# Patient Record
Sex: FEMALE | Race: WHITE | NOT HISPANIC OR LATINO | Employment: OTHER | URBAN - METROPOLITAN AREA
[De-identification: names, ages, dates, MRNs, and addresses within clinical notes are randomized per-mention and may not be internally consistent; named-entity substitution may affect disease eponyms.]

---

## 2017-01-26 ENCOUNTER — OFFICE VISIT (OUTPATIENT)
Dept: INTERNAL MEDICINE | Facility: CLINIC | Age: 82
End: 2017-01-26
Payer: MEDICARE

## 2017-01-26 VITALS
SYSTOLIC BLOOD PRESSURE: 126 MMHG | WEIGHT: 143.94 LBS | BODY MASS INDEX: 25.5 KG/M2 | RESPIRATION RATE: 12 BRPM | HEIGHT: 63 IN | TEMPERATURE: 98 F | DIASTOLIC BLOOD PRESSURE: 84 MMHG | HEART RATE: 64 BPM

## 2017-01-26 DIAGNOSIS — I25.10 CORONARY ARTERY DISEASE INVOLVING NATIVE CORONARY ARTERY OF NATIVE HEART WITHOUT ANGINA PECTORIS: ICD-10-CM

## 2017-01-26 DIAGNOSIS — F02.80 LATE ONSET ALZHEIMER'S DISEASE WITHOUT BEHAVIORAL DISTURBANCE: ICD-10-CM

## 2017-01-26 DIAGNOSIS — G30.1 LATE ONSET ALZHEIMER'S DISEASE WITHOUT BEHAVIORAL DISTURBANCE: ICD-10-CM

## 2017-01-26 DIAGNOSIS — G30.1 LATE ONSET ALZHEIMER'S DISEASE WITH BEHAVIORAL DISTURBANCE: ICD-10-CM

## 2017-01-26 DIAGNOSIS — M25.552 LEFT HIP PAIN: ICD-10-CM

## 2017-01-26 DIAGNOSIS — S72.142D CLOSED DISPLACED INTERTROCHANTERIC FRACTURE OF LEFT FEMUR WITH ROUTINE HEALING, SUBSEQUENT ENCOUNTER: ICD-10-CM

## 2017-01-26 DIAGNOSIS — I10 ESSENTIAL HYPERTENSION: ICD-10-CM

## 2017-01-26 DIAGNOSIS — I35.0 SEVERE AORTIC STENOSIS: ICD-10-CM

## 2017-01-26 DIAGNOSIS — K21.9 GASTROESOPHAGEAL REFLUX DISEASE, ESOPHAGITIS PRESENCE NOT SPECIFIED: ICD-10-CM

## 2017-01-26 DIAGNOSIS — Z23 NEED FOR TETANUS BOOSTER: ICD-10-CM

## 2017-01-26 DIAGNOSIS — R41.3 MEMORY LOSS: ICD-10-CM

## 2017-01-26 DIAGNOSIS — Z00.00 WELLNESS EXAMINATION: Primary | ICD-10-CM

## 2017-01-26 DIAGNOSIS — F32.A DEPRESSION, UNSPECIFIED DEPRESSION TYPE: ICD-10-CM

## 2017-01-26 DIAGNOSIS — E03.9 ACQUIRED HYPOTHYROIDISM: ICD-10-CM

## 2017-01-26 DIAGNOSIS — M89.9 BONE DISORDER: ICD-10-CM

## 2017-01-26 DIAGNOSIS — F02.818 LATE ONSET ALZHEIMER'S DISEASE WITH BEHAVIORAL DISTURBANCE: ICD-10-CM

## 2017-01-26 DIAGNOSIS — E78.5 HYPERLIPIDEMIA, UNSPECIFIED HYPERLIPIDEMIA TYPE: ICD-10-CM

## 2017-01-26 DIAGNOSIS — D63.8 ANEMIA OF CHRONIC DISEASE: ICD-10-CM

## 2017-01-26 DIAGNOSIS — L98.9 HAND LESION: ICD-10-CM

## 2017-01-26 PROCEDURE — 1160F RVW MEDS BY RX/DR IN RCRD: CPT | Mod: S$GLB,,, | Performed by: INTERNAL MEDICINE

## 2017-01-26 PROCEDURE — 99213 OFFICE O/P EST LOW 20 MIN: CPT | Mod: 25,S$GLB,, | Performed by: INTERNAL MEDICINE

## 2017-01-26 PROCEDURE — 90714 TD VACC NO PRESV 7 YRS+ IM: CPT | Mod: S$GLB,,, | Performed by: INTERNAL MEDICINE

## 2017-01-26 PROCEDURE — 99999 PR PBB SHADOW E&M-EST. PATIENT-LVL V: CPT | Mod: PBBFAC,,, | Performed by: INTERNAL MEDICINE

## 2017-01-26 PROCEDURE — 90471 IMMUNIZATION ADMIN: CPT | Mod: S$GLB,,, | Performed by: INTERNAL MEDICINE

## 2017-01-26 PROCEDURE — 1157F ADVNC CARE PLAN IN RCRD: CPT | Mod: S$GLB,,, | Performed by: INTERNAL MEDICINE

## 2017-01-26 PROCEDURE — 1125F AMNT PAIN NOTED PAIN PRSNT: CPT | Mod: S$GLB,,, | Performed by: INTERNAL MEDICINE

## 2017-01-26 PROCEDURE — 1159F MED LIST DOCD IN RCRD: CPT | Mod: S$GLB,,, | Performed by: INTERNAL MEDICINE

## 2017-01-26 RX ORDER — LEVOTHYROXINE SODIUM 50 UG/1
50 TABLET ORAL
Qty: 90 TABLET | Refills: 1 | Status: SHIPPED | OUTPATIENT
Start: 2017-01-26 | End: 2017-05-31 | Stop reason: SDUPTHER

## 2017-01-26 RX ORDER — LOSARTAN POTASSIUM 25 MG/1
12.5 TABLET ORAL DAILY
Qty: 30 TABLET | Refills: 3 | Status: ON HOLD | OUTPATIENT
Start: 2017-01-26 | End: 2017-03-20 | Stop reason: HOSPADM

## 2017-01-26 RX ORDER — ATORVASTATIN CALCIUM 40 MG/1
40 TABLET, FILM COATED ORAL DAILY
Qty: 90 TABLET | Refills: 3 | Status: SHIPPED | OUTPATIENT
Start: 2017-01-26 | End: 2018-04-10 | Stop reason: SDUPTHER

## 2017-01-26 RX ORDER — ALPRAZOLAM 0.25 MG/1
0.25 TABLET ORAL NIGHTLY PRN
Qty: 30 TABLET | Refills: 0 | Status: SHIPPED | OUTPATIENT
Start: 2017-01-26 | End: 2017-01-26

## 2017-01-30 ENCOUNTER — HOSPITAL ENCOUNTER (OUTPATIENT)
Dept: RADIOLOGY | Facility: HOSPITAL | Age: 82
Discharge: HOME | DRG: 291 | End: 2017-01-30
Attending: ORTHOPAEDIC SURGERY
Payer: MEDICARE

## 2017-01-30 ENCOUNTER — OFFICE VISIT (OUTPATIENT)
Dept: ORTHOPEDICS | Facility: CLINIC | Age: 82
DRG: 291 | End: 2017-01-30
Payer: MEDICARE

## 2017-01-30 VITALS
WEIGHT: 143.94 LBS | RESPIRATION RATE: 16 BRPM | DIASTOLIC BLOOD PRESSURE: 74 MMHG | HEART RATE: 75 BPM | HEIGHT: 63 IN | SYSTOLIC BLOOD PRESSURE: 151 MMHG | BODY MASS INDEX: 25.5 KG/M2

## 2017-01-30 DIAGNOSIS — M25.552 LEFT HIP PAIN: Primary | ICD-10-CM

## 2017-01-30 DIAGNOSIS — M25.552 LEFT HIP PAIN: ICD-10-CM

## 2017-01-30 DIAGNOSIS — M70.72 BURSITIS OF HIP, LEFT: ICD-10-CM

## 2017-01-30 PROCEDURE — 1125F AMNT PAIN NOTED PAIN PRSNT: CPT | Mod: S$GLB,,, | Performed by: PHYSICIAN ASSISTANT

## 2017-01-30 PROCEDURE — 1160F RVW MEDS BY RX/DR IN RCRD: CPT | Mod: S$GLB,,, | Performed by: PHYSICIAN ASSISTANT

## 2017-01-30 PROCEDURE — 1159F MED LIST DOCD IN RCRD: CPT | Mod: S$GLB,,, | Performed by: PHYSICIAN ASSISTANT

## 2017-01-30 PROCEDURE — 1157F ADVNC CARE PLAN IN RCRD: CPT | Mod: S$GLB,,, | Performed by: PHYSICIAN ASSISTANT

## 2017-01-30 PROCEDURE — 73521 X-RAY EXAM HIPS BI 2 VIEWS: CPT | Mod: 26,,, | Performed by: RADIOLOGY

## 2017-01-30 PROCEDURE — 99213 OFFICE O/P EST LOW 20 MIN: CPT | Mod: 25,S$GLB,, | Performed by: PHYSICIAN ASSISTANT

## 2017-01-30 PROCEDURE — 20610 DRAIN/INJ JOINT/BURSA W/O US: CPT | Mod: LT,S$GLB,, | Performed by: PHYSICIAN ASSISTANT

## 2017-01-30 PROCEDURE — 99999 PR PBB SHADOW E&M-EST. PATIENT-LVL IV: CPT | Mod: PBBFAC,,, | Performed by: PHYSICIAN ASSISTANT

## 2017-01-30 RX ORDER — BETAMETHASONE SODIUM PHOSPHATE AND BETAMETHASONE ACETATE 3; 3 MG/ML; MG/ML
12 INJECTION, SUSPENSION INTRA-ARTICULAR; INTRALESIONAL; INTRAMUSCULAR; SOFT TISSUE
Status: COMPLETED | OUTPATIENT
Start: 2017-01-30 | End: 2017-01-30

## 2017-01-30 RX ADMIN — BETAMETHASONE SODIUM PHOSPHATE AND BETAMETHASONE ACETATE 12 MG: 3; 3 INJECTION, SUSPENSION INTRA-ARTICULAR; INTRALESIONAL; INTRAMUSCULAR; SOFT TISSUE at 03:01

## 2017-01-30 NOTE — LETTER
January 30, 2017      Vishal Avendaño MD  [address] [facility] [place] [address]           [facility]  [address]  [place] [address]  Phone: [phone]          Patient: Janine Awad   MR Number: [id number]   YOB: 1927   Date of Visit: 1/30/2017       Dear Dr. Vishal Avendaño:    Thank you for referring Janine Awad to me for evaluation. Attached you will find relevant portions of my assessment and plan of care.    If you have questions, please do not hesitate to call me. I look forward to following Janine Awad along with you.    Sincerely,    Jamin Mustafa PA-C    Enclosure  CC:  No Recipients    If you would like to receive this communication electronically, please contact [email or web address] or [phone] to request more information on [employer] Link access.    For providers and/or their staff who would like to refer a patient to [facility], please contact us through our one-stop-shop provider referral line, Angelic Major, at [phone].    If you feel you have received this communication in error or would no longer like to receive these types of communications, please e-mail [email or web address]

## 2017-01-30 NOTE — PROGRESS NOTES
SUBJECTIVE:     Chief Complaint & History of Present Illness:  Janine Awad is a Established patient 89 y.o. female who is seen here today with a complaint of    Chief Complaint   Patient presents with    Left Hip - Pain     Patient suffered a fall in June 2016.     .  She is patient well known to us has undergone an IM nailing of her left hip in June 2016 has recovered very well.  She gel developed some soreness and tenderness in the lateral aspect of the left hip associated with standing and ambulation  On a scale of 1-10, with 10 being worst pain imaginable, he rates this pain as 5 on good days and 6 on bad days.  she describes the pain as tender.      Past Medical History   Diagnosis Date    Acquired hypothyroidism     Closed displaced intertrochanteric fracture of left femur s/p IM nail on 6/5/2016 6/5/2016    Coronary artery disease involving native coronary artery of native heart without angina pectoris     Essential hypertension     GERD (gastroesophageal reflux disease)     Hx of colonic polyps     Hyperlipidemia     Macular degeneration     Mitral valve stenosis     Severe aortic stenosis 9/22/2014    Syncope 1/5/2015       Past Surgical History   Procedure Laterality Date    Intertrochanteric hip fracture surgery Left 06/05/2016     IM nail       Family History   Problem Relation Age of Onset    Heart disease Mother     Cancer Mother     Heart disease Father     No Known Problems Sister     No Known Problems Brother     No Known Problems Maternal Grandmother     No Known Problems Maternal Grandfather     No Known Problems Paternal Grandmother     No Known Problems Paternal Grandfather     No Known Problems Maternal Aunt     No Known Problems Maternal Uncle     No Known Problems Paternal Aunt     No Known Problems Paternal Uncle     Anemia Neg Hx     Arrhythmia Neg Hx     Asthma Neg Hx     Clotting disorder Neg Hx     Fainting Neg Hx     Heart attack Neg Hx      Heart failure Neg Hx     Hyperlipidemia Neg Hx     Hypertension Neg Hx     Stroke Neg Hx     Atrial Septal Defect Neg Hx        Review of patient's allergies indicates:   Allergen Reactions    Codeine      chills    Lisinopril Other (See Comments)     cough         Current Outpatient Prescriptions:     acetaminophen (TYLENOL) 325 MG tablet, Take 2 tablets (650 mg total) by mouth every 6 (six) hours as needed for Pain., Disp: , Rfl: 0    aspirin (ECOTRIN) 81 MG EC tablet, Take 81 mg by mouth once daily., Disp: , Rfl:     atorvastatin (LIPITOR) 40 MG tablet, Take 1 tablet (40 mg total) by mouth once daily., Disp: 90 tablet, Rfl: 3    calcium citrate-vitamin D3 315-200 mg (CITRACAL+D) 315-200 mg-unit per tablet, Take 1 tablet by mouth once daily., Disp: , Rfl:     COCONUT OIL ORAL, Take by mouth., Disp: , Rfl:     estrogens, conjugated, (PREMARIN) 0.3 MG tablet, Take 0.3 mg by mouth once daily., Disp: , Rfl:     fish oil-omega-3 fatty acids 300-1,000 mg capsule, Take 2 g by mouth once daily., Disp: , Rfl:     GINSENG ORAL, Take by mouth., Disp: , Rfl:     levothyroxine (SYNTHROID) 50 MCG tablet, Take 1 tablet (50 mcg total) by mouth before breakfast., Disp: 90 tablet, Rfl: 1    losartan (COZAAR) 25 MG tablet, Take 0.5 tablets (12.5 mg total) by mouth once daily. Hold for SBP < 110., Disp: 30 tablet, Rfl: 3    omeprazole (PRILOSEC) 20 MG capsule, Take 20 mg by mouth once daily. , Disp: , Rfl:     polyethylene glycol (GLYCOLAX) 17 gram PwPk, Take 17 g by mouth 2 (two) times daily., Disp: , Rfl: 0    senna-docusate 8.6-50 mg (PERICOLACE) 8.6-50 mg per tablet, Take 2 tablets by mouth 2 (two) times daily., Disp: , Rfl:     vitamin D 1000 units Tab, Take 185 mg by mouth once daily., Disp: , Rfl:     ZINC ACETATE ORAL, Take by mouth., Disp: , Rfl:     Review of Systems:  ROS:  Constitutional: no fever or chills  Eyes: no visual changes, Positive macular degeneration  ENT: no nasal congestion or sore  "throat  Respiratory: no cough or shortness of breath  Cardiovascular: no chest pain or palpitations, Positive for mitral valve stenosis, CAD, severe aortic stenosis  Gastrointestinal: no nausea or vomiting, tolerating diet, Positive for GERD  Genitourinary: no hematuria or dysuria  Integument/Breast: no rash or pruritis  Hematologic/Lymphatic: no easy bruising or lymphadenopathy, Positive hypothyroidism  Musculoskeletal: no arthralgias or myalgias  Neurological: no seizures or tremors, Positive for dementia Alzheimer's disease  Behavioral/Psych: no auditory or visual hallucinations, Positive for depression  Endocrine: no heat or cold intolerance      PE:  Visit Vitals    BP (!) 151/74    Pulse 75    Resp 16    Ht 5' 3" (1.6 m)    Wt 65.3 kg (143 lb 15.4 oz)    LMP  (LMP Unknown)    BMI 25.5 kg/m2     General: Pleasant, cooperative, NAD   HEENT: NCAT, sclera nonicteric   Lungs: Respirations are equal and unlabored.   Abdomen: Soft and non-tender.  CV: 2+ bilateral upper and lower extremity pulses.   Skin: Intact throughout LE with no rashes, erythema, or lesions  Extremities: No LE edema, NVI lower extremities        Hip Exam:   leftpositives: tenderness over greater trochanter and negatives: FROM  no pain with heel impact  pulses full    120 degrees flexion  -05 degrees extension   30 degrees internal rotation  45 degrees external rotation  40 degrees abduction  -05 degrees adduction   0 flexion contracture      RADIOGRAPHS:  X-rays taken today films reviewed by me demonstrate IM nail in good alignment unchanged from previous x-rays no evidence of fracture dislocation or other degenerative joint disease joint spaces well maintained    ASSESSMENT/PLAN:     Plan: We discussed with the patient at length all the different treatment options available for arthrosis of the hip including anti-inflammatories, acetaminophen, rest, ice, lower extremity strengthening exercise, occasional cortisone injections for " temporary relief, and finally total hip arthroplasty.   We will proceed with therapeutic diagnostic injection of the left greater trochanteric bursa     The injection site was identified and the skin was prepared with an ETOH solution. The   left  greater trochanteric bursa was injected with 1 ml of Celestone and 5 ml Lidocaine under sterile technique. Janine Awad tolerated the procedure well, she was advised to rest the  hip  today, ice and support. she did receive immediate relief of the pain in and about her  hip  she was told this would be short lived and is secondary to the lidocaine. she may have an increase in her discomfort tonight followed by steady improvement over the next several days. It may take 1-3 weeks following the injection to get the full benefit of the medication.  I will see her back in 3-6 months. Sooner if she has any problems or concerns.    Janine Awad was advised to monitor her blood sugars closely over the next several days. The steroid may cause a rise in them. If her glucose levels rise to a point she is uncomfortable or he is unable to control them is is to contact his PCP or go immediately to the emergency department.

## 2017-02-01 ENCOUNTER — TELEPHONE (OUTPATIENT)
Dept: INTERNAL MEDICINE | Facility: CLINIC | Age: 82
End: 2017-02-01

## 2017-02-01 ENCOUNTER — HOSPITAL ENCOUNTER (INPATIENT)
Facility: HOSPITAL | Age: 82
LOS: 2 days | Discharge: HOME HEALTH | DRG: 291 | End: 2017-02-03
Attending: EMERGENCY MEDICINE | Admitting: INTERNAL MEDICINE
Payer: MEDICARE

## 2017-02-01 DIAGNOSIS — I50.9 HEART FAILURE: ICD-10-CM

## 2017-02-01 DIAGNOSIS — I50.9 ACUTE ON CHRONIC CONGESTIVE HEART FAILURE, UNSPECIFIED CONGESTIVE HEART FAILURE TYPE: ICD-10-CM

## 2017-02-01 DIAGNOSIS — J81.0 ACUTE PULMONARY EDEMA: ICD-10-CM

## 2017-02-01 DIAGNOSIS — J18.9 PNEUMONIA OF LEFT LOWER LOBE DUE TO INFECTIOUS ORGANISM: Primary | ICD-10-CM

## 2017-02-01 DIAGNOSIS — R09.02 HYPOXIA: ICD-10-CM

## 2017-02-01 DIAGNOSIS — R06.02 SHORTNESS OF BREATH: ICD-10-CM

## 2017-02-01 LAB
ALBUMIN SERPL BCP-MCNC: 3.5 G/DL
ALP SERPL-CCNC: 93 U/L
ALT SERPL W/O P-5'-P-CCNC: 15 U/L
ANION GAP SERPL CALC-SCNC: 11 MMOL/L
AST SERPL-CCNC: 19 U/L
BACTERIA #/AREA URNS AUTO: ABNORMAL /HPF
BASOPHILS # BLD AUTO: 0.04 K/UL
BASOPHILS NFR BLD: 0.2 %
BILIRUB SERPL-MCNC: 1.5 MG/DL
BILIRUB UR QL STRIP: NEGATIVE
BNP SERPL-MCNC: 852 PG/ML
BUN SERPL-MCNC: 14 MG/DL
CALCIUM SERPL-MCNC: 9.1 MG/DL
CHLORIDE SERPL-SCNC: 103 MMOL/L
CLARITY UR REFRACT.AUTO: CLEAR
CO2 SERPL-SCNC: 24 MMOL/L
COLOR UR AUTO: YELLOW
CREAT SERPL-MCNC: 0.7 MG/DL
DIFFERENTIAL METHOD: ABNORMAL
EOSINOPHIL # BLD AUTO: 0.1 K/UL
EOSINOPHIL NFR BLD: 0.9 %
ERYTHROCYTE [DISTWIDTH] IN BLOOD BY AUTOMATED COUNT: 14.7 %
EST. GFR  (AFRICAN AMERICAN): >60 ML/MIN/1.73 M^2
EST. GFR  (NON AFRICAN AMERICAN): >60 ML/MIN/1.73 M^2
GLUCOSE SERPL-MCNC: 94 MG/DL
GLUCOSE UR QL STRIP: NEGATIVE
HCT VFR BLD AUTO: 35.3 %
HGB BLD-MCNC: 11.7 G/DL
HGB UR QL STRIP: NEGATIVE
INR PPP: 1
KETONES UR QL STRIP: NEGATIVE
LACTATE SERPL-SCNC: 1.9 MMOL/L
LEUKOCYTE ESTERASE UR QL STRIP: ABNORMAL
LIPASE SERPL-CCNC: 10 U/L
LYMPHOCYTES # BLD AUTO: 2.1 K/UL
LYMPHOCYTES NFR BLD: 12.8 %
MCH RBC QN AUTO: 31.1 PG
MCHC RBC AUTO-ENTMCNC: 33.1 %
MCV RBC AUTO: 94 FL
MICROSCOPIC COMMENT: ABNORMAL
MONOCYTES # BLD AUTO: 0.9 K/UL
MONOCYTES NFR BLD: 5.8 %
NEUTROPHILS # BLD AUTO: 12.8 K/UL
NEUTROPHILS NFR BLD: 79.7 %
NITRITE UR QL STRIP: NEGATIVE
PH UR STRIP: 8 [PH] (ref 5–8)
PLATELET # BLD AUTO: 229 K/UL
PMV BLD AUTO: 10.5 FL
POTASSIUM SERPL-SCNC: 4.2 MMOL/L
PROT SERPL-MCNC: 7.1 G/DL
PROT UR QL STRIP: NEGATIVE
PROTHROMBIN TIME: 11 SEC
RBC # BLD AUTO: 3.76 M/UL
RBC #/AREA URNS AUTO: 1 /HPF (ref 0–4)
SODIUM SERPL-SCNC: 138 MMOL/L
SP GR UR STRIP: 1.01 (ref 1–1.03)
SQUAMOUS #/AREA URNS AUTO: 4 /HPF
T4 FREE SERPL-MCNC: 0.8 NG/DL
TROPONIN I SERPL DL<=0.01 NG/ML-MCNC: 0.04 NG/ML
TROPONIN I SERPL DL<=0.01 NG/ML-MCNC: 0.05 NG/ML
URN SPEC COLLECT METH UR: ABNORMAL
UROBILINOGEN UR STRIP-ACNC: NEGATIVE EU/DL
WBC # BLD AUTO: 16.07 K/UL
WBC #/AREA URNS AUTO: 18 /HPF (ref 0–5)

## 2017-02-01 PROCEDURE — 84439 ASSAY OF FREE THYROXINE: CPT

## 2017-02-01 PROCEDURE — 83605 ASSAY OF LACTIC ACID: CPT

## 2017-02-01 PROCEDURE — 99285 EMERGENCY DEPT VISIT HI MDM: CPT | Mod: 25

## 2017-02-01 PROCEDURE — 87040 BLOOD CULTURE FOR BACTERIA: CPT

## 2017-02-01 PROCEDURE — 63600175 PHARM REV CODE 636 W HCPCS: Performed by: EMERGENCY MEDICINE

## 2017-02-01 PROCEDURE — 25000003 PHARM REV CODE 250: Performed by: INTERNAL MEDICINE

## 2017-02-01 PROCEDURE — 85025 COMPLETE CBC W/AUTO DIFF WBC: CPT

## 2017-02-01 PROCEDURE — 96372 THER/PROPH/DIAG INJ SC/IM: CPT

## 2017-02-01 PROCEDURE — 93010 ELECTROCARDIOGRAM REPORT: CPT | Mod: ,,, | Performed by: INTERNAL MEDICINE

## 2017-02-01 PROCEDURE — 93005 ELECTROCARDIOGRAM TRACING: CPT

## 2017-02-01 PROCEDURE — 85610 PROTHROMBIN TIME: CPT

## 2017-02-01 PROCEDURE — 83690 ASSAY OF LIPASE: CPT

## 2017-02-01 PROCEDURE — 25000003 PHARM REV CODE 250: Performed by: EMERGENCY MEDICINE

## 2017-02-01 PROCEDURE — 20600001 HC STEP DOWN PRIVATE ROOM

## 2017-02-01 PROCEDURE — 96366 THER/PROPH/DIAG IV INF ADDON: CPT

## 2017-02-01 PROCEDURE — 84484 ASSAY OF TROPONIN QUANT: CPT

## 2017-02-01 PROCEDURE — 63600175 PHARM REV CODE 636 W HCPCS: Performed by: INTERNAL MEDICINE

## 2017-02-01 PROCEDURE — 84484 ASSAY OF TROPONIN QUANT: CPT | Mod: 91

## 2017-02-01 PROCEDURE — 96376 TX/PRO/DX INJ SAME DRUG ADON: CPT

## 2017-02-01 PROCEDURE — 81001 URINALYSIS AUTO W/SCOPE: CPT

## 2017-02-01 PROCEDURE — 96375 TX/PRO/DX INJ NEW DRUG ADDON: CPT

## 2017-02-01 PROCEDURE — 80053 COMPREHEN METABOLIC PANEL: CPT

## 2017-02-01 PROCEDURE — 99285 EMERGENCY DEPT VISIT HI MDM: CPT | Mod: ,,, | Performed by: EMERGENCY MEDICINE

## 2017-02-01 PROCEDURE — 96367 TX/PROPH/DG ADDL SEQ IV INF: CPT

## 2017-02-01 PROCEDURE — 96365 THER/PROPH/DIAG IV INF INIT: CPT

## 2017-02-01 PROCEDURE — 83880 ASSAY OF NATRIURETIC PEPTIDE: CPT

## 2017-02-01 RX ORDER — ASPIRIN 325 MG
325 TABLET ORAL
Status: DISCONTINUED | OUTPATIENT
Start: 2017-02-01 | End: 2017-02-01

## 2017-02-01 RX ORDER — AZITHROMYCIN 250 MG/1
250 TABLET, FILM COATED ORAL DAILY
Status: DISCONTINUED | OUTPATIENT
Start: 2017-02-02 | End: 2017-02-03 | Stop reason: HOSPADM

## 2017-02-01 RX ORDER — LEVOTHYROXINE SODIUM 25 UG/1
50 TABLET ORAL
Status: DISCONTINUED | OUTPATIENT
Start: 2017-02-02 | End: 2017-02-03 | Stop reason: HOSPADM

## 2017-02-01 RX ORDER — HEPARIN SODIUM 5000 [USP'U]/ML
5000 INJECTION, SOLUTION INTRAVENOUS; SUBCUTANEOUS EVERY 8 HOURS
Status: DISCONTINUED | OUTPATIENT
Start: 2017-02-01 | End: 2017-02-02

## 2017-02-01 RX ORDER — ACETAMINOPHEN 325 MG/1
650 TABLET ORAL EVERY 6 HOURS PRN
Status: DISCONTINUED | OUTPATIENT
Start: 2017-02-01 | End: 2017-02-03 | Stop reason: HOSPADM

## 2017-02-01 RX ORDER — AMOXICILLIN 250 MG
2 CAPSULE ORAL 2 TIMES DAILY PRN
Status: DISCONTINUED | OUTPATIENT
Start: 2017-02-01 | End: 2017-02-03 | Stop reason: HOSPADM

## 2017-02-01 RX ORDER — ASPIRIN 81 MG/1
81 TABLET ORAL DAILY
Status: DISCONTINUED | OUTPATIENT
Start: 2017-02-02 | End: 2017-02-03 | Stop reason: HOSPADM

## 2017-02-01 RX ORDER — FUROSEMIDE 10 MG/ML
10 INJECTION INTRAMUSCULAR; INTRAVENOUS
Status: COMPLETED | OUTPATIENT
Start: 2017-02-01 | End: 2017-02-01

## 2017-02-01 RX ORDER — POLYETHYLENE GLYCOL 3350 17 G/17G
17 POWDER, FOR SOLUTION ORAL 2 TIMES DAILY PRN
Status: DISCONTINUED | OUTPATIENT
Start: 2017-02-01 | End: 2017-02-03 | Stop reason: HOSPADM

## 2017-02-01 RX ORDER — ATORVASTATIN CALCIUM 20 MG/1
40 TABLET, FILM COATED ORAL DAILY
Status: DISCONTINUED | OUTPATIENT
Start: 2017-02-02 | End: 2017-02-03 | Stop reason: HOSPADM

## 2017-02-01 RX ORDER — FUROSEMIDE 10 MG/ML
20 INJECTION INTRAMUSCULAR; INTRAVENOUS DAILY
Status: DISCONTINUED | OUTPATIENT
Start: 2017-02-01 | End: 2017-02-03 | Stop reason: HOSPADM

## 2017-02-01 RX ORDER — HYDROCODONE BITARTRATE AND ACETAMINOPHEN 5; 325 MG/1; MG/1
1 TABLET ORAL
Status: DISPENSED | OUTPATIENT
Start: 2017-02-01 | End: 2017-02-02

## 2017-02-01 RX ORDER — PANTOPRAZOLE SODIUM 40 MG/1
40 TABLET, DELAYED RELEASE ORAL DAILY
Status: DISCONTINUED | OUTPATIENT
Start: 2017-02-02 | End: 2017-02-03 | Stop reason: HOSPADM

## 2017-02-01 RX ADMIN — CEFTRIAXONE 1 G: 1 INJECTION, SOLUTION INTRAVENOUS at 06:02

## 2017-02-01 RX ADMIN — FUROSEMIDE 10 MG: 10 INJECTION, SOLUTION INTRAMUSCULAR; INTRAVENOUS at 06:02

## 2017-02-01 RX ADMIN — ACETAMINOPHEN 650 MG: 325 TABLET ORAL at 07:02

## 2017-02-01 RX ADMIN — FUROSEMIDE 20 MG: 10 INJECTION, SOLUTION INTRAVENOUS at 09:02

## 2017-02-01 RX ADMIN — HEPARIN SODIUM 5000 UNITS: 5000 INJECTION, SOLUTION INTRAVENOUS; SUBCUTANEOUS at 10:02

## 2017-02-01 RX ADMIN — Medication 1 CAPSULE: at 09:02

## 2017-02-01 RX ADMIN — AZITHROMYCIN MONOHYDRATE 500 MG: 500 INJECTION, POWDER, LYOPHILIZED, FOR SOLUTION INTRAVENOUS at 07:02

## 2017-02-01 NOTE — ED PROVIDER NOTES
"Encounter Date: 2/1/2017    SCRIBE #1 NOTE: I, Ashley Dykes, am scribing for, and in the presence of,  Dr. Marin. I have scribed the entire note.       History     Chief Complaint   Patient presents with    Chest Pain     Episode of SOB while getting dresse to go to a doctor's appointment.  left sided chest pain. Alzheimers     Review of patient's allergies indicates:   Allergen Reactions    Codeine      chills    Lisinopril Other (See Comments)     cough     HPI     Time seen by provider: 4:39 PM    This is a 89 y.o. female with PM Hx of GERD, essential HTN, severe aortic stenosis (9/22/14), and with Alzheimer's who presents to ER. Pt's daughter called in to PCP stating pt had a fever at approximately 101 Fahrenheit. Associated symptoms included increased weakness, wheezing, neck pain, and upper back pain. Daughter attempted to get an appointment but stated her mother was too weak and decided to bring her to ER instead. Pt also c/o mid-chest pain when she stood up associated with SOB. She states, "my breathing doesn't seem to be normal." Pt denies cough, nausea, vomiting, or any other symptoms at this time. Took two baby aspirin PTA.       Past Medical History   Diagnosis Date    Acquired hypothyroidism     Closed displaced intertrochanteric fracture of left femur s/p IM nail on 6/5/2016 6/5/2016    Coronary artery disease involving native coronary artery of native heart without angina pectoris     Essential hypertension     GERD (gastroesophageal reflux disease)     Hx of colonic polyps     Hyperlipidemia     Macular degeneration     Mitral valve stenosis     Severe aortic stenosis 9/22/2014    Syncope 1/5/2015     Past Medical History Pertinent Negatives   Diagnosis Date Noted    AAA (abdominal aortic aneurysm) 1/5/2015    Acute coronary syndrome 1/5/2015    Asthma 1/5/2015    Atrial fibrillation 1/5/2015    Atrial flutter 1/5/2015    Cancer 1/5/2015    Cardiomyopathy 1/5/2015    Carotid " artery occlusion 1/5/2015    CHF (congestive heart failure) 1/5/2015    Clotting disorder 1/5/2015    Diabetes mellitus 1/5/2015    Heart block 1/5/2015    Heart murmur 1/5/2015    Sleep apnea 1/5/2015    Stroke 1/5/2015    Supraventricular tachycardia 1/5/2015    Valvular regurgitation 1/5/2015    Ventricular tachycardia 1/5/2015     Past Surgical History   Procedure Laterality Date    Intertrochanteric hip fracture surgery Left 06/05/2016     IM nail     Family History   Problem Relation Age of Onset    Heart disease Mother     Cancer Mother     Heart disease Father     No Known Problems Sister     No Known Problems Brother     No Known Problems Maternal Grandmother     No Known Problems Maternal Grandfather     No Known Problems Paternal Grandmother     No Known Problems Paternal Grandfather     No Known Problems Maternal Aunt     No Known Problems Maternal Uncle     No Known Problems Paternal Aunt     No Known Problems Paternal Uncle     Anemia Neg Hx     Arrhythmia Neg Hx     Asthma Neg Hx     Clotting disorder Neg Hx     Fainting Neg Hx     Heart attack Neg Hx     Heart failure Neg Hx     Hyperlipidemia Neg Hx     Hypertension Neg Hx     Stroke Neg Hx     Atrial Septal Defect Neg Hx      Social History   Substance Use Topics    Smoking status: Never Smoker    Smokeless tobacco: Never Used    Alcohol use 0.6 oz/week     1 Standard drinks or equivalent per week      Comment: rare      Review of Systems   Constitutional: Positive for fever (101 at home).   Respiratory: Positive for shortness of breath and wheezing. Negative for cough.    Cardiovascular: Positive for chest pain (mid chest).   Gastrointestinal: Negative for nausea and vomiting.   Musculoskeletal: Positive for back pain (upper back) and neck pain.   Neurological: Positive for weakness.   All other systems reviewed and are negative.      Physical Exam   Initial Vitals   BP Pulse Resp Temp SpO2   02/01/17 1524  02/01/17 1524 02/01/17 1524 02/01/17 1524 02/01/17 1524   169/69 80 18 98.9 °F (37.2 °C) 91 %     Physical Exam     Gen/Constitutional: Interactive. No acute distress  Head: Normocephalic, Atraumatic  Neck: supple, no masses or LAD  Eyes: PERRLA, conjunctiva clear  Ears, Nose and Throat: No rhinorrhea or stridor.  Cardiac: Reg Rhythm, No murmur  Pulmonary: CTA Bilat, no wheezes, rhonchi. Scattered rales in left lower lobe. No accessory muscle use.   GI: Abdomen soft, non-tender, non-distended; no rebound or guarding  : No CVA tenderness.  Musculoskeletal: Extremities warm, well perfused, no erythema, no edema  Skin: No rashes  Neuro: Alert and Oriented x 3; No focal motor or sensory deficits.    Psych: Normal affect      ED Course   Procedures  Labs Reviewed   CBC W/ AUTO DIFFERENTIAL - Abnormal; Notable for the following:        Result Value    WBC 16.07 (*)     RBC 3.76 (*)     Hemoglobin 11.7 (*)     Hematocrit 35.3 (*)     MCH 31.1 (*)     RDW 14.7 (*)     Gran # 12.8 (*)     Gran% 79.7 (*)     Lymph% 12.8 (*)     All other components within normal limits   COMPREHENSIVE METABOLIC PANEL - Abnormal; Notable for the following:     Total Bilirubin 1.5 (*)     All other components within normal limits   TROPONIN I - Abnormal; Notable for the following:     Troponin I 0.035 (*)     All other components within normal limits   B-TYPE NATRIURETIC PEPTIDE - Abnormal; Notable for the following:      (*)     All other components within normal limits   URINALYSIS - Abnormal; Notable for the following:     Leukocytes, UA 3+ (*)     All other components within normal limits   URINALYSIS MICROSCOPIC - Abnormal; Notable for the following:     WBC, UA 18 (*)     All other components within normal limits   CULTURE, BLOOD   CULTURE, BLOOD   LACTIC ACID, PLASMA   LIPASE   PROTIME-INR   TSH   T4, FREE   TROPONIN I     Imaging Results         X-Ray Chest 1 View (Final result) Result time:  02/01/17 18:29:23    Final result  by Dean Garza MD (02/01/17 18:29:23)    Impression:      Patchy areas of heterogeneous, predominantly airspace, consolidation in the middle and lower lung zones bilaterally.  Findings could relate to pneumonia, atelectasis, or pulmonary edema.  ______________________________________     Electronically signed by resident: DEAN GARZA MD  Date:     02/01/17  Time:    18:05            As the supervising and teaching physician, I personally reviewed the images and resident's interpretation and I agree with the findings.            Electronically signed by: RAKAN LAZAR MD  Date:     02/01/17  Time:    18:29     Narrative:    Technique:  Chest AP radiograph    Comparison: Radiograph 6/4/2016.    Findings:   Lung volumes are normal and symmetric.  Mediastinal arches are midline.  The cardiac silhouette is enlarged, stable.  Patchy, nonsegmental heterogeneous opacities are seen in the middle and lower lung zones bilaterally.  Atherosclerotic calcification is noted at the level of the aortic arch.  No pleural effusion or pneumothorax is seen.  The osseous structures demonstrate degenerative change without evidence of acute fracture or osseous destructive process.                EKG Readings: (Independently Interpreted)   EKG: NSR, no CRISTIAN's or STD's, non-specific twave pattern, no STEMI, and no changes when compared to prior.             Medical Decision Making:   History:   Old Medical Records: I decided to obtain old medical records.  Independently Interpreted Test(s):   I have ordered and independently interpreted EKG Reading(s) - see prior notes    Clinical Tests:  Labs Test(s) were ordered and reviewed by me.  Radiological study(s) were ordered and reviewed by me.  Medical test(s) were ordered and reviewed by me.      Pt presents with SOB, CP, and reported fever at home. My primary concern was evaluation for  PNA, MI/ACS, and COPD exacerbation, among other diagnoses. Plan to get cardiac labs, EKG, and CXR. Also  considered PE but felt it to be unlikely based on hx and exam.       5:43 PM  CXR consistent with mild pulmonary edema bilaterally as well as likely infiltrate in the left lower lobe. Given these findings I felt she warranted coverage for CAP as well as lasix for diuresis, and I felt she warranted admission to medicine for treatment of PNA and heart failure. Accepted for admit by [facility] because of new dx of heart failure.           Scribe Attestation:   Scribe #1: I performed the above scribed service and the documentation accurately describes the services I performed. I attest to the accuracy of the note.    Attending Attestation:           Physician Attestation for Scribe:  Physician Attestation Statement for Scribe #1: I, Dr. Marin, reviewed documentation, as scribed by Ashley Dykes in my presence, and it is both accurate and complete.                 ED Course     Clinical Impression:   The primary encounter diagnosis was Pneumonia of left lower lobe due to infectious organism. Diagnoses of Shortness of breath, Acute on chronic congestive heart failure, unspecified congestive heart failure type, and Hypoxia were also pertinent to this visit.    Disposition:   Disposition: Admitted  Condition: Leonardo Marin MD  02/01/17 0312

## 2017-02-01 NOTE — TELEPHONE ENCOUNTER
Received call from phone staff- phone staff said that she tried to call triage line,but was place on hold. Told phone staff to go ahead and give me call, since she really didn't know why daughter was calling,except that pt was seen by Dr Avendaño on Monday(Dr Avendaño was not here).     Spoke with daughter,she said that pt is running a fever of 100-101,and pt says she is wheezing,even though daughter doesn't hear anything,and pt is weak.     The daughter wanted Dr Avendaño to do something for pt,since pt was seen last week. She was explained to that this was a new problem,and would need to be seen.  An appt was given for today at 340 with Dr Enciso. Daughter did say that she didn't know if she could get her mother in cause she was weak. She is aware that if she couldn't get help,she should call 911,and bring pt to ER and get evaluated.      Dr Avendaño was made verbally aware of the phone conversation,and agreed with plan.

## 2017-02-01 NOTE — PROVIDER PROGRESS NOTES - EMERGENCY DEPT.
Encounter Date: 2/1/2017    ED Physician Progress Notes       SCRIBE NOTE: I, Abraham Walker, am scribing for, and in the presence of,  Daniel Carrasco MD.  Physician Statement: I, Daniel Carrasco MD, personally performed the services described in this documentation as scribed by Abraham Walker in my presence, and it is both accurate and complete.      EKG - STEMI Decision  Initial Reading: No STEMI present.

## 2017-02-02 PROBLEM — J81.0 ACUTE PULMONARY EDEMA: Status: ACTIVE | Noted: 2017-02-02

## 2017-02-02 LAB
ALBUMIN SERPL BCP-MCNC: 3.4 G/DL
ALP SERPL-CCNC: 86 U/L
ALT SERPL W/O P-5'-P-CCNC: 13 U/L
ANION GAP SERPL CALC-SCNC: 11 MMOL/L
AORTIC VALVE REGURGITATION: ABNORMAL
AORTIC VALVE STENOSIS: ABNORMAL
APTT BLDCRRT: 25.3 SEC
AST SERPL-CCNC: 22 U/L
BASOPHILS # BLD AUTO: 0.05 K/UL
BASOPHILS # BLD AUTO: 0.05 K/UL
BASOPHILS NFR BLD: 0.5 %
BASOPHILS NFR BLD: 0.5 %
BILIRUB SERPL-MCNC: 1.5 MG/DL
BUN SERPL-MCNC: 20 MG/DL
CALCIUM SERPL-MCNC: 9.3 MG/DL
CHLORIDE SERPL-SCNC: 102 MMOL/L
CO2 SERPL-SCNC: 28 MMOL/L
CREAT SERPL-MCNC: 0.9 MG/DL
DIASTOLIC DYSFUNCTION: YES
DIFFERENTIAL METHOD: ABNORMAL
DIFFERENTIAL METHOD: ABNORMAL
EOSINOPHIL # BLD AUTO: 0.3 K/UL
EOSINOPHIL # BLD AUTO: 0.3 K/UL
EOSINOPHIL NFR BLD: 2.8 %
EOSINOPHIL NFR BLD: 2.8 %
ERYTHROCYTE [DISTWIDTH] IN BLOOD BY AUTOMATED COUNT: 14.8 %
ERYTHROCYTE [DISTWIDTH] IN BLOOD BY AUTOMATED COUNT: 14.8 %
EST. GFR  (AFRICAN AMERICAN): >60 ML/MIN/1.73 M^2
EST. GFR  (NON AFRICAN AMERICAN): 56.9 ML/MIN/1.73 M^2
GLUCOSE SERPL-MCNC: 96 MG/DL
HCT VFR BLD AUTO: 36.8 %
HCT VFR BLD AUTO: 36.8 %
HGB BLD-MCNC: 12.1 G/DL
HGB BLD-MCNC: 12.1 G/DL
LYMPHOCYTES # BLD AUTO: 2.4 K/UL
LYMPHOCYTES # BLD AUTO: 2.4 K/UL
LYMPHOCYTES NFR BLD: 23.2 %
LYMPHOCYTES NFR BLD: 23.2 %
MAGNESIUM SERPL-MCNC: 2.6 MG/DL
MCH RBC QN AUTO: 31 PG
MCH RBC QN AUTO: 31 PG
MCHC RBC AUTO-ENTMCNC: 32.9 %
MCHC RBC AUTO-ENTMCNC: 32.9 %
MCV RBC AUTO: 94 FL
MCV RBC AUTO: 94 FL
MITRAL VALVE MOBILITY: ABNORMAL
MITRAL VALVE REGURGITATION: ABNORMAL
MONOCYTES # BLD AUTO: 0.5 K/UL
MONOCYTES # BLD AUTO: 0.5 K/UL
MONOCYTES NFR BLD: 4.7 %
MONOCYTES NFR BLD: 4.7 %
NEUTROPHILS # BLD AUTO: 7.2 K/UL
NEUTROPHILS # BLD AUTO: 7.2 K/UL
NEUTROPHILS NFR BLD: 68.5 %
NEUTROPHILS NFR BLD: 68.5 %
PLATELET # BLD AUTO: 215 K/UL
PLATELET # BLD AUTO: 215 K/UL
PMV BLD AUTO: 10.8 FL
PMV BLD AUTO: 10.8 FL
POTASSIUM SERPL-SCNC: 4.2 MMOL/L
PROT SERPL-MCNC: 7.3 G/DL
RBC # BLD AUTO: 3.9 M/UL
RBC # BLD AUTO: 3.9 M/UL
RETIRED EF AND QEF - SEE NOTES: 55 (ref 55–65)
SODIUM SERPL-SCNC: 141 MMOL/L
TRICUSPID VALVE REGURGITATION: ABNORMAL
TROPONIN I SERPL DL<=0.01 NG/ML-MCNC: 0.04 NG/ML
TROPONIN I SERPL DL<=0.01 NG/ML-MCNC: 0.04 NG/ML
WBC # BLD AUTO: 10.53 K/UL
WBC # BLD AUTO: 10.53 K/UL

## 2017-02-02 PROCEDURE — 25000003 PHARM REV CODE 250: Performed by: INTERNAL MEDICINE

## 2017-02-02 PROCEDURE — G8988 SELF CARE GOAL STATUS: HCPCS | Mod: CI

## 2017-02-02 PROCEDURE — 63600175 PHARM REV CODE 636 W HCPCS: Performed by: INTERNAL MEDICINE

## 2017-02-02 PROCEDURE — G8989 SELF CARE D/C STATUS: HCPCS | Mod: CJ

## 2017-02-02 PROCEDURE — 93306 TTE W/DOPPLER COMPLETE: CPT

## 2017-02-02 PROCEDURE — 85025 COMPLETE CBC W/AUTO DIFF WBC: CPT

## 2017-02-02 PROCEDURE — 93306 TTE W/DOPPLER COMPLETE: CPT | Mod: 26,,, | Performed by: INTERNAL MEDICINE

## 2017-02-02 PROCEDURE — 97161 PT EVAL LOW COMPLEX 20 MIN: CPT

## 2017-02-02 PROCEDURE — 25000003 PHARM REV CODE 250: Performed by: HOSPITALIST

## 2017-02-02 PROCEDURE — 36415 COLL VENOUS BLD VENIPUNCTURE: CPT

## 2017-02-02 PROCEDURE — 99233 SBSQ HOSP IP/OBS HIGH 50: CPT | Mod: ,,, | Performed by: HOSPITALIST

## 2017-02-02 PROCEDURE — 85730 THROMBOPLASTIN TIME PARTIAL: CPT

## 2017-02-02 PROCEDURE — 84484 ASSAY OF TROPONIN QUANT: CPT

## 2017-02-02 PROCEDURE — 83735 ASSAY OF MAGNESIUM: CPT

## 2017-02-02 PROCEDURE — G8987 SELF CARE CURRENT STATUS: HCPCS | Mod: CJ

## 2017-02-02 PROCEDURE — 80053 COMPREHEN METABOLIC PANEL: CPT

## 2017-02-02 PROCEDURE — 63600175 PHARM REV CODE 636 W HCPCS: Performed by: HOSPITALIST

## 2017-02-02 PROCEDURE — 97165 OT EVAL LOW COMPLEX 30 MIN: CPT

## 2017-02-02 PROCEDURE — 20600001 HC STEP DOWN PRIVATE ROOM

## 2017-02-02 RX ORDER — ASPIRIN 325 MG
325 TABLET ORAL DAILY
Status: DISCONTINUED | OUTPATIENT
Start: 2017-02-02 | End: 2017-02-02

## 2017-02-02 RX ORDER — CLOPIDOGREL 300 MG/1
300 TABLET, FILM COATED ORAL ONCE
Status: COMPLETED | OUTPATIENT
Start: 2017-02-02 | End: 2017-02-02

## 2017-02-02 RX ORDER — TRAMADOL HYDROCHLORIDE 50 MG/1
50 TABLET ORAL EVERY 6 HOURS PRN
Status: DISCONTINUED | OUTPATIENT
Start: 2017-02-02 | End: 2017-02-03 | Stop reason: HOSPADM

## 2017-02-02 RX ORDER — HEPARIN SODIUM 5000 [USP'U]/ML
5000 INJECTION, SOLUTION INTRAVENOUS; SUBCUTANEOUS EVERY 12 HOURS
Status: DISCONTINUED | OUTPATIENT
Start: 2017-02-02 | End: 2017-02-03 | Stop reason: HOSPADM

## 2017-02-02 RX ORDER — HYDROCODONE BITARTRATE AND ACETAMINOPHEN 5; 325 MG/1; MG/1
1 TABLET ORAL EVERY 6 HOURS PRN
Status: DISCONTINUED | OUTPATIENT
Start: 2017-02-02 | End: 2017-02-03 | Stop reason: HOSPADM

## 2017-02-02 RX ORDER — HEPARIN SODIUM,PORCINE/D5W 25000/250
15 INTRAVENOUS SOLUTION INTRAVENOUS CONTINUOUS
Status: DISCONTINUED | OUTPATIENT
Start: 2017-02-02 | End: 2017-02-02

## 2017-02-02 RX ORDER — ONDANSETRON 2 MG/ML
4 INJECTION INTRAMUSCULAR; INTRAVENOUS EVERY 8 HOURS PRN
Status: DISCONTINUED | OUTPATIENT
Start: 2017-02-02 | End: 2017-02-03 | Stop reason: HOSPADM

## 2017-02-02 RX ORDER — ONDANSETRON 2 MG/ML
4 INJECTION INTRAMUSCULAR; INTRAVENOUS ONCE
Status: COMPLETED | OUTPATIENT
Start: 2017-02-02 | End: 2017-02-02

## 2017-02-02 RX ADMIN — CEFTRIAXONE 1 G: 1 INJECTION, SOLUTION INTRAVENOUS at 05:02

## 2017-02-02 RX ADMIN — HYDROCODONE BITARTRATE AND ACETAMINOPHEN 1 TABLET: 5; 325 TABLET ORAL at 03:02

## 2017-02-02 RX ADMIN — FUROSEMIDE 20 MG: 10 INJECTION, SOLUTION INTRAVENOUS at 08:02

## 2017-02-02 RX ADMIN — TRAMADOL HYDROCHLORIDE 50 MG: 50 TABLET, COATED ORAL at 11:02

## 2017-02-02 RX ADMIN — Medication 1 CAPSULE: at 08:02

## 2017-02-02 RX ADMIN — ONDANSETRON 4 MG: 2 INJECTION INTRAMUSCULAR; INTRAVENOUS at 07:02

## 2017-02-02 RX ADMIN — ASPIRIN 81 MG: 81 TABLET, COATED ORAL at 08:02

## 2017-02-02 RX ADMIN — HEPARIN SODIUM AND DEXTROSE 15 UNITS/KG/HR: 10000; 5 INJECTION INTRAVENOUS at 05:02

## 2017-02-02 RX ADMIN — LOSARTAN POTASSIUM 12.5 MG: 25 TABLET, FILM COATED ORAL at 08:02

## 2017-02-02 RX ADMIN — ATORVASTATIN CALCIUM 40 MG: 20 TABLET, FILM COATED ORAL at 08:02

## 2017-02-02 RX ADMIN — ONDANSETRON 4 MG: 2 INJECTION INTRAMUSCULAR; INTRAVENOUS at 03:02

## 2017-02-02 RX ADMIN — Medication 1 CAPSULE: at 09:02

## 2017-02-02 RX ADMIN — AZITHROMYCIN 250 MG: 250 TABLET, FILM COATED ORAL at 08:02

## 2017-02-02 RX ADMIN — PANTOPRAZOLE SODIUM 40 MG: 40 TABLET, DELAYED RELEASE ORAL at 08:02

## 2017-02-02 RX ADMIN — HEPARIN SODIUM 5000 UNITS: 5000 INJECTION, SOLUTION INTRAVENOUS; SUBCUTANEOUS at 08:02

## 2017-02-02 RX ADMIN — HEPARIN SODIUM 5000 UNITS: 5000 INJECTION, SOLUTION INTRAVENOUS; SUBCUTANEOUS at 09:02

## 2017-02-02 RX ADMIN — CLOPIDOGREL BISULFATE 300 MG: 300 TABLET, FILM COATED ORAL at 05:02

## 2017-02-02 RX ADMIN — LEVOTHYROXINE SODIUM 50 MCG: 25 TABLET ORAL at 05:02

## 2017-02-02 NOTE — PLAN OF CARE
Problem: Physical Therapy Goal  Goal: Physical Therapy Goal  Goals to be met by: 17     Patient will increase functional independence with mobility by performin. Supine to sit with Modified [place]  2. Sit to stand transfer with Supervision  3. Bed to chair transfer with Supervision using AD as needed.  4. Gait x 300 feet with Supervision using AD as needed.   5. Lower extremity exercise program x15 reps, with assistance as needed, in order to increase LE strength and (I) with functional mobility.   Outcome: Ongoing (interventions implemented as appropriate)  PT evaluation complete and appropriate goals established.     Andreina Faith, PT, DPT   2017  [phone]

## 2017-02-02 NOTE — PLAN OF CARE
02/02/17 1246   Discharge Assessment   Assessment Type Discharge Planning Assessment   Confirmed/corrected address and phone number on facesheet? Yes   Assessment information obtained from? Patient;Caregiver;Medical Record   Expected Length of Stay (days) 3   Communicated expected length of stay with patient/caregiver yes   Prior to hospitilization cognitive status: Alert/Oriented   Prior to hospitalization functional status: Independent   Current cognitive status: Alert/Oriented   Current Functional Status: Independent   Arrived From home or self-care   Lives With child(teodoro), adult   Able to Return to Prior Arrangements yes   Is patient able to care for self after discharge? Yes   How many people do you have in your home that can help with your care after discharge? 1   Who are your caregiver(s) and their phone number(s)? daughterGrace  [phone]   Patient's perception of discharge disposition home or selfcare   Readmission Within The Last 30 Days no previous admission in last 30 days   Patient currently being followed by outpatient case management? No   Patient currently receives home health services? No   Does the patient currently use HME? No   Patient currently receives private duty nursing? No   Patient currently receives any other outside agency services? No   Equipment Currently Used at Home walker, rolling;wheelchair;bath bench   Do you have any problems affording any of your prescribed medications? No   Is the patient taking medications as prescribed? yes   Do you have any financial concerns preventing you from receiving the healthcare you need? No   Does the patient have transportation to healthcare appointments? Yes   Transportation Available family or friend will provide   On Dialysis? No   Does the patient receive services at the Coumadin Clinic? No   Are there any open cases? No   Discharge Plan A Home with family   Patient/Family In Agreement With Plan yes   Admitted with [facility]. Lives with  daughter. Mostly independent in her ADLs. Uses walker PRN. Plan is to DC home. No DC needs identified.

## 2017-02-02 NOTE — ED NOTES
Pt resting in bed. Daughter at bedside. Pt aao to self and place. Pt has history of alzheimers. Skin intact. Pt connected to cardiac monitor, pulse ox, and BP cuff. Pt c/o neck pain 8/10. Will administer tylenol

## 2017-02-02 NOTE — H&P
"[facility]  History & Physical    SUBJECTIVE:     Chief Complaint/Reason for Admission: shortness of breath    History of Present Illness:  89 y.o. female with PM Hx of GERD, essential HTN, severe aortic stenosis (9/22/14), and with Alzheimer's who presents to ER. Pt's daughter called in to PCP stating pt had a fever at approximately 101 Fahrenheit. Associated symptoms included increased weakness, wheezing, neck pain, and upper back pain. Daughter attempted to get an appointment but stated her mother was too weak and decided to bring her to ER instead. Pt also c/o mid-chest pain when she stood up associated with SOB. She states, "my breathing doesn't seem to be normal." Pt denies cough, nausea, vomiting, or any other symptoms at this time. Took two baby aspirin PTA for fever. Chest pain was described as worse when taking a deep breath and on movement.No relation with exertion. Patient states that she doesnot remember much of her symptoms-daughter assists in history .    Review of patient's allergies indicates:   Allergen Reactions    Codeine      chills    Lisinopril Other (See Comments)     cough       Past Medical History   Diagnosis Date    Acquired hypothyroidism     Closed displaced intertrochanteric fracture of left femur s/p IM nail on 6/5/2016 6/5/2016    Coronary artery disease involving native coronary artery of native heart without angina pectoris     Essential hypertension     GERD (gastroesophageal reflux disease)     Hx of colonic polyps     Hyperlipidemia     Macular degeneration     Mitral valve stenosis     Severe aortic stenosis 9/22/2014    Syncope 1/5/2015     Past Surgical History   Procedure Laterality Date    Intertrochanteric hip fracture surgery Left 06/05/2016     IM nail     Family History   Problem Relation Age of Onset    Heart disease Mother     Cancer Mother     Heart disease Father     No Known Problems Sister     No Known Problems Brother     " No Known Problems Maternal Grandmother     No Known Problems Maternal Grandfather     No Known Problems Paternal Grandmother     No Known Problems Paternal Grandfather     No Known Problems Maternal Aunt     No Known Problems Maternal Uncle     No Known Problems Paternal Aunt     No Known Problems Paternal Uncle     Anemia Neg Hx     Arrhythmia Neg Hx     Asthma Neg Hx     Clotting disorder Neg Hx     Fainting Neg Hx     Heart attack Neg Hx     Heart failure Neg Hx     Hyperlipidemia Neg Hx     Hypertension Neg Hx     Stroke Neg Hx     Atrial Septal Defect Neg Hx      Social History   Substance Use Topics    Smoking status: Never Smoker    Smokeless tobacco: Never Used    Alcohol use 0.6 oz/week     1 Standard drinks or equivalent per week      Comment: rare             Scheduled Meds:   azithromycin  500 mg Intravenous ED 1 Time    cefTRIAXone (ROCEPHIN) IVPB  1 g Intravenous ED 1 Time     Continuous Infusions:   PRN Meds:    Review of Systems:  Constitutional:+ fever - chills  Eyes: no visual changes  Respiratory: no cough +shortness of breath  Cardiovascular: + chest pain or palpitations  Gastrointestinal: no nausea or vomiting, no abdominal pain or change in bowel habits  Hematologic/Lymphatic: no easy bruising or lymphadenopathy  Musculoskeletal: no arthralgias or myalgias  Neurological: no seizures or tremors      OBJECTIVE:     Vital Signs (Most Recent):  Temp: 98.6 °F (37 °C) (02/01/17 1700)  Pulse: 85 (02/01/17 1651)  Resp: 18 (02/01/17 1524)  BP: (!) 169/69 (02/01/17 1524)  SpO2: 96 % (02/01/17 1651)        Physical Exam:  General: alert, awake and oriented x 3  Eyes:  PERRL.   Neck: supple, + JVD ,Tenderness to [id number] neck   Lungs:  Bibasilar crackles  Cardiovascular: Heart: regular rate and rhythm, S1, S2 normal, + MAXIMINO  Chest Wall: no tenderness.   Extremities: no cyanosis or edema  Pulses: 2+ and symmetric.  Abdomen/Rectal: Abdomen: soft, non-tender non-distented; bowel  sounds normal  Skin: No rashes or lesions  Neurologic: Normal strength and tone. No focal numbness or weakness  Psych/Behavioral:  Normal.      Laboratory:  CBC:   Recent Labs  Lab 02/01/17  1700   WBC 16.07*   RBC 3.76*   HGB 11.7*   HCT 35.3*      MCV 94   MCH 31.1*   MCHC 33.1     CMP:   Recent Labs  Lab 02/01/17  1700   GLU 94   CALCIUM 9.1   ALBUMIN 3.5   PROT 7.1      K 4.2   CO2 24      BUN 14   CREATININE 0.7   ALKPHOS 93   ALT 15   AST 19   BILITOT 1.5*     Cardiac markers:   Recent Labs  Lab 02/01/17  1700   TROPONINI 0.035*       Diagnostic Results:  Labs: Reviewed  ECG: Reviewed  X-Ray: Reviewed    No results found for this visit on 02/01/17.  Results for orders placed or performed during the hospital encounter of 02/01/17   X-Ray Chest 1 View    Narrative    Technique:  Chest AP radiograph    Comparison: Radiograph 6/4/2016.    Findings:   Lung volumes are normal and symmetric.  Mediastinal arches are midline.  The cardiac silhouette is enlarged, stable.  Patchy, nonsegmental heterogeneous opacities are seen in the middle and lower lung zones bilaterally.  Atherosclerotic calcification is noted at the level of the aortic arch.  No pleural effusion or pneumothorax is seen.  The osseous structures demonstrate degenerative change without evidence of acute fracture or osseous destructive process.    Impression    Patchy areas of heterogeneous, predominantly airspace, consolidation in the middle and lower lung zones bilaterally.  Findings could relate to pneumonia, atelectasis, or pulmonary edema.  ______________________________________     Electronically signed by resident: RAY VU MD  Date:     02/01/17  Time:    18:05            As the supervising and teaching physician, I personally reviewed the images and resident's interpretation and I agree with the findings.         Ejection Fractions   EF   Date Value Ref Range Status   09/22/2014 65          CXR-Patchy areas of  heterogeneous, predominantly airspace, consolidation in the middle and lower lung zones bilaterally.  Findings could relate to pneumonia, atelectasis, or pulmonary edema.    EKG-sinus rhythm with ST changes similar to previous EKG    ASSESSMENT/PLAN:     Acute Diastolic heart failure in setting of severe AS  -lasix naive -10 Mg given in ED-start with lasix 20 mg I V tonight and monitor UOP(with severe AS prevent overdiuresis)  -echocardiogram with color flow doppler in am  -ARB-continue  -strict ins and outs  -daily weights  -fluid restriction  -BP equal in both arms  -consider repeat CXR to r/o Pnemonia    ?Pneumonia  -WBC up could be secondary to steroid injection  -CXR as above  -cover with ceftriaxone and azithromycin  -blood cx x 2 done in ED  -check procalcitonin  -Pyuria on UA       Elevated troponin  -Pleuritic chest pain  -in setting of CHF and severe AS  -trend  -start ACS protocol if significant elevation    Hypothyroidism  -continue Levothyroxine and check TSH and FT4    Musculoskeletal tenderness Base of neck  -Tylenol prn    DVT px-Heparin  GI px-protonix

## 2017-02-02 NOTE — PT/OT/SLP EVAL
Occupational Therapy  Evaluation    Janine Awad   MRN: [id number]   Admitting Diagnosis: Acute pulmonary edema    OT Date of Treatment: 02/02/17   OT Start Time: 1420  OT Stop Time: 1439  OT Total Time (min): 19 min    Billable Minutes:  Evaluation 19 minutes    Diagnosis: Acute pulmonary edema     Past Medical History   Diagnosis Date    Acquired hypothyroidism     Closed displaced intertrochanteric fracture of left femur s/p IM nail on 6/5/2016 6/5/2016    Coronary artery disease involving native coronary artery of native heart without angina pectoris     Essential hypertension     GERD (gastroesophageal reflux disease)     Hx of colonic polyps     Hyperlipidemia     Macular degeneration     Mitral valve stenosis     Severe aortic stenosis 9/22/2014    Syncope 1/5/2015      Past Surgical History   Procedure Laterality Date    Intertrochanteric hip fracture surgery Left 06/05/2016     IM nail       Referring physician: Dr. Avendaño  Date referred to OT: 2/1/17    General Precautions: Standard, fall  Orthopedic Precautions: N/A  Braces: N/A    Do you have any cultural, spiritual, Church conflicts, given your current situation?: None     Patient History:  Living Environment  Lives With: child(teodoro), adult  Living Arrangements: house  Number of Stairs to Enter Home: 1  Living Environment Comment: Pt poor historian; correct history provided by daughter; lives with daughter in 1-story house with 1 [address]. PTA, pt was (I) with mobility and ADLs, requiring supervision only. Daughter provides 24 hr assistance.  Equipment Currently Used at Home: bath bench, bedside commode, walker, rolling, wheelchair (owned but not used)    Prior level of function:   Bed Mobility/Transfers: independent  Grooming: independent  Bathing: independent  Upper Body Dressing: independent  Lower Body Dressing: independent  Toileting: independent  Driving License: No     Dominant hand: right    Subjective:  Communicated with RN prior  to session.  Pt pleasantly confused    Chief Complaint: Neck pain  Patient/Family stated goals: Return home    Pain Ratin/10  Location: neck  Pain Addressed: Reposition, Distraction, Nurse notified  Pain Rating Post-Intervention:  (Did not rate)    Objective:  Patient found with: pulse ox (continuous), oxygen, telemetry    Cognitive Exam:  Oriented to: Person and Place  Follows Commands/attention: Follows one-step commands  Communication: clear/fluent  Memory:  Impaired STM and Impaired LTM  Safety awareness/insight to disability: impaired    Visual/perceptual:  Intact    Physical Exam:  Postural examination/scapula alignment: No postural abnormalities identified  Skin integrity: Visible skin intact  Edema: None    Sensation:   Intact    Upper Extremity Range of Motion:  Right Upper Extremity: WFL  Left Upper Extremity: WFL    Upper Extremity Strength:  Right Upper Extremity: 3+/5 throughout  Left Upper Extremity: 3+/5 throughout   Strength: WFL    Functional Mobility:  Bed Mobility:  Supine to Sit: Stand by Assistance  Sit to Supine: Stand by Assistance    Transfers:  Sit <> Stand Assistance: Stand By Assistance from EOB  Sit <> Stand Assistive Device: No Assistive Device    Functional Ambulation: ~175 ft with initially CGA, then Min A for LOB/episode of knees buckling; pt c/o dizziness during ambulation (O2 sats 94-96% on 1L)    Activities of Daily Living:  UE Dressing Level of Assistance: Minimum assistance to don gown around back  LE Dressing Level of Assistance: Stand by assistance to don socks    Balance:   Static Sit: GOOD+: Takes MAXIMAL challenges from all directions.    Dynamic Sit: GOOD+: Maintains balance through MAXIMAL excursions of active trunk motion  Static Stand: GOOD-: Takes MODERATE challenges from all directions inconsistently  Dynamic stand: FAIR+: Needs CLOSE SUPERVISION during gait and is able to right self with minor LOB    Therapeutic Activities and Exercises:  OT/PT eval; educated  "on OT role and POC    AM-PAC 6 CLICK ADL  How much help from another person does this patient currently need?  1 = Unable, Total/Dependent Assistance  2 = A lot, Maximum/Moderate Assistance  3 = A little, Minimum/Contact Guard/Supervision  4 = None, Modified [place]/Independent    Putting on and taking off regular lower body clothing? : 3  Bathing (including washing, rinsing, drying)?: 3  Toileting, which includes using toilet, bedpan, or urinal? : 3  Putting on and taking off regular upper body clothing?: 3  Taking care of personal grooming such as brushing teeth?: 4  Eating meals?: 4  Total Score: 20    AM-PAC Raw Score CMS "G-Code Modifier Level of Impairment Assistance   6 % Total / Unable   7 - 9 CM 80 - 100% Maximal Assist   10 - 14 CL 60 - 80% Moderate Assist   15 - 19 CK 40 - 60% Moderate Assist   20 - 22 CJ 20 - 40% Minimal Assist   23 CI 1-20% SBA / CGA   24 CH 0% Independent/ Mod I       Patient left supine with all lines intact, call button in reach and daughter present    Assessment:  Janine Awad is a 89 y.o. female with a medical diagnosis of Acute pulmonary edema and presents with decreased strength, endurance, and safety awareness needed for ADLs and functional mobility. Pt would continue to benefit from OT to increase independence. Recommend  upon D/C.    Rehab identified problem list/impairments: Rehab identified problem list/impairments: weakness, impaired endurance, impaired self care skills, impaired functional mobilty, gait instability, impaired balance, impaired cognition, pain, decreased safety awareness, impaired cardiopulmonary response to activity    Rehab potential is good.    Activity tolerance: Good    Discharge recommendations: Discharge Facility/Level Of Care Needs: home with home health     Barriers to discharge: Barriers to Discharge: None    Equipment recommendations: none     GOALS:   Occupational Therapy Goals        Problem: Occupational Therapy Goal    " Goal Priority Disciplines Outcome Interventions   Occupational Therapy Goal     OT, PT/OT Ongoing (interventions implemented as appropriate)    Description:  Goals to be met by: 7 days (2/9/17)     Patient will increase functional independence with ADLs by performing:    UE Dressing with Supervision.  LE Dressing with Supervision.  Grooming while standing at sink with Supervision.  Toileting from toilet with Supervision for hygiene and clothing management.   Supine to sit with Supervision.  Toilet transfer to toilet with Supervision.  Increased functional strength to WFL for ADLs.                PLAN:  Patient to be seen 4 x/week to address the above listed problems via self-care/home management, therapeutic activities, therapeutic exercises  Plan of Care expires: 03/02/17  Plan of Care reviewed with: patient, daughter    OT G-codes  Functional Assessment Tool Used: [facility]  Score: 20  Functional Limitation: Self care  Self Care Current Status (): CJ  Self Care Goal Status (): ROSEY Thomas  02/02/2017

## 2017-02-02 NOTE — PROGRESS NOTES
Pt with Alzheimer's; daughter needing to assist with history as pt is unable to give complete history. Not appropriate for Digital medicine HF program.    Removed from HF list.

## 2017-02-02 NOTE — NURSING TRANSFER
Nursing Transfer Note      2/2/2017     Transfer From: ED to Room 377    Transfer via stretcher    Transfer with  to O2, cardiac monitoring    Transported by Transporter    Medicines sent: Yes    Chart send with patient: Yes    Notified: daughter    Patient reassessed at: 2/1/2017 2300    Upon arrival to floor: cardiac monitor applied, patient oriented to room, call bell in reach and bed in lowest position

## 2017-02-02 NOTE — SIGNIFICANT EVENT
Considering the elevation of troponin on second draw, will start treating her for ACS. Also ordered 3rd Troponin and consult for Int card evaluation.

## 2017-02-02 NOTE — PLAN OF CARE
CM went to the room. Bedside echo in progress. No family at bedside. Will call pt's daughter later for DC needs assessment.

## 2017-02-02 NOTE — PROGRESS NOTES
"Progress Note  Hospital Medicine    Primary Team: [facility]  Admit Date: 2/1/2017   Length of Stay:  LOS: 1 day   SUBJECTIVE:   Reason for Admission:  Acute pulmonary edema      HPI:  89 y.o. female with PM Hx of GERD, essential HTN, severe aortic stenosis (9/22/14), and with Alzheimer's who presents to ER. Pt's daughter called in to PCP stating pt had a fever at approximately 101 Fahrenheit. Associated symptoms included increased weakness, wheezing, neck pain, and upper back pain. Daughter attempted to get an appointment but stated her mother was too weak and decided to bring her to ER instead. Pt also c/o mid-chest pain when she stood up associated with SOB. She states, "my breathing doesn't seem to be normal." Pt denies cough, nausea, vomiting, or any other symptoms at this time. Took two baby aspirin PTA for fever. Chest pain was described as worse when taking a deep breath and on movement.No relation with exertion. Patient states that she doesnot remember much of her symptoms-daughter assists in history.  CXR notable for bilateral pulmonary edema vs PNA with WBC 16; pt was admitted to - for evaluation of CHF and PNA.    Interval history:    No acute events overnight.  Pt reports increased urine output after Lasix and reports breathing is somewhat improved.    Review of Systems:  Constitutional: no fever or chills  Respiratory: positive for dyspnea on exertion  Cardiovascular: no chest pain or palpitations  Gastrointestinal: no nausea or vomiting, no abdominal pain or change in bowel habits  Musculoskeletal: no arthralgias or myalgias     OBJECTIVE:     Temp:  [97.8 °F (36.6 °C)-98.9 °F (37.2 °C)]   Pulse:  [56-85]   Resp:  [17-18]   BP: (110-169)/(58-69)   SpO2:  [91 %-98 %]  Body mass index is 24.29 kg/(m^2).  Intake/[place]:  This Shift:       Net I/O past 24h:     Intake/Output Summary (Last 24 hours) at 02/02/17 1257  Last data filed at 02/02/17 0613   Gross per 24 hour   Intake              180 ml "   Output              950 ml   Net             -770 ml             Physical Exam:  Gen- well-developed, well-nourished, NAD  CVS- S1 and S2 present, 5/6 systolic murmur with radiation to carotids, RRR  Resp- bibasilar crackles, no work of breathing  Abd- BS+, soft, NT, ND  Ext- no clubbing, cyanosis, or edema    Laboratory:  CBC/Anemia Labs: Coags:      Recent Labs  Lab 02/01/17  1700 02/02/17  0543   WBC 16.07* 10.53  10.53   HGB 11.7* 12.1  12.1   HCT 35.3* 36.8*  36.8*    215  215   MCV 94 94  94   RDW 14.7* 14.8*  14.8*      Recent Labs  Lab 02/01/17  1700 02/02/17  0543   INR 1.0  --    APTT  --  25.3        Chemistries:     Recent Labs  Lab 02/01/17  1700 02/02/17  0543    141   K 4.2 4.2    102   CO2 24 28   BUN 14 20   CREATININE 0.7 0.9   CALCIUM 9.1 9.3   PROT 7.1 7.3   BILITOT 1.5* 1.5*   ALKPHOS 93 86   ALT 15 13   AST 19 22   MG  --  2.6          Cardiac Enzymes: Ejection Fractions:    Recent Labs      02/01/17   1700  02/01/17   2208  02/02/17   0543   TROPONINI  0.035*  0.054*  0.039*  0.039*    EF   Date Value Ref Range Status   09/22/2014 65          POCT Glucose: HbA1c:    No results for input(s): POCTGLUCOSE in the last 168 hours. Hemoglobin A1C   Date Value Ref Range Status   03/24/2016 5.7 4.5 - 6.2 % Final   08/11/2009 6.0 4.0 - 6.2 % Final         Medications:  Scheduled Meds:   aspirin  81 mg Oral Daily    atorvastatin  40 mg Oral Daily    azithromycin  250 mg Oral Daily    [START ON 2/3/2017] cefTRIAXone (ROCEPHIN) IVPB  1 g Intravenous Q24H    furosemide  20 mg Intravenous Daily    heparin (porcine)  5,000 Units Subcutaneous Q12H    levothyroxine  50 mcg Oral Before breakfast    losartan  12.5 mg Oral Daily    omega-3 fatty acids-fish oil  1 capsule Oral BID    pantoprazole  40 mg Oral Daily                             Continuous Infusions:   PRN Meds:.acetaminophen, polyethylene glycol, senna-docusate 8.6-50 mg, tramadol     ASSESSMENT/PLAN:     Acute  Diastolic heart failure in setting of severe AS  -Pt with good urine output after total Lasix 30 IV since admission (Lasix naive)- negative 3lb, I/O not accurate  -Repeat dose of 20mg IV x 1 today; may need 20mg PO daily on discharge  -Repeat ECHO today  -Continue Losartan 12.5mg daily  -strict i/o, daily weights, fluid restriction  -chart reviewed, and appears that pt has been evaluated for TAVR in the past, but refused surgery at most recent Cardiology appt 10/2016; will discuss further with daughter  -Suspect Trop elevated due to demand ischemia, in setting of CHF exacerbation and severe AS    CAP  -WBC up could be secondary to steroid injection, but resolved after starting Rocephin and Azithro  -Continue this regimen x 5 days (fever, cough)  -blood cx x 2 done in ED, NGTD      Hypothyroidism  -continue Levothyroxine   -TFTs at goal     GERD  -Continue PPI    DVT ppx- Heparin  CODE Status- FULL    Dispo- possible d/c home tomorrow if medically stable    Shena Lord MD  Hospital Medicine Staff

## 2017-02-02 NOTE — PLAN OF CARE
Problem: Patient Care Overview  Goal: Plan of Care Review  Outcome: Ongoing (interventions implemented as appropriate)  Pt denies Chest pain, SOB or nausea. No falls, trauma or injury noted. VSS. 2L NC. Echo in am. Blood culture: pending. Plan of care reviewed with patient. No further questions at this time. No significant events. Will continue to monitor.

## 2017-02-02 NOTE — ED NOTES
MD made aware of the patients neck and back pain. Patient rates her pain as a 8/10 on the pain scale.

## 2017-02-02 NOTE — PLAN OF CARE
Problem: Occupational Therapy Goal  Goal: Occupational Therapy Goal  Goals to be met by: 7 days (2/9/17)     Patient will increase functional independence with ADLs by performing:    UE Dressing with Supervision.  LE Dressing with Supervision.  Grooming while standing at sink with Supervision.  Toileting from toilet with Supervision for hygiene and clothing management.   Supine to sit with Supervision.  Toilet transfer to toilet with Supervision.  Increased functional strength to WFL for ADLs.  Outcome: Ongoing (interventions implemented as appropriate)  Eval and POC set 2/2/17

## 2017-02-02 NOTE — PT/OT/SLP EVAL
Physical Therapy  Evaluation    Janine Awad   MRN: [id number]   Admitting Diagnosis: Acute pulmonary edema    PT Received On: 02/02/17  PT Start Time: 1410     PT Stop Time: 1438    PT Total Time (min): 28 min       Billable Minutes:  Evaluation 28 (co-eval with OT)     Diagnosis: Acute pulmonary edema  Alzheimer's disease     Past Medical History   Diagnosis Date    Acquired hypothyroidism     Closed displaced intertrochanteric fracture of left femur s/p IM nail on 6/5/2016 6/5/2016    Coronary artery disease involving native coronary artery of native heart without angina pectoris     Essential hypertension     GERD (gastroesophageal reflux disease)     Hx of colonic polyps     Hyperlipidemia     Macular degeneration     Mitral valve stenosis     Severe aortic stenosis 9/22/2014    Syncope 1/5/2015      Past Surgical History   Procedure Laterality Date    Intertrochanteric hip fracture surgery Left 06/05/2016     IM nail       Referring physician: Jassi Avendaño MD  Date referred to PT: 2/1/17    General Precautions: Standard, fall  Orthopedic Precautions: N/A   Braces: N/A       Do you have any cultural, spiritual, Synagogue conflicts, given your current situation?: none noted     Patient History:  -Patient able to provide partial history with pt's daughter assisting and clarifying as needed.   Lives With: child(teodoro), adult   Living Arrangements: house  Home Accessibility: stairs to enter home  Home Layout: Able to live on 1st floor  Number of Stairs to Enter Home: 1  Stair Railings at Home: none  Transportation Available: family or friend will provide  Living Environment Comment: Pt lives with her daughter in a 1SH with 1 CRISTIAN. Pt reports that PTA she was (I) with ADLs and ambulation. Pt's daughter reports that she provides 24-hour assistance.   Equipment Currently Used at Home: bath bench, bedside commode, walker, rolling, wheelchair  DME owned (not currently used): none    Previous Level of  Function:  Ambulation Skills: independent  Transfer Skills: independent  ADL Skills: independent  Work/Leisure Activity: independent    Subjective:  Communicated with RN prior to session.  Pt agreeable to therapy.   Chief Complaint: neck pain  Patient goals: return home     Pain Ratin/10   Location - Side: Right  Location - Orientation: generalized  Location: neck  Pain Addressed: Reposition, Distraction, Nurse notified       Objective:   Patient found with: pulse ox (continuous), telemetry, oxygen     Cognitive Exam:  Oriented to: Person and Place    Follows Commands/attention: Follows two-step commands  Communication: clear/fluent  Safety awareness/insight to disability: impaired    Physical Exam:  Postural examination/scapula alignment: Rounded shoulder and Head forward    Skin integrity: Visible skin intact  Edema: None noted     Sensation:   Intact    Lower Extremity Range of Motion:  Right Lower Extremity: WFL  Left Lower Extremity: WFL    Lower Extremity Strength:  Right Lower Extremity: WFL  Left Lower Extremity: WFL    Functional Mobility:  Bed Mobility:  Supine to Sit: Stand by Assistance  Sit to Supine: Contact Guard Assistance    Transfers:  Sit <> Stand Assistance: Stand By Assistance  Sit <> Stand Assistive Device: No Assistive Device    Gait:   Gait Distance: 175 ft.   Assistance 1: Contact Guard Assistance, Minimum assistance, Moderate assistance (initially CGA but progressed to [place] with occasional modA for balance assist)  Gait Assistive Device: Hand held assist, No device  Gait Pattern: reciprocal  Gait Deviation(s): decreased jeanne, increased time in double stance, decreased velocity of limb motion, decreased step length, decreased stride length  -Portable O2 in place throughout ambulation. SpO2 94-96%. Pt became dizzy and weak towards the end of ambulation, requiring increased level of assistance ([place]). Pt then with two episodes of knee buckling, requiring modA to maintain balance.      Balance:   Static Sit: GOOD: Takes MODERATE challenges from all directions  Dynamic Sit: GOOD-: Maintains balance through MODERATE excursions of active trunk movement,     Static Stand: GOOD-: Takes MODERATE challenges from all directions inconsistently  Dynamic stand: FAIR: Needs CONTACT GUARD during gait    Therapeutic Activities and Exercises:  Pt and pt's daughter educated on role of PT and PT POC. Pt and pt's daughter educated on d/c recs for HHPT. Pt and pt's daughter verbalized understanding and agreeable to recs.   Pt provided daily orientation.     AM-PAC 6 CLICK MOBILITY  How much help from another person does this patient currently need?   1 = Unable, Total/Dependent Assistance  2 = A lot, Maximum/Moderate Assistance  3 = A little, Minimum/Contact Guard/Supervision  4 = None, Modified [place]/Independent    Turning over in bed (including adjusting bedclothes, sheets and blankets)?: 3  Sitting down on and standing up from a chair with arms (e.g., wheelchair, bedside commode, etc.): 3  Moving from lying on back to sitting on the side of the bed?: 3  Moving to and from a bed to a chair (including a wheelchair)?: 3  Need to walk in hospital room?: 3  Climbing 3-5 steps with a railing?: 2  Total Score: 17     AM-PAC Raw Score CMS G-Code Modifier Level of Impairment Assistance   6 % Total / Unable   7 - 9 CM 80 - 100% Maximal Assist   10 - 14 CL 60 - 80% Moderate Assist   15 - 19 CK 40 - 60% Moderate Assist   20 - 22 CJ 20 - 40% Minimal Assist   23 CI 1-20% SBA / CGA   24 CH 0% Independent/ Mod I     Patient left supine with all lines intact, call button in reach and daughter present.    Assessment:   Janine Awad is a 89 y.o. female with a medical diagnosis of Acute pulmonary edema and presents with Alzheimer's disease at baseline. Functional mobility limited by impaired endurance, decreased balance, and generalized weakness. Pt ambulated with CGA initially, but then experienced  dizziness and weakness, requiring increased assistance. Pt would benefit from skilled IP PT in order to address current deficits and progress functional mobility.     Rehab identified problem list/impairments: Rehab identified problem list/impairments: weakness, gait instability, impaired balance, impaired endurance, impaired cardiopulmonary response to activity, decreased safety awareness, impaired self care skills, impaired functional mobilty, pain, impaired cognition    Rehab potential is good.    Activity tolerance: Fair    Discharge recommendations: Discharge Facility/Level Of Care Needs: home health PT     Barriers to discharge: Barriers to Discharge: None    Equipment recommendations: Equipment Needed After Discharge: none     GOALS:   Physical Therapy Goals        Problem: Physical Therapy Goal    Goal Priority Disciplines Outcome Goal Variances Interventions   Physical Therapy Goal     PT/OT, PT Ongoing (interventions implemented as appropriate)     Description:  Goals to be met by: 17     Patient will increase functional independence with mobility by performin. Supine to sit with Modified [place]  2. Sit to stand transfer with Supervision  3. Bed to chair transfer with Supervision using AD as needed.  4. Gait  x 300 feet with Supervision using AD as needed.   5. Lower extremity exercise program x15 reps, with assistance as needed, in order to increase LE strength and (I) with functional mobility.                 PLAN:    Patient to be seen 4 x/week to address the above listed problems via gait training, therapeutic activities, therapeutic exercises  Plan of Care expires: 17  Plan of Care reviewed with: patient, daughter        Andreina Faith, PT, DPT   2017  [phone]

## 2017-02-03 VITALS
BODY MASS INDEX: 24.38 KG/M2 | DIASTOLIC BLOOD PRESSURE: 57 MMHG | TEMPERATURE: 98 F | HEIGHT: 63 IN | WEIGHT: 137.56 LBS | OXYGEN SATURATION: 94 % | HEART RATE: 62 BPM | RESPIRATION RATE: 14 BRPM | SYSTOLIC BLOOD PRESSURE: 110 MMHG

## 2017-02-03 LAB
ALBUMIN SERPL BCP-MCNC: 3.2 G/DL
ALP SERPL-CCNC: 78 U/L
ALT SERPL W/O P-5'-P-CCNC: 10 U/L
ANION GAP SERPL CALC-SCNC: 9 MMOL/L
AST SERPL-CCNC: 14 U/L
BASOPHILS # BLD AUTO: 0.04 K/UL
BASOPHILS NFR BLD: 0.5 %
BILIRUB SERPL-MCNC: 0.9 MG/DL
BUN SERPL-MCNC: 20 MG/DL
CALCIUM SERPL-MCNC: 9.2 MG/DL
CHLORIDE SERPL-SCNC: 101 MMOL/L
CO2 SERPL-SCNC: 28 MMOL/L
CREAT SERPL-MCNC: 0.8 MG/DL
DIFFERENTIAL METHOD: ABNORMAL
EOSINOPHIL # BLD AUTO: 0.3 K/UL
EOSINOPHIL NFR BLD: 4.2 %
ERYTHROCYTE [DISTWIDTH] IN BLOOD BY AUTOMATED COUNT: 14.5 %
EST. GFR  (AFRICAN AMERICAN): >60 ML/MIN/1.73 M^2
EST. GFR  (NON AFRICAN AMERICAN): >60 ML/MIN/1.73 M^2
GLUCOSE SERPL-MCNC: 102 MG/DL
HCT VFR BLD AUTO: 35.2 %
HGB BLD-MCNC: 11.4 G/DL
LYMPHOCYTES # BLD AUTO: 2.7 K/UL
LYMPHOCYTES NFR BLD: 34.9 %
MAGNESIUM SERPL-MCNC: 2.4 MG/DL
MCH RBC QN AUTO: 30.6 PG
MCHC RBC AUTO-ENTMCNC: 32.4 %
MCV RBC AUTO: 95 FL
MONOCYTES # BLD AUTO: 0.5 K/UL
MONOCYTES NFR BLD: 6.2 %
NEUTROPHILS # BLD AUTO: 4.2 K/UL
NEUTROPHILS NFR BLD: 53.9 %
PLATELET # BLD AUTO: 212 K/UL
PMV BLD AUTO: 10.6 FL
POTASSIUM SERPL-SCNC: 4.2 MMOL/L
PROT SERPL-MCNC: 7 G/DL
RBC # BLD AUTO: 3.72 M/UL
SODIUM SERPL-SCNC: 138 MMOL/L
WBC # BLD AUTO: 7.71 K/UL

## 2017-02-03 PROCEDURE — 99238 HOSP IP/OBS DSCHRG MGMT 30/<: CPT | Mod: ,,, | Performed by: HOSPITALIST

## 2017-02-03 PROCEDURE — 63600175 PHARM REV CODE 636 W HCPCS: Performed by: INTERNAL MEDICINE

## 2017-02-03 PROCEDURE — 97530 THERAPEUTIC ACTIVITIES: CPT

## 2017-02-03 PROCEDURE — 36415 COLL VENOUS BLD VENIPUNCTURE: CPT

## 2017-02-03 PROCEDURE — 80053 COMPREHEN METABOLIC PANEL: CPT

## 2017-02-03 PROCEDURE — 25000003 PHARM REV CODE 250: Performed by: INTERNAL MEDICINE

## 2017-02-03 PROCEDURE — 85025 COMPLETE CBC W/AUTO DIFF WBC: CPT

## 2017-02-03 PROCEDURE — 63600175 PHARM REV CODE 636 W HCPCS: Performed by: HOSPITALIST

## 2017-02-03 PROCEDURE — 83735 ASSAY OF MAGNESIUM: CPT

## 2017-02-03 RX ORDER — ONDANSETRON 2 MG/ML
4 INJECTION INTRAMUSCULAR; INTRAVENOUS ONCE AS NEEDED
Status: DISCONTINUED | OUTPATIENT
Start: 2017-02-03 | End: 2017-02-03 | Stop reason: HOSPADM

## 2017-02-03 RX ORDER — ONDANSETRON 4 MG/1
4 TABLET, FILM COATED ORAL EVERY 8 HOURS PRN
Qty: 10 TABLET | Refills: 0 | Status: SHIPPED | OUTPATIENT
Start: 2017-02-03 | End: 2018-01-16

## 2017-02-03 RX ORDER — LEVOFLOXACIN 750 MG/1
750 TABLET ORAL DAILY
Qty: 3 TABLET | Refills: 0 | Status: SHIPPED | OUTPATIENT
Start: 2017-02-03 | End: 2017-02-13

## 2017-02-03 RX ORDER — FUROSEMIDE 20 MG/1
20 TABLET ORAL DAILY
Qty: 30 TABLET | Refills: 2 | Status: SHIPPED | OUTPATIENT
Start: 2017-02-03 | End: 2017-05-31 | Stop reason: SDUPTHER

## 2017-02-03 RX ADMIN — HEPARIN SODIUM 5000 UNITS: 5000 INJECTION, SOLUTION INTRAVENOUS; SUBCUTANEOUS at 08:02

## 2017-02-03 RX ADMIN — FUROSEMIDE 20 MG: 10 INJECTION, SOLUTION INTRAVENOUS at 08:02

## 2017-02-03 RX ADMIN — LOSARTAN POTASSIUM 12.5 MG: 25 TABLET, FILM COATED ORAL at 08:02

## 2017-02-03 RX ADMIN — AZITHROMYCIN 250 MG: 250 TABLET, FILM COATED ORAL at 08:02

## 2017-02-03 RX ADMIN — Medication 1 CAPSULE: at 08:02

## 2017-02-03 RX ADMIN — LEVOTHYROXINE SODIUM 50 MCG: 25 TABLET ORAL at 05:02

## 2017-02-03 RX ADMIN — ATORVASTATIN CALCIUM 40 MG: 20 TABLET, FILM COATED ORAL at 08:02

## 2017-02-03 RX ADMIN — CEFTRIAXONE 1 G: 1 INJECTION, SOLUTION INTRAVENOUS at 05:02

## 2017-02-03 RX ADMIN — ONDANSETRON 4 MG: 2 INJECTION INTRAMUSCULAR; INTRAVENOUS at 10:02

## 2017-02-03 RX ADMIN — ASPIRIN 81 MG: 81 TABLET, COATED ORAL at 08:02

## 2017-02-03 RX ADMIN — PANTOPRAZOLE SODIUM 40 MG: 40 TABLET, DELAYED RELEASE ORAL at 08:02

## 2017-02-03 NOTE — PLAN OF CARE
NO ORDERS IN SYSTEM. MARRY PLEASE ATTACH AND SEND ORDERS ONCE ORDERS ARE RECEIVED    REFERRAL SENT TO     [facility]

## 2017-02-03 NOTE — PROGRESS NOTES
Pt transport came to move pt to XRAY, pt complained of severe dizziness and nausea. Notified MD. Have orders to give Zofran IV to pt. XRAY would be rescheduled until tomorrow morning.

## 2017-02-03 NOTE — PROGRESS NOTES
Orthostatic BP:    1945: Lying   BP: 141/66 (95)    1950: Sitting: BP: 144/65 (96)     1955: Standing   BP: 141/74 (105)    Pt denies dizziness. But she c/o nauseated afterwards. Will continue to monitor.

## 2017-02-03 NOTE — PLAN OF CARE
[facility]    HOME HEALTH ORDERS  FACE TO FACE ENCOUNTER    Patient Name: Janine Awad  YOB: 1927    PCP: Vishal Avendaño MD   PCP Address: [address] / [place] [address]  PCP Phone Number: [phone]  PCP Fax: [phone]    Encounter Date: 02/03/2017    Admit to Home Health    Diagnoses:  Active Hospital Problems    Diagnosis  POA    *Acute pulmonary edema [J81.0]  Yes    Pneumonia of left lower lobe due to infectious organism [J18.1]  Yes    Late onset Alzheimer's disease without behavioral disturbance [G30.1, F02.80]  Yes    Depression [F32.9]  Yes    Anemia of chronic disease [D63.8]  Yes    Severe aortic stenosis [I35.0]  Yes    Hyperlipidemia [E78.5]  Yes    Essential hypertension [I10]  Yes    Acquired hypothyroidism [E03.9]  Yes    GERD (gastroesophageal reflux disease) [K21.9]  Yes      Resolved Hospital Problems    Diagnosis Date Resolved POA   No resolved problems to display.       Future Appointments  Date Time Provider Department Center   3/14/2017 1:40 PM Jeri Leahy MD [facility] DERM [place]   4/5/2017 2:00 PM Micky Palma MD [facility] NEURO [facility]   4/19/2017 1:00 PM LAB, APPOINTMENT [facility] LAB [facility]   4/26/2017 1:40 PM Vishal Avendaño MD [facility] IM [place]           I have seen and examined this patient face to face today. My clinical findings that support the need for the home health skilled services and home bound status are the following:  Weakness/numbness causing balance and gait disturbance due to Heart Failure and Weakness/Debility making it taxing to leave home.    Allergies:  Review of patient's allergies indicates:   Allergen Reactions    Codeine Nausea And Vomiting     chills    Lisinopril Other (See Comments)     cough       Diet: cardiac diet    Activities: activity as tolerated    Nursing:   SN to complete comprehensive assessment including routine vital signs. Instruct on disease  process and s/s of complications to report to MD. Review/verify medication list sent home with the patient at time of discharge  and instruct patient/caregiver as needed. Frequency may be adjusted depending on start of care date.    Notify MD if SBP > 160 or < 90; DBP > 90 or < 50; HR > 120 or < 50; Temp > 101      CONSULTS:    Physical Therapy to evaluate and treat. Evaluate for home safety and equipment needs; Establish/upgrade home exercise program. Perform / instruct on therapeutic exercises, gait training, transfer training, and Range of Motion.  Occupational Therapy to evaluate and treat. Evaluate home environment for safety and equipment needs. Perform/Instruct on transfers, ADL training, ROM, and therapeutic exercises.    MISCELLANEOUS CARE:  Heart Failure:      SN to instruct on the following:    Instruct on the definition of CHF.   Instruct on the signs/sympoms of CHF to be reported.   Instruct on and monitor daily weights.   Instruct on factors that cause exacerbation.   Instruct on action, dose, schedule, and side effects of medications.   Instruct on diet as prescribed.   Instruct on activity allowed.   Instruct on life-style modifications for life long management of CHF   SN to assess compliance with daily weights, diet, medications, fluid retention,    safety precautions, activities permitted and life-style modifications.   Additional 1-2 SN visits per week as needed for signs and symptoms     of CHF exacerbation.      For Weight Gain > 2-3 lbs in 1 day or 4-6 lbs over 1 week notify PCP:  Obtain BMP lab test in 3 days, notify PCP    Medications: Review discharge medications with patient and family and provide education.      Current Discharge Medication List      START taking these medications    Details   furosemide (LASIX) 20 MG tablet Take 1 tablet (20 mg total) by mouth once daily.  Qty: 30 tablet, Refills: 2      levoFLOXacin (LEVAQUIN) 750 MG tablet Take 1 tablet (750 mg total) by mouth once  daily.  Qty: 3 tablet, Refills: 0         CONTINUE these medications which have NOT CHANGED    Details   aspirin (ECOTRIN) 81 MG EC tablet Take 81 mg by mouth once daily.      atorvastatin (LIPITOR) 40 MG tablet Take 1 tablet (40 mg total) by mouth once daily.  Qty: 90 tablet, Refills: 3      levothyroxine (SYNTHROID) 50 MCG tablet Take 1 tablet (50 mcg total) by mouth before breakfast.  Qty: 90 tablet, Refills: 1      losartan (COZAAR) 25 MG tablet Take 0.5 tablets (12.5 mg total) by mouth once daily. Hold for SBP < 110.  Qty: 30 tablet, Refills: 3      omeprazole (PRILOSEC) 20 MG capsule Take 20 mg by mouth once daily.       acetaminophen (TYLENOL) 325 MG tablet Take 2 tablets (650 mg total) by mouth every 6 (six) hours as needed for Pain.  Refills: 0      calcium citrate-vitamin D3 315-200 mg (CITRACAL+D) 315-200 mg-unit per tablet Take 1 tablet by mouth once daily.      COCONUT OIL ORAL Take by mouth.      estrogens, conjugated, (PREMARIN) 0.3 MG tablet Take 0.3 mg by mouth once daily.      fish oil-omega-3 fatty acids 300-1,000 mg capsule Take 2 g by mouth once daily.      GINSENG ORAL Take by mouth.      senna-docusate 8.6-50 mg (PERICOLACE) 8.6-50 mg per tablet Take 2 tablets by mouth 2 (two) times daily.      vitamin D 1000 units Tab Take 185 mg by mouth once daily.      ZINC ACETATE ORAL Take by mouth.         STOP taking these medications       polyethylene glycol (GLYCOLAX) 17 gram PwPk Comments:   Reason for Stopping:               I certify that this patient is confined to her home and needs intermittent skilled nursing care, physical therapy and occupational therapy.

## 2017-02-03 NOTE — PT/OT/SLP PROGRESS
"Physical Therapy  Treatment    Janine Awad   MRN: [id number]   Admitting Diagnosis: Acute pulmonary edema    PT Received On: 17  PT Start Time: 1017     PT Stop Time: 1026    PT Total Time (min): 9 min       Billable Minutes:  Therapeutic Activity 9    Treatment Type: Treatment  PT/PTA: PT     PTA Visit Number: 0       General Precautions: Standard, fall  Orthopedic Precautions: N/A   Braces: N/A    Do you have any cultural, spiritual, Christian conflicts, given your current situation?: none noted     Subjective:  Communicated with RN prior to session.  "I wasn't in here last night, was I?" "I'm very confused then."    Pain Ratin/10    Objective:   Patient found with: telemetry, pulse ox (continuous)    Functional Mobility:  Bed Mobility:   Supine to Sit: Stand by Assistance  Sit to Supine: Stand by Assistance    Transfers:  Sit <> Stand Assistance: Contact Guard Assistance  Sit <> Stand Assistive Device: No Assistive Device  Toilet Transfer Technique: Stand Pivot  Toilet Transfer Assistance: Contact Guard Assistance  Toilet Transfer Assistive Device: No Assistive Device, bedside commode, HHA      Gait:   Gait Distance: 2 ft. + 2 ft. bed<>BSC  Assistance 1: Contact Guard Assistance  Gait Assistive Device: Hand held assist  Gait Pattern: 2-point gait  Gait Deviation(s): decreased jeanne, increased time in double stance, decreased velocity of limb motion, decreased step length    Balance:   Static Sit: supervision   Dynamic Sit: SBA  Static Stand: CGA  Dynamic stand: CGA     Therapeutic Activities and Exercises:  Pt pleasantly confused throughout session. Provided daily orientation. Pt receptive to orientation and verbalized understanding.  Upon sitting up at EOB, pt reported dizziness. Increased time spent in static sitting prior to progressing mobility to allow dizziness to decrease. Pt assisted to BSC with CGA and HHA. While voiding on BSC, pt reported nausea and dizziness. Pt cued on pursed " breathing to decrease dizziness. Pt performed hygiene following toileting with SBA. Pt assisted back to bed with CGA and HHA. Further ambulation unable to be performed 2* dizziness and nausea. Pt also decline seated and supine therex. Pt provided with emesis bag. RN notified on pt's nausea and dizziness.      AM-PAC 6 CLICK MOBILITY  How much help from another person does this patient currently need?   1 = Unable, Total/Dependent Assistance  2 = A lot, Maximum/Moderate Assistance  3 = A little, Minimum/Contact Guard/Supervision  4 = None, Modified [place]/Independent    Turning over in bed (including adjusting bedclothes, sheets and blankets)?: 4  Sitting down on and standing up from a chair with arms (e.g., wheelchair, bedside commode, etc.): 3  Moving from lying on back to sitting on the side of the bed?: 3  Moving to and from a bed to a chair (including a wheelchair)?: 3  Need to walk in hospital room?: 3  Climbing 3-5 steps with a railing?: 2  Total Score: 18    AM-PAC Raw Score CMS G-Code Modifier Level of Impairment Assistance   6 % Total / Unable   7 - 9 CM 80 - 100% Maximal Assist   10 - 14 CL 60 - 80% Moderate Assist   15 - 19 CK 40 - 60% Moderate Assist   20 - 22 CJ 20 - 40% Minimal Assist   23 CI 1-20% SBA / CGA   24 CH 0% Independent/ Mod I     Patient left supine with all lines intact, call button in reach and RN notified.    Assessment:  Janine Awad is a 89 y.o. female with a medical diagnosis of Acute pulmonary edema and presents with nausea and dizziness this date, limiting participation in therapy session. Pt tolerated stand pivot transfer T/F BSC; however, unable to perform further ambulation or therex 2* nausea and dizziness. RN notified. Pt would continue to benefit from skilled IP PT in order to address current deficits and progress functional mobility. Will progress as tolerated.     Rehab identified problem list/impairments: Rehab identified problem list/impairments:  weakness, gait instability, impaired endurance, impaired balance, impaired self care skills, impaired functional mobilty, impaired cognition, impaired cardiopulmonary response to activity    Rehab potential is good.    Activity tolerance: Fair    Discharge recommendations: Discharge Facility/Level Of Care Needs: home health PT     Barriers to discharge: Barriers to Discharge: None    Equipment recommendations: Equipment Needed After Discharge: none     GOALS:   Physical Therapy Goals        Problem: Physical Therapy Goal    Goal Priority Disciplines Outcome Goal Variances Interventions   Physical Therapy Goal     PT/OT, PT Ongoing (interventions implemented as appropriate)     Description:  Goals to be met by: 17     Patient will increase functional independence with mobility by performin. Supine to sit with Modified [place]  2. Sit to stand transfer with Supervision  3. Bed to chair transfer with Supervision using AD as needed.  4. Gait  x 300 feet with Supervision using AD as needed.   5. Lower extremity exercise program x15 reps, with assistance as needed, in order to increase LE strength and (I) with functional mobility.                 PLAN:    Patient to be seen 4 x/week  to address the above listed problems via gait training, therapeutic activities, therapeutic exercises  Plan of Care expires: 17  Plan of Care reviewed with: patient        Andreina Marcell, PT, DPT   2/3/2017  [phone]

## 2017-02-03 NOTE — PROGRESS NOTES
Every time pt wakes up, she gets confused and keeps asking where she is and what happened to her. Need frequent reinforcement about place and situation.

## 2017-02-03 NOTE — PLAN OF CARE
Problem: Patient Care Overview  Goal: Plan of Care Review  Outcome: Ongoing (interventions implemented as appropriate)  Pt denies Chest pain, SOB. Nauseated and vomitted; prn meds given. No falls, trauma or injury noted. VSS. RA O2 Sat >96%. Blood culture: pending. Plan of care reviewed with patient. No further questions at this time. No significant events. Will continue to monitor.

## 2017-02-03 NOTE — PLAN OF CARE
Problem: Physical Therapy Goal  Goal: Physical Therapy Goal  Goals to be met by: 17     Patient will increase functional independence with mobility by performin. Supine to sit with Modified [place]  2. Sit to stand transfer with Supervision  3. Bed to chair transfer with Supervision using AD as needed.  4. Gait x 300 feet with Supervision using AD as needed.   5. Lower extremity exercise program x15 reps, with assistance as needed, in order to increase LE strength and (I) with functional mobility.    Outcome: Ongoing (interventions implemented as appropriate)  Goals reviewed and remain appropriate. Pt progressing towards goals.     Andreina Faith, PT, DPT   2/3/2017  [phone]

## 2017-02-03 NOTE — PLAN OF CARE
Called [facility] & spoke to Beata.  Received referral & will F/U with Yanet

## 2017-02-03 NOTE — PROGRESS NOTES
"Progress Note  Hospital Medicine    Primary Team: [facility] [place] [facility]  Admit Date: 2/1/2017   Length of Stay:  LOS: 2 days   SUBJECTIVE:   Reason for Admission:  Acute pulmonary edema      HPI:  89 y.o. female with PM Hx of GERD, essential HTN, severe aortic stenosis (9/22/14), and with Alzheimer's who presents to ER. Pt's daughter called in to PCP stating pt had a fever at approximately 101 Fahrenheit. Associated symptoms included increased weakness, wheezing, neck pain, and upper back pain. Daughter attempted to get an appointment but stated her mother was too weak and decided to bring her to ER instead. Pt also c/o mid-chest pain when she stood up associated with SOB. She states, "my breathing doesn't seem to be normal." Pt denies cough, nausea, vomiting, or any other symptoms at this time. Took two baby aspirin PTA for fever. Chest pain was described as worse when taking a deep breath and on movement.No relation with exertion. Patient states that she doesnot remember much of her symptoms-daughter assists in history.  CXR notable for bilateral pulmonary edema vs PNA with WBC 16; pt was admitted to - for evaluation of CHF and PNA.    Interval history:    No acute events overnight.  Pt is on room air and is breathing comfortably.  Reports neck pain is not bothering her today, mild right shoulder pain.      Review of Systems:  Constitutional: no fever or chills  Respiratory: positive for dyspnea on exertion  Cardiovascular: no chest pain or palpitations  Gastrointestinal: no nausea or vomiting, no abdominal pain or change in bowel habits  Musculoskeletal: no arthralgias or myalgias     OBJECTIVE:     Temp:  [97.6 °F (36.4 °C)-97.9 °F (36.6 °C)]   Pulse:  [61-83]   Resp:  [8-24]   BP: (105-144)/(55-74)   SpO2:  [91 %-97 %]  Body mass index is 24.37 kg/(m^2).  Intake/[place]:  This Shift:       Net I/O past 24h:     Intake/Output Summary (Last 24 hours) at 02/03/17 0901  Last data filed at 02/03/17 0500   Gross per 24 " hour   Intake              600 ml   Output             1600 ml   Net            -1000 ml             Physical Exam:  Gen- well-developed, well-nourished, NAD  CVS- S1 and S2 present, 5/6 systolic murmur with radiation to carotids, RRR  Resp- minimal bibasilar crackles, no work of breathing  Abd- BS+, soft, NT, ND  Ext- no clubbing, cyanosis, or edema    Laboratory:  CBC/Anemia Labs: Coags:      Recent Labs  Lab 02/01/17  1700 02/02/17  0543 02/03/17  0619   WBC 16.07* 10.53  10.53 7.71   HGB 11.7* 12.1  12.1 11.4*   HCT 35.3* 36.8*  36.8* 35.2*    215  215 212   MCV 94 94  94 95   RDW 14.7* 14.8*  14.8* 14.5      Recent Labs  Lab 02/01/17  1700 02/02/17  0543   INR 1.0  --    APTT  --  25.3        Chemistries:     Recent Labs  Lab 02/01/17  1700 02/02/17  0543    141   K 4.2 4.2    102   CO2 24 28   BUN 14 20   CREATININE 0.7 0.9   CALCIUM 9.1 9.3   PROT 7.1 7.3   BILITOT 1.5* 1.5*   ALKPHOS 93 86   ALT 15 13   AST 19 22   MG  --  2.6          Cardiac Enzymes: Ejection Fractions:    Recent Labs      02/01/17   1700  02/01/17   2208  02/02/17   0543   TROPONINI  0.035*  0.054*  0.039*  0.039*    EF   Date Value Ref Range Status   02/02/2017 55 55 - 65    09/22/2014 65          POCT Glucose: HbA1c:    No results for input(s): POCTGLUCOSE in the last 168 hours. Hemoglobin A1C   Date Value Ref Range Status   03/24/2016 5.7 4.5 - 6.2 % Final   08/11/2009 6.0 4.0 - 6.2 % Final         Medications:  Scheduled Meds:   aspirin  81 mg Oral Daily    atorvastatin  40 mg Oral Daily    azithromycin  250 mg Oral Daily    cefTRIAXone (ROCEPHIN) IVPB  1 g Intravenous Q24H    furosemide  20 mg Intravenous Daily    heparin (porcine)  5,000 Units Subcutaneous Q12H    levothyroxine  50 mcg Oral Before breakfast    losartan  12.5 mg Oral Daily    omega-3 fatty acids-fish oil  1 capsule Oral BID    pantoprazole  40 mg Oral Daily                             Continuous Infusions:   PRN  Meds:.acetaminophen, hydrocodone-acetaminophen 5-325mg, ondansetron, polyethylene glycol, senna-docusate 8.6-50 mg, tramadol     ASSESSMENT/PLAN:     Acute Diastolic heart failure in setting of severe AS  -Pt with good urine output after IV Lasix; now down 5lb  -Repeat dose of Lasix 20mg IV x 1 today; discharge with 20mg PO daily  -ECHO 2/2/17:  CONCLUSIONS     1 - Normal left ventricular systolic function (EF 55-60%).     2 - Normal right ventricular systolic function .     3 - Left ventricular diastolic dysfunction.     4 - Biatrial enlargement.     5 - Moderate mitral regurgitation.     6 - Severe aortic stenosis.   -Continue Losartan 12.5mg daily; add beta-blocker as tolerated by bp, after resolution of acute CHF exacerbation  -strict i/o, daily weights, fluid restriction  -chart reviewed, pt was been evaluated for TAVR in the past, but refused surgery at most recent Cardiology appt 10/2016; confirmed this with daughter   -Suspect Trop elevated due to demand ischemia, in setting of CHF exacerbation and severe AS    CAP  -WBC up could be secondary to steroid injection, but resolved after starting Rocephin and Azithro  -Continue this regimen x 5 days (fever, cough); change to Levaquin on discharge  -blood cx x 2 done in ED, NGTD      Hypothyroidism  -continue Levothyroxine   -TFTs at goal     GERD  -Continue PPI    DVT ppx- Heparin  CODE Status- FULL    Dispo- stable for d/c home today with KEYUR Lord MD  Hospital Medicine Staff

## 2017-02-03 NOTE — PROGRESS NOTES
Pt c/o nausea and vomitted. MD notified. 4mg IV zofran given. Will continue to monitor.     Pt and her daughter complained that her sleeping and depression meds are not working. She takes 10mg po hydroxyzine and 75mg po venlafaxine at home. They wanted to change the meds to different ones. Dr. Bradshaw notified. No new orders received. Received an order to inform the pt to notify primary care provider or morning team. Pt states ok. Will inform day shift nurse to follow up.

## 2017-02-04 NOTE — DISCHARGE SUMMARY
"DISCHARGE SUMMARY  Hospital Medicine    Team: [facility] [place] [facility] MED C    Patient Name: Janine Awad  YOB: 1927    Admit Date: 2/1/2017    Discharge Date: 02/03/2017    Discharge Attending Physician: Shena Lord MD     Principal Diagnoses:  Active Hospital Problems    Diagnosis  POA    *Acute pulmonary edema [J81.0]  Yes    Pneumonia of left lower lobe due to infectious organism [J18.1]  Yes    Late onset Alzheimer's disease without behavioral disturbance [G30.1, F02.80]  Yes    Depression [F32.9]  Yes    Anemia of chronic disease [D63.8]  Yes    Severe aortic stenosis [I35.0]  Yes    Hyperlipidemia [E78.5]  Yes    Essential hypertension [I10]  Yes    Acquired hypothyroidism [E03.9]  Yes    GERD (gastroesophageal reflux disease) [K21.9]  Yes      Resolved Hospital Problems    Diagnosis Date Resolved POA   No resolved problems to display.       Discharged Condition: stable    HOSPITAL COURSE:      Initial Presentation:    89 y.o. female with PM Hx of GERD, essential HTN, severe aortic stenosis (9/22/14), and with Alzheimer's who presents to ER. Pt's daughter called in to PCP stating pt had a fever at approximately 101 Fahrenheit. Associated symptoms included increased weakness, wheezing, neck pain, and upper back pain. Daughter attempted to get an appointment but stated her mother was too weak and decided to bring her to ER instead. Pt also c/o mid-chest pain when she stood up associated with SOB. She states, "my breathing doesn't seem to be normal." Pt denies cough, nausea, vomiting, or any other symptoms at this time. Took two baby aspirin PTA for fever. Chest pain was described as worse when taking a deep breath and on movement.No relation with exertion. Patient states that she doesnot remember much of her symptoms-daughter assists in history.  CXR notable for bilateral pulmonary edema vs PNA with WBC 16; [place] [facility] [place] and [place]NA.    Course of Principle " Problem for Admission:    Acute Diastolic heart failure in setting of severe AS  -Pt with good urine output after IV Lasix; now down 5lb  -Repeat dose of Lasix 20mg IV x 1 today; discharge with 20mg PO daily  -ECHO 2/2/17:  CONCLUSIONS     1 - Normal left ventricular systolic function (EF 55-60%).     2 - Normal right ventricular systolic function .     3 - Left ventricular diastolic dysfunction.     4 - Biatrial enlargement.     5 - Moderate mitral regurgitation.     6 - Severe aortic stenosis.   -Continue Losartan 12.5mg daily; add beta-blocker as tolerated by bp, after resolution of acute CHF exacerbation  -strict i/o, daily weights, fluid restriction  -chart reviewed, pt was been evaluated for TAVR in the past, but refused surgery at most recent Cardiology appt 10/2016; confirmed this with daughter   -Suspect Trop elevated due to demand ischemia, in setting of CHF exacerbation and severe AS    Other Medical Problems Addressed in the Hospital:    CAP  -WBC up could be secondary to steroid injection, but resolved after starting Rocephin and Azithro  -Continue this regimen x 5 days (fever, cough); change to Levaquin on discharge  -blood cx x 2 done in ED, NGTD      Hypothyroidism  -continue Levothyroxine   -TFTs at goal      GERD  -Continue PPI      Consults: None    Last CBC/BMP:    CBC/Anemia Labs: Coags:      Recent Labs  Lab 02/01/17  1700 02/02/17  0543 02/03/17  0619   WBC 16.07* 10.53  10.53 7.71   HGB 11.7* 12.1  12.1 11.4*   HCT 35.3* 36.8*  36.8* 35.2*    215  215 212   MCV 94 94  94 95   RDW 14.7* 14.8*  14.8* 14.5      Recent Labs  Lab 02/01/17  1700 02/02/17  0543   INR 1.0  --    APTT  --  25.3        Chemistries:     Recent Labs  Lab 02/01/17  1700 02/02/17  0543 02/03/17  0619    141 138   K 4.2 4.2 4.2    102 101   CO2 24 28 28   BUN 14 20 20   CREATININE 0.7 0.9 0.8   CALCIUM 9.1 9.3 9.2   PROT 7.1 7.3 7.0   BILITOT 1.5* 1.5* 0.9   ALKPHOS 93 86 78   ALT 15 13 10   AST 19  22 14   MG  --  2.6 2.4        Significant Diagnostic Studies: as above    Special Treatments/Procedures:   * No surgery found *     Disposition: [place]-Health Care [facility] [place]      Future Scheduled Appointments:  Future Appointments  Date Time Provider Department Center   3/14/2017 1:40 PM Jeri Leahy MD [facility] DERM [place]   4/5/2017 2:00 PM Micky Palma MD [facility] NEUR[facility]   4/19/2017 1:00 PM LAB, APPOINTMENT [facility] LAB VNP [facility]   4/26/2017 1:40 PM Vishal Avendaño MD [facility] IM [place]       Discharge Medication List:       Janine Awad   Home Medication Instructions RALF:[id number]    Printed on:02/03/17 2139   Medication Information                      acetaminophen (TYLENOL) 325 MG tablet  Take 2 tablets (650 mg total) by mouth every 6 (six) hours as needed for Pain.             aspirin (ECOTRIN) 81 MG EC tablet  Take 81 mg by mouth once daily.             atorvastatin (LIPITOR) 40 MG tablet  Take 1 tablet (40 mg total) by mouth once daily.             calcium citrate-vitamin D3 315-200 mg (CITRACAL+D) 315-200 mg-unit per tablet  Take 1 tablet by mouth once daily.             COCONUT OIL ORAL  Take by mouth.             estrogens, conjugated, (PREMARIN) 0.3 MG tablet  Take 0.3 mg by mouth once daily.             fish oil-omega-3 fatty acids 300-1,000 mg capsule  Take 2 g by mouth once daily.             furosemide (LASIX) 20 MG tablet  Take 1 tablet (20 mg total) by mouth once daily.             GINSENG ORAL  Take by mouth.             levoFLOXacin (LEVAQUIN) 750 MG tablet  Take 1 tablet (750 mg total) by mouth once daily.             levothyroxine (SYNTHROID) 50 MCG tablet  Take 1 tablet (50 mcg total) by mouth before breakfast.             losartan (COZAAR) 25 MG tablet  Take 0.5 tablets (12.5 mg total) by mouth once daily. Hold for SBP < 110.             omeprazole (PRILOSEC) 20 MG capsule  Take 20 mg by mouth once daily.              ondansetron (ZOFRAN) 4 MG tablet  Take 1  tablet (4 mg total) by mouth every 8 (eight) hours as needed for Nausea.             senna-docusate 8.6-50 mg (PERICOLACE) 8.6-50 mg per tablet  Take 2 tablets by mouth 2 (two) times daily.             vitamin D 1000 units Tab  Take 185 mg by mouth once daily.             ZINC ACETATE ORAL  Take by mouth.                 Patient Instructions:    Discharge Procedure Orders  Diet Cardiac     Activity as tolerated     Call MD for:  difficulty breathing or increased cough         At the time of discharge patient was told to take all medications as prescribed, to keep all followup appointments, and to call their primary care physician or return to the emergency room if they have any worsening or concerning symptoms.    Signing Physician:  Shena Lord MD

## 2017-02-06 ENCOUNTER — PATIENT OUTREACH (OUTPATIENT)
Dept: ADMINISTRATIVE | Facility: CLINIC | Age: 82
End: 2017-02-06
Payer: MEDICARE

## 2017-02-06 LAB
BACTERIA BLD CULT: NORMAL
BACTERIA BLD CULT: NORMAL

## 2017-02-06 NOTE — PT/OT/SLP DISCHARGE
Physical Therapy Discharge Summary    Janine Awad  MRN: [id number]   Acute pulmonary edema   Patient Discharged from acute Physical Therapy on 2/3/17.  Please refer to prior PT noted date on 2/3/17 for functional status.     Assessment:   Patient was discharge unexpectedly.  Information required to complete and accurate discharge summary is unknown.  Refer to therapy initial evaluation and last progress note for initial and most recent functional status and goal achievement.  Recommendations made may be found in medical record.  GOALS:   Physical Therapy Goals     Not on file      Multidisciplinary Problems (Resolved)        Problem: Physical Therapy Goal    Goal Priority Disciplines Outcome Goal Variances Interventions   Physical Therapy Goal   (Resolved)     PT/OT, PT Outcome(s) achieved     Description:  Goals to be met by: 17     Patient will increase functional independence with mobility by performin. Supine to sit with Modified [place]  2. Sit to stand transfer with Supervision  3. Bed to chair transfer with Supervision using AD as needed.  4. Gait  x 300 feet with Supervision using AD as needed.   5. Lower extremity exercise program x15 reps, with assistance as needed, in order to increase LE strength and (I) with functional mobility.               Reasons for Discontinuation of Therapy Services  Transfer to alternate level of care.      Plan:  Patient Discharged to: Home with Home Health Service.    Andreina Faith, PT, DPT   2017  [phone]

## 2017-02-06 NOTE — PT/OT/SLP DISCHARGE
Occupational Therapy Discharge Summary    Janine Awad  MRN: [id number]   Acute pulmonary edema   Patient Discharged from acute Occupational Therapy on 2/4/17.  Please refer to prior OT note dated on 2/2/17 for functional status.     Assessment:   Patient was discharge unexpectedly.  Information required to complete and accurate discharge summary is unknown.  Refer to therapy initial evaluation and last progress note for initial and most recent functional status and goal achievement.  Recommendations made may be found in medical record. Patient appropriate for care in another setting.  GOALS:   Occupational Therapy Goals     Not on file      Multidisciplinary Problems (Resolved)        Problem: Occupational Therapy Goal    Goal Priority Disciplines Outcome Interventions   Occupational Therapy Goal   (Resolved)     OT, PT/OT Outcome(s) achieved    Description:  Goals to be met by: 7 days (2/9/17)     Patient will increase functional independence with ADLs by performing:    UE Dressing with Supervision.  LE Dressing with Supervision.  Grooming while standing at sink with Supervision.  Toileting from toilet with Supervision for hygiene and clothing management.   Supine to sit with Supervision.  Toilet transfer to toilet with Supervision.  Increased functional strength to WFL for ADLs.              Reasons for Discontinuation of Therapy Services  Transfer to alternate level of care.      Plan:  Patient Discharged to: Home with Home Health Service     ROSEY Diego  2/6/2017

## 2017-02-15 ENCOUNTER — HOSPITAL ENCOUNTER (INPATIENT)
Facility: HOSPITAL | Age: 82
LOS: 2 days | Discharge: HOME HEALTH | DRG: 303 | End: 2017-02-17
Attending: EMERGENCY MEDICINE | Admitting: HOSPITALIST
Payer: MEDICARE

## 2017-02-15 DIAGNOSIS — I24.9 ACS (ACUTE CORONARY SYNDROME): Primary | ICD-10-CM

## 2017-02-15 PROBLEM — J18.9 PNEUMONIA OF LEFT LOWER LOBE DUE TO INFECTIOUS ORGANISM: Status: RESOLVED | Noted: 2017-02-01 | Resolved: 2017-02-15

## 2017-02-15 PROBLEM — J81.0 ACUTE PULMONARY EDEMA: Status: RESOLVED | Noted: 2017-02-02 | Resolved: 2017-02-15

## 2017-02-15 PROBLEM — R07.9 CHEST PAIN IN ADULT: Status: ACTIVE | Noted: 2017-02-15

## 2017-02-15 LAB
ALBUMIN SERPL BCP-MCNC: 3.7 G/DL
ALP SERPL-CCNC: 94 U/L
ALT SERPL W/O P-5'-P-CCNC: 15 U/L
ANION GAP SERPL CALC-SCNC: 11 MMOL/L
AST SERPL-CCNC: 20 U/L
BASOPHILS # BLD AUTO: 0.05 K/UL
BASOPHILS NFR BLD: 0.5 %
BILIRUB SERPL-MCNC: 0.7 MG/DL
BUN SERPL-MCNC: 17 MG/DL
CALCIUM SERPL-MCNC: 9.3 MG/DL
CHLORIDE SERPL-SCNC: 108 MMOL/L
CO2 SERPL-SCNC: 24 MMOL/L
CREAT SERPL-MCNC: 0.8 MG/DL
DIFFERENTIAL METHOD: ABNORMAL
EOSINOPHIL # BLD AUTO: 0.2 K/UL
EOSINOPHIL NFR BLD: 2.4 %
ERYTHROCYTE [DISTWIDTH] IN BLOOD BY AUTOMATED COUNT: 14.8 %
EST. GFR  (AFRICAN AMERICAN): >60 ML/MIN/1.73 M^2
EST. GFR  (NON AFRICAN AMERICAN): >60 ML/MIN/1.73 M^2
GLUCOSE SERPL-MCNC: 87 MG/DL
HCT VFR BLD AUTO: 36.1 %
HGB BLD-MCNC: 11.9 G/DL
LYMPHOCYTES # BLD AUTO: 2.2 K/UL
LYMPHOCYTES NFR BLD: 21.8 %
MCH RBC QN AUTO: 31.1 PG
MCHC RBC AUTO-ENTMCNC: 33 %
MCV RBC AUTO: 94 FL
MONOCYTES # BLD AUTO: 0.6 K/UL
MONOCYTES NFR BLD: 6.3 %
NEUTROPHILS # BLD AUTO: 6.9 K/UL
NEUTROPHILS NFR BLD: 68.5 %
PLATELET # BLD AUTO: 233 K/UL
PMV BLD AUTO: 10.3 FL
POTASSIUM SERPL-SCNC: 3.8 MMOL/L
PROT SERPL-MCNC: 7.4 G/DL
RBC # BLD AUTO: 3.83 M/UL
SODIUM SERPL-SCNC: 143 MMOL/L
TROPONIN I SERPL DL<=0.01 NG/ML-MCNC: 0.09 NG/ML
WBC # BLD AUTO: 10.06 K/UL

## 2017-02-15 PROCEDURE — 25000003 PHARM REV CODE 250: Performed by: EMERGENCY MEDICINE

## 2017-02-15 PROCEDURE — 85025 COMPLETE CBC W/AUTO DIFF WBC: CPT

## 2017-02-15 PROCEDURE — 12000002 HC ACUTE/MED SURGE SEMI-PRIVATE ROOM

## 2017-02-15 PROCEDURE — 93005 ELECTROCARDIOGRAM TRACING: CPT

## 2017-02-15 PROCEDURE — 84484 ASSAY OF TROPONIN QUANT: CPT | Mod: 91

## 2017-02-15 PROCEDURE — 99291 CRITICAL CARE FIRST HOUR: CPT | Mod: 25

## 2017-02-15 PROCEDURE — 85730 THROMBOPLASTIN TIME PARTIAL: CPT

## 2017-02-15 PROCEDURE — 93010 ELECTROCARDIOGRAM REPORT: CPT | Mod: 76,,, | Performed by: INTERNAL MEDICINE

## 2017-02-15 PROCEDURE — 99285 EMERGENCY DEPT VISIT HI MDM: CPT | Mod: ,,, | Performed by: EMERGENCY MEDICINE

## 2017-02-15 PROCEDURE — 80053 COMPREHEN METABOLIC PANEL: CPT

## 2017-02-15 PROCEDURE — 93010 ELECTROCARDIOGRAM REPORT: CPT | Mod: ,,, | Performed by: INTERNAL MEDICINE

## 2017-02-15 RX ORDER — HEPARIN SODIUM,PORCINE/D5W 25000/250
12 INTRAVENOUS SOLUTION INTRAVENOUS CONTINUOUS
Status: DISCONTINUED | OUTPATIENT
Start: 2017-02-15 | End: 2017-02-16

## 2017-02-15 RX ORDER — ASPIRIN 325 MG
325 TABLET ORAL ONCE
Status: COMPLETED | OUTPATIENT
Start: 2017-02-15 | End: 2017-02-15

## 2017-02-15 RX ADMIN — ASPIRIN 325 MG ORAL TABLET 325 MG: 325 PILL ORAL at 10:02

## 2017-02-15 NOTE — IP AVS SNAPSHOT
[facility]  [address]  [facility] [address]  Phone: [phone]           Patient Discharge Instructions     Our goal is to set you up for success. This packet includes information on your condition, medications, and your home care. It will help you to care for yourself so you don't get sicker and need to go back to the hospital.     Please ask your nurse if you have any questions.        There are many details to remember when preparing to leave the hospital. Here is what you will need to do:    1. Take your medicine. If you are prescribed medications, review your Medication List in the following pages. You may have new medications to  at the pharmacy and others that you'll need to stop taking. Review the instructions for how and when to take your medications. Talk with your doctor or nurses if you are unsure of what to do.     2. Go to your follow-up appointments. Specific follow-up information is listed in the following pages. Your may be contacted by a transition nurse or clinical provider about future appointments. Be sure we have all of the phone numbers to reach you, if needed. Please contact your provider's office if you are unable to make an appointment.     3. Watch for warning signs. Your doctor or nurse will give you detailed warning signs to watch for and when to call for assistance. These instructions may also include educational information about your condition. If you experience any of warning signs to your health, call your doctor.               [facility]  Unless otherwise directed by your provider, please contact [facility], our nurse care line that is available for 24/7 assistance.     [phone] (toll-free)    Registered nurses in the [facility] provide clinical advisement, health education, appointment booking, and other advisory services.                    ** Verify the list of medication(s) below is accurate and up  to date. Carry this with you in case of emergency. If your medications have changed, please notify your healthcare provider.             Medication List      START taking these medications        Additional Info    Begin Date AM Noon PM Bedtime    artificial tears 0.5 % ophthalmic solution   Commonly known as:  ISOPTO TEARS   Refills:  0   Dose:  1 drop    Last time this was given:  1 drop on 2/17/2017  5:49 AM   Instructions:  Place 1 drop into the left eye 4 (four) times daily.                                          CONTINUE taking these medications        Additional Info    Begin Date AM Noon PM Bedtime    acetaminophen 325 MG tablet   Commonly known as:  TYLENOL   Refills:  0   Dose:  650 mg    Instructions:  Take 2 tablets (650 mg total) by mouth every 6 (six) hours as needed for Pain.                            atorvastatin 40 MG tablet   Commonly known as:  LIPITOR   Quantity:  90 tablet   Refills:  3   Dose:  40 mg    Last time this was given:  80 mg on 2/17/2017  8:55 AM   Instructions:  Take 1 tablet (40 mg total) by mouth once daily.                               calcium citrate-vitamin D3 315-200 mg 315-200 mg-unit per tablet   Commonly known as:  CITRACAL+D   Refills:  0   Dose:  1 tablet    Instructions:  Take 1 tablet by mouth once daily.                               COCONUT OIL ORAL   Refills:  0    Instructions:  Take by mouth.                               estrogens (conjugated) 0.3 MG tablet   Commonly known as:  PREMARIN   Refills:  0   Dose:  0.3 mg    Instructions:  Take 0.3 mg by mouth once daily.                               fish oil-omega-3 fatty acids 300-1,000 mg capsule   Refills:  0   Dose:  2 g    Instructions:  Take 2 g by mouth once daily.                               furosemide 20 MG tablet   Commonly known as:  LASIX   Quantity:  30 tablet   Refills:  2   Dose:  20 mg    Last time this was given:  20 mg on 2/17/2017  8:56 AM   Instructions:  Take 1 tablet (20 mg total) by mouth  once daily.                               GINSENG ORAL   Refills:  0    Instructions:  Take by mouth.                               levothyroxine 50 MCG tablet   Commonly known as:  SYNTHROID   Quantity:  90 tablet   Refills:  1   Dose:  50 mcg    Last time this was given:  50 mcg on 2/17/2017  5:49 AM   Instructions:  Take 1 tablet (50 mcg total) by mouth before breakfast.                               losartan 25 MG tablet   Commonly known as:  COZAAR   Quantity:  30 tablet   Refills:  3   Dose:  12.5 mg    Last time this was given:  12.5 mg on 2/17/2017  8:56 AM   Instructions:  Take 0.5 tablets (12.5 mg total) by mouth once daily. Hold for SBP < 110.                               omeprazole 20 MG capsule   Commonly known as:  PRILOSEC   Refills:  0   Dose:  20 mg    Instructions:  Take 20 mg by mouth once daily.                               ondansetron 4 MG tablet   Commonly known as:  ZOFRAN   Quantity:  10 tablet   Refills:  0   Dose:  4 mg    Instructions:  Take 1 tablet (4 mg total) by mouth every 8 (eight) hours as needed for Nausea.                            senna-docusate 8.6-50 mg 8.6-50 mg per tablet   Commonly known as:  PERICOLACE   Refills:  0   Dose:  2 tablet    Instructions:  Take 2 tablets by mouth 2 (two) times daily.                                  vitamin D 1000 units Tab   Refills:  0   Dose:  185 mg    Instructions:  Take 185 mg by mouth once daily.                               ZINC ACETATE ORAL   Refills:  0    Instructions:  Take by mouth.                                 STOP taking these medications     aspirin 81 MG EC tablet   Commonly known as:  ECOTRIN            Where to Get Your Medications      You can get these medications from any pharmacy     You don't need a prescription for these medications     artificial tears 0.5 % ophthalmic solution                  Please bring to all follow up appointments:    1. A copy of your discharge instructions.  2. All medicines you are  currently taking in their original bottles.  3. Identification and insurance card.    Please arrive 15 minutes ahead of scheduled appointment time.    Please call 24 hours in advance if you must reschedule your appointment and/or time.        Your Scheduled Appointments     Mar 14, 2017  1:40 PM CDT   Consult with MD Vandana Reyes - Dermatology ([place])    [address]  [place] [address]   [phone]            Apr 05, 2017  2:00 PM CDT   Neurology - Established Patient with Micky Palma MD   [facility]    [address]  [facility] [address]   [phone]            Apr 19, 2017  1:00 PM CDT   Fasting Lab with LAB, APPOINTMENT [place] [facility] ([facility])    [address] [facility] [facility] [id number] [phone]            Apr 26, 2017  1:40 PM CDT   Established Patient Visit with MD Vandana Michel - Internal Medicine ([place])    [address]  [place] [id number] [phone]                Discharge Instructions     Future Orders    Activity as tolerated     Call MD for:  difficulty breathing or increased cough     Call MD for:  increased confusion or weakness     Call MD for:  persistent dizziness, light-headedness, or visual disturbances     Diet Cardiac         Primary Diagnosis     Your primary diagnosis was:  Chest Pain At Rest      Admission Information     Date & Time Provider Department CSN    2/15/2017  8:44 PM Shena Lord MD [facility] [id number]      Care Providers     Provider Role Specialty Primary office phone    Shena Lord MD Attending Provider Hospitalist [phone]    John Rob MD Team Attending  Hospitalist [phone]    Shena Lord MD Team Attending  Hospitalist [phone]      Your Vitals Were     BP Pulse Temp Resp Height Weight    124/59 (BP Location: Left arm, Patient Position: Lying, BP Method:  "Automatic) 74 98 °F (36.7 °C) (Oral) 18 5' 2" (1.575 m) 65.8 kg (145 lb)    Last Period SpO2 BMI          (LMP Unknown) 95% 26.52 kg/m2        Recent Lab Values        8/11/2009 3/24/2016                       12:20 PM 12:50 PM          A1C 6.0 5.7                     Allergies as of 2/17/2017        Reactions    Codeine Nausea And Vomiting    chills    Lisinopril Other (See Comments)    cough      Advance Directives     An advance directive is a document which, in the event you are no longer able to make decisions for yourself, tells your healthcare team what kind of treatment you do or do not want to receive, or who you would like to make those decisions for you.  If you do not currently have an advance directive, [facility] encourages you to create one.  For more information call:  [phone]), [phone] ([phone]),  or log on to [email or web address].        Language Assistance Services     ATTENTION: Language assistance services are available, free of charge. Please call [phone].      ATENCIÓN: Si habla español, tiene a peñaloza disposición servicios gratuitos de asistencia lingüística. Llame al [phone].     [facility] Ý: N?u b?n nói Ti?ng Vi?t, có các d?ch v? h? tr? ngôn ng? mi?n phí dành cho b?n. G?i s? [phone].        Pneumonmia Discharge Instructions                 [facility] complies with applicable Federal civil rights laws and does not discriminate on the basis of race, color, national origin, age, disability, or sex.        "

## 2017-02-16 PROBLEM — I34.0 MITRAL REGURGITATION: Status: ACTIVE | Noted: 2017-02-16

## 2017-02-16 LAB
ALBUMIN SERPL BCP-MCNC: 3.1 G/DL
ALP SERPL-CCNC: 76 U/L
ALT SERPL W/O P-5'-P-CCNC: 11 U/L
ANION GAP SERPL CALC-SCNC: 7 MMOL/L
APTT BLDCRRT: 21.4 SEC
APTT BLDCRRT: 24.8 SEC
AST SERPL-CCNC: 15 U/L
BASOPHILS # BLD AUTO: 0.05 K/UL
BASOPHILS NFR BLD: 0.5 %
BILIRUB SERPL-MCNC: 0.7 MG/DL
BUN SERPL-MCNC: 16 MG/DL
CALCIUM SERPL-MCNC: 8.9 MG/DL
CHLORIDE SERPL-SCNC: 110 MMOL/L
CO2 SERPL-SCNC: 25 MMOL/L
CREAT SERPL-MCNC: 0.8 MG/DL
DIFFERENTIAL METHOD: ABNORMAL
EOSINOPHIL # BLD AUTO: 0.3 K/UL
EOSINOPHIL NFR BLD: 2.7 %
ERYTHROCYTE [DISTWIDTH] IN BLOOD BY AUTOMATED COUNT: 14.8 %
EST. GFR  (AFRICAN AMERICAN): >60 ML/MIN/1.73 M^2
EST. GFR  (NON AFRICAN AMERICAN): >60 ML/MIN/1.73 M^2
FACT X PPP CHRO-ACNC: 0.3 IU/ML
GLUCOSE SERPL-MCNC: 108 MG/DL
HCT VFR BLD AUTO: 32.9 %
HGB BLD-MCNC: 10.5 G/DL
INR PPP: 1
LYMPHOCYTES # BLD AUTO: 3 K/UL
LYMPHOCYTES NFR BLD: 27.9 %
MAGNESIUM SERPL-MCNC: 2 MG/DL
MCH RBC QN AUTO: 30.4 PG
MCHC RBC AUTO-ENTMCNC: 31.9 %
MCV RBC AUTO: 95 FL
MONOCYTES # BLD AUTO: 0.4 K/UL
MONOCYTES NFR BLD: 3.8 %
NEUTROPHILS # BLD AUTO: 6.9 K/UL
NEUTROPHILS NFR BLD: 65.1 %
PLATELET # BLD AUTO: 220 K/UL
PMV BLD AUTO: 10.7 FL
POTASSIUM SERPL-SCNC: 3.7 MMOL/L
PROT SERPL-MCNC: 6.2 G/DL
PROTHROMBIN TIME: 10.8 SEC
RBC # BLD AUTO: 3.45 M/UL
SODIUM SERPL-SCNC: 142 MMOL/L
TROPONIN I SERPL DL<=0.01 NG/ML-MCNC: 0.09 NG/ML
TROPONIN I SERPL DL<=0.01 NG/ML-MCNC: 0.09 NG/ML
TROPONIN I SERPL DL<=0.01 NG/ML-MCNC: 0.11 NG/ML
WBC # BLD AUTO: 10.66 K/UL

## 2017-02-16 PROCEDURE — 96366 THER/PROPH/DIAG IV INF ADDON: CPT

## 2017-02-16 PROCEDURE — 85610 PROTHROMBIN TIME: CPT

## 2017-02-16 PROCEDURE — 85520 HEPARIN ASSAY: CPT

## 2017-02-16 PROCEDURE — 85025 COMPLETE CBC W/AUTO DIFF WBC: CPT

## 2017-02-16 PROCEDURE — 80053 COMPREHEN METABOLIC PANEL: CPT

## 2017-02-16 PROCEDURE — 96365 THER/PROPH/DIAG IV INF INIT: CPT

## 2017-02-16 PROCEDURE — 25000003 PHARM REV CODE 250: Performed by: EMERGENCY MEDICINE

## 2017-02-16 PROCEDURE — 20600001 HC STEP DOWN PRIVATE ROOM

## 2017-02-16 PROCEDURE — 84484 ASSAY OF TROPONIN QUANT: CPT

## 2017-02-16 PROCEDURE — 83735 ASSAY OF MAGNESIUM: CPT

## 2017-02-16 PROCEDURE — 99223 1ST HOSP IP/OBS HIGH 75: CPT | Mod: AI,,, | Performed by: INTERNAL MEDICINE

## 2017-02-16 PROCEDURE — 25000003 PHARM REV CODE 250: Performed by: HOSPITALIST

## 2017-02-16 PROCEDURE — 25000003 PHARM REV CODE 250: Performed by: INTERNAL MEDICINE

## 2017-02-16 PROCEDURE — 85730 THROMBOPLASTIN TIME PARTIAL: CPT

## 2017-02-16 RX ORDER — ATORVASTATIN CALCIUM 20 MG/1
40 TABLET, FILM COATED ORAL DAILY
Status: DISCONTINUED | OUTPATIENT
Start: 2017-02-16 | End: 2017-02-16

## 2017-02-16 RX ORDER — ASPIRIN 81 MG/1
81 TABLET ORAL DAILY
Status: DISCONTINUED | OUTPATIENT
Start: 2017-02-16 | End: 2017-02-16

## 2017-02-16 RX ORDER — LEVOTHYROXINE SODIUM 50 UG/1
50 TABLET ORAL
Status: DISCONTINUED | OUTPATIENT
Start: 2017-02-16 | End: 2017-02-17 | Stop reason: HOSPADM

## 2017-02-16 RX ORDER — ACETAMINOPHEN 325 MG/1
650 TABLET ORAL EVERY 6 HOURS PRN
Status: DISCONTINUED | OUTPATIENT
Start: 2017-02-16 | End: 2017-02-17 | Stop reason: HOSPADM

## 2017-02-16 RX ORDER — AMOXICILLIN 250 MG
2 CAPSULE ORAL 2 TIMES DAILY PRN
Status: DISCONTINUED | OUTPATIENT
Start: 2017-02-16 | End: 2017-02-17 | Stop reason: HOSPADM

## 2017-02-16 RX ORDER — PANTOPRAZOLE SODIUM 40 MG/1
40 TABLET, DELAYED RELEASE ORAL DAILY
Status: DISCONTINUED | OUTPATIENT
Start: 2017-02-16 | End: 2017-02-17 | Stop reason: HOSPADM

## 2017-02-16 RX ORDER — CLOPIDOGREL BISULFATE 75 MG/1
75 TABLET ORAL DAILY
Status: DISCONTINUED | OUTPATIENT
Start: 2017-02-17 | End: 2017-02-16

## 2017-02-16 RX ORDER — CLOPIDOGREL 300 MG/1
600 TABLET, FILM COATED ORAL ONCE
Status: COMPLETED | OUTPATIENT
Start: 2017-02-16 | End: 2017-02-16

## 2017-02-16 RX ORDER — ATORVASTATIN CALCIUM 20 MG/1
80 TABLET, FILM COATED ORAL DAILY
Status: DISCONTINUED | OUTPATIENT
Start: 2017-02-16 | End: 2017-02-17 | Stop reason: HOSPADM

## 2017-02-16 RX ORDER — FUROSEMIDE 20 MG/1
20 TABLET ORAL DAILY
Status: DISCONTINUED | OUTPATIENT
Start: 2017-02-16 | End: 2017-02-17 | Stop reason: HOSPADM

## 2017-02-16 RX ADMIN — ATORVASTATIN CALCIUM 80 MG: 20 TABLET, FILM COATED ORAL at 08:02

## 2017-02-16 RX ADMIN — CLOPIDOGREL BISULFATE 600 MG: 300 TABLET, FILM COATED ORAL at 12:02

## 2017-02-16 RX ADMIN — LOSARTAN POTASSIUM 12.5 MG: 25 TABLET, FILM COATED ORAL at 09:02

## 2017-02-16 RX ADMIN — HYPROMELLOSE 2910 1 DROP: 5 SOLUTION OPHTHALMIC at 11:02

## 2017-02-16 RX ADMIN — HEPARIN SODIUM AND DEXTROSE 12 UNITS/KG/HR: 10000; 5 INJECTION INTRAVENOUS at 12:02

## 2017-02-16 RX ADMIN — HYPROMELLOSE 2910 1 DROP: 5 SOLUTION OPHTHALMIC at 09:02

## 2017-02-16 RX ADMIN — PANTOPRAZOLE SODIUM 40 MG: 40 TABLET, DELAYED RELEASE ORAL at 08:02

## 2017-02-16 RX ADMIN — FUROSEMIDE 20 MG: 20 TABLET ORAL at 08:02

## 2017-02-16 RX ADMIN — LEVOTHYROXINE SODIUM 50 MCG: 50 TABLET ORAL at 06:02

## 2017-02-16 NOTE — ED NOTES
Pt states she had chest pain half hour ago rating it 5-10. States it has since resolved. Informed pt to notify staff the next time she starts to have CP.

## 2017-02-16 NOTE — H&P
[facility]  Cardiology  History and Physical     Patient Name: Janine Awad  MRN: [id number]  Admission Date: 2/15/2017  Code Status: Full Code   Attending Provider: Susy Feng MD   Primary Care Physician: Vishal Avendaño MD  Principal Problem:Chest pain at rest    Patient information was obtained from relative(s) and ER records.     Subjective:     Chief Complaint:  Chest pain     HPI:  88 yo F w hx of severe AS, anxiety, Alzheimer's dimentia, recently admitted and discharged on 2/3 for acute on chronic diastolic CHF w PNA.  Pt unable to give a hx 2/2 to memory loss.  Her daughter, who lives with her, reported that she was having CP intermittently over the last few nights.  Tonight, her mother went to bed then called out to her and stated that the chest pain had returned and was now worse, lasting for more than 1 hour, radiating down her L arm and into her back and shoulder.  Her mother described the pain as a pressure.  She stated that her mother was not diaphoretic.  Nothing made it better or worse.    Ms Awad has known AS and has been evaluated for TAVR in the past which she refused again at a Cardiology apt 10/2016, which was confirmed with her daughter.  She again confirmed this tonight in a conversation with Dr. Mayfield.  During this conversation, Dr. Mayfield asked Ms Awad's daughter about her wishes regarding possible PCI and she stated that Ms Awad does not want any invasive procedures including catheterization.  Dr. Mayfield also discussed the risks associated with heparin infusion as part of conservative management.      Pt states that her chest pain is now resolving.  She currently denies SOB, palpitations, LE swelling, orthopnea.        Past Medical History   Diagnosis Date    Acquired hypothyroidism     Closed displaced intertrochanteric fracture of left femur s/p IM nail on 6/5/2016 6/5/2016    Coronary artery disease involving native coronary artery of native heart  without angina pectoris     Essential hypertension     GERD (gastroesophageal reflux disease)     Hx of colonic polyps     Hyperlipidemia     Macular degeneration     Mitral valve stenosis     Severe aortic stenosis 9/22/2014    Syncope 1/5/2015       Past Surgical History   Procedure Laterality Date    Intertrochanteric hip fracture surgery Left 06/05/2016     IM nail       Review of patient's allergies indicates:   Allergen Reactions    Codeine Nausea And Vomiting     chills    Lisinopril Other (See Comments)     cough       No current facility-administered medications on file prior to encounter.      Current Outpatient Prescriptions on File Prior to Encounter   Medication Sig    acetaminophen (TYLENOL) 325 MG tablet Take 2 tablets (650 mg total) by mouth every 6 (six) hours as needed for Pain.    aspirin (ECOTRIN) 81 MG EC tablet Take 81 mg by mouth once daily.    atorvastatin (LIPITOR) 40 MG tablet Take 1 tablet (40 mg total) by mouth once daily.    calcium citrate-vitamin D3 315-200 mg (CITRACAL+D) 315-200 mg-unit per tablet Take 1 tablet by mouth once daily.    COCONUT OIL ORAL Take by mouth.    estrogens, conjugated, (PREMARIN) 0.3 MG tablet Take 0.3 mg by mouth once daily.    fish oil-omega-3 fatty acids 300-1,000 mg capsule Take 2 g by mouth once daily.    furosemide (LASIX) 20 MG tablet Take 1 tablet (20 mg total) by mouth once daily.    GINSENG ORAL Take by mouth.    levothyroxine (SYNTHROID) 50 MCG tablet Take 1 tablet (50 mcg total) by mouth before breakfast.    losartan (COZAAR) 25 MG tablet Take 0.5 tablets (12.5 mg total) by mouth once daily. Hold for SBP < 110.    omeprazole (PRILOSEC) 20 MG capsule Take 20 mg by mouth once daily.     ondansetron (ZOFRAN) 4 MG tablet Take 1 tablet (4 mg total) by mouth every 8 (eight) hours as needed for Nausea.    senna-docusate 8.6-50 mg (PERICOLACE) 8.6-50 mg per tablet Take 2 tablets by mouth 2 (two) times daily.    vitamin D 1000 units  Tab Take 185 mg by mouth once daily.    ZINC ACETATE ORAL Take by mouth.     Family History     Problem Relation (Age of Onset)    Cancer Mother    Heart disease Mother, Father    No Known Problems Sister, Brother, Maternal Grandmother, Maternal Grandfather, Paternal Grandmother, Paternal Grandfather, Maternal Aunt, Maternal Uncle, Paternal Aunt, Paternal Uncle        Social History Main Topics    Smoking status: Never Smoker    Smokeless tobacco: Never Used    Alcohol use 0.6 oz/week     1 Standard drinks or equivalent per week      Comment: rare     Drug use: No    Sexual activity: Not on file     Review of Systems   Constitution: Negative for weakness, malaise/fatigue and weight gain.   HENT: Negative for congestion.    Cardiovascular: Negative for chest pain, claudication, dyspnea on exertion, irregular heartbeat, leg swelling, near-syncope, orthopnea, palpitations, paroxysmal nocturnal dyspnea and syncope.   Respiratory: Negative for sleep disturbances due to breathing.    Endocrine: Negative for cold intolerance.   Hematologic/Lymphatic: Negative for bleeding problem. Does not bruise/bleed easily.   Skin: Negative for flushing.   Musculoskeletal: Negative for back pain.   Gastrointestinal: Negative for bloating, abdominal pain, heartburn, nausea and vomiting.   Neurological: Negative for dizziness and light-headedness.   Psychiatric/Behavioral: The patient is not nervous/anxious.      Objective:     Vital Signs (Most Recent):  Temp: 98 °F (36.7 °C) (02/15/17 1825)  Pulse: 68 (02/16/17 0316)  Resp: 17 (02/16/17 0316)  BP: (!) 136/57 (02/16/17 0316)  SpO2: 95 % (02/16/17 0316) Vital Signs (24h Range):  Temp:  [98 °F (36.7 °C)] 98 °F (36.7 °C)  Pulse:  [68-84] 68  Resp:  [15-24] 17  SpO2:  [95 %-98 %] 95 %  BP: (127-160)/(57-69) 136/57     Weight: 65.8 kg (145 lb)  Body mass index is 26.52 kg/(m^2).    SpO2: 95 %  O2 Device (Oxygen Therapy): room air    No intake or output data in the 24 hours ending  02/16/17 0347    Lines/Drains/Airways     Peripheral Intravenous Line                 Peripheral IV - Single Lumen 02/15/17 2119 Left Antecubital less than 1 day         Peripheral IV - Single Lumen 02/16/17 0329 Right Antecubital less than 1 day                Physical Exam   Constitutional: She is oriented to person, place, and time. She appears well-developed.   HENT:   Head: Normocephalic and atraumatic.   Neck: Normal range of motion. Neck supple. No JVD present. No thyromegaly present.   Cardiovascular: Normal rate, S1 normal and S2 normal.  An irregularly irregular rhythm present. PMI is not displaced.  Exam reveals a midsystolic click and decreased pulses (slow upstroke). Exam reveals no gallop, no S3, no S4, no distant heart sounds, no friction rub and no opening snap.    Murmur heard.  Pulses:       Carotid pulses are 2+ on the right side, and 2+ on the left side.       Radial pulses are 1+ on the right side, and 1+ on the left side.        Posterior tibial pulses are 1+ on the right side, and 1+ on the left side.   Pulmonary/Chest: Effort normal and breath sounds normal. No stridor. No respiratory distress. She has no wheezes. She has no rales. She exhibits no tenderness.   Abdominal: Soft. Bowel sounds are normal. She exhibits no distension and no mass. There is no tenderness. There is no rebound and no guarding.   Musculoskeletal: She exhibits edema. She exhibits no tenderness or deformity.   Lymphadenopathy:     She has no cervical adenopathy.   Neurological: She is alert and oriented to person, place, and time.   Skin: Skin is warm and dry. No rash noted. No erythema. No pallor.   Psychiatric: She has a normal mood and affect. Her behavior is normal.       Significant Labs:     Recent Labs  Lab 02/15/17  2119 02/15/17  2256   TROPONINI 0.087* 0.086*       Significant Imaging:  Imaging Results         X-Ray Chest AP Portable (Final result) Result time:  02/15/17 21:13:09    Final result by Aleksandar SCHULTZ  MD Roseann (02/15/17 21:13:09)    Impression:        Cardiomegaly with mild pulmonary vascular congestion, improved from prior study. Pulmonary edema has resolved.        Electronically signed by: SCOT PATTERSON MD  Date:     02/15/17  Time:    21:13     Narrative:    Chest AP portable    Indication:Chest Pain.    Comparison:February 1, 2017.    Findings:     Interval clearing of bilateral pulmonary edema seen on prior study.    The cardiomediastinal silhouette is enlarged with mild vascular congestion, improved.  There is no pleural effusion.  The trachea is midline.  The lungs are symmetrically expanded bilaterally without evidence of acute parenchymal process.  There is no pneumothorax.  The osseous structures appear unchanged.            EKG: normal EKG, normal sinus rhythm, unchanged from previous tracings      Assessment and Plan:     * Chest pain at rest  89 y.o. female with history of ischemic heart disease presenting with typical chest pain for more than 1/2 hour, no new or ischemic changes on EKG, positive troponin concerning for unstable angina/NSTEMI. Exacerbation of diastolic CHF in setting of severe AS seems to have resolve given no elevation of JVP and resolving CXR.  Patient is currently chest pain free and hemodynamically stable. ASA given in ED and heparin bolus and infusion started    - Admit to inpatient medicine/cardiology service for management of ACS  - Discussion with patient revealed that she does not want coronary catheterization, so no interventional consult needed  - Trend troponin and serial EKG   - ACS protocol. Risks discussed with decision maker.  - If questions or concerns, please page the cardiology hospitalist    Severe aortic stenosis  -no intervention at this time  -pt continues to refuse TAVR    ACS (acute coronary syndrome)  -RALPH SCORE- 6  -ACS protocol-Aspirin 325 mg then 81 mg daily, plavix 600 mg once then 300 mg daily, heparin gtt  -consider beta blocker tomorrow AM if  BP/HR can tolerate  -high potency statin, atorvastatin 80 mg daily  -EKG prn chest pain  -NTG prn chest pain  -cardiac monitoring    Essential hypertension  -currently controlled  -continue losartan 12.5 mg daily    Mitral regurgitation  -continue to monitor  -no intervention at this time    Acquired hypothyroidism  Pt is currently asymptomatic.  Denies cold intolerance, constipation, malaise, hair loss, depression, unexplained weight gain.  -continue home dose medication: levothyroxine 50 mcg daily, 1/2 before breakfast    Coronary artery disease involving native coronary artery of native heart without angina pectoris  -continue daily ASA 81 mg and BP control      Anemia of chronic disease  -continue to monitor daily CBC  -no RBC transfusion necessary unless hematocrit below 21    Late onset Alzheimer's disease without behavioral disturbance  -continue to monitor  -ensure low stress environment such reduced noise levels and soft lighting  -discuss medical issues with patient and designated decision-maker      VTE Risk Mitigation         Ordered     Medium Risk of VTE  Once      02/15/17 2244     Reason for no Mechanical VTE Prophylaxis  Once      02/15/17 2244        I have spent approximately 30 minutes of time interviewing and examining this patient.  More than 50% of this time was face-to-face with the patient.      Dana Mayfield MD  Cardiology   [facility]

## 2017-02-16 NOTE — ED NOTES
Rita Eng (Daughter of ) [phone], would like to be called once her mother has a room upstairs.

## 2017-02-16 NOTE — ASSESSMENT & PLAN NOTE
89 y.o. female with history of ischemic heart disease presenting with typical chest pain for more than 1/2 hour, no new or ischemic changes on EKG, positive troponin concerning for unstable angina/NSTEMI. Exacerbation of diastolic CHF in setting of severe AS seems to have resolve given no elevation of JVP and resolving CXR.  Patient is currently chest pain free and hemodynamically stable. ASA given in ED and heparin bolus and infusion started    - Admit to inpatient medicine/cardiology service for management of ACS  - Discussion with patient revealed that she does not want coronary catheterization, so no interventional consult needed  - Trend troponin and serial EKG   - ACS protocol. Risks discussed with decision maker.  - If questions or concerns, please page the cardiology hospitalist

## 2017-02-16 NOTE — CONSULTS
CC: left eye redness    HPI: Janine Awad is a 89 y.o. female with PMHx Dementia who presented to ED with chest pain. ACS therapy started with aspirin, clopidogrel, and heparin. Cardiology team was notified at 6 AM about gross redness of left eye. Patient reports associated eye irritation and aching with extraocular movement. She denies blurred vision, trauma, fall. Symptoms reportedly have been stable since onset.     (POOR HISTORIAN)    PMHx  Dementia  HTN  HLD  CAD    POH:  Glasses in youth    FMHx  No known history    Gtts: none      Past Medical History   Diagnosis Date    Acquired hypothyroidism     Closed displaced intertrochanteric fracture of left femur s/p IM nail on 6/5/2016 6/5/2016    Coronary artery disease involving native coronary artery of native heart without angina pectoris     Essential hypertension     GERD (gastroesophageal reflux disease)     Hx of colonic polyps     Hyperlipidemia     Macular degeneration     Mitral valve stenosis     Severe aortic stenosis 9/22/2014    Syncope 1/5/2015         Family History   Problem Relation Age of Onset    Heart disease Mother     Cancer Mother     Heart disease Father     No Known Problems Sister     No Known Problems Brother     No Known Problems Maternal Grandmother     No Known Problems Maternal Grandfather     No Known Problems Paternal Grandmother     No Known Problems Paternal Grandfather     No Known Problems Maternal Aunt     No Known Problems Maternal Uncle     No Known Problems Paternal Aunt     No Known Problems Paternal Uncle     Anemia Neg Hx     Arrhythmia Neg Hx     Asthma Neg Hx     Clotting disorder Neg Hx     Fainting Neg Hx     Heart attack Neg Hx     Heart failure Neg Hx     Hyperlipidemia Neg Hx     Hypertension Neg Hx     Stroke Neg Hx     Atrial Septal Defect Neg Hx            Current Facility-Administered Medications:     acetaminophen tablet 650 mg, 650 mg, Oral, Q6H PRN, Dana MCKENNA  MD Tran    atorvastatin tablet 80 mg, 80 mg, Oral, Daily, Dana Mayfiled MD, 80 mg at 02/16/17 0805    furosemide tablet 20 mg, 20 mg, Oral, Daily, Dana Mayfield MD, 20 mg at 02/16/17 0805    levothyroxine tablet 50 mcg, 50 mcg, Oral, Before breakfast, Dana Mayfield MD, 50 mcg at 02/16/17 0610    losartan split tablet 12.5 mg, 12.5 mg, Oral, Daily, Dana Mayfield MD    pantoprazole EC tablet 40 mg, 40 mg, Oral, Daily, Dana Mayfield MD, 40 mg at 02/16/17 0805    senna-docusate 8.6-50 mg per tablet 2 tablet, 2 tablet, Oral, BID PRN, Dana Mayfield MD    Current Outpatient Prescriptions:     acetaminophen (TYLENOL) 325 MG tablet, Take 2 tablets (650 mg total) by mouth every 6 (six) hours as needed for Pain., Disp: , Rfl: 0    aspirin (ECOTRIN) 81 MG EC tablet, Take 81 mg by mouth once daily., Disp: , Rfl:     atorvastatin (LIPITOR) 40 MG tablet, Take 1 tablet (40 mg total) by mouth once daily., Disp: 90 tablet, Rfl: 3    calcium citrate-vitamin D3 315-200 mg (CITRACAL+D) 315-200 mg-unit per tablet, Take 1 tablet by mouth once daily., Disp: , Rfl:     COCONUT OIL ORAL, Take by mouth., Disp: , Rfl:     estrogens, conjugated, (PREMARIN) 0.3 MG tablet, Take 0.3 mg by mouth once daily., Disp: , Rfl:     fish oil-omega-3 fatty acids 300-1,000 mg capsule, Take 2 g by mouth once daily., Disp: , Rfl:     furosemide (LASIX) 20 MG tablet, Take 1 tablet (20 mg total) by mouth once daily., Disp: 30 tablet, Rfl: 2    GINSENG ORAL, Take by mouth., Disp: , Rfl:     levothyroxine (SYNTHROID) 50 MCG tablet, Take 1 tablet (50 mcg total) by mouth before breakfast., Disp: 90 tablet, Rfl: 1    losartan (COZAAR) 25 MG tablet, Take 0.5 tablets (12.5 mg total) by mouth once daily. Hold for SBP < 110., Disp: 30 tablet, Rfl: 3    omeprazole (PRILOSEC) 20 MG capsule, Take 20 mg by mouth once daily. , Disp: , Rfl:     ondansetron (ZOFRAN) 4 MG tablet, Take 1 tablet (4 mg total) by mouth every  8 (eight) hours as needed for Nausea., Disp: 10 tablet, Rfl: 0    senna-docusate 8.6-50 mg (PERICOLACE) 8.6-50 mg per tablet, Take 2 tablets by mouth 2 (two) times daily., Disp: , Rfl:     vitamin D 1000 units Tab, Take 185 mg by mouth once daily., Disp: , Rfl:     ZINC ACETATE ORAL, Take by mouth., Disp: , Rfl:       Review of patient's allergies indicates:   Allergen Reactions    Codeine Nausea And Vomiting     chills    Lisinopril Other (See Comments)     cough         Social History   Substance Use Topics    Smoking status: Never Smoker    Smokeless tobacco: Never Used    Alcohol use 0.6 oz/week     1 Standard drinks or equivalent per week      Comment: rare          Base Eye Exam     Visual Acuity      Right Left   Near sc 25+1 30+2         Tonometry (Tonopen, 8:59 AM)      Right Left   Pressure 21 25         Pupils      React APD   Right Brisk None   Left Brisk None         Visual Fields      Right Left   Result Full Full         Extraocular Movement      Right Left   Result Full, Ortho Full, Ortho         Neuro/Psych     dementia            Additional Tests     Color      Right Left   Ish[place]ara 11/13 13/13               Slit Lamp and Fundus Exam     External Exam      Right Left    External Normal inferior lid ecchymosis      Slit Lamp Exam      Right Left    Lids/Lashes Normal inferior lid ecchymosis    Conjunctiva/Sclera White and quiet diffuse subconjunctival hemorrhage with chemosis    Cornea Clear Clear    Anterior Chamber formed formed    Iris Round and reactive Round and reactive    Lens Clear Clear    Vitreous clear, PVD clear, PVD      Fundus Exam      Right Left    Disc Normal, PPA Normal    Macula RPE changes Normal    Vessels Normal Normal    Periphery Normal Normal              Pressure equal on retropulsion OU      Plan   A/P: Janine Awad is a 89 y.o. female     1) Subconjunctival hemorrhage OS  - s/p ACS therapy with aspirin, plavix, and heparin  - artificial tears QID  -  management per primary team    F/u PRN

## 2017-02-16 NOTE — PROGRESS NOTES
Pt seen and examined.  In brief, 88 y/o WF with PMH of dementia, AS (refusing TAVR eval), and diastolic CHF known to my service, who presented to ED overnight for reports of chest and left arm pain.  Pt's EKG was reviewed, serial trops were rising and pt was started on ACS protocol with Heparin gtt, ASA, Plavix load, statin after discussion with pt's family.  Course complicated by left eye subconjunctival hemorrhage and Heparin gtt, ASA, and Plavix were stopped.  Optho eval recommended artificial tears to left eye and conservative management.  On my exam this morning, pt is calm and pleasant and denies CP, SOB, arm pain or tingling.  Only complains of left eye irritation, feels like she was hit.    NSTEMI- more likely type II 2/2 demand ischemia, related to severe AS.  Did not tolerate anticoagulation or DAPT, will hold these and manage conservatively with blood pressure control and gentle diuresis.      Called daughter x 3 without success to discuss plan and goals of care, will continue attempts in AM.      Shena Lord MD  Hospital Medicine Staff

## 2017-02-16 NOTE — ED PROVIDER NOTES
Encounter Date: 2/15/2017    SCRIBE #1 NOTE: I, Jovany Helms, am scribing for, and in the presence of, Dr. Feng.       History     Chief Complaint   Patient presents with    Chest pain (resolved)     Diagnosed with pneumonia 2 weeks ago. Currently taking antibiotics. Reports chest and shoulder pain x 15 minutes. She denied N/V, diaphoresis. She reports pain has since resolved. She denies recent cough, fever, chills.      Review of patient's allergies indicates:   Allergen Reactions    Codeine Nausea And Vomiting     chills    Lisinopril Other (See Comments)     cough     HPI Comments: Time seen by provider: 8:45 PM    This is a 89 y.o. female with pertinent PMHx of HLD, GERD, syncope, HTN, severe aortic stenosis, CAD without angina pectoris, and mitral valve stenosis, who presents to the ED with a chief complaint of a 10 minute episode of gradual onset moderate left shoulder pain with associated labored breathing, dizziness, and mild chest pain that occurred at 3:30 pm today. Pt was getting ready to go to bed when the pain started and when it got worse EMT was called, however on EMT arrival the pain was gone. The pt first complained of the left shoulder pain with the chest pain coming up later. There is no reported nausea, diaphoresis, palpations, cough, fever, chills, diarrhea, appetite loss, or recent falls. Pt has been complaining of chest pains off and on for the past 2 weeks since being admitted for pneumonia. Currently the pt is in no pain. There are no other associated symptoms or mitigating/aggravating factors reported at this time.     The history is provided by the patient and a friend.     Past Medical History   Diagnosis Date    Acquired hypothyroidism     Closed displaced intertrochanteric fracture of left femur s/p IM nail on 6/5/2016 6/5/2016    Coronary artery disease involving native coronary artery of native heart without angina pectoris     Essential hypertension     GERD  (gastroesophageal reflux disease)     Hx of colonic polyps     Hyperlipidemia     Macular degeneration     Mitral valve stenosis     Severe aortic stenosis 9/22/2014    Syncope 1/5/2015     Past Medical History Pertinent Negatives   Diagnosis Date Noted    AAA (abdominal aortic aneurysm) 1/5/2015    Acute coronary syndrome 1/5/2015    Asthma 1/5/2015    Atrial fibrillation 1/5/2015    Atrial flutter 1/5/2015    Cancer 1/5/2015    Cardiomyopathy 1/5/2015    Carotid artery occlusion 1/5/2015    CHF (congestive heart failure) 1/5/2015    Clotting disorder 1/5/2015    Diabetes mellitus 1/5/2015    Heart block 1/5/2015    Heart murmur 1/5/2015    Sleep apnea 1/5/2015    Stroke 1/5/2015    Supraventricular tachycardia 1/5/2015    Valvular regurgitation 1/5/2015    Ventricular tachycardia 1/5/2015     Past Surgical History   Procedure Laterality Date    Intertrochanteric hip fracture surgery Left 06/05/2016     IM nail     Family History   Problem Relation Age of Onset    Heart disease Mother     Cancer Mother     Heart disease Father     No Known Problems Sister     No Known Problems Brother     No Known Problems Maternal Grandmother     No Known Problems Maternal Grandfather     No Known Problems Paternal Grandmother     No Known Problems Paternal Grandfather     No Known Problems Maternal Aunt     No Known Problems Maternal Uncle     No Known Problems Paternal Aunt     No Known Problems Paternal Uncle     Anemia Neg Hx     Arrhythmia Neg Hx     Asthma Neg Hx     Clotting disorder Neg Hx     Fainting Neg Hx     Heart attack Neg Hx     Heart failure Neg Hx     Hyperlipidemia Neg Hx     Hypertension Neg Hx     Stroke Neg Hx     Atrial Septal Defect Neg Hx      Social History   Substance Use Topics    Smoking status: Never Smoker    Smokeless tobacco: Never Used    Alcohol use 0.6 oz/week     1 Standard drinks or equivalent per week      Comment: rare      Review of  Systems   Constitutional: Negative for appetite change, chills, diaphoresis and fever.   HENT: Negative for congestion.    Eyes: Negative for pain.   Respiratory: Negative for cough. Shortness of breath: labored breathing.    Cardiovascular: Positive for chest pain. Negative for palpitations.   Gastrointestinal: Negative for abdominal pain, diarrhea, nausea and vomiting.   Genitourinary: Negative for difficulty urinating and dysuria.   Musculoskeletal: Negative for back pain and neck pain.        Positive left shoulder pain.    Skin: Negative for rash.   Neurological: Positive for dizziness. Negative for weakness and headaches.       Physical Exam   Initial Vitals   BP Pulse Resp Temp SpO2   02/15/17 1825 02/15/17 1825 02/15/17 1825 02/15/17 1825 02/15/17 1825   135/62 84 16 98 °F (36.7 °C) 96 %     Physical Exam    Nursing note and vitals reviewed.  Constitutional: She appears well-developed and well-nourished. She is not diaphoretic. No distress.   HENT:   Head: Normocephalic and atraumatic.   Mouth/Throat: Oropharynx is clear and moist.   Neck: Normal range of motion. Neck supple. No JVD present.   Cardiovascular: Normal rate, regular rhythm and intact distal pulses.   Murmur (holosystolic) heard.  Pulmonary/Chest: Breath sounds normal. No respiratory distress. She has no wheezes. She has no rhonchi. She has no rales. She exhibits tenderness (Mild reproducible chest wall tenderness to the left pectoralis).   Abdominal: Soft. She exhibits no distension. There is no tenderness.   Musculoskeletal: Normal range of motion. She exhibits no edema.   Lymphadenopathy:     She has no cervical adenopathy.   Neurological: She is alert and oriented to person, place, and time. She has normal strength. No cranial nerve deficit or sensory deficit.   Skin: Skin is warm and dry.         ED Course   Procedures  Labs Reviewed   CBC W/ AUTO DIFFERENTIAL - Abnormal; Notable for the following:        Result Value    RBC 3.83 (*)      Hemoglobin 11.9 (*)     Hematocrit 36.1 (*)     MCH 31.1 (*)     RDW 14.8 (*)     All other components within normal limits    Narrative:     PLEASE REVIEW ORDER START TIME BEFORE MARKING SPECIMEN  COLLECTED.   TROPONIN I - Abnormal; Notable for the following:     Troponin I 0.087 (*)     All other components within normal limits    Narrative:     PLEASE REVIEW ORDER START TIME BEFORE MARKING SPECIMEN  COLLECTED.   COMPREHENSIVE METABOLIC PANEL    Narrative:     PLEASE REVIEW ORDER START TIME BEFORE MARKING SPECIMEN  COLLECTED.   TROPONIN I    Narrative:     PLEASE REVIEW ORDER START TIME BEFORE MARKING SPECIMEN  COLLECTED.   APTT    Narrative:     PLEASE REVIEW ORDER START TIME BEFORE MARKING SPECIMEN  COLLECTED.     EKG Readings: (Independently Interpreted)   Heart Rate: 84.   NSR, ST depressions in leads 1 and 2. Nonspecific S and T wave changes. Unchanged from prior EKG 2/1/17.        X-Rays:   Independently Interpreted Readings:   Chest X-Ray: Mild pulmonary congestion. Cardiomegaly.      Medical Decision Making:   History:   Old Medical Records: I decided to obtain old medical records.  Old Records Summarized: records from previous admission(s).       <> Summary of Records: Pt was recently admitted 2/1/17 for pneumonia and CHF.   Initial Assessment:   This is an emergent evaluation of a 89 y.o. female who presents with chest pain. My initial differential diagnoses include but are not limited to ACS, MI, pneumonia, bronchitis, muscle strain, muscle spasm. Will do labs, troponin, and EKG.   Independently Interpreted Test(s):   I have ordered and independently interpreted X-rays - see prior notes.  I have ordered and independently interpreted EKG Reading(s) - see prior notes  Clinical Tests:   Lab Tests: Ordered and Reviewed  Radiological Study: Reviewed and Ordered  Medical Tests: Reviewed and Ordered  ED Management:  10:03 pm  I was notified by the nurse that the pt is having another episode of chest pain.  Repeat EKG shows no changes but troponin returned elevated at 0.087 compared to prior troponin taken 2 weeks ago. Heparin drip started per ACS protocol. Case discussed with hospital medicine Dr. Walden and the pt will be admitted for further evaluation and treatment.   Other:   I have discussed this case with another health care provider.    Additional MDM:   EKG: I have independently interpreted EKG(s) - see notes.   X-Rays: I have independently interpreted X-Ray(s) - see notes.          Scribe Attestation:   Scribe #1: I performed the above scribed service and the documentation accurately describes the services I performed. I attest to the accuracy of the note.    Attending Attestation:           Physician Attestation for Scribe:  Physician Attestation Statement for Scribe #1: I, Dr. Feng, reviewed documentation, as scribed by Jovany Helms in my presence, and it is both accurate and complete.                 ED Course     Clinical Impression:   The encounter diagnosis was ACS (acute coronary syndrome).    Disposition:   Disposition: Admitted  Condition: Leonardo Feng MD  02/15/17 6516

## 2017-02-16 NOTE — SUBJECTIVE & OBJECTIVE
Past Medical History   Diagnosis Date    Acquired hypothyroidism     Closed displaced intertrochanteric fracture of left femur s/p IM nail on 6/5/2016 6/5/2016    Coronary artery disease involving native coronary artery of native heart without angina pectoris     Essential hypertension     GERD (gastroesophageal reflux disease)     Hx of colonic polyps     Hyperlipidemia     Macular degeneration     Mitral valve stenosis     Severe aortic stenosis 9/22/2014    Syncope 1/5/2015       Past Surgical History   Procedure Laterality Date    Intertrochanteric hip fracture surgery Left 06/05/2016     IM nail       Review of patient's allergies indicates:   Allergen Reactions    Codeine Nausea And Vomiting     chills    Lisinopril Other (See Comments)     cough       No current facility-administered medications on file prior to encounter.      Current Outpatient Prescriptions on File Prior to Encounter   Medication Sig    acetaminophen (TYLENOL) 325 MG tablet Take 2 tablets (650 mg total) by mouth every 6 (six) hours as needed for Pain.    aspirin (ECOTRIN) 81 MG EC tablet Take 81 mg by mouth once daily.    atorvastatin (LIPITOR) 40 MG tablet Take 1 tablet (40 mg total) by mouth once daily.    calcium citrate-vitamin D3 315-200 mg (CITRACAL+D) 315-200 mg-unit per tablet Take 1 tablet by mouth once daily.    COCONUT OIL ORAL Take by mouth.    estrogens, conjugated, (PREMARIN) 0.3 MG tablet Take 0.3 mg by mouth once daily.    fish oil-omega-3 fatty acids 300-1,000 mg capsule Take 2 g by mouth once daily.    furosemide (LASIX) 20 MG tablet Take 1 tablet (20 mg total) by mouth once daily.    GINSENG ORAL Take by mouth.    levothyroxine (SYNTHROID) 50 MCG tablet Take 1 tablet (50 mcg total) by mouth before breakfast.    losartan (COZAAR) 25 MG tablet Take 0.5 tablets (12.5 mg total) by mouth once daily. Hold for SBP < 110.    omeprazole (PRILOSEC) 20 MG capsule Take 20 mg by mouth once daily.      ondansetron (ZOFRAN) 4 MG tablet Take 1 tablet (4 mg total) by mouth every 8 (eight) hours as needed for Nausea.    senna-docusate 8.6-50 mg (PERICOLACE) 8.6-50 mg per tablet Take 2 tablets by mouth 2 (two) times daily.    vitamin D 1000 units Tab Take 185 mg by mouth once daily.    ZINC ACETATE ORAL Take by mouth.     Family History     Problem Relation (Age of Onset)    Cancer Mother    Heart disease Mother, Father    No Known Problems Sister, Brother, Maternal Grandmother, Maternal Grandfather, Paternal Grandmother, Paternal Grandfather, Maternal Aunt, Maternal Uncle, Paternal Aunt, Paternal Uncle        Social History Main Topics    Smoking status: Never Smoker    Smokeless tobacco: Never Used    Alcohol use 0.6 oz/week     1 Standard drinks or equivalent per week      Comment: rare     Drug use: No    Sexual activity: Not on file     Review of Systems   Constitution: Negative for weakness, malaise/fatigue and weight gain.   HENT: Negative for congestion.    Cardiovascular: Negative for chest pain, claudication, dyspnea on exertion, irregular heartbeat, leg swelling, near-syncope, orthopnea, palpitations, paroxysmal nocturnal dyspnea and syncope.   Respiratory: Negative for sleep disturbances due to breathing.    Endocrine: Negative for cold intolerance.   Hematologic/Lymphatic: Negative for bleeding problem. Does not bruise/bleed easily.   Skin: Negative for flushing.   Musculoskeletal: Negative for back pain.   Gastrointestinal: Negative for bloating, abdominal pain, heartburn, nausea and vomiting.   Neurological: Negative for dizziness and light-headedness.   Psychiatric/Behavioral: The patient is not nervous/anxious.      Objective:     Vital Signs (Most Recent):  Temp: 98 °F (36.7 °C) (02/15/17 1825)  Pulse: 68 (02/16/17 0316)  Resp: 17 (02/16/17 0316)  BP: (!) 136/57 (02/16/17 0316)  SpO2: 95 % (02/16/17 0316) Vital Signs (24h Range):  Temp:  [98 °F (36.7 °C)] 98 °F (36.7 °C)  Pulse:  [68-84]  68  Resp:  [15-24] 17  SpO2:  [95 %-98 %] 95 %  BP: (127-160)/(57-69) 136/57     Weight: 65.8 kg (145 lb)  Body mass index is 26.52 kg/(m^2).    SpO2: 95 %  O2 Device (Oxygen Therapy): room air    No intake or output data in the 24 hours ending 02/16/17 0347    Lines/Drains/Airways     Peripheral Intravenous Line                 Peripheral IV - Single Lumen 02/15/17 2119 Left Antecubital less than 1 day         Peripheral IV - Single Lumen 02/16/17 0329 Right Antecubital less than 1 day                Physical Exam   Constitutional: She is oriented to person, place, and time. She appears well-developed.   HENT:   Head: Normocephalic and atraumatic.   Neck: Normal range of motion. Neck supple. No JVD present. No thyromegaly present.   Cardiovascular: Normal rate, S1 normal and S2 normal.  An irregularly irregular rhythm present. PMI is not displaced.  Exam reveals a midsystolic click and decreased pulses (slow upstroke). Exam reveals no gallop, no S3, no S4, no distant heart sounds, no friction rub and no opening snap.    Murmur heard.  Pulses:       Carotid pulses are 2+ on the right side, and 2+ on the left side.       Radial pulses are 1+ on the right side, and 1+ on the left side.        Posterior tibial pulses are 1+ on the right side, and 1+ on the left side.   Pulmonary/Chest: Effort normal and breath sounds normal. No stridor. No respiratory distress. She has no wheezes. She has no rales. She exhibits no tenderness.   Abdominal: Soft. Bowel sounds are normal. She exhibits no distension and no mass. There is no tenderness. There is no rebound and no guarding.   Musculoskeletal: She exhibits edema. She exhibits no tenderness or deformity.   Lymphadenopathy:     She has no cervical adenopathy.   Neurological: She is alert and oriented to person, place, and time.   Skin: Skin is warm and dry. No rash noted. No erythema. No pallor.   Psychiatric: She has a normal mood and affect. Her behavior is normal.        Significant Labs:     Recent Labs  Lab 02/15/17  2119 02/15/17  2256   TROPONINI 0.087* 0.086*       Significant Imaging:  Imaging Results         X-Ray Chest AP Portable (Final result) Result time:  02/15/17 21:13:09    Final result by Aleksandar Whitfield MD (02/15/17 21:13:09)    Impression:        Cardiomegaly with mild pulmonary vascular congestion, improved from prior study. Pulmonary edema has resolved.        Electronically signed by: ALEKSANDAR WHITFIELD MD  Date:     02/15/17  Time:    21:13     Narrative:    Chest AP portable    Indication:Chest Pain.    Comparison:February 1, 2017.    Findings:     Interval clearing of bilateral pulmonary edema seen on prior study.    The cardiomediastinal silhouette is enlarged with mild vascular congestion, improved.  There is no pleural effusion.  The trachea is midline.  The lungs are symmetrically expanded bilaterally without evidence of acute parenchymal process.  There is no pneumothorax.  The osseous structures appear unchanged.

## 2017-02-16 NOTE — ASSESSMENT & PLAN NOTE
-continue to monitor  -ensure low stress environment such reduced noise levels and soft lighting  -discuss medical issues with patient and designated decision-maker

## 2017-02-16 NOTE — PROGRESS NOTES
Pt started on ACS protocol and subsequently developed a spontaneous conjunctival hemorrhage.  As a result, I have stopped the heparin gtt.    -anti Xa level and PT/INR was ordered  -I called ophthalmology consult.  He will come see the patient now.  Consult was ordered.  -As per my admission note, I spoke with the patient's daughter on the phone earlier about the risk of heparin for ACS protocol so she was aware that this was a potential risk.    -I called the patient's daughter back and told her that Ms. Awad developed the conjunctival hemorrhage.  She accepted this news.  -I have assessed her neurologically and her exam was non-focal.      -I notified Dr Lord about the situation and we discuss holding ASA and plavix as well.

## 2017-02-16 NOTE — ED NOTES
Care assumed. Pt resting comfortably in room in NAD. Pt on continuous cardiac, BP and O2 monitoring. Instructed to call when assistance is needed. Call light within reach, bed in lowest/locked position with side rails up x2.

## 2017-02-16 NOTE — ASSESSMENT & PLAN NOTE
-RALPH SCORE- 6  -ACS protocol-Aspirin 325 mg then 81 mg daily, plavix 600 mg once then 300 mg daily, heparin gtt  -beta blocker  -high potency statin, atorvastatin 80 mg daily  -EKG prn chest pain  -NTG prn chest pain  -cardiac monitoring

## 2017-02-16 NOTE — ASSESSMENT & PLAN NOTE
Pt is currently asymptomatic.  Denies cold intolerance, constipation, malaise, hair loss, depression, unexplained weight gain.  -continue home dose medication: levothyroxine 50 mcg daily, 1/2 before breakfast

## 2017-02-16 NOTE — ED NOTES
Rounding completed on patient. Plan of care discussed and patient has no complaints or questions. Pt is in bed awake and alert. Pt is resting comfortably and is in no acute distress. Respirations are even and unlabored. Pt denies chest pain or SOB. Pt has no complaints at this time. The bed is in low, locked position with side rails upx2. Call light is in reach, and patient is oriented to call light use. Pt states they will call nurse if they need assistance.

## 2017-02-17 VITALS
HEIGHT: 62 IN | RESPIRATION RATE: 18 BRPM | BODY MASS INDEX: 26.68 KG/M2 | SYSTOLIC BLOOD PRESSURE: 112 MMHG | HEART RATE: 75 BPM | WEIGHT: 145 LBS | DIASTOLIC BLOOD PRESSURE: 51 MMHG | OXYGEN SATURATION: 93 % | TEMPERATURE: 98 F

## 2017-02-17 LAB
ALBUMIN SERPL BCP-MCNC: 3.2 G/DL
ALP SERPL-CCNC: 74 U/L
ALT SERPL W/O P-5'-P-CCNC: 10 U/L
ANION GAP SERPL CALC-SCNC: 9 MMOL/L
AST SERPL-CCNC: 15 U/L
BASOPHILS # BLD AUTO: 0.05 K/UL
BASOPHILS NFR BLD: 0.6 %
BILIRUB SERPL-MCNC: 1.1 MG/DL
BUN SERPL-MCNC: 13 MG/DL
CALCIUM SERPL-MCNC: 9.1 MG/DL
CHLORIDE SERPL-SCNC: 108 MMOL/L
CHOLEST/HDLC SERPL: 4.2 {RATIO}
CO2 SERPL-SCNC: 23 MMOL/L
CREAT SERPL-MCNC: 0.8 MG/DL
DIFFERENTIAL METHOD: ABNORMAL
EOSINOPHIL # BLD AUTO: 0.3 K/UL
EOSINOPHIL NFR BLD: 3.6 %
ERYTHROCYTE [DISTWIDTH] IN BLOOD BY AUTOMATED COUNT: 15 %
EST. GFR  (AFRICAN AMERICAN): >60 ML/MIN/1.73 M^2
EST. GFR  (NON AFRICAN AMERICAN): >60 ML/MIN/1.73 M^2
GLUCOSE SERPL-MCNC: 94 MG/DL
HCT VFR BLD AUTO: 33.1 %
HDL/CHOLESTEROL RATIO: 23.7 %
HDLC SERPL-MCNC: 232 MG/DL
HDLC SERPL-MCNC: 55 MG/DL
HGB BLD-MCNC: 10.8 G/DL
LDLC SERPL CALC-MCNC: 144.2 MG/DL
LYMPHOCYTES # BLD AUTO: 2.4 K/UL
LYMPHOCYTES NFR BLD: 28.3 %
MAGNESIUM SERPL-MCNC: 2.2 MG/DL
MCH RBC QN AUTO: 31.2 PG
MCHC RBC AUTO-ENTMCNC: 32.6 %
MCV RBC AUTO: 96 FL
MONOCYTES # BLD AUTO: 0.4 K/UL
MONOCYTES NFR BLD: 4.2 %
NEUTROPHILS # BLD AUTO: 5.2 K/UL
NEUTROPHILS NFR BLD: 63.1 %
NONHDLC SERPL-MCNC: 177 MG/DL
PLATELET # BLD AUTO: 211 K/UL
PMV BLD AUTO: 10.4 FL
POTASSIUM SERPL-SCNC: 3.7 MMOL/L
PROT SERPL-MCNC: 6.3 G/DL
RBC # BLD AUTO: 3.46 M/UL
SODIUM SERPL-SCNC: 140 MMOL/L
TRIGL SERPL-MCNC: 164 MG/DL
WBC # BLD AUTO: 8.31 K/UL

## 2017-02-17 PROCEDURE — 85025 COMPLETE CBC W/AUTO DIFF WBC: CPT

## 2017-02-17 PROCEDURE — 25000003 PHARM REV CODE 250: Performed by: HOSPITALIST

## 2017-02-17 PROCEDURE — 36415 COLL VENOUS BLD VENIPUNCTURE: CPT

## 2017-02-17 PROCEDURE — 80061 LIPID PANEL: CPT

## 2017-02-17 PROCEDURE — 83735 ASSAY OF MAGNESIUM: CPT

## 2017-02-17 PROCEDURE — 99238 HOSP IP/OBS DSCHRG MGMT 30/<: CPT | Mod: ,,, | Performed by: HOSPITALIST

## 2017-02-17 PROCEDURE — 25000003 PHARM REV CODE 250: Performed by: INTERNAL MEDICINE

## 2017-02-17 PROCEDURE — 80053 COMPREHEN METABOLIC PANEL: CPT

## 2017-02-17 RX ORDER — POTASSIUM CHLORIDE 20 MEQ/1
40 TABLET, EXTENDED RELEASE ORAL ONCE
Status: COMPLETED | OUTPATIENT
Start: 2017-02-17 | End: 2017-02-17

## 2017-02-17 RX ADMIN — LEVOTHYROXINE SODIUM 50 MCG: 50 TABLET ORAL at 05:02

## 2017-02-17 RX ADMIN — HYPROMELLOSE 2910 1 DROP: 5 SOLUTION OPHTHALMIC at 05:02

## 2017-02-17 RX ADMIN — PANTOPRAZOLE SODIUM 40 MG: 40 TABLET, DELAYED RELEASE ORAL at 08:02

## 2017-02-17 RX ADMIN — POTASSIUM CHLORIDE 40 MEQ: 1500 TABLET, EXTENDED RELEASE ORAL at 08:02

## 2017-02-17 RX ADMIN — ATORVASTATIN CALCIUM 80 MG: 20 TABLET, FILM COATED ORAL at 08:02

## 2017-02-17 RX ADMIN — FUROSEMIDE 20 MG: 20 TABLET ORAL at 08:02

## 2017-02-17 RX ADMIN — LOSARTAN POTASSIUM 12.5 MG: 25 TABLET, FILM COATED ORAL at 08:02

## 2017-02-17 RX ADMIN — HYPROMELLOSE 2910 1 DROP: 5 SOLUTION OPHTHALMIC at 11:02

## 2017-02-17 NOTE — PROGRESS NOTES
Patient is ready for discharge. Patient stable alert and oriented. IVs removed. No complaints of pain. Discussed discharge plan. Reviewed medications and side effects, appointments, and answered questions with patient and family. Waiting on transport.

## 2017-02-17 NOTE — ED NOTES
Attempted to call report a second time. Nurse states she just received a direct admit pt who she is getting situated. States she will call back.

## 2017-02-17 NOTE — ED NOTES
Attempted to call report. Nurse states she is giving report to the nurse receiving her patients and that she will tell her to call me back.

## 2017-02-17 NOTE — PLAN OF CARE
02/17/17 1039   Readmission Questionnaire   At the time of your discharge, did someone talk to you about what your health problems were? Yes   At the time of discharge, did someone talk to you about what to watch out for regarding worsening of your health problem? Yes   At the time of discharge, did someone talk to you about what to do if you experienced worsening of your health problem? Yes   At the time of discharge, did someone talk to you about which medication to take when you left the hospital and which ones to stop taking? Yes   At the time of discharge, did someone talk to you about when and where to follow up with a doctor after you left the hospital? Yes   How often do you need to have someone help you when you read instructions, pamphlets, or other written material from your doctor or pharmacy? Always   Do you have problems taking your medications as prescribed? No   Do you have any problems affording any of  your prescribed medications? No   Do you have problems obtaining/receiving your medications? No   Does the patient have transportation to healthcare appointments? Yes   Lives With child(teodoro), adult   Living Arrangements house   Does the patient have family/friends to help with healtcare needs after discharge? yes   Who are your caregiver(s) and their phone number(s)? [facility]  [phone]   Are you currently feeling confused? Yes   Are you currently having problems thinking? Yes   Are you currently having memory problems? Yes   In the last 7 days, my sleep quality was: fair

## 2017-02-17 NOTE — PLAN OF CARE
02/17/17 1040   Discharge Assessment   Assessment Type Discharge Planning Assessment   Assessment information obtained from? Medical Record   Expected Length of Stay (days) 2   Prior to hospitilization cognitive status: Not Oriented to Place;Not Oriented to Time   Prior to hospitalization functional status: Assistive Equipment;Needs Assistance   Current cognitive status: Not Oriented to Place;Not Oriented to Time   Current Functional Status: Assistive Equipment;Needs Assistance   Arrived From home or self-care   Lives With child(teodoro), adult   Able to Return to Prior Arrangements yes   Is patient able to care for self after discharge? No   Does the patient have family/friends to help with healtcare needs after discharge? yes   Patient's perception of discharge disposition home or selfcare;home health   Readmission Within The Last 30 Days previous discharge plan unsuccessful   Patient currently being followed by outpatient case management? No   Does the patient currently use HME? Yes   Equipment Currently Used at Home bath bench;bedside commode;walker, rolling;wheelchair   Do you have any problems affording any of your prescribed medications? No   Is the patient taking medications as prescribed? yes   Do you have any financial concerns preventing you from receiving the healthcare you need? No   Does the patient have transportation to healthcare appointments? Yes   Transportation Available family or friend will provide   On Dialysis? No   Does the patient receive services at the Coumadin Clinic? No   Are there any open cases? No   Discharge Plan A Home with family;Home Health   Admitted with ACS. Pt confused 2/2 Alzheimer's. Called daughter and left  to obtain further information. Information from recent admit. Plan is to DC home with her daughter and [facility].

## 2017-02-17 NOTE — PLAN OF CARE
met with pt and spoke to her daughter per telephone conversation.  Pt lives with her daughter and plans to return.  Pt will be discharged home today with a continuation of hh provided by [facility].  Will make referral to [facility] in right care.

## 2017-02-17 NOTE — PLAN OF CARE
Problem: Patient Care Overview  Goal: Plan of Care Review  Outcome: Ongoing (interventions implemented as appropriate)  Pt. Remains free from falls/ injury/trauma. No complaints of CP, SOB, pain/discomfort. Bed alarm on and bedside commode at bedside. Plan of Care reviewed with patient. VSS and NADN. Will continue to monitor.

## 2017-02-17 NOTE — PROGRESS NOTES
Patient admitted to CSU. Patient arrived to the floor from the ED via stretcher per escort. VSS and NAND. No complaints at this time. Patient oriented to room and unit. Plan of care initiated. Bed in lowest lock position. Side rails up X 2. Call bed in reach. Will Continue to monitor patient.

## 2017-02-17 NOTE — PLAN OF CARE
[facility]    HOME HEALTH ORDERS  FACE TO FACE ENCOUNTER    Patient Name: Janine Awad  YOB: 1927    PCP: Vishal Avendaño MD   PCP Address: [address] / [place] [address]  PCP Phone Number: [phone]  PCP Fax: [phone]    Encounter Date: 02/17/2017    Admit to Home Health    Diagnoses:  Active Hospital Problems    Diagnosis  POA    *Chest pain at rest [R07.9]  Yes    Mitral regurgitation [I34.0]  Yes    ACS (acute coronary syndrome) [I24.9]  Yes    Late onset Alzheimer's disease without behavioral disturbance [G30.1, F02.80]  Yes    Anemia of chronic disease [D63.8]  Yes    Essential hypertension [I10]  Yes    Acquired hypothyroidism [E03.9]  Yes    Coronary artery disease involving native coronary artery of native heart without angina pectoris [I25.10]  Yes      Resolved Hospital Problems    Diagnosis Date Resolved POA   No resolved problems to display.       Future Appointments  Date Time Provider Department Center   3/14/2017 1:40 PM Jeri Leahy MD [facility] DERM [place]   4/5/2017 2:00 PM Micky Palma MD [facility] NEURO [facility]   4/19/2017 1:00 PM LAB, APPOINTMENT [facility] LAB [facility]   4/26/2017 1:40 PM Vishal Avendaño MD [facility] IM [place]           I have seen and examined this patient face to face today. My clinical findings that support the need for the home health skilled services and home bound status are the following:  Weakness/numbness causing balance and gait disturbance due to Weakness/Debility making it taxing to leave home.    Allergies:  Review of patient's allergies indicates:   Allergen Reactions    Codeine Nausea And Vomiting     chills    Lisinopril Other (See Comments)     cough       Diet: cardiac diet    Activities: activity as tolerated    Nursing:   SN to complete comprehensive assessment including routine vital signs. Instruct on disease process and s/s of complications to report to MD.  Review/verify medication list sent home with the patient at time of discharge  and instruct patient/caregiver as needed. Frequency may be adjusted depending on start of care date.    Notify MD if SBP > 160 or < 90; DBP > 90 or < 50; HR > 120 or < 50; Temp > 101      CONSULTS:    Physical Therapy to evaluate and treat. Evaluate for home safety and equipment needs; Establish/upgrade home exercise program. Perform / instruct on therapeutic exercises, gait training, transfer training, and Range of Motion.  Occupational Therapy to evaluate and treat. Evaluate home environment for safety and equipment needs. Perform/Instruct on transfers, ADL training, ROM, and therapeutic exercises.      Medications: Review discharge medications with patient and family and provide education.      Current Discharge Medication List      START taking these medications    Details   artificial tears (ISOPTO TEARS) 0.5 % ophthalmic solution Place 1 drop into the left eye 4 (four) times daily.         CONTINUE these medications which have NOT CHANGED    Details   acetaminophen (TYLENOL) 325 MG tablet Take 2 tablets (650 mg total) by mouth every 6 (six) hours as needed for Pain.  Refills: 0      atorvastatin (LIPITOR) 40 MG tablet Take 1 tablet (40 mg total) by mouth once daily.  Qty: 90 tablet, Refills: 3      calcium citrate-vitamin D3 315-200 mg (CITRACAL+D) 315-200 mg-unit per tablet Take 1 tablet by mouth once daily.      COCONUT OIL ORAL Take by mouth.      estrogens, conjugated, (PREMARIN) 0.3 MG tablet Take 0.3 mg by mouth once daily.      fish oil-omega-3 fatty acids 300-1,000 mg capsule Take 2 g by mouth once daily.      furosemide (LASIX) 20 MG tablet Take 1 tablet (20 mg total) by mouth once daily.  Qty: 30 tablet, Refills: 2      GINSENG ORAL Take by mouth.      levothyroxine (SYNTHROID) 50 MCG tablet Take 1 tablet (50 mcg total) by mouth before breakfast.  Qty: 90 tablet, Refills: 1      losartan (COZAAR) 25 MG tablet Take 0.5  tablets (12.5 mg total) by mouth once daily. Hold for SBP < 110.  Qty: 30 tablet, Refills: 3      omeprazole (PRILOSEC) 20 MG capsule Take 20 mg by mouth once daily.       ondansetron (ZOFRAN) 4 MG tablet Take 1 tablet (4 mg total) by mouth every 8 (eight) hours as needed for Nausea.  Qty: 10 tablet, Refills: 0      senna-docusate 8.6-50 mg (PERICOLACE) 8.6-50 mg per tablet Take 2 tablets by mouth 2 (two) times daily.      vitamin D 1000 units Tab Take 185 mg by mouth once daily.      ZINC ACETATE ORAL Take by mouth.         STOP taking these medications       aspirin (ECOTRIN) 81 MG EC tablet Comments:   Reason for Stopping:               I certify that this patient is confined to her home and needs physical therapy and occupational therapy.

## 2017-02-17 NOTE — PROGRESS NOTES
Progress Note  Hospital Medicine    Primary Team: [facility]  Admit Date: 2/15/2017   Length of Stay:  LOS: 2 days   SUBJECTIVE:   Reason for Admission:  Chest pain at rest    HPI:  89 yo F w hx of severe AS, anxiety, Alzheimer's dimentia, recently admitted and discharged on 2/3 for acute on chronic diastolic CHF w PNA. Pt unable to give a hx 2/2 to memory loss. Her daughter, who lives with her, reported that she was having CP intermittently over the last few nights. Tonight, her mother went to bed then called out to her and stated that the chest pain had returned and was now worse, lasting for more than 1 hour, radiating down her L arm and into her back and shoulder. Her mother described the pain as a pressure. She stated that her mother was not diaphoretic. Nothing made it better or worse.     Ms Awad has known AS and has been evaluated for TAVR in the past which she refused again at a Cardiology apt 10/2016, which was confirmed with her daughter. She again confirmed this tonight in a conversation with Dr. Mayfield. During this conversation, Dr. Mayfield asked Ms Awad's daughter about her wishes regarding possible PCI and she stated that Ms Awad does not want any invasive procedures including catheterization. Dr. Mayfield also discussed the risks associated with heparin infusion as part of conservative management.      Pt states that her chest pain is now resolving. She was admitted to [facility] for further evaluation    Interval history:    Please see significant events notes.  After being started on Heparin, ASA,and Plavix, pt suffered left eye subconjunctival hemorrhage.  These medications were stopped, Ophtho evaluated.  This morning pt is without complaints.  Denies CP, SOB, fatigue, and upon asking notes left eye irritation but vision intact.    Review of Systems:  Constitutional: no fever or chills  Respiratory: no cough or shortness of breath  Cardiovascular: no chest pain or palpitations  Gastrointestinal:  no nausea or vomiting, no abdominal pain or change in bowel habits  Musculoskeletal: no arthralgias or myalgias     OBJECTIVE:     Temp:  [97.5 °F (36.4 °C)-98.1 °F (36.7 °C)]   Pulse:  []   Resp:  [16-19]   BP: (114-142)/(54-66)   SpO2:  [95 %-98 %]  Body mass index is 26.52 kg/(m^2).  Intake/Outake:  This Shift:       Net I/O past 24h:     Intake/Output Summary (Last 24 hours) at 02/17/17 0953  Last data filed at 02/17/17 0500   Gross per 24 hour   Intake               90 ml   Output             1000 ml   Net             -910 ml             Physical Exam:  Gen- well-developed, well-nourished, NAD  Eyes- PERRL, EOMI, left diffuse subconjunctival hemorrhage with inferior lid ecchymosis  CVS- S1 and S2 present, RRR, 4/6 systolic murmur  Resp- few bibasilar crackles, no work of breathing    Laboratory:  CBC/Anemia Labs: Coags:      Recent Labs  Lab 02/15/17  2119 02/16/17  0329 02/17/17  0405   WBC 10.06 10.66 8.31   HGB 11.9* 10.5* 10.8*   HCT 36.1* 32.9* 33.1*    220 211   MCV 94 95 96   RDW 14.8* 14.8* 15.0*      Recent Labs  Lab 02/15/17  2256 02/16/17  0047 02/16/17  0813   INR  --   --  1.0   APTT 24.8 21.4  --         Chemistries:     Recent Labs  Lab 02/15/17  2119 02/16/17  0329 02/17/17  0405    142 140   K 3.8 3.7 3.7    110 108   CO2 24 25 23   BUN 17 16 13   CREATININE 0.8 0.8 0.8   CALCIUM 9.3 8.9 9.1   PROT 7.4 6.2 6.3   BILITOT 0.7 0.7 1.1*   ALKPHOS 94 76 74   ALT 15 11 10   AST 20 15 15   MG  --  2.0 2.2          Cardiac Enzymes: Ejection Fractions:    Recent Labs      02/15/17   2256  02/16/17   0329  02/16/17   0620   TROPONINI  0.086*  0.113*  0.090*    EF   Date Value Ref Range Status   02/02/2017 55 55 - 65    09/22/2014 65          POCT Glucose: HbA1c:    No results for input(s): POCTGLUCOSE in the last 168 hours. Hemoglobin A1C   Date Value Ref Range Status   03/24/2016 5.7 4.5 - 6.2 % Final   08/11/2009 6.0 4.0 - 6.2 % Final         Medications:  Scheduled Meds:    artificial tears  1 drop Left Eye QID    atorvastatin  80 mg Oral Daily    furosemide  20 mg Oral Daily    levothyroxine  50 mcg Oral Before breakfast    losartan  12.5 mg Oral Daily    pantoprazole  40 mg Oral Daily                             Continuous Infusions:   PRN Meds:.acetaminophen, senna-docusate 8.6-50 mg     ASSESSMENT/PLAN:     Chest pain at rest  NSTEMI- type II  -Started on ACS protocol; stopped due to subconjunctival hemorrhage  -d/w comanagement service; elevated troponin most likely 2/2 heart strain from severe AS  - EKG unchanged  - ASA, Plavix, and Heparin stopped; continue conservative management with bp control    Subconjunctival Hemorrhage  -vision intact, evaluated by Ophto  -continue bp management, no straining  -artificial tears QID     Severe aortic stenosis  -no intervention at this time  -pt continues to refuse TAVR; rediscussed with pt's daughter today      Essential hypertension  -currently controlled  -continue losartan 12.5 mg daily     Mitral regurgitation  -continue to monitor  -no intervention at this time     Acquired hypothyroidism  -continue home dose medication: levothyroxine 50 mcg daily, 1/2 before breakfast     Anemia of chronic disease  -continue to monitor daily CBC  -no RBC transfusion necessary unless hematocrit below 21     Late onset Alzheimer's disease without behavioral disturbance  -continue to monitor  -ensure low stress environment such reduced noise levels and soft lighting  -discuss medical issues with patient and designated decision-maker; daughter requesting f/u with Dr. Mariola [facility] ppx- BRITTANY  CODE Status- FULL     Dispo- d/c home today with KEYUR Lord MD  Hospital Medicine Staff

## 2017-02-18 NOTE — DISCHARGE SUMMARY
DISCHARGE SUMMARY  Hospital Medicine    Team: [facility] HOSP MED C    Patient Name: Janine Awad  YOB: 1927    Admit Date: 2/15/2017    Discharge Date: 02/17/2017    Discharge Attending Physician: Shena Lord MD     Principal Diagnoses:  Active Hospital Problems    Diagnosis  POA    *Chest pain at rest [R07.9]  Yes    Mitral regurgitation [I34.0]  Yes    ACS (acute coronary syndrome) [I24.9]  Yes    Late onset Alzheimer's disease without behavioral disturbance [G30.1, F02.80]  Yes    Anemia of chronic disease [D63.8]  Yes    Essential hypertension [I10]  Yes    Acquired hypothyroidism [E03.9]  Yes    Coronary artery disease involving native coronary artery of native heart without angina pectoris [I25.10]  Yes      Resolved Hospital Problems    Diagnosis Date Resolved POA   No resolved problems to display.       Discharged Condition: stable    HOSPITAL COURSE:      Initial Presentation:    89 yo F w hx of severe AS, anxiety, Alzheimer's dimentia, recently admitted and discharged on 2/3 for acute on chronic diastolic CHF w PNA. Pt unable to give a hx 2/2 to memory loss. Her daughter, who lives with her, reported that she was having CP intermittently over the last few nights. Tonight, her mother went to bed then called out to her and stated that the chest pain had returned and was now worse, lasting for more than 1 hour, radiating down her L arm and into her back and shoulder. Her mother described the pain as a pressure. She stated that her mother was not diaphoretic. Nothing made it better or worse.      Ms Awad has known AS and has been evaluated for TAVR in the past which she refused again at a Cardiology apt 10/2016, which was confirmed with her daughter. She again confirmed this tonight in a conversation with Dr. Mayfield. During this conversation, Dr. Mayfield asked Ms Awad's daughter about her wishes regarding possible PCI and she stated that Ms Awad does not want any invasive  procedures including catheterization. Dr. Mayfield also discussed the risks associated with heparin infusion as part of conservative management.       Pt states that her chest pain is now resolving. She was admitted to [facility] for further evaluation.    Course of Principle Problem for Admission:    Chest pain at rest  NSTEMI- type II  -Started on ACS protocol; stopped due to subconjunctival hemorrhage  -d/w comanagement service; elevated troponin most likely 2/2 heart strain from severe AS  - EKG unchanged  - ASA, Plavix, and Heparin stopped; continue conservative management with bp control    Other Medical Problems Addressed in the Hospital:    Subconjunctival Hemorrhage  -vision intact, evaluated by Ophtho  -continue bp management, no straining  -artificial tears QID      Severe aortic stenosis  -no intervention at this time  -pt continues to refuse TAVR; rediscussed with pt's daughter today who is in agreement with this      Essential hypertension  -currently controlled  -continue losartan 12.5 mg daily         Late onset Alzheimer's disease without behavioral disturbance  -continue to monitor  -ensure low stress environment such reduced noise levels and soft lighting  -discuss medical issues with patient and designated decision-maker; daughter requesting f/u with Dr. Garnett  -PCP appt next week       Consults: None    Last CBC/BMP:    CBC/Anemia Labs: Coags:      Recent Labs  Lab 02/15/17  2119 02/16/17  0329 02/17/17  0405   WBC 10.06 10.66 8.31   HGB 11.9* 10.5* 10.8*   HCT 36.1* 32.9* 33.1*    220 211   MCV 94 95 96   RDW 14.8* 14.8* 15.0*      Recent Labs  Lab 02/15/17  2256 02/16/17  0047 02/16/17  0813   INR  --   --  1.0   APTT 24.8 21.4  --         Chemistries:     Recent Labs  Lab 02/15/17  2119 02/16/17  0329 02/17/17  0405    142 140   K 3.8 3.7 3.7    110 108   CO2 24 25 23   BUN 17 16 13   CREATININE 0.8 0.8 0.8   CALCIUM 9.3 8.9 9.1   PROT 7.4 6.2 6.3   BILITOT 0.7 0.7 1.1*   ALKPHOS  94 76 74   ALT 15 11 10   AST 20 15 15   MG  --  2.0 2.2          Significant Diagnostic Studies: none    Special Treatments/Procedures:   * No surgery found *     Disposition: Home-Health Care [facility]      Future Scheduled Appointments:  Future Appointments  Date Time Provider Department Center   2/23/2017 3:00 PM Vishal Avendaño MD [facility] IM [place]   3/14/2017 1:40 PM Jeri Leahy MD [facility] DERM [place]   4/5/2017 2:00 PM Micky Palma MD [facility] NEURO [facility]   4/19/2017 1:00 PM LAB, APPOINTMENT [facility] LAB [facility]   4/26/2017 1:40 PM Vishal Avendaño MD [facility] IM [place]       Discharge Medication List:       Ritesh Janine Villaseñor   Home Medication Instructions RALF:[id number]    Printed on:02/17/17 2330   Medication Information                      acetaminophen (TYLENOL) 325 MG tablet  Take 2 tablets (650 mg total) by mouth every 6 (six) hours as needed for Pain.             artificial tears (ISOPTO TEARS) 0.5 % ophthalmic solution  Place 1 drop into the left eye 4 (four) times daily.             atorvastatin (LIPITOR) 40 MG tablet  Take 1 tablet (40 mg total) by mouth once daily.             calcium citrate-vitamin D3 315-200 mg (CITRACAL+D) 315-200 mg-unit per tablet  Take 1 tablet by mouth once daily.             COCONUT OIL ORAL  Take by mouth.             estrogens, conjugated, (PREMARIN) 0.3 MG tablet  Take 0.3 mg by mouth once daily.             fish oil-omega-3 fatty acids 300-1,000 mg capsule  Take 2 g by mouth once daily.             furosemide (LASIX) 20 MG tablet  Take 1 tablet (20 mg total) by mouth once daily.             GINSENG ORAL  Take by mouth.             levothyroxine (SYNTHROID) 50 MCG tablet  Take 1 tablet (50 mcg total) by mouth before breakfast.             losartan (COZAAR) 25 MG tablet  Take 0.5 tablets (12.5 mg total) by mouth once daily. Hold for SBP < 110.             omeprazole (PRILOSEC) 20 MG capsule  Take 20 mg by mouth once daily.              ondansetron  (ZOFRAN) 4 MG tablet  Take 1 tablet (4 mg total) by mouth every 8 (eight) hours as needed for Nausea.             senna-docusate 8.6-50 mg (PERICOLACE) 8.6-50 mg per tablet  Take 2 tablets by mouth 2 (two) times daily.             vitamin D 1000 units Tab  Take 185 mg by mouth once daily.             ZINC ACETATE ORAL  Take by mouth.                 Patient Instructions:    Discharge Procedure Orders  Diet Cardiac     Activity as tolerated     Call MD for:  difficulty breathing or increased cough     Call MD for:  increased confusion or weakness     Call MD for:  persistent dizziness, light-headedness, or visual disturbances         At the time of discharge patient was told to take all medications as prescribed, to keep all followup appointments, and to call their primary care physician or return to the emergency room if they have any worsening or concerning symptoms.    Signing Physician:  Shena Lord MD

## 2017-02-24 ENCOUNTER — OFFICE VISIT (OUTPATIENT)
Dept: INTERNAL MEDICINE | Facility: CLINIC | Age: 82
End: 2017-02-24
Payer: MEDICARE

## 2017-02-24 VITALS
SYSTOLIC BLOOD PRESSURE: 138 MMHG | DIASTOLIC BLOOD PRESSURE: 92 MMHG | TEMPERATURE: 98 F | HEIGHT: 63 IN | HEART RATE: 72 BPM | WEIGHT: 141.13 LBS | BODY MASS INDEX: 25.01 KG/M2 | RESPIRATION RATE: 20 BRPM

## 2017-02-24 DIAGNOSIS — I24.9 ACS (ACUTE CORONARY SYNDROME): Primary | ICD-10-CM

## 2017-02-24 DIAGNOSIS — G30.1 LATE ONSET ALZHEIMER'S DISEASE WITHOUT BEHAVIORAL DISTURBANCE: ICD-10-CM

## 2017-02-24 DIAGNOSIS — I35.0 SEVERE AORTIC STENOSIS: ICD-10-CM

## 2017-02-24 DIAGNOSIS — G47.00 INSOMNIA, UNSPECIFIED TYPE: ICD-10-CM

## 2017-02-24 DIAGNOSIS — F02.80 LATE ONSET ALZHEIMER'S DISEASE WITHOUT BEHAVIORAL DISTURBANCE: ICD-10-CM

## 2017-02-24 DIAGNOSIS — I25.10 CORONARY ARTERY DISEASE INVOLVING NATIVE CORONARY ARTERY OF NATIVE HEART WITHOUT ANGINA PECTORIS: ICD-10-CM

## 2017-02-24 DIAGNOSIS — E03.9 ACQUIRED HYPOTHYROIDISM: ICD-10-CM

## 2017-02-24 DIAGNOSIS — I10 ESSENTIAL HYPERTENSION: ICD-10-CM

## 2017-02-24 DIAGNOSIS — K21.9 GASTROESOPHAGEAL REFLUX DISEASE, ESOPHAGITIS PRESENCE NOT SPECIFIED: ICD-10-CM

## 2017-02-24 DIAGNOSIS — E78.5 HYPERLIPIDEMIA, UNSPECIFIED HYPERLIPIDEMIA TYPE: ICD-10-CM

## 2017-02-24 DIAGNOSIS — H11.32 SUBCONJUNCTIVAL BLEED, LEFT: ICD-10-CM

## 2017-02-24 PROCEDURE — 1159F MED LIST DOCD IN RCRD: CPT | Mod: S$GLB,,, | Performed by: INTERNAL MEDICINE

## 2017-02-24 PROCEDURE — 1160F RVW MEDS BY RX/DR IN RCRD: CPT | Mod: S$GLB,,, | Performed by: INTERNAL MEDICINE

## 2017-02-24 PROCEDURE — 1126F AMNT PAIN NOTED NONE PRSNT: CPT | Mod: S$GLB,,, | Performed by: INTERNAL MEDICINE

## 2017-02-24 PROCEDURE — 1157F ADVNC CARE PLAN IN RCRD: CPT | Mod: S$GLB,,, | Performed by: INTERNAL MEDICINE

## 2017-02-24 PROCEDURE — 99999 PR PBB SHADOW E&M-EST. PATIENT-LVL IV: CPT | Mod: PBBFAC,,, | Performed by: INTERNAL MEDICINE

## 2017-02-24 PROCEDURE — 99215 OFFICE O/P EST HI 40 MIN: CPT | Mod: S$GLB,,, | Performed by: INTERNAL MEDICINE

## 2017-02-24 RX ORDER — AMITRIPTYLINE HYDROCHLORIDE 25 MG/1
25 TABLET, FILM COATED ORAL NIGHTLY PRN
Qty: 30 TABLET | Refills: 3 | Status: SHIPPED | OUTPATIENT
Start: 2017-02-24 | End: 2017-03-10

## 2017-02-24 NOTE — PROGRESS NOTES
Subjective:       Patient ID: Janine Awad is a 89 y.o. female.    Chief Complaint: Transitional Care (Heart attack)    HPI   89 yo F with severe AS, anxiety, Alzheimer's dimentia, HLD, hypothyroidism, GERD recently admitted and discharged on 2/3/17 for acute on chronic diastolic CHF w PNA is here for hospital f/u from 2/15/17-2/17/17 for suspected NSTEMI associated with chest pain. Pt was started on ACS protocol but was stopped 2/2 subconjunctival hemorrhage. Elevated CE were suspected to be from heart strain from AS. There were no EKG changes while hospitalized. No CATH was done as the pt does not want any invasive procedures done. She is currently off all anticoagulation. No further chest pain or cardiac symptoms since discharge.  Pt is c/o insomnia and would like to try something for treatment. Pt previously tried Atarax which caused hyperactivity.   Review of Systems   Constitutional: Negative for activity change, appetite change, chills, diaphoresis, fatigue, fever and unexpected weight change.   HENT: Negative for congestion, mouth sores, postnasal drip, rhinorrhea, sinus pressure, sneezing, sore throat, trouble swallowing and voice change.    Eyes: Negative for pain, discharge and visual disturbance.   Respiratory: Negative for cough, shortness of breath and wheezing.    Cardiovascular: Negative for chest pain, palpitations and leg swelling.   Gastrointestinal: Negative for abdominal pain, blood in stool, constipation, diarrhea, nausea and vomiting.   Endocrine: Negative for cold intolerance and heat intolerance.   Genitourinary: Negative for difficulty urinating, dysuria, frequency, hematuria and urgency.   Musculoskeletal: Negative for arthralgias and myalgias.   Skin: Negative for rash and wound.   Allergic/Immunologic: Negative for environmental allergies and food allergies.   Neurological: Negative for dizziness, tremors, seizures, syncope, weakness, light-headedness and headaches.    Hematological: Negative for adenopathy. Does not bruise/bleed easily.   Psychiatric/Behavioral: Positive for confusion, decreased concentration and sleep disturbance. The patient is not nervous/anxious.        Objective:      Physical Exam   Constitutional: She is oriented to person, place, and time. She appears well-developed and well-nourished. No distress.   HENT:   Head: Normocephalic and atraumatic.   Right Ear: External ear normal.   Left Ear: External ear normal.   Nose: Nose normal.   Mouth/Throat: Oropharynx is clear and moist. No oropharyngeal exudate.   Eyes: Conjunctivae and EOM are normal. Pupils are equal, round, and reactive to light. Right eye exhibits no discharge. Left eye exhibits no discharge. No scleral icterus.       Hemorrhage    Neck: Neck supple. No JVD present. No thyromegaly present.   Cardiovascular: Normal rate, regular rhythm and intact distal pulses.    Murmur heard.   Systolic (AS) murmur is present   Pulmonary/Chest: Effort normal and breath sounds normal. No respiratory distress. She has no wheezes. She has no rales. She exhibits no tenderness.   Abdominal: Soft. Bowel sounds are normal. She exhibits no distension. There is no tenderness. There is no guarding.   Musculoskeletal: She exhibits no edema.   Lymphadenopathy:     She has no cervical adenopathy.   Neurological: She is alert and oriented to person, place, and time.   Skin: Skin is warm and dry. No rash noted. She is not diaphoretic. No pallor.   Psychiatric: She has a normal mood and affect. Judgment normal.   Nursing note and vitals reviewed.      Assessment:       1. ACS (acute coronary syndrome)    2. Coronary artery disease involving native coronary artery of native heart without angina pectoris    3. Subconjunctival bleed, left    4. Severe aortic stenosis    5. Essential hypertension    6. Hyperlipidemia, unspecified hyperlipidemia type    7. Acquired hypothyroidism    8. Gastroesophageal reflux disease, esophagitis  presence not specified    9. Late onset Alzheimer's disease without behavioral disturbance    10. Insomnia, unspecified type        Plan:    1/2. Resolved, no further symptoms          Referral to Cardiology to discuss anticoagulation           Pt advised to hold Premarin for now   3. Improving, secondary to anticoagulation which was stopped   4. Pt not interested in surgical evaluation   5. Stable, CPT   6. Lipitor restarted last month by PCP   7. Stable on Synthroid   8. Stable on PPI   9. Stable, followed by Neurology   10. Rx Elavil 25 mg qHS PRN  11. F/u with PCP as scheduled      Over 1/2 of 40 minute visit spent reviewing pt's medical/hospitalization records, education/discussion of pt's medical conditions and medical management

## 2017-03-03 ENCOUNTER — OFFICE VISIT (OUTPATIENT)
Dept: CARDIOLOGY | Facility: CLINIC | Age: 82
End: 2017-03-03
Payer: MEDICARE

## 2017-03-03 ENCOUNTER — TELEPHONE (OUTPATIENT)
Dept: INTERNAL MEDICINE | Facility: CLINIC | Age: 82
End: 2017-03-03

## 2017-03-03 VITALS
BODY MASS INDEX: 25.8 KG/M2 | DIASTOLIC BLOOD PRESSURE: 66 MMHG | HEART RATE: 82 BPM | WEIGHT: 145.63 LBS | HEIGHT: 63 IN | SYSTOLIC BLOOD PRESSURE: 121 MMHG | OXYGEN SATURATION: 96 %

## 2017-03-03 DIAGNOSIS — G30.1 LATE ONSET ALZHEIMER'S DISEASE WITHOUT BEHAVIORAL DISTURBANCE: ICD-10-CM

## 2017-03-03 DIAGNOSIS — I35.0 SEVERE AORTIC STENOSIS: ICD-10-CM

## 2017-03-03 DIAGNOSIS — F02.80 LATE ONSET ALZHEIMER'S DISEASE WITHOUT BEHAVIORAL DISTURBANCE: ICD-10-CM

## 2017-03-03 DIAGNOSIS — I34.0 MITRAL VALVE INSUFFICIENCY, UNSPECIFIED ETIOLOGY: ICD-10-CM

## 2017-03-03 DIAGNOSIS — I25.10 CORONARY ARTERY DISEASE INVOLVING NATIVE CORONARY ARTERY OF NATIVE HEART WITHOUT ANGINA PECTORIS: Primary | ICD-10-CM

## 2017-03-03 DIAGNOSIS — I10 ESSENTIAL HYPERTENSION: ICD-10-CM

## 2017-03-03 PROCEDURE — 1157F ADVNC CARE PLAN IN RCRD: CPT | Mod: S$GLB,,, | Performed by: NURSE PRACTITIONER

## 2017-03-03 PROCEDURE — 99999 PR PBB SHADOW E&M-EST. PATIENT-LVL II: CPT | Mod: PBBFAC,,, | Performed by: NURSE PRACTITIONER

## 2017-03-03 PROCEDURE — 99499 UNLISTED E&M SERVICE: CPT | Mod: S$GLB,,, | Performed by: INTERNAL MEDICINE

## 2017-03-03 PROCEDURE — 1160F RVW MEDS BY RX/DR IN RCRD: CPT | Mod: S$GLB,,, | Performed by: NURSE PRACTITIONER

## 2017-03-03 PROCEDURE — 1159F MED LIST DOCD IN RCRD: CPT | Mod: S$GLB,,, | Performed by: NURSE PRACTITIONER

## 2017-03-03 PROCEDURE — 99999 PR PBB SHADOW E&M-EST. PATIENT-LVL III: CPT | Mod: PBBFAC,,, | Performed by: INTERNAL MEDICINE

## 2017-03-03 PROCEDURE — 99213 OFFICE O/P EST LOW 20 MIN: CPT | Mod: S$GLB,,, | Performed by: NURSE PRACTITIONER

## 2017-03-03 RX ORDER — NAPROXEN SODIUM 220 MG/1
81 TABLET, FILM COATED ORAL DAILY
Refills: 0 | Status: ON HOLD | COMMUNITY
Start: 2017-03-03 | End: 2018-07-29

## 2017-03-03 NOTE — TELEPHONE ENCOUNTER
----- Message from Becki Roberson sent at 3/2/2017  1:18 PM CST -----  Contact: Marlys/ [facility]. [facility]/  [phone]  Patient has a bruised left  Eye, the inside is really red and glossy and she doesn't know how it happened.

## 2017-03-03 NOTE — LETTER
March 8, 2017      Oleg Enciso, DO  [address] [address]           [facility]  [address], [address]  [place] [address]  Phone: [phone]          Patient: Janine Awad   MR Number: [id number]   YOB: 1927   Date of Visit: 3/3/2017       Dear Dr. Oleg Enciso:    Thank you for referring Janine Awad to me for evaluation. Attached you will find relevant portions of my assessment and plan of care.    If you have questions, please do not hesitate to call me. I look forward to following Janine Awad along with you.    Sincerely,    Lester Chapman MD    Enclosure  CC:  No Recipients    If you would like to receive this communication electronically, please contact [email or web address] or [phone] to request more information on [employer] Link access.    For providers and/or their staff who would like to refer a patient to [facility], please contact us through our one-stop-shop provider referral line, [facility] , at [phone].    If you feel you have received this communication in error or would no longer like to receive these types of communications, please e-mail [email or web address]

## 2017-03-07 NOTE — PROGRESS NOTES
Subjective:       Patient ID: Janine Awad is a 89 y.o. female  who presents for hospital follow-up of Aortic valve stenosis and chest pain    Chief Complaint: No chief complaint on file.    HPI Comments: Patient here for hospital follow up. 88 yo F with severe AS, anxiety, Alzheimer's dimentia, recently admitted for CP lasting for more than 1 hour, radiating down her L arm and into her back and shoulder.   Chart review noted known AS and has been evaluated for TAVR in the past which she refused and continues to decline  Recent admission LHC +/- PCI was recommended and patient declined any invasive procedures including catheterization.  She was started on asa, Plavix and heparin gtt for ACS at [facility] which was stopped due to subconjunctival hemorrhage  Patient was discharged to follow up in 1 week   Since discharge her daughter reports intermittent chest pain. Patient is confused and can not recall discussion from minute to minute. Patient is aware of confusion and cried several times during visit today.   Discussed options for continued medical management as well as referral to TAVR clinic if interested; also discussed LHC if med management fails. She is undecided and her daughter would like time to consider options.     Review of Systems   Unable to perform ROS: Dementia       Objective:      Physical Exam   Constitutional: No distress.   HENT:   Head: Normocephalic and atraumatic.   Eyes: Left conjunctiva has a hemorrhage.   Neck: No JVD present.   Cardiovascular: Normal rate and regular rhythm.    Murmur heard.  Pulmonary/Chest: Effort normal and breath sounds normal.   Abdominal: Soft. Bowel sounds are normal.   Musculoskeletal: She exhibits tenderness (right hip/femur). She exhibits no edema.   Neurological: She is alert.   Skin: No rash noted. She is not diaphoretic. No erythema.   Psychiatric: Cognition and memory are impaired. She exhibits a depressed mood. She exhibits abnormal recent memory and  abnormal remote memory.       Assessment:       1. Coronary artery disease involving native coronary artery of native heart without angina pectoris    2. Essential hypertension    3. Mitral valve insufficiency, unspecified etiology    4. Severe aortic stenosis    5. Late onset Alzheimer's disease without behavioral disturbance        Plan:       Diagnoses and all orders for this visit:    Coronary artery disease involving native coronary artery of native heart without angina pectoris    Essential hypertension    Mitral valve insufficiency, unspecified etiology    Severe aortic stenosis    Late onset Alzheimer's disease without behavioral disturbance    Other orders  -     aspirin 81 MG Chew; Take 1 tablet (81 mg total) by mouth once daily.      Patient recently admitted for CP lasting for more than 1 hour, radiating down her L arm and into her back and shoulder.   Chart review noted known AS and has been evaluated for TAVR in the past which she refused and continues to decline  Recent admission LHC +/- PCI was recommended and patient declined any invasive procedures including catheterization.  She was started on asa, Plavix and heparin gtt for ACS at [facility] which was stopped due to subconjunctival hemorrhage which is resolving at this time    Since discharge her daughter reports intermittent chest pain. Patient is confused and can not recall discussion from minute to minute. Patient is aware of confusion and cried several times during visit today.     Discussed options for continued medical management as well as referral to TAVR clinic if interested; also discussed LHC if med management fails. She is undecided and her daughter would like time to consider options.    Current Outpatient Prescriptions on File Prior to Visit   Medication Sig Dispense Refill    amitriptyline (ELAVIL) 25 MG tablet Take 1 tablet (25 mg total) by mouth nightly as needed for Insomnia. 30 tablet 3    artificial tears (ISOPTO TEARS) 0.5 %  ophthalmic solution Place 1 drop into the left eye 4 (four) times daily.      atorvastatin (LIPITOR) 40 MG tablet Take 1 tablet (40 mg total) by mouth once daily. 90 tablet 3    COCONUT OIL ORAL Take by mouth.      estrogens, conjugated, (PREMARIN) 0.3 MG tablet Take 0.3 mg by mouth once daily.      fish oil-omega-3 fatty acids 300-1,000 mg capsule Take 2 g by mouth once daily.      furosemide (LASIX) 20 MG tablet Take 1 tablet (20 mg total) by mouth once daily. 30 tablet 2    GINSENG ORAL Take by mouth.      levothyroxine (SYNTHROID) 50 MCG tablet Take 1 tablet (50 mcg total) by mouth before breakfast. 90 tablet 1    losartan (COZAAR) 25 MG tablet Take 0.5 tablets (12.5 mg total) by mouth once daily. Hold for SBP < 110. 30 tablet 3    omeprazole (PRILOSEC) 20 MG capsule Take 20 mg by mouth once daily.       ondansetron (ZOFRAN) 4 MG tablet Take 1 tablet (4 mg total) by mouth every 8 (eight) hours as needed for Nausea. 10 tablet 0    vitamin D 1000 units Tab Take 185 mg by mouth once daily.      ZINC ACETATE ORAL Take by mouth.       No current facility-administered medications on file prior to visit.      /66, HR 82    Continue current meds with addition of asa  RTC 1 week to establish care with Dr Chapman

## 2017-03-08 ENCOUNTER — TELEPHONE (OUTPATIENT)
Dept: INTERNAL MEDICINE | Facility: CLINIC | Age: 82
End: 2017-03-08

## 2017-03-08 NOTE — PROGRESS NOTES
Seen by Nito Shultz.    See note same date.        Lester Chapman MD, FACC, CCDS  Interventional Cardiology  [facility]

## 2017-03-10 ENCOUNTER — NURSE TRIAGE (OUTPATIENT)
Dept: ADMINISTRATIVE | Facility: CLINIC | Age: 82
End: 2017-03-10

## 2017-03-10 ENCOUNTER — OFFICE VISIT (OUTPATIENT)
Dept: CARDIOLOGY | Facility: CLINIC | Age: 82
End: 2017-03-10
Payer: MEDICARE

## 2017-03-10 ENCOUNTER — OFFICE VISIT (OUTPATIENT)
Dept: INTERNAL MEDICINE | Facility: CLINIC | Age: 82
End: 2017-03-10
Payer: MEDICARE

## 2017-03-10 VITALS
BODY MASS INDEX: 25.69 KG/M2 | WEIGHT: 145 LBS | HEIGHT: 63 IN | HEART RATE: 85 BPM | SYSTOLIC BLOOD PRESSURE: 118 MMHG | DIASTOLIC BLOOD PRESSURE: 70 MMHG | OXYGEN SATURATION: 96 %

## 2017-03-10 VITALS
BODY MASS INDEX: 25.94 KG/M2 | TEMPERATURE: 98 F | HEART RATE: 92 BPM | SYSTOLIC BLOOD PRESSURE: 139 MMHG | WEIGHT: 146.38 LBS | RESPIRATION RATE: 16 BRPM | DIASTOLIC BLOOD PRESSURE: 67 MMHG | HEIGHT: 63 IN

## 2017-03-10 DIAGNOSIS — I10 ESSENTIAL HYPERTENSION: ICD-10-CM

## 2017-03-10 DIAGNOSIS — D69.2 SENILE PURPURA: ICD-10-CM

## 2017-03-10 DIAGNOSIS — I70.0 AORTIC ATHEROSCLEROSIS: ICD-10-CM

## 2017-03-10 DIAGNOSIS — I25.118 CORONARY ARTERY DISEASE OF NATIVE ARTERY OF NATIVE HEART WITH STABLE ANGINA PECTORIS: Primary | ICD-10-CM

## 2017-03-10 DIAGNOSIS — I35.0 SEVERE AORTIC STENOSIS: ICD-10-CM

## 2017-03-10 DIAGNOSIS — G47.00 INSOMNIA, UNSPECIFIED TYPE: ICD-10-CM

## 2017-03-10 DIAGNOSIS — I34.0 MITRAL VALVE INSUFFICIENCY, UNSPECIFIED ETIOLOGY: ICD-10-CM

## 2017-03-10 PROBLEM — I24.9 ACS (ACUTE CORONARY SYNDROME): Status: RESOLVED | Noted: 2017-02-15 | Resolved: 2017-03-10

## 2017-03-10 PROCEDURE — 1160F RVW MEDS BY RX/DR IN RCRD: CPT | Mod: S$GLB,,, | Performed by: INTERNAL MEDICINE

## 2017-03-10 PROCEDURE — 99214 OFFICE O/P EST MOD 30 MIN: CPT | Mod: S$GLB,,, | Performed by: INTERNAL MEDICINE

## 2017-03-10 PROCEDURE — 99999 PR PBB SHADOW E&M-EST. PATIENT-LVL III: CPT | Mod: PBBFAC,,, | Performed by: INTERNAL MEDICINE

## 2017-03-10 PROCEDURE — 1126F AMNT PAIN NOTED NONE PRSNT: CPT | Mod: S$GLB,,, | Performed by: INTERNAL MEDICINE

## 2017-03-10 PROCEDURE — 1159F MED LIST DOCD IN RCRD: CPT | Mod: S$GLB,,, | Performed by: INTERNAL MEDICINE

## 2017-03-10 PROCEDURE — 1157F ADVNC CARE PLAN IN RCRD: CPT | Mod: S$GLB,,, | Performed by: INTERNAL MEDICINE

## 2017-03-10 RX ORDER — HYDROXYZINE HYDROCHLORIDE 25 MG/1
25 TABLET, FILM COATED ORAL NIGHTLY PRN
Qty: 30 TABLET | Refills: 3 | Status: ON HOLD | OUTPATIENT
Start: 2017-03-10 | End: 2017-03-20 | Stop reason: HOSPADM

## 2017-03-10 RX ORDER — ISOSORBIDE MONONITRATE 30 MG/1
30 TABLET, EXTENDED RELEASE ORAL DAILY
Qty: 30 TABLET | Refills: 11 | Status: SHIPPED | OUTPATIENT
Start: 2017-03-10 | End: 2017-05-31 | Stop reason: SDUPTHER

## 2017-03-10 NOTE — PROGRESS NOTES
Subjective:   Chief Complaint:  Janine Awad is a 89 y.o. female who presents for follow-up of AS and CAD    HPI:   Mrs. Awad is an 89 year-old  Female with severe AS, presumed CAD with admit for ACS earlier this year, and Alzheimer's dementia.  She lives at home with assitance from friends/family.    She has elected thus far to defer invasive evaluation and treatment for CAD and AS.    Last week she saw Nito Shultz NP in clinic for hospital f/u.    She does have occasional angina, but it generally resolves when she rests or tries to calm down, though she has difficulty recalling her symptoms.  She is accompanied by her daughter who provides much of the history.  She also had an episode of near-syncope a couple of weeks ago.  She has not been taking SL NTG.    Patient Active Problem List    Diagnosis Date Noted    Mitral regurgitation 2017    Chest pain at rest 02/15/2017    Late onset Alzheimer's disease without behavioral disturbance 2016    Depression     Anemia of chronic disease 2016    Memory loss 2016    Macular degeneration     Severe aortic stenosis 2014    Essential hypertension     Hyperlipidemia     Acquired hypothyroidism     Coronary artery disease of native artery of native heart with stable angina pectoris     GERD (gastroesophageal reflux disease)        The following portions of the patient's history were reviewed and updated as appropriate: allergies, current medications, past family history, past medical history, past social history, past surgical history and problem list.    Right Arm BP - Sittin/70  Left Arm BP - Sittin/72    Current Outpatient Prescriptions   Medication Sig    amitriptyline (ELAVIL) 25 MG tablet Take 1 tablet (25 mg total) by mouth nightly as needed for Insomnia.    artificial tears (ISOPTO TEARS) 0.5 % ophthalmic solution Place 1 drop into the left eye 4 (four) times daily.    aspirin 81 MG Chew Take 1  tablet (81 mg total) by mouth once daily.    atorvastatin (LIPITOR) 40 MG tablet Take 1 tablet (40 mg total) by mouth once daily.    COCONUT OIL ORAL Take by mouth.    estrogens, conjugated, (PREMARIN) 0.3 MG tablet Take 0.3 mg by mouth once daily.    fish oil-omega-3 fatty acids 300-1,000 mg capsule Take 2 g by mouth once daily.    furosemide (LASIX) 20 MG tablet Take 1 tablet (20 mg total) by mouth once daily.    GINSENG ORAL Take by mouth.    isosorbide mononitrate (IMDUR) 30 MG 24 hr tablet Take 1 tablet (30 mg total) by mouth once daily.    levothyroxine (SYNTHROID) 50 MCG tablet Take 1 tablet (50 mcg total) by mouth before breakfast.    losartan (COZAAR) 25 MG tablet Take 0.5 tablets (12.5 mg total) by mouth once daily. Hold for SBP < 110.    omeprazole (PRILOSEC) 20 MG capsule Take 20 mg by mouth once daily.     ondansetron (ZOFRAN) 4 MG tablet Take 1 tablet (4 mg total) by mouth every 8 (eight) hours as needed for Nausea.    vitamin D 1000 units Tab Take 185 mg by mouth once daily.    ZINC ACETATE ORAL Take by mouth.     No current facility-administered medications for this visit.      Review of Systems   Cardiovascular: Positive for chest pain and dyspnea on exertion. Negative for claudication, cyanosis, irregular heartbeat, leg swelling, near-syncope, orthopnea, palpitations, paroxysmal nocturnal dyspnea and syncope.   Hematologic/Lymphatic: Negative for bleeding problem. Does not bruise/bleed easily.   Neurological: Positive for dizziness and light-headedness. Negative for loss of balance.         Objective:   Physical Exam   Constitutional: She is oriented to person, place, and time. She appears well-developed and well-nourished. No distress. She is not intubated.   HENT:   Head: Atraumatic.   Neck: No JVD present.   Cardiovascular: Normal rate, regular rhythm, S1 normal, S2 normal and intact distal pulses.  PMI is not displaced.  Exam reveals no gallop, no S3, no S4, no distant heart  sounds, no friction rub, no midsystolic click and no opening snap.    Murmur heard.  High-pitched harsh mid to late systolic murmur is present with a grade of 3/6  at the upper right sternal border radiating to the neck  Pulses:       Carotid pulses are 2+ on the right side, and 2+ on the left side.       Radial pulses are 2+ on the right side, and 2+ on the left side.        Femoral pulses are 2+ on the right side, and 2+ on the left side.       Popliteal pulses are 2+ on the right side, and 2+ on the left side.        Dorsalis pedis pulses are 2+ on the right side, and 2+ on the left side.        Posterior tibial pulses are 2+ on the right side, and 2+ on the left side.   Pulmonary/Chest: Effort normal and breath sounds normal. No accessory muscle usage. No apnea, no tachypnea and no bradypnea. She is not intubated. No respiratory distress. She has no decreased breath sounds. She has no wheezes. She has no rhonchi. She has no rales. She exhibits no tenderness.   Abdominal: Soft. Normal aorta and bowel sounds are normal. She exhibits no distension, no abdominal bruit, no pulsatile midline mass and no mass. There is no tenderness.   Musculoskeletal: She exhibits no edema.   Neurological: She is alert and oriented to person, place, and time.   Skin: Skin is warm. No rash noted. No erythema. No pallor.   Psychiatric: She has a normal mood and affect. Her behavior is normal. Judgment and thought content normal.   Vitals reviewed.      LABS    LAST HbA1c  Lab Results   Component Value Date    HGBA1C 5.7 03/24/2016       Lipid panel  Lab Results   Component Value Date    CHOL 232 (H) 02/17/2017    CHOL 255 (H) 03/24/2016    CHOL 242 (H) 08/22/2011     Lab Results   Component Value Date    HDL 55 02/17/2017    HDL 54 03/24/2016    HDL 86 (H) 08/22/2011     Lab Results   Component Value Date    LDLCALC 144.2 02/17/2017    LDLCALC 153.0 03/24/2016    LDLCALC 136.6 08/22/2011     Lab Results   Component Value Date    TRIG  164 (H) 02/17/2017    TRIG 240 (H) 03/24/2016    TRIG 97 08/22/2011     Lab Results   Component Value Date    CHOLHDL 23.7 02/17/2017    CHOLHDL 21.2 03/24/2016    CHOLHDL 35.5 08/22/2011        CMP  Sodium   Date Value Ref Range Status   02/17/2017 140 136 - 145 mmol/L Final     Potassium   Date Value Ref Range Status   02/17/2017 3.7 3.5 - 5.1 mmol/L Final     Chloride   Date Value Ref Range Status   02/17/2017 108 95 - 110 mmol/L Final     CO2   Date Value Ref Range Status   02/17/2017 23 23 - 29 mmol/L Final     Glucose   Date Value Ref Range Status   02/17/2017 94 70 - 110 mg/dL Final     BUN, Bld   Date Value Ref Range Status   02/17/2017 13 8 - 23 mg/dL Final     Creatinine   Date Value Ref Range Status   02/17/2017 0.8 0.5 - 1.4 mg/dL Final     Calcium   Date Value Ref Range Status   02/17/2017 9.1 8.7 - 10.5 mg/dL Final     Total Protein   Date Value Ref Range Status   02/17/2017 6.3 6.0 - 8.4 g/dL Final     Albumin   Date Value Ref Range Status   02/17/2017 3.2 (L) 3.5 - 5.2 g/dL Final     Total Bilirubin   Date Value Ref Range Status   02/17/2017 1.1 (H) 0.1 - 1.0 mg/dL Final     Comment:     For infants and newborns, interpretation of results should be based  on gestational age, weight and in agreement with clinical  observations.  Premature Infant recommended reference ranges:  Up to 24 hours.............<8.0 mg/dL  Up to 48 hours............<12.0 mg/dL  3-5 days..................<15.0 mg/dL  6-29 days.................<15.0 mg/dL       Alkaline Phosphatase   Date Value Ref Range Status   02/17/2017 74 55 - 135 U/L Final     AST   Date Value Ref Range Status   02/17/2017 15 10 - 40 U/L Final     ALT   Date Value Ref Range Status   02/17/2017 10 10 - 44 U/L Final     Anion Gap   Date Value Ref Range Status   02/17/2017 9 8 - 16 mmol/L Final     eGFR if    Date Value Ref Range Status   02/17/2017 >60.0 >60 mL/min/1.73 m^2 Final     eGFR if non    Date Value Ref Range Status    02/17/2017 >60.0 >60 mL/min/1.73 m^2 Final     Comment:     Calculation used to obtain the estimated glomerular filtration  rate (eGFR) is the CKD-EPI equation. Since race is unknown   in our information system, the eGFR values for   -American and Non--American patients are given   for each creatinine result.         Lab Results   Component Value Date    WBC 8.31 02/17/2017    HGB 10.8 (L) 02/17/2017    HCT 33.1 (L) 02/17/2017    MCV 96 02/17/2017     02/17/2017       ECHOCARDIOGRAM:  1 - Normal left ventricular systolic function (EF 55-60%).     2 - Normal right ventricular systolic function .     3 - Left ventricular diastolic dysfunction.     4 - Biatrial enlargement.     5 - Moderate mitral regurgitation.     6 - Severe aortic stenosis.       ECG:  Normal sinus rhythm  LVH with repolarization abnormality  Abnormal ECG      STRESS TESTING:    CARDIAC CATHETERIZATION:    Assessment:     1. Coronary artery disease of native artery of native heart with stable angina pectoris    2. Essential hypertension    3. Severe aortic stenosis    4. Mitral valve insufficiency, unspecified etiology      Severe symptomatic AS and presumed CAD with recent ACS.    Patient is electing to defer cardiac cath and/or TAVR evaluation at this time.    I provided her with education on these conditions and procedures and we discussed extensively her symptoms and the expected symptoms of AS And CAD.  I also discussed the natural history or expected progression of AS and CAD, including possibility of sudden cardiac death.      Greater than 50% of the visit of 45 minutes was spent counseling, educating, and coordinating the care of the patient.    Plan:   Add Imdur 30mg for angina symptoms.  Continue Aspirin.    RTC 1 month.    Continue with current medical plan and lifestyle changes.    I have reviewed the patient's medical history in detail and updated the computerized patient record.    Follow up as scheduled. Return  sooner for concerns or questions      She expressed verbal understanding and agreed with the plan          Lester Chapman MD, FACC, CCDS  Interventional Cardiology  [facility]

## 2017-03-10 NOTE — TELEPHONE ENCOUNTER
"    Reason for Disposition   Caller requesting a NON-URGENT new prescription or refill and triager unable to refill per unit policy    Protocols used: ST MEDICATION QUESTION CALL-A-AMINA Mehta 's daughter is calling to say she forgot to ask Oleg Enciso DO today for her mother's refill of buspirone for anxiety.  She will ask the pharmacist for an emergency supply, and I assured her that I will message Oleg Enciso DO with the request for refill.  I did point out to her that it is not listed as a current drug, and in the discontinue note it is recorded as "discontinued on 10/18/2016 as therapy not effective, by Vishal Avendaño MD."  Please contact Grace Eng directly with any additional care advice.    "

## 2017-03-10 NOTE — PATIENT INSTRUCTIONS
Coronary Angiography     The catheter can be placed into the groin, arm, or wrist.   Angiography is a special type of X-ray that lets your doctor view your coronary arteries to see if the blood vessels to your heart are narrowed or blocked.   Before the procedure  · Tell your doctor what medicines you take and any allergies you may have.  · Tell your doctor if you've had a reaction to contrast dye or have had any kidney problems.  · Dont eat or drink anything for at least 6 to 8 hours before the procedure. You will likely be told not to have anything after midnight, the night before the procedure.  · A nurse will place an IV catheter in your vein to give fluids, and medicine to relieve pain and help you feel less anxious.  · He or she will clean your skin and, if necessary, shave the area where the catheter will be inserted.  During the procedure  · Your doctor will place a long, thin tube called a catheter inside an artery in your groin or arm and guide it into your heart.  · He or she will inject a contrast dye through the catheter into your blood vessels or heart chambers.  · X-rays are taken to show images of the inside of your heart and coronary arteries.  After the procedure    · Your doctor or nurse will tell you how long to lie down and keep the insertion site still.  · If the insertion site was in your groin, you may need to lie down with your leg still for several hours. If bleeding occurs, a nurse will apply pressure to the area to control it.  · A nurse will check your blood pressure and the insertion site frequently to make sure you remain stable after the procedure.  · You may be asked to drink fluid to help flush the contrast liquid out of your system.  · Have someone drive you home from the hospital.  · If your doctor uses angioplasty to treat a blocked artery, you will stay the night in the hospital.  · Its normal to find a small bruise or lump at the insertion site. The lump may be the collagen  plug or stitch that you feel, or a small bruise. These common side effects should disappear within a few weeks.  When to call your healthcare provider  Contact your healthcare provider right away if you have any of these:  · Chest pain  · Pain, swelling, redness, bleeding, or drainage at the insertion site  · Severe pain, coldness, or a bluish color in the leg or arm that held the catheter  · Fever over 100.4°F (38°C)   Date Last Reviewed: 6/1/2016 © 2000-2016 [employer]. [address] [place], [place] [id number]. All rights reserved. This information is not intended as a substitute for professional medical care. Always follow your healthcare professional's instructions.        Understanding Coronary Artery Disease (CAD)    To understand coronary artery disease (CAD), you need to know how your heart works. Your heart is a muscle that pumps blood throughout your body. To work right, your heart needs a steady supply of oxygen. It gets this oxygen from blood supplied by the coronary arteries.        Healthy Artery      Damaged Artery      Narrowed Artery      Blocked Artery   Healthy artery. When a coronary artery is healthy and has no blockages, blood flows through easily. Healthy arteries can easily supply the oxygen-rich blood your heart needs.  Damaged artery. Coronary artery disease begins when damage to the artery lining leads to the buildup of fat-like substances and cholesterol along the artery wall. This is called plaque. This damage could be caused by things like high blood pressure or smoking. This plaque buildup begins to narrow the arteries carrying blood to the heart. This is called atherosclerosis,  Narrowed artery. As more plaque builds up, your artery has trouble supplying blood to your heart muscle when it needs it most, such as during exercise. You may not feel any symptoms when this happens. Or you may feel angina--pressure, tightness, achiness, or pain in your chest, jaw, neck,  back, or arm.  Blocked artery. A piece of plaque may break off and completely block the artery. Or a blood clot may plug the narrowed artery. When this happens, blood flow is blocked from reaching the heart. Without oxygen-rich blood, part of the heart muscle becomes damaged and stops working. You may feel crushing pressure or pain in or around your chest. This is a heart attack (acute myocardial infarction, or AMI) and is a medical emergency.  Date Last Reviewed: 3/28/2016  © 7557-5582 [employer]. [address] [place] [address]. All rights reserved. This information is not intended as a substitute for professional medical care. Always follow your healthcare professional's instructions.        Heart Valve Problems: Aortic Stenosis  Aortic stenosis means your aortic valve has a problem opening. The left ventricle has to work harder to push the blood through the valve. In some cases, this extra work will make the muscle of the ventricle thicken. In time, the extra work can tire the heart and cause the heart muscle to weaken. Stenosis usually get worse slowly, over many years. But sometimes it can quickly get worse.  Possible causes  Calcium deposits can form on the aortic valve as you get older. These deposits make the valve stiff and hard to open. In some cases, you may have been born with an abnormal aortic valve. Or your aortic valve may have been damaged by rheumatic fever or a heart infection.        Open aortic valve with stenosis (viewed from above).      Treating aortic stenosis  In many cases, treatment wont be needed unless you have symptoms. If you do have symptoms, medicines may help relieve them. If the stenosis is severe, your doctor may recommend surgery to replace the valve, even if you dont have symptoms.  Date Last Reviewed: 6/1/2016  © 3287-3622 [employer]. [address] [place] [address]. All rights reserved. This information is not intended as a  substitute for professional medical care. Always follow your healthcare professional's instructions.        Understanding Transcatheter Aortic Valve Replacement (TAVR)  Transcatheter aortic valve replacement (TAVR) is a procedure to replace a diseased aortic valve with a new tissue or biologic valve. The old heart valve is not removed but acts like an anchor for the new heart valve. This procedure is done through small incisions using a long thin tube (catheter), X-rays, and ultrasound.  The aortic valve controls blood flow from the heart to the body. In some people, the valve becomes scarred and stiff and has trouble opening. This is a condition called aortic stenosis. The heart then has to work harder to push blood through the narrowed heart valve to the rest of the body. Over time, the extra work can cause the heart muscle to weaken. This may lead to symptoms such as tiredness, shortness of breath, chest pain, and fainting. It can lead to heart failure.  In TAVR, the catheter is usually placed in the femoral artery in your groin. Sometimes, the catheter is placed through a small incision in your chest underneath the collarbone or an incision between the ribs. The doctor uses the catheter to bring the new valve to your heart. The new valve is made of cow or pig heart tissue and is mounted on a metal frame. The new valve helps improve blood flow from the heart to the rest of the body.  Reasons for TAVR  TAVR is an option if you are at higher risk traditional open-heart surgery to replace a valve. TAVR may be an option if you have:  · Advanced age  · A weaker heart  · Previous heart surgery  · A history of stroke  · Chronic obstructive lung disease  · Coronary artery disease  · Kidney disease  · Diabetes  · Previous radiation treatment to the chest  · Porcelain aorta, extensive calcium around the aorta that would make open heart surgery impossible or difficult  · Frailty or physical disability that would make  recovery from an open heart surgery challenging  TAVR may also be a choice for you for other reasons. A team of doctors makes the decision to do this surgery on a case-by-case basis.  Risks and possible complications  TAVR is very effective in most people. However, all medical procedures carry some risks and possible complications. Some common risks of TAVR include:  · Bleeding or the need for a transfusion  · Anemia (not enough red blood cells in the blood)  · Blood clots  · Infection  · Collection of fluid around your heart  · Confusion or memory problems  · Damage to the heart  · Damage to your blood vessels  · Failure of the new valve  · Heart attack  · Heart rhythm problems that may require a pacemaker  · Kidney damage or failure  · Lung puncture  · Stroke  · Risks of anesthesia (including death)  · Rarely, the new heart valve can move out of position after it has been implanted  · Leaking of blood in or around the new heart valve  You may have other risks, depending on your medical condition. Be sure to talk with your doctor if you have any concerns before the procedure.  Life after a heart valve replacement  · A new heart valve can help ease symptoms you may have had. These include pain or pressure in your chest, shortness of breath, and tiredness.  · After the surgery, you will need to take aspirin or other blood-thinning medicine every day. You will likely also need to take an anti-platelet medication such as clopidogrel  for a certain period of time. These medicines help prevent blood clots in your new valve.  · You will need to take antibiotics before you have dental work, as prescribed by your healthcare provider. This is to help prevent bacteria from harming your new heart valve.  · Your healthcare provider may tell you to make some lifestyle changes to protect your heart and make it stronger. These include exercise, quitting smoking, and keeping a healthy weight.  · After your recovery from TAVR, you  may be able to return to regular activities with a noticeable improvement in the symptoms from your heart valve disease. Be sure to talk to your doctor before starting any exercise program.  Date Last Reviewed: 5/1/2016  © 1068-0551 [employer]. [address] [place] [address], [place] [address]. All rights reserved. This information is not intended as a substitute for professional medical care. Always follow your healthcare professional's instructions.

## 2017-03-10 NOTE — PROGRESS NOTES
Subjective:       Patient ID: Janine Awad is a 89 y.o. female.    Chief Complaint: Follow-up    HPI   89 yo F with CAD, severe AS, anxiety, Alzheimer's dimentia, insomnia, HLD, hypothyroidism, GERD, senile purpura, aortic atherosclerosis is here for f/u. Pt recently had a NSTEMI and has seen Cardiology since discharge. She was restarted on ASA for anticoagulation and Imdur today. Pt does not want any further cardiac interventions at this time. Pt was started on Elavil by me at last visit for insomnia which is not helping.   Review of Systems   Constitutional: Negative for activity change, appetite change, chills, diaphoresis, fatigue, fever and unexpected weight change.   HENT: Negative for postnasal drip, rhinorrhea, sinus pressure, sneezing, sore throat, trouble swallowing and voice change.    Respiratory: Negative for cough, shortness of breath and wheezing.    Cardiovascular: Negative for chest pain, palpitations and leg swelling.   Gastrointestinal: Negative for abdominal pain, blood in stool, constipation, diarrhea, nausea and vomiting.   Genitourinary: Negative for dysuria.   Musculoskeletal: Negative for arthralgias and myalgias.   Skin: Negative for rash and wound.   Allergic/Immunologic: Negative for environmental allergies and food allergies.   Hematological: Negative for adenopathy. Does not bruise/bleed easily.   Psychiatric/Behavioral: Positive for sleep disturbance.       Objective:      Physical Exam   Constitutional: She is oriented to person, place, and time. She appears well-developed and well-nourished. No distress.   HENT:   Head: Normocephalic and atraumatic.   Eyes: Conjunctivae and EOM are normal. Pupils are equal, round, and reactive to light. Right eye exhibits no discharge. Left eye exhibits no discharge. No scleral icterus.   Neck: Neck supple. No JVD present.   Cardiovascular: Normal rate, regular rhythm and intact distal pulses.    Murmur (AS) heard.  Pulmonary/Chest: Effort  normal and breath sounds normal. No respiratory distress. She has no wheezes. She has no rales.   Musculoskeletal: She exhibits no edema.   Lymphadenopathy:     She has no cervical adenopathy.   Neurological: She is alert and oriented to person, place, and time.   Skin: Skin is warm and dry. No rash noted. She is not diaphoretic. No pallor.       Assessment:       1. Coronary artery disease of native artery of native heart with stable angina pectoris    2. Insomnia, unspecified type    3. Senile purpura    4. Aortic atherosclerosis        Plan:    1. Stable, managed by cardiology. CPT   2. Change Elavil to Atarax 25 mg qHS   3. Stable, continue to monitor   4. Stable on ASA/statin

## 2017-03-13 RX ORDER — BUSPIRONE HYDROCHLORIDE 15 MG/1
15 TABLET ORAL 2 TIMES DAILY
Qty: 60 TABLET | Refills: 11 | Status: SHIPPED | OUTPATIENT
Start: 2017-03-13 | End: 2017-05-30

## 2017-03-14 ENCOUNTER — INITIAL CONSULT (OUTPATIENT)
Dept: DERMATOLOGY | Facility: CLINIC | Age: 82
End: 2017-03-14
Payer: MEDICARE

## 2017-03-14 VITALS — WEIGHT: 146 LBS | BODY MASS INDEX: 25.86 KG/M2

## 2017-03-14 DIAGNOSIS — L82.1 SEBORRHEIC KERATOSES: ICD-10-CM

## 2017-03-14 DIAGNOSIS — L81.4 LENTIGINES: ICD-10-CM

## 2017-03-14 DIAGNOSIS — L57.0 ACTINIC KERATOSIS: Primary | ICD-10-CM

## 2017-03-14 DIAGNOSIS — D22.9 NEVUS OF MULTIPLE SITES: ICD-10-CM

## 2017-03-14 PROCEDURE — 17000 DESTRUCT PREMALG LESION: CPT | Mod: S$GLB,,, | Performed by: DERMATOLOGY

## 2017-03-14 PROCEDURE — 99999 PR PBB SHADOW E&M-EST. PATIENT-LVL III: CPT | Mod: PBBFAC,,, | Performed by: DERMATOLOGY

## 2017-03-14 PROCEDURE — 99213 OFFICE O/P EST LOW 20 MIN: CPT | Mod: 25,S$GLB,, | Performed by: DERMATOLOGY

## 2017-03-14 PROCEDURE — 1157F ADVNC CARE PLAN IN RCRD: CPT | Mod: S$GLB,,, | Performed by: DERMATOLOGY

## 2017-03-14 PROCEDURE — 1160F RVW MEDS BY RX/DR IN RCRD: CPT | Mod: S$GLB,,, | Performed by: DERMATOLOGY

## 2017-03-14 PROCEDURE — 1159F MED LIST DOCD IN RCRD: CPT | Mod: S$GLB,,, | Performed by: DERMATOLOGY

## 2017-03-14 NOTE — MR AVS SNAPSHOT
[place] [facility]   [facility]  [place] [address]  Phone: [phone]  Fax: [phone]                  Janine Awad   3/14/2017 1:40 PM   Initial consult    Description:  Female : 1927   Provider:  Jeri Leahy MD   Department:  [place] [facility]           Reason for Visit     Lesion           Diagnoses this Visit        Comments    Actinic keratosis    -  Primary     Seborrheic keratoses         Lentigines         Nevus of multiple sites                To Do List           Future Appointments        Provider Department Dept Phone    2017 2:00 PM Micky Palma MD [facility] Neurology [phone]    2017 1:00 PM LAB, APPOINTMENT [place] [facility] [phone]    2017 1:40 PM Vishal Avendaño MD [facility] Internal Medicine [phone]    5/10/2017 2:00 PM Lester Chapman MD [facility] Cardiology [phone]      Goals (5 Years of Data)     None      Follow-Up and Disposition     Return in about 6 months (around 2017).      [facility] -  Assistance  Registered nurses in the [facility] provide clinical advisement, health education, appointment booking, and other advisory services.  Call for this free service at [phone].             Medications           Message regarding Medications     Verify the changes and/or additions to your medication regime listed below are the same as discussed with your clinician today.  If any of these changes or additions are incorrect, please notify your healthcare provider.             Verify that the below list of medications is an accurate representation of the medications you are currently taking.  If none reported, the list may be blank. If incorrect, please contact your healthcare provider. Carry this list with you in case of emergency.           Current Medications     artificial tears (ISOPTO TEARS) 0.5 %  ophthalmic solution Place 1 drop into the left eye 4 (four) times daily.    aspirin 81 MG Chew Take 1 tablet (81 mg total) by mouth once daily.    atorvastatin (LIPITOR) 40 MG tablet Take 1 tablet (40 mg total) by mouth once daily.    busPIRone (BUSPAR) 15 MG tablet Take 1 tablet (15 mg total) by mouth 2 (two) times daily.    COCONUT OIL ORAL Take by mouth.    estrogens, conjugated, (PREMARIN) 0.3 MG tablet Take 0.3 mg by mouth once daily.    fish oil-omega-3 fatty acids 300-1,000 mg capsule Take 2 g by mouth once daily.    furosemide (LASIX) 20 MG tablet Take 1 tablet (20 mg total) by mouth once daily.    GINSENG ORAL Take by mouth.    hydrOXYzine HCl (ATARAX) 25 MG tablet Take 1 tablet (25 mg total) by mouth nightly as needed (insomnia).    isosorbide mononitrate (IMDUR) 30 MG 24 hr tablet Take 1 tablet (30 mg total) by mouth once daily.    levothyroxine (SYNTHROID) 50 MCG tablet Take 1 tablet (50 mcg total) by mouth before breakfast.    losartan (COZAAR) 25 MG tablet Take 0.5 tablets (12.5 mg total) by mouth once daily. Hold for SBP < 110.    omeprazole (PRILOSEC) 20 MG capsule Take 20 mg by mouth once daily.     ondansetron (ZOFRAN) 4 MG tablet Take 1 tablet (4 mg total) by mouth every 8 (eight) hours as needed for Nausea.    vitamin D 1000 units Tab Take 185 mg by mouth once daily.    ZINC ACETATE ORAL Take by mouth.           Clinical Reference Information           Your Vitals Were     Weight Last Period BMI          66.2 kg (146 lb) (LMP Unknown) 25.86 kg/m2        Allergies as of 3/14/2017     Codeine    Lisinopril      Immunizations Administered on Date of Encounter - 3/14/2017     None      Language Assistance Services     ATTENTION: Language assistance services are available, free of charge. Please call [phone].      ATENCIÓN: Si habla arcadio, tiene a peñaloza disposición servicios gratuitos de asistencia lingüística. Llame al [phone].     CHÚ Ý: N?u b?n nói Ti?ng Vi?t, có các d?ch v? h? tr?  casa cui? mi?n phí dành cho b?n. G?i s? [phone].         [facility] complies with applicable Federal civil rights laws and does not discriminate on the basis of race, color, national origin, age, disability, or sex.

## 2017-03-14 NOTE — PROGRESS NOTES
"  Subjective:       Patient ID:  Janine Awad is a 89 y.o. female who presents for   Chief Complaint   Patient presents with    Lesion     HPI Comments: History of Present Illness: The patient presents with chief complaint of spot.  Location: right hand  Duration: month  Signs/Symptoms: irritated    Prior treatments: none      Lesion         Review of Systems   Constitutional: Negative for fever.   Skin: Negative for itching and rash.   Hematologic/Lymphatic: Does not bruise/bleed easily.        Objective:    Physical Exam   Constitutional: She appears well-developed and well-nourished. No distress.   Neurological: She is alert and oriented to person, place, and time. She is not disoriented.   Psychiatric: She has a normal mood and affect.   Skin:   Areas Examined (abnormalities noted in diagram):   Head / Face Inspection Performed  Neck Inspection Performed  Chest / Axilla Inspection Performed  Back Inspection Performed  RUE Inspected  LUE Inspection Performed                       Diagram Legend     Erythematous scaling macule/papule c/w actinic keratosis         Pigmented verrucoid papule/plaque c/w seborrheic keratosis         Assessment / Plan:        Actinic keratosis   Cryosurgery Procedure Note    Verbal consent from the patient is obtained and the patient is aware of the precancerous quality and need for treatment of these lesions. Liquid nitrogen cryosurgery is applied to the 1 actinic keratoses, as detailed in the physical exam, to produce a freeze injury.      Seborrheic keratoses  reassurance    Lentigines  The "ABCD" rules to observe pigmented lesions were reviewed.      Nevus of multiple sites  The "ABCD" rules to observe pigmented lesions were reviewed.               Return in about 6 months (around 9/14/2017).  "

## 2017-03-18 ENCOUNTER — HOSPITAL ENCOUNTER (INPATIENT)
Facility: HOSPITAL | Age: 82
LOS: 2 days | Discharge: HOME HEALTH | DRG: 280 | End: 2017-03-20
Attending: EMERGENCY MEDICINE | Admitting: INTERNAL MEDICINE
Payer: MEDICARE

## 2017-03-18 DIAGNOSIS — I10 ESSENTIAL HYPERTENSION: ICD-10-CM

## 2017-03-18 DIAGNOSIS — I50.33 ACUTE ON CHRONIC DIASTOLIC HEART FAILURE: ICD-10-CM

## 2017-03-18 DIAGNOSIS — I25.118 CORONARY ARTERY DISEASE OF NATIVE ARTERY OF NATIVE HEART WITH STABLE ANGINA PECTORIS: ICD-10-CM

## 2017-03-18 DIAGNOSIS — I21.4 NSTEMI (NON-ST ELEVATED MYOCARDIAL INFARCTION): Primary | ICD-10-CM

## 2017-03-18 DIAGNOSIS — R53.81 PHYSICAL DECONDITIONING: ICD-10-CM

## 2017-03-18 DIAGNOSIS — R07.9 CHEST PAIN, UNSPECIFIED TYPE: ICD-10-CM

## 2017-03-18 DIAGNOSIS — I25.10 CORONARY ARTERY DISEASE, ANGINA PRESENCE UNSPECIFIED, UNSPECIFIED VESSEL OR LESION TYPE, UNSPECIFIED WHETHER NATIVE OR TRANSPLANTED HEART: ICD-10-CM

## 2017-03-18 LAB
ALBUMIN SERPL BCP-MCNC: 3.4 G/DL
ALP SERPL-CCNC: 93 U/L
ALT SERPL W/O P-5'-P-CCNC: 12 U/L
ANION GAP SERPL CALC-SCNC: 15 MMOL/L
APTT BLDCRRT: 29.2 SEC
AST SERPL-CCNC: 25 U/L
BASOPHILS # BLD AUTO: 0.04 K/UL
BASOPHILS NFR BLD: 0.3 %
BILIRUB SERPL-MCNC: 1.1 MG/DL
BILIRUB UR QL STRIP: NEGATIVE
BNP SERPL-MCNC: 1375 PG/ML
BUN SERPL-MCNC: 18 MG/DL
CALCIUM SERPL-MCNC: 9.6 MG/DL
CHLORIDE SERPL-SCNC: 106 MMOL/L
CLARITY UR: CLEAR
CO2 SERPL-SCNC: 19 MMOL/L
COLOR UR: YELLOW
CREAT SERPL-MCNC: 0.8 MG/DL
DIFFERENTIAL METHOD: ABNORMAL
EOSINOPHIL # BLD AUTO: 0.3 K/UL
EOSINOPHIL NFR BLD: 2.7 %
ERYTHROCYTE [DISTWIDTH] IN BLOOD BY AUTOMATED COUNT: 15.3 %
EST. GFR  (AFRICAN AMERICAN): >60 ML/MIN/1.73 M^2
EST. GFR  (NON AFRICAN AMERICAN): >60 ML/MIN/1.73 M^2
GLUCOSE SERPL-MCNC: 112 MG/DL
GLUCOSE UR QL STRIP: NEGATIVE
HCT VFR BLD AUTO: 33.7 %
HGB BLD-MCNC: 10.6 G/DL
HGB UR QL STRIP: NEGATIVE
INR PPP: 1.1
KETONES UR QL STRIP: NEGATIVE
LEUKOCYTE ESTERASE UR QL STRIP: NEGATIVE
LYMPHOCYTES # BLD AUTO: 3 K/UL
LYMPHOCYTES NFR BLD: 24.8 %
MCH RBC QN AUTO: 30.3 PG
MCHC RBC AUTO-ENTMCNC: 31.5 %
MCV RBC AUTO: 96 FL
MONOCYTES # BLD AUTO: 0.7 K/UL
MONOCYTES NFR BLD: 5.8 %
NEUTROPHILS # BLD AUTO: 7.9 K/UL
NEUTROPHILS NFR BLD: 66.4 %
NITRITE UR QL STRIP: NEGATIVE
PH UR STRIP: 6 [PH] (ref 5–8)
PLATELET # BLD AUTO: 230 K/UL
PMV BLD AUTO: 10.9 FL
POTASSIUM SERPL-SCNC: 4.2 MMOL/L
PROT SERPL-MCNC: 7.1 G/DL
PROT UR QL STRIP: NEGATIVE
PROTHROMBIN TIME: 11.4 SEC
RBC # BLD AUTO: 3.5 M/UL
SODIUM SERPL-SCNC: 140 MMOL/L
SP GR UR STRIP: 1.01 (ref 1–1.03)
T4 FREE SERPL-MCNC: 0.97 NG/DL
TROPONIN I SERPL DL<=0.01 NG/ML-MCNC: 0.27 NG/ML
TROPONIN I SERPL DL<=0.01 NG/ML-MCNC: 1.14 NG/ML
TSH SERPL DL<=0.005 MIU/L-ACNC: 0.01 UIU/ML
URN SPEC COLLECT METH UR: NORMAL
UROBILINOGEN UR STRIP-ACNC: NEGATIVE EU/DL
WBC # BLD AUTO: 11.94 K/UL

## 2017-03-18 PROCEDURE — 85610 PROTHROMBIN TIME: CPT

## 2017-03-18 PROCEDURE — 63600175 PHARM REV CODE 636 W HCPCS: Performed by: HOSPITALIST

## 2017-03-18 PROCEDURE — 25000003 PHARM REV CODE 250: Performed by: HOSPITALIST

## 2017-03-18 PROCEDURE — 84484 ASSAY OF TROPONIN QUANT: CPT | Mod: 91

## 2017-03-18 PROCEDURE — 85730 THROMBOPLASTIN TIME PARTIAL: CPT

## 2017-03-18 PROCEDURE — 93005 ELECTROCARDIOGRAM TRACING: CPT

## 2017-03-18 PROCEDURE — 25000003 PHARM REV CODE 250: Performed by: EMERGENCY MEDICINE

## 2017-03-18 PROCEDURE — 84439 ASSAY OF FREE THYROXINE: CPT

## 2017-03-18 PROCEDURE — 11000001 HC ACUTE MED/SURG PRIVATE ROOM

## 2017-03-18 PROCEDURE — 81003 URINALYSIS AUTO W/O SCOPE: CPT

## 2017-03-18 PROCEDURE — 84443 ASSAY THYROID STIM HORMONE: CPT

## 2017-03-18 PROCEDURE — 36415 COLL VENOUS BLD VENIPUNCTURE: CPT

## 2017-03-18 PROCEDURE — 84484 ASSAY OF TROPONIN QUANT: CPT

## 2017-03-18 PROCEDURE — 80053 COMPREHEN METABOLIC PANEL: CPT

## 2017-03-18 PROCEDURE — 83880 ASSAY OF NATRIURETIC PEPTIDE: CPT

## 2017-03-18 PROCEDURE — 99285 EMERGENCY DEPT VISIT HI MDM: CPT

## 2017-03-18 PROCEDURE — 96374 THER/PROPH/DIAG INJ IV PUSH: CPT

## 2017-03-18 PROCEDURE — 85025 COMPLETE CBC W/AUTO DIFF WBC: CPT

## 2017-03-18 RX ORDER — HEPARIN SODIUM,PORCINE/D5W 25000/250
12 INTRAVENOUS SOLUTION INTRAVENOUS CONTINUOUS
Status: DISCONTINUED | OUTPATIENT
Start: 2017-03-18 | End: 2017-03-19

## 2017-03-18 RX ORDER — NAPROXEN SODIUM 220 MG/1
81 TABLET, FILM COATED ORAL DAILY
Status: DISCONTINUED | OUTPATIENT
Start: 2017-03-19 | End: 2017-03-20 | Stop reason: HOSPADM

## 2017-03-18 RX ORDER — FUROSEMIDE 10 MG/ML
40 INJECTION INTRAMUSCULAR; INTRAVENOUS ONCE
Status: DISCONTINUED | OUTPATIENT
Start: 2017-03-18 | End: 2017-03-18

## 2017-03-18 RX ORDER — GLUCAGON 1 MG
1 KIT INJECTION
Status: DISCONTINUED | OUTPATIENT
Start: 2017-03-18 | End: 2017-03-20 | Stop reason: HOSPADM

## 2017-03-18 RX ORDER — IBUPROFEN 200 MG
16 TABLET ORAL
Status: DISCONTINUED | OUTPATIENT
Start: 2017-03-18 | End: 2017-03-20 | Stop reason: HOSPADM

## 2017-03-18 RX ORDER — BUSPIRONE HYDROCHLORIDE 5 MG/1
15 TABLET ORAL 2 TIMES DAILY
Status: DISCONTINUED | OUTPATIENT
Start: 2017-03-18 | End: 2017-03-20 | Stop reason: HOSPADM

## 2017-03-18 RX ORDER — LEVOTHYROXINE SODIUM 50 UG/1
50 TABLET ORAL
Status: DISCONTINUED | OUTPATIENT
Start: 2017-03-19 | End: 2017-03-20 | Stop reason: HOSPADM

## 2017-03-18 RX ORDER — CLOPIDOGREL BISULFATE 75 MG/1
75 TABLET ORAL DAILY
Status: DISCONTINUED | OUTPATIENT
Start: 2017-03-18 | End: 2017-03-20 | Stop reason: HOSPADM

## 2017-03-18 RX ORDER — CLOPIDOGREL BISULFATE 75 MG/1
225 TABLET ORAL ONCE
Status: COMPLETED | OUTPATIENT
Start: 2017-03-19 | End: 2017-03-19

## 2017-03-18 RX ORDER — ISOSORBIDE MONONITRATE 30 MG/1
30 TABLET, EXTENDED RELEASE ORAL DAILY
Status: DISCONTINUED | OUTPATIENT
Start: 2017-03-18 | End: 2017-03-18

## 2017-03-18 RX ORDER — IBUPROFEN 200 MG
24 TABLET ORAL
Status: DISCONTINUED | OUTPATIENT
Start: 2017-03-18 | End: 2017-03-20 | Stop reason: HOSPADM

## 2017-03-18 RX ORDER — FUROSEMIDE 10 MG/ML
40 INJECTION INTRAMUSCULAR; INTRAVENOUS ONCE
Status: COMPLETED | OUTPATIENT
Start: 2017-03-18 | End: 2017-03-18

## 2017-03-18 RX ORDER — ATORVASTATIN CALCIUM 40 MG/1
40 TABLET, FILM COATED ORAL DAILY
Status: DISCONTINUED | OUTPATIENT
Start: 2017-03-18 | End: 2017-03-20 | Stop reason: HOSPADM

## 2017-03-18 RX ORDER — ASPIRIN 325 MG
325 TABLET ORAL
Status: COMPLETED | OUTPATIENT
Start: 2017-03-18 | End: 2017-03-18

## 2017-03-18 RX ADMIN — FUROSEMIDE 40 MG: 10 INJECTION, SOLUTION INTRAMUSCULAR; INTRAVENOUS at 07:03

## 2017-03-18 RX ADMIN — ASPIRIN 325 MG ORAL TABLET 325 MG: 325 PILL ORAL at 02:03

## 2017-03-18 RX ADMIN — ATORVASTATIN CALCIUM 40 MG: 40 TABLET, FILM COATED ORAL at 09:03

## 2017-03-18 RX ADMIN — CLOPIDOGREL BISULFATE 75 MG: 75 TABLET ORAL at 07:03

## 2017-03-18 RX ADMIN — BUSPIRONE HYDROCHLORIDE 15 MG: 5 TABLET ORAL at 09:03

## 2017-03-18 RX ADMIN — HEPARIN SODIUM AND DEXTROSE 12 UNITS/KG/HR: 10000; 5 INJECTION INTRAVENOUS at 08:03

## 2017-03-18 NOTE — H&P
"[facility] History and Physical - Resident Note    Admitting Team: Hospitalist Team B  Attending Physician: Dr. Liang  Resident: Ray  Interns: Amanda     Date of Admit: 3/18/2017    Chief Complaint     Chest Pain (Onset early this morning, states took aspirin 81mg and pain eased up.  Hx of MI 2 weeks ago. )   for 1 day    Subjective:      History of Present Illness:  Janine Awad is a 89 y.o.  female with CAD, severe AS, HTN, HLD and dementia presenting with chest pain for 1 day. Due to pts dementia history is limited, daughter is able to provide some history. On the night prior to admission Ms. Awad woke her daughter up complaining of chest pain. She did not characterize the pain, but she pointed to the center of her chest. Her daughter was unsure if this was cardiac or anxiety so she gave her a Buspar and an ASA. The pain resolved and she went back to bed. On the morning of admit, she again complained of chest pain. This time the daughter notes that Ms. Awad was also "breathing hard" when she was having the pain. No complaints of nausea or radiating pain. Ms. Awad cannot recall if this pain is similar to her prior cardiac events. The pain resolved prior to EMS reaching the house, but her daughter felt that since she had pain twice in one day she should come to the hospital to be evaluated. Ms. Awad's daughter denies that her mom had any syncopal events, recent illness, fevers, diarrhea, dysuria.     Past Medical History:  Past Medical History:   Diagnosis Date    Acquired hypothyroidism     Closed displaced intertrochanteric fracture of left femur s/p IM nail on 6/5/2016 6/5/2016    Coronary artery disease involving native coronary artery of native heart without angina pectoris     Essential hypertension     GERD (gastroesophageal reflux disease)     Hx of colonic polyps     Hyperlipidemia     Macular degeneration     Mitral valve stenosis     Severe aortic stenosis " 9/22/2014    Syncope 1/5/2015       Past Surgical History:  Past Surgical History:   Procedure Laterality Date    INTERTROCHANTERIC HIP FRACTURE SURGERY Left 06/05/2016    IM nail       Allergies:  Review of patient's allergies indicates:   Allergen Reactions    Codeine Nausea And Vomiting     chills    Lisinopril Other (See Comments)     cough       Home Medications:  Prior to Admission medications    Medication Sig Start Date End Date Taking? Authorizing Provider   artificial tears (ISOPTO TEARS) 0.5 % ophthalmic solution Place 1 drop into the left eye 4 (four) times daily. 2/17/17   Shena Lord MD   aspirin 81 MG Chew Take 1 tablet (81 mg total) by mouth once daily. 3/3/17 3/3/18  Nito Shultz NP   atorvastatin (LIPITOR) 40 MG tablet Take 1 tablet (40 mg total) by mouth once daily. 1/26/17   Vishal Avendaño MD   busPIRone (BUSPAR) 15 MG tablet Take 1 tablet (15 mg total) by mouth 2 (two) times daily. 3/13/17 3/13/18  Oleg Enciso,    COCONUT OIL ORAL Take by mouth.    Historical Provider, MD           fish oil-omega-3 fatty acids 300-1,000 mg capsule Take 2 g by mouth once daily.    Historical Provider, MD   furosemide (LASIX) 20 MG tablet Take 1 tablet (20 mg total) by mouth once daily. 2/3/17 2/3/18  Shena Lord MD   GINSENG ORAL Take by mouth.    Historical Provider, MD   hydrOXYzine HCl (ATARAX) 25 MG tablet Take 1 tablet (25 mg total) by mouth nightly as needed (insomnia). 3/10/17   Oleg Enciso DO   isosorbide mononitrate (IMDUR) 30 MG 24 hr tablet Take 1 tablet (30 mg total) by mouth once daily. 3/10/17   Lester Chapman MD   levothyroxine (SYNTHROID) 50 MCG tablet Take 1 tablet (50 mcg total) by mouth before breakfast. 1/26/17 1/26/18  Vishal Avendaño MD   losartan (COZAAR) 25 MG tablet Take 0.5 tablets (12.5 mg total) by mouth once daily. Hold for SBP < 110. 1/26/17   Vishal Avendaño MD   omeprazole (PRILOSEC) 20 MG capsule Take 20 mg by mouth once daily.  12/30/12    "Historical Provider, MD   ondansetron (ZOFRAN) 4 MG tablet Take 1 tablet (4 mg total) by mouth every 8 (eight) hours as needed for Nausea. 2/3/17   Shena Lord MD   vitamin D 1000 units Tab Take 185 mg by mouth once daily.    Historical Provider, MD   ZINC ACETATE ORAL Take by mouth.    Historical Provider, MD       Family History:  Reviewed and noncontributory     Social History:  Social History   Substance Use Topics    Smoking status: Never Smoker    Smokeless tobacco: Never Used    Alcohol use 0.6 oz/week     1 Standard drinks or equivalent per week      Comment: rare        Review of Systems:  Constitutional: negative for chills and fevers  Eyes: positive for subconjuntival hemmorhage , negative for irritation and redness  Ears, nose, mouth, throat, and face: negative for nasal congestion and sore mouth  Respiratory: negative for cough and sputum  Cardiovascular: positive for chest pain and dyspnea, negative for palpitations and syncope  Gastrointestinal: negative for abdominal pain, constipation and diarrhea  Genitourinary:negative for dysuria and frequency  Integument/breast: negative for pruritus and rash  Musculoskeletal:negative for back pain and myalgias  Neurological: positive for memory problems, negative for coordination problems and weakness     Health Maintaince :   Primary Care Physician: Dr. Enciso   Immunizations:   TDap is up to date, .  Influenza is up to date, .  Pneumovax is up to date.  Cancer Screening:  PAP: unknown  Mammogram: unknown  Colonoscopy: unknown     Objective:   Last 24 Hour Vital Signs:  BP  Min: 95/52  Max: 121/56  Temp  Av.2 °F (36.8 °C)  Min: 98 °F (36.7 °C)  Max: 98.4 °F (36.9 °C)  Pulse  Av.8  Min: 78  Max: 89  Resp  Av  Min: 14  Max: 18  SpO2  Av.5 %  Min: 95 %  Max: 99 %  Height  Av' 3" (160 cm)  Min: 5' 3" (160 cm)  Max: 5' 3" (160 cm)  Weight  Av.5 kg (140 lb)  Min: 63.5 kg (140 lb)  Max: 63.5 kg (140 lb)  Body mass index is " 24.8 kg/(m^2).     Physical Examination:  General appearance: alert, appears stated age, cooperative and no distress  Head: Normocephalic, without obvious abnormality, atraumatic  Eyes: PERRL, EOMI, resolving subconjunctival hemorrhage in left eye   Throat: edentulous, MMM  Neck: no adenopathy and supple, symmetrical, trachea midline  Lungs: crackles present b/l to mid lung zones   Heart: regular rate and rhythm, S1, S2 normal and systolic murmur: systolic ejection 3/6, crescendo and decrescendo loudest at the right 2nd ICS  Abdomen: soft, non-tender; bowel sounds normal; no masses,  no organomegaly  Extremities: edema 1+ L>R  Pulses: 2+ and symmetric  Skin: Skin color, texture, turgor normal. No rashes or lesions  Neurologic: awake, alert, able to answer questions, doesn't know much detail, moving all extremities       Laboratory:  Most Recent Data:  CBC:   Pending   BMP:   Lab Results   Component Value Date     03/18/2017    K 4.2 03/18/2017     03/18/2017    CO2 19 (L) 03/18/2017    BUN 18 03/18/2017    CREATININE 0.8 03/18/2017     (H) 03/18/2017    CALCIUM 9.6 03/18/2017    MG 2.2 02/17/2017    PHOS 2.6 (L) 06/09/2016     LFTs:   Lab Results   Component Value Date    PROT 7.1 03/18/2017    ALBUMIN 3.4 (L) 03/18/2017    BILITOT 1.1 (H) 03/18/2017    AST 25 03/18/2017    ALKPHOS 93 03/18/2017    ALT 12 03/18/2017       Cardiac:   Lab Results   Component Value Date    TROPONINI 0.271 (H) 03/18/2017    BNP 1375 (H) 03/18/2017     Urinalysis:   Lab Results   Component Value Date    COLORU Yellow 03/18/2017    SPECGRAV 1.010 03/18/2017    NITRITE Negative 03/18/2017    KETONESU Negative 03/18/2017    UROBILINOGEN Negative 03/18/2017    WBCUA 18 (H) 02/01/2017       Other Results:  EKG (my interpretation): unchanged from previous tracings.    Radiology:  Imaging Results         X-Ray Chest PA And Lateral (Final result) Result time:  03/18/17 15:38:27    Final result by Stewart Krueger MD  (03/18/17 15:38:27)    Impression:      Unchanged appearance of the chest as above.              Electronically signed by: SERGIO DUARTE MD  Date:     03/18/17  Time:    15:38     Narrative:    Comparison: 02/15/2017    Technique: PA and lateral views of the chest was obtained    Findings: Cardiac silhouette remains enlarged.  The pulmonary vasculature appears diffusely congested with prominence of the interstitium suggesting possible interstitial edema.  Similar to prior allowing for some differences in projection and technique. Visualized osseous structures demonstrate no acute abnormality.               Assessment:     Janine Awad is a 89 y.o. female with:     Plan:     NSTEMI  -Troponin elevated to 0.271, EKG unchanged from prior   -Had similar presentation on 2/15 to [facility]. Pt refused cath and was treated with medical management. Heparin ggt discontinued after subconjunctival hemorrhage   -Discussed ACS treatment with pt, she is amenable to medical management of ACS and possible LHC if necessary  -Given 325 mg ASA in the ED   -Discussed case with Dr. Chapman who is Ms. Awad's cardiologist and he is in agreement with starting dual antiplatelet therapy and heparin ggt at this time. Either Dr. Chapman or on call cardiologist to evaluate in the morning or sooner as need arises   -Trend troponin/ EKG q6  -Begin heparin ggt, plavix   -Continue daily ASA    Acute on Chronic Heart Failure Preserved EF  -BNP elevated to 1375   -Last echo 1 month ago (2/2) with normal EF, diastolic dysfunction, severe aortic stenosis   -Chest x-ray and physical exam with evidence of fluid overload   -Will diurese with IV lasix   -Strict I/Os, daily weights    Severe Aortic Stenosis   -Severe AS noted on prior echos   -TAVR discussed with pt previously as an outpt and she has stated that she does not want invasive measures taken  -Diuresis as above  -Mechanical trauma of RBCs from AS likely cause of elevated bili and  contributing to anemia     Coronary Artery Disease    -Known disease, however pt has denied LHC in the past   -Continue home ASA, atorvastatin  -Holding imdur for low blood pressure     Hypoalbuminemia  -3.4 on admission labs   -Encourage improved nutrition     Hyperbilirubinemia    -1.1 on admission labs   -Likely mechanical trauma from severe AS    Hyperlipidemia   -Lipid panel from 2/17 Total cholesterol 232, triglycerides 164, , HDL 55  -Continue atorvastatin     Hypothyroidism   -TSH pending   -Continue home synthroid     Hypertension   -BP low on admission, 101/52  -Holding Imdur and losartan   -Monitor and add medications as needed     Dementia   -No acute issues   -Monitor    Anxiety  -No acute issue    -Continue home Buspar    GERD  -Continue home PPI    F: None  E: Monitor and replete as needed   N: Cardiac diet     PPx: Heparin ggt, SCD    Dispo: pending cardiology evaluation and downtrend in troponin     Code Status:     FULL    Tom Patel  [facility] Internal Medicine HO-I  U Hospitalist Service Team B    [facility] Medicine Hospitalist Pager numbers:   [facility] Hospitalist Medicine Team A (Demarcus/Frank): [phone]  [facility] Hospitalist Medicine Team B (Azul/Katarzyna):  [phone]

## 2017-03-18 NOTE — IP AVS SNAPSHOT
[facility]  [address]  [place] [address]  Phone: [phone]           Patient Discharge Instructions     Our goal is to set you up for success. This packet includes information on your condition, medications, and your home care. It will help you to care for yourself so you don't get sicker and need to go back to the hospital.     Please ask your nurse if you have any questions.        There are many details to remember when preparing to leave the hospital. Here is what you will need to do:    1. Take your medicine. If you are prescribed medications, review your Medication List in the following pages. You may have new medications to  at the pharmacy and others that you'll need to stop taking. Review the instructions for how and when to take your medications. Talk with your doctor or nurses if you are unsure of what to do.     2. Go to your follow-up appointments. Specific follow-up information is listed in the following pages. Your may be contacted by a transition nurse or clinical provider about future appointments. Be sure we have all of the phone numbers to reach you, if needed. Please contact your provider's office if you are unable to make an appointment.     3. Watch for warning signs. Your doctor or nurse will give you detailed warning signs to watch for and when to call for assistance. These instructions may also include educational information about your condition. If you experience any of warning signs to your health, call your doctor.               ** Verify the list of medication(s) below is accurate and up to date. Carry this with you in case of emergency. If your medications have changed, please notify your healthcare provider.             Medication List      START taking these medications        Additional Info                      clopidogrel 75 mg tablet   Commonly known as:  PLAVIX   Quantity:  90 tablet   Refills:  3   Dose:  75 mg    Last time this was given:  75  mg on 3/20/2017  9:01 AM   Instructions:  Take 1 tablet (75 mg total) by mouth once daily.     Begin Date    AM    Noon    PM    Bedtime       cyanocobalamin 1000 MCG tablet   Commonly known as:  VITAMIN B-12   Quantity:  90 tablet   Refills:  3   Dose:  1000 mcg    Instructions:  Take 1 tablet (1,000 mcg total) by mouth once daily.     Begin Date    AM    Noon    PM    Bedtime       metoprolol succinate 25 MG 24 hr tablet   Commonly known as:  TOPROL-XL   Quantity:  90 tablet   Refills:  3   Dose:  25 mg    Last time this was given:  25 mg on 3/20/2017  9:01 AM   Instructions:  Take 1 tablet (25 mg total) by mouth once daily.     Begin Date    AM    Noon    PM    Bedtime         CONTINUE taking these medications        Additional Info                      artificial tears 0.5 % ophthalmic solution   Commonly known as:  ISOPTO TEARS   Refills:  0   Dose:  1 drop    Instructions:  Place 1 drop into the left eye 4 (four) times daily.     Begin Date    AM    Noon    PM    Bedtime       aspirin 81 MG Chew   Refills:  0   Dose:  81 mg    Last time this was given:  81 mg on 3/20/2017  9:01 AM   Instructions:  Take 1 tablet (81 mg total) by mouth once daily.     Begin Date    AM    Noon    PM    Bedtime       atorvastatin 40 MG tablet   Commonly known as:  LIPITOR   Quantity:  90 tablet   Refills:  3   Dose:  40 mg    Last time this was given:  40 mg on 3/20/2017  9:01 AM   Instructions:  Take 1 tablet (40 mg total) by mouth once daily.     Begin Date    AM    Noon    PM    Bedtime       busPIRone 15 MG tablet   Commonly known as:  BUSPAR   Quantity:  60 tablet   Refills:  11   Dose:  15 mg    Last time this was given:  15 mg on 3/20/2017  9:01 AM   Instructions:  Take 1 tablet (15 mg total) by mouth 2 (two) times daily.     Begin Date    AM    Noon    PM    Bedtime       COCONUT OIL ORAL   Refills:  0    Instructions:  Take by mouth.     Begin Date    AM    Noon    PM    Bedtime       fish oil-omega-3 fatty acids  300-1,000 mg capsule   Refills:  0   Dose:  2 g    Instructions:  Take 2 g by mouth once daily.     Begin Date    AM    Noon    PM    Bedtime       furosemide 20 MG tablet   Commonly known as:  LASIX   Quantity:  30 tablet   Refills:  2   Dose:  20 mg    Instructions:  Take 1 tablet (20 mg total) by mouth once daily.     Begin Date    AM    Noon    PM    Bedtime       GINSENG ORAL   Refills:  0    Instructions:  Take by mouth.     Begin Date    AM    Noon    PM    Bedtime       isosorbide mononitrate 30 MG 24 hr tablet   Commonly known as:  IMDUR   Quantity:  30 tablet   Refills:  11   Dose:  30 mg    Instructions:  Take 1 tablet (30 mg total) by mouth once daily.     Begin Date    AM    Noon    PM    Bedtime       levothyroxine 50 MCG tablet   Commonly known as:  SYNTHROID   Quantity:  90 tablet   Refills:  1   Dose:  50 mcg    Last time this was given:  50 mcg on 3/20/2017  5:56 AM   Instructions:  Take 1 tablet (50 mcg total) by mouth before breakfast.     Begin Date    AM    Noon    PM    Bedtime       omeprazole 20 MG capsule   Commonly known as:  PRILOSEC   Refills:  0   Dose:  20 mg    Instructions:  Take 20 mg by mouth once daily.     Begin Date    AM    Noon    PM    Bedtime       ondansetron 4 MG tablet   Commonly known as:  ZOFRAN   Quantity:  10 tablet   Refills:  0   Dose:  4 mg    Instructions:  Take 1 tablet (4 mg total) by mouth every 8 (eight) hours as needed for Nausea.     Begin Date    AM    Noon    PM    Bedtime       vitamin D 1000 units Tab   Refills:  0   Dose:  185 mg    Instructions:  Take 185 mg by mouth once daily.     Begin Date    AM    Noon    PM    Bedtime       ZINC ACETATE ORAL   Refills:  0    Instructions:  Take by mouth.     Begin Date    AM    Noon    PM    Bedtime         STOP taking these medications     estrogens (conjugated) 0.3 MG tablet   Commonly known as:  PREMARIN       hydrOXYzine HCl 25 MG tablet   Commonly known as:  ATARAX       losartan 25 MG tablet   Commonly  known as:  COZAAR            Where to Get Your Medications      You can get these medications from any pharmacy     Bring a paper prescription for each of these medications     clopidogrel 75 mg tablet    cyanocobalamin 1000 MCG tablet    metoprolol succinate 25 MG 24 hr tablet                  Please bring to all follow up appointments:    1. A copy of your discharge instructions.  2. All medicines you are currently taking in their original bottles.  3. Identification and insurance card.    Please arrive 15 minutes ahead of scheduled appointment time.    Please call 24 hours in advance if you must reschedule your appointment and/or time.        Your Scheduled Appointments     Mar 31, 2017 10:20 AM CDT   Established Patient Visit with MD [place] [facility] - Cardiology ([place])    [address], [address]  [facility] [address] [phone]            Apr 05, 2017  2:00 PM CDT   Neurology - Established Patient with Micky Palma MD   [facility] - Neurology ([facility])    [address] [id number] [phone]            Apr 19, 2017  1:00 PM CDT   Fasting Lab with LAB, APPOINTMENT [place] [facility]    [address] [id number] [phone]            Apr 26, 2017  1:40 PM CDT   Established Patient Visit with Vishal Avendaño MD   [place] - Internal Medicine [place]    [address]  [place] [address] [phone]              Follow-up Information     Follow up with Oleg Enciso DO In 1 week.    Specialty:  Internal Medicine    Contact information:    [address] [facility]  [place] [address]  [phone]          Follow up with Lester Chapman MD In 1 week.    Specialty:  Cardiology    Contact information:    [address]  [place] [address] [phone]        Referrals     Future Orders    Ambulatory referral to Home Health         Discharge Instructions   "   Future Orders    Activity as tolerated     Call MD for:  difficulty breathing or increased cough     Call MD for:     Comments:    Chest pain    Diet Cardiac       Discharge References/Attachments     HEART ATTACK, DISCHARGE INSTRUCTIONS FOR (ENGLISH)    HEART ATTACK: BACK AT HOME (ENGLISH)    HEART ATTACK: LEAVING THE HOSPITAL  (ENGLISH)        Primary Diagnosis     Your primary diagnosis was:  Heart Attack      Admission Information     Date & Time Provider Department CSN    3/18/2017  1:34 PM Gerry Liang MD [facility] [id number]       Admisson Diagnosis: NSTEMI (non-ST elevated myocardial infarction), Essential hypertension, Coronary artery disease, angina presence unspecified, unspecified vessel or lesion type, unspecified whether native or transplanted heart, Chest pain, unspecified type      Care Providers     Provider Role Specialty Primary office phone    Gerry Liang MD Attending Provider Internal Medicine [phone]    Lester Chapman MD Consulting Physician  Cardiology [phone]      Your Vitals Were     BP Pulse Temp Resp Height Weight    110/51 (BP Location: Left arm, Patient Position: Lying, BP Method: Automatic) 70 96.5 °F (35.8 °C) (Oral) 18 5' 3" (1.6 m) 67.2 kg (148 lb 3.2 oz)    Last Period SpO2 BMI          (LMP Unknown) 96% 26.25 kg/m2        Recent Lab Values        8/11/2009 3/24/2016                       12:20 PM 12:50 PM          A1C 6.0 5.7                     Allergies as of 3/20/2017        Reactions    Codeine Nausea And Vomiting    chills    Lisinopril Other (See Comments)    cough      [facility]     [facility] Nurse Care Line - 24/7 Assistance  Unless otherwise directed by your provider, please contact [facility], our nurse care line that is available for 24/7 assistance.     Registered nurses in the [facility] provide clinical advisement, health education, appointment booking, and other advisory services.  Call for this " free service at [phone].        Advance Directives     An advance directive is a document which, in the event you are no longer able to make decisions for yourself, tells your healthcare team what kind of treatment you do or do not want to receive, or who you would like to make those decisions for you.  If you do not currently have an advance directive, [facility] encourages you to create one.  For more information call:  [phone]), [phone] ([phone]), or log on to [email or web address].        Translation Services Information     ATTENTION: Language assistance services are available, free of charge. Please call [phone].    ATENCIÓN: Si habla español, tiene a peñaloza disposición servicios gratuitos de asistencia lingüística. Llame al [phone].     CHÚ Ý: N?u b?n nói Ti?ng Vi?t, có các d?ch v? h? tr? ngôn ng? mi?n phí dành cho b?n. G?i s? [phone].        Heart Failure Education       Heart Failure: Being Active  You have a condition called heart failure. Being active doesnt mean that you have to wear yourself out. Even a little movement each day helps to strengthen your heart. If you cant get out to exercise, you can do simple stretching and strengthening exercises at home. These are good ways to keep you well-conditioned and prevent you and your heart from becoming excessively weak.    Ideas to get you started  · Add a little movement to things you do now. Walk to mail letters. Park your car at the far end of the parking lot and walk to the store. Walk up a flight of stairs instead of taking the elevator.  · Choose activities you enjoy. You might walk, swim, or ride an exercise bike. Things like gardening and washing the car count, too. Other possibilities include: washing dishes, walking the dog, walking around the mall, and doing aerobic activities with friends.  · Join a group exercise program at a [facility] or [facility], a senior center, or a community center. Or look  into a hospital cardiac rehabilitation program. Ask your doctor if you qualify.  Tips to keep you going  · Get up and get dressed each day. Go to a coffee shop and read a newspaper or go somewhere that you'll be in the presence of other active people. Youll feel more like being active.  · Make a plan. Choose one or more activities that you enjoy and that you can easily do. Then plan to do at least one each day. You might write your plan on a calendar.  · Go with a friend or a group if you like company. This can help you feel supported and stay motivated, too.  · Plan social events that you enjoy. This will keep you mentally engaged as well as physically motivated to do things you find pleasure in.  For your safety  · Talk with your healthcare provider before starting an exercise program.  · Exercise indoors when its too hot or too cold outside, or when the air quality is poor. Try walking at a shopping mall.  · Wear socks and sturdy shoes to maintain your balance and prevent falls.  · Start slowly. Do a few minutes several times a day at first. Increase your time and speed little by little.  · Stop and rest whenever you feel tired or get short of breath.  · Dont push yourself on days when you dont feel well.  Date Last Reviewed: 3/20/2016  © 2608-7688 [employer] [address] [place] [address], [place] [address]. All rights reserved. This information is not intended as a substitute for professional medical care. Always follow your healthcare professional's instructions.              Heart Failure: Evaluating Your Heart  You have a condition called heart failure. To evaluate your condition, your doctor will examine you, ask questions, and do some tests. Along with looking for signs of heart failure, the doctor looks for any other health problems that may have led to heart failure. The results of your evaluation will help your doctor form a treatment plan.  Health history and physical exam  Your visit will  start with a health history. Tell the doctor about any symptoms youve noticed and about all medicines you take. Then youll have a physical exam. This includes listening to your heartbeat and breathing. Youll also be checked for swelling (edema) in your legs and neck. When you have fluid buildup or fluid in the lungs, it may be called congestive heart failure.  Diagnosing heart failure     During an echocardiogram, sound waves bounce off the heart. These are converted into a picture on the screen.   The following may be done to help your doctor form a diagnosis:  · X-rays show the size and shape of your heart. These pictures can also show fluid in your lungs.  · An electrocardiogram (ECG or EKG) shows the pattern of your heartbeat. Small pads (electrodes) are placed on your chest, arms, and legs. Wires connect the pads to the ECG machine, which records your hearts electrical signals. This can give the doctor information about heart function.  · An echocardiogram uses ultrasound waves to show the structure and movement of your heart muscle. This shows how well the heart pumps. It also shows the thickness of the heart walls, and if the heart is enlarged. It is one of the most useful, non-invasive tests as it provides information about the heart's general function. This helps your doctor make treatment decisions.  · Lab tests evaluate small amounts of blood or urine for signs of problems. A BNP lab test can help diagnose and evaluate heart failure. BNP stands for B-type natriuretic peptide. The ventricles secrete more BNP when heart failure worsens. Lab tests can also provide information about metabolic dysfunction or heart dysfunction.  Your treatment plan  Based on the results of your evaluation and tests, your doctor will develop a treatment plan. This plan is designed to relieve some of your heart failure symptoms and help make you more comfortable. Your treatment plan may include:  · Medicine to help your heart  work better and improve your quality of life  · Changes in what you eat and drink to help prevent fluid from backing up in your body  · Daily monitoring of your weight and heart failure symptoms to see how well your treatment plan is working  · Exercise to help you stay healthy  · Help with quitting smoking  · Emotional and psychological support to help adjust to the changes  · Referrals to other specialists to make sure you are being treated comprehensively  Date Last Reviewed: 3/21/2016  © 4403-5708 [employer]. [address] [place] [address], [place] [address]. All rights reserved. This information is not intended as a substitute for professional medical care. Always follow your healthcare professional's instructions.              Heart Failure: Making Changes to Your Diet  You have a condition called heart failure. When you have heart failure, excess fluid is more likely to build up in your body because your heart isn't working well. This makes the heart work harder to pump blood. Fluid buildup causes symptoms such as shortness of breath and swelling (edema). This is often referred to as congestive heart failure or CHF. Controlling the amount of salt (sodium) you eat may help stop fluid from building up. Your doctor may also tell you to reduce the amount of fluid you drink.  Reading food labels    Your healthcare provider will tell you how much sodium you can eat each day. Read food labels to keep track. Keep in mind that certain foods are high in salt. These include canned, frozen, and processed foods. Check the amount of sodium in each serving. Watch out for high-sodium ingredients. These include MSG (monosodium glutamate), baking soda, and sodium phosphate.   Eating less salt  Give yourself time to get used to eating less salt. It may take a little while. Here are some tips to help:  · Take the saltshaker off the table. Replace it with salt-free herb mixes and spices.  · Eat fresh or plain frozen  vegetables. These have much less salt than canned vegetables.  · Choose low-sodium snacks like sodium-free pretzels, crackers, or air-popped popcorn.  · Dont add salt to your food when youre cooking. Instead, season your foods with pepper, lemon, garlic, or onion.  · When you eat out, ask that your food be cooked without added salt.  · Avoid eating fried foods as these often have a great deal of salt.  If youre told to limit fluids  You may need to limit how much fluid you have to help prevent swelling. This includes anything that is liquid at room temperature, such as ice cream and soup. If your doctor tells you to limit fluid, try these tips:  · Measure drinks in a measuring cup before you drink them. This will help you meet daily goals.  · Chill drinks to make them more refreshing.  · Suck on frozen lemon wedges to quench thirst.  · Only drink when youre thirsty.  · Chew sugarless gum or suck on hard candy to keep your mouth moist.  · Weigh yourself daily to know if your body's fluid content is rising.  My sodium goal  Your healthcare provider may give you a sodium goal to meet each day. This includes sodium found in food as well as salt that you add. My goal is to eat no more than ___________ mg of sodium per day.     When to call your doctor  Call your doctor right away if you have any symptoms of worsening heart failure. These can include:  · Sudden weight gain  · Increased swelling of your legs or ankles  · Trouble breathing when youre resting or at night  · Increase in the number of pillows you have to sleep on  · Chest pain, pressure, discomfort, or pain in the jaw, neck, or back   Date Last Reviewed: 3/21/2016  © 6055-0559 [employer]. [address] [place] [address], [place] [address]. All rights reserved. This information is not intended as a substitute for professional medical care. Always follow your healthcare professional's instructions.              Heart Failure: Medicines to Help Your  Heart    You have a condition called heart failure (also known as congestive heart failure, or CHF). Your doctor will likely prescribe medicines for heart failure and any underlying health problems you have. Most heart failure patients take one or more types of medicinen. Your healthcare provider will work to find the combination of medicines that works best for you.  Heart failure medicines  Here are the most common heart failure medicines:  · ACE inhibitors lower blood pressure and decrease strain on the heart. This makes it easier for the heart to pump. Angiotensin receptor blockers have similar effects. These are prescribed for some patients instead of ACE inhibitors.  · Beta-blockers relieve stress on the heart. They also improve symptoms. They may also improve the heart's pumping action over time.  · Diuretics (also called water pills) help rid your body of excess water. This can help rid your body of swelling (edema). Having less fluid to pump means your heart doesnt have to work as hard. Some diuretics make your body lose a mineral called potassium. Your doctor will tell you if you need to take supplements or eat more foods high in potassium.  · Digoxin helps your heart pump with more strength. This helps your heart pump more blood with each beat. So, more oxygen-rich blood travels to the rest of the body.  · Aldosterone antagonists help alter hormones and decrease strain on the heart.  · Hydralazine and nitrates are two separate medicines used together to treat heart failure. They may come in one combination pill. They lower blood pressure and decrease how hard the heart has to pump.  Medicines for related conditions  Controlling other heart problems helps keep heart failure under control, too. Depending on other heart problems you have, medicines may be prescribed to:  · Lower blood pressure (antihypertensives).  · Lower cholesterol levels (statins).  · Prevent blood clots (anticoagulants or  aspirin).  · Keep the heartbeat steady (antiarrhythmics).  Date Last Reviewed: 3/5/2016  © 7776-5169 [employer]. [address] [place] [address], [place] [address]. All rights reserved. This information is not intended as a substitute for professional medical care. Always follow your healthcare professional's instructions.              Heart Failure: Procedures That May Help    The heart is a muscle that pumps oxygen-rich blood to all parts of the body. When you have heart failure, the heart is not able to pump as well as it should. Blood and fluid may back up into the lungs (congestive heart failure), and some parts of the body dont get enough oxygen-rich blood to work normally. These problems lead to the symptoms of heart failure.     Certain procedures may help the heart pump better in some cases of heart failure. Some procedures are done to treat health problems that may have caused the heart failure such as coronary artery disease or heart rhythm problems. For more serious heart failure, other options are available.  Treating artery and valve problems  If you have coronary artery disease or valve disease, procedures may be done to improve blood flow. This helps the heart pump better, which can improve heart failure symptoms. First, your doctor may do a cardiac catheterization to help detect clogged blood vessels or valve damage. During this procedure, a  thin tube (catheter) in inserted into a blood vessel and guided to the heart. There a dye is injected and a special type of X-ray (angiogram) is taken of the blood vessels. Procedures to open a blocked artery or fix damaged valves can also be done using catheterization.  · Angioplasty uses a balloon-tipped instrument at the end of the catheter. The balloon is inflated to widen the narrowed artery. In many cases, a stent is expanded to further support the narrowed artery. A stent is a metal mesh tube.  · Valve surgery repairs or replacement of faulty  valves can also be done during catheterization so blood can flow properly through the chambers of the heart.  Bypass surgery is another option to help treat blocked arteries. It uses a healthy blood vessel from elsewhere in the body. The healthy blood vessel is attached above and below the blocked area so that blood can flow around the blocked artery.  Treating heart rhythm problems  A device may be placed in the chest to help a weak heart maintain a healthy, heartbeat so the heart can pump more effectively:  · Pacemaker. A pacemaker is an implanted device that regulates your heartbeat electronically. It monitors your heart's rhythm and generates a painless electric impulse that helps the heart beat in a regular rhythm. A pacemaker is programmed to meet your specific heart rhythm needs.  · Biventricular pacing/cardiac resynchronization therapy. A type of pacemaker that paces both pumping chambers of the heart at the same time to coordinate contractions and to improve the heart's function. Some people with heart failure are candidates for this therapy.  · Implantable cardioverter defibrillator. A device similar to a pacemaker that senses when the heart is beating too fast and delivers an electrical shock to convert the fast rhythm to a normal rhythm. This can be a life saving device.  In severe cases  In more serious cases of heart failure when other treatments no longer work, other options may include:  · Ventricular assist devices (VADs). These are mechanical devices used to take over the pumping function for one or both of the heart's ventricles, or pumping chambers. A VAD may be necessary when heart failure progresses to the point that medicines and other treatments no longer help. In some cases, a VAD may be used as a bridge to transplant.  · Heart transplant. This is replacing the diseased heart with a healthy one from a donor. This is an option for a few people who are very sick. A heart transplant is very  serious and not an option for all patients. Your doctor can tell you more.  Date Last Reviewed: 3/20/2016  © 0031-6973 [employer]. [address] [place] [address], [place] [address]. All rights reserved. This information is not intended as a substitute for professional medical care. Always follow your healthcare professional's instructions.              Heart Failure: Tracking Your Weight  You have a condition called heart failure. When you have heart failure, a sudden weight gain or a steady rise in weight is a warning sign that your body is retaining too much water and salt. This could mean your heart failure is getting worse. If left untreated, it can cause problems for your lungs and result in shortness of breath. Weighing yourself each day is the best way to know if youre retaining water. If your weight goes up quickly, call your doctor. You will be given instructions on how to get rid of the excess water. You will likely need medicines and to avoid salt. This will help your heart work better.  Call your doctor if you gain more than 2 pounds in 1 day, more than 5 pounds in 1 week, or whatever weight gain you were told to report by your doctor. This is often a sign of worsening heart failure and needs to be evaluated and treated. Your doctor will tell you what to do next.   Tips for weighing yourself    · Weigh yourself at the same time each morning, wearing the same clothes. Weigh yourself after urinating and before eating.  · Use the same scale each day. Make sure the numbers are easy to read. Put the scale on a flat, hard surface -- not on a rug or carpet.  · Do not stop weighing yourself. If you forget one day, weigh again the next morning.  How to use your weight chart  · Keep your weight chart near the scale. Write your weight on the chart as soon as you get off the scale.  · Fill in the month and the start date on the chart. Then write down your weight each day. Your chart will look like  this:    · If you miss a day, leave the space blank. Weigh yourself the next day and write your weight in the next space.  · Take your weight chart with you when you go to see your doctor.  Date Last Reviewed: 3/20/2016  © 8745-3456 [employer] [address] [place] [address]. All rights reserved. This information is not intended as a substitute for professional medical care. Always follow your healthcare professional's instructions.              Heart Failure: Warning Signs of a Flare-Up  You have a condition called heart failure. Once you have heart failure, flare-ups can happen. Below are signs that can mean your heart failure is getting worse. If you notice any of these warning signs, call your healthcare provider.  Swelling    · Your feet, ankles, or lower legs get puffier.  · You notice skin changes on your lower legs.  · Your shoes feel too tight.  · Your clothes are tighter in the waist.  · You have trouble getting rings on or off your fingers.  Shortness of breath  · You have to breathe harder even when youre doing your normal activities or when youre resting.  · You are short of breath walking up stairs or even short distances.  · You wake up at night short of breath or coughing.  · You need to use more pillows or sit up to sleep.  · You wake up tired or restless.  Other warning signs  · You feel weaker, dizzy, or more tired.  · You have chest pain or changes in your heartbeat.  · You have a cough that wont go away.  · You cant remember things or dont feel like eating.  Tracking your weight  Gaining weight is often the first warning sign that heart failure is getting worse. Gaining even a few pounds can be a sign that your body is retaining excess water and salt. Weighing yourself each day in the morning after you urinate and before you eat, is the best way to know if you're retaining water. Get a scale that is easy to read and make sure you wear the same clothes and use the  same scale every time you weigh. Your healthcare provider will show you how to track your weight. Call your doctor if you gain more than 2 pounds in 1 day, 5 pounds in 1 week, or whatever weight gain you were told to report by your doctor. This is often a sign of worsening heart failure and needs to be evaluated and treated before it compromises your breathing. Your doctor will tell you what to do next.    Date Last Reviewed: 3/15/2016  © 2506-2897 [employer]. [address] [place] [address], [place] [address]. All rights reserved. This information is not intended as a substitute for professional medical care. Always follow your healthcare professional's instructions.              Pneumonmia Discharge Instructions                 [facility] complies with applicable Federal civil rights laws and does not discriminate on the basis of race, color, national origin, age, disability, or sex.

## 2017-03-18 NOTE — ED PROVIDER NOTES
"Encounter Date: 3/18/2017       History     Chief Complaint   Patient presents with    Chest Pain     Onset early this morning, states took aspirin 81mg and pain eased up.  Hx of MI 2 weeks ago.      Review of patient's allergies indicates:   Allergen Reactions    Codeine Nausea And Vomiting     chills    Lisinopril Other (See Comments)     cough     HPI Comments: 89F with severe AS and CAD presents for evaluation after an episode of chest pain earlier today.  Pt has memory problems so she can't remember much about the episode.  Pt states she went on a trip with friends to see [employer] yesterday.  She felt fine walking around, even up and down steps.  This morning, she wasn't doing anything unusual when she developed a discomfort in the center of her chest.  She reports no associated symptoms.  She does not recall how long it lasted, but states she "doesn't scare easily" so assumes it lasted a while.  She has no pain at this time and does not recall when her pain stopped.  She is unsure if this is the same pain she had with her previous MI.    The history is provided by the patient.     Past Medical History:   Diagnosis Date    Acquired hypothyroidism     Closed displaced intertrochanteric fracture of left femur s/p IM nail on 6/5/2016 6/5/2016    Coronary artery disease involving native coronary artery of native heart without angina pectoris     Essential hypertension     GERD (gastroesophageal reflux disease)     Hx of colonic polyps     Hyperlipidemia     Macular degeneration     Mitral valve stenosis     Severe aortic stenosis 9/22/2014    Syncope 1/5/2015     Past Surgical History:   Procedure Laterality Date    INTERTROCHANTERIC HIP FRACTURE SURGERY Left 06/05/2016    IM nail     Family History   Problem Relation Age of Onset    Heart disease Mother     Cancer Mother     Heart disease Father     Skin cancer Father     No Known Problems Sister     No Known Problems Brother     No " Known Problems Maternal Grandmother     No Known Problems Maternal Grandfather     No Known Problems Paternal Grandmother     No Known Problems Paternal Grandfather     No Known Problems Maternal Aunt     No Known Problems Maternal Uncle     No Known Problems Paternal Aunt     No Known Problems Paternal Uncle     Anemia Neg Hx     Arrhythmia Neg Hx     Asthma Neg Hx     Clotting disorder Neg Hx     Fainting Neg Hx     Heart attack Neg Hx     Heart failure Neg Hx     Hyperlipidemia Neg Hx     Hypertension Neg Hx     Stroke Neg Hx     Atrial Septal Defect Neg Hx      Social History   Substance Use Topics    Smoking status: Never Smoker    Smokeless tobacco: Never Used    Alcohol use 0.6 oz/week     1 Standard drinks or equivalent per week      Comment: rare      Review of Systems   Constitutional: Negative for diaphoresis.   Respiratory: Negative for shortness of breath.    Cardiovascular: Positive for chest pain. Negative for leg swelling.   Gastrointestinal: Negative for nausea and vomiting.   All other systems reviewed and are negative.      Physical Exam   Initial Vitals   BP Pulse Resp Temp SpO2   03/18/17 1324 03/18/17 1324 03/18/17 1324 03/18/17 1324 03/18/17 1324   121/56 89 16 98.4 °F (36.9 °C) 97 %     Physical Exam    Nursing note and vitals reviewed.  Constitutional: She appears well-developed and well-nourished. No distress.   HENT:   Head: Normocephalic and atraumatic.   Eyes: Conjunctivae are normal.   Neck: Normal range of motion.   Cardiovascular: Normal rate, regular rhythm and normal heart sounds.   No murmur heard.  Pulmonary/Chest: Breath sounds normal. No respiratory distress.   Abdominal: Soft. Bowel sounds are normal. She exhibits no distension. There is no tenderness.   Musculoskeletal: Normal range of motion. She exhibits no edema or tenderness.   Neurological: She is alert and oriented to person, place, and time.   Skin: Skin is warm and dry.   Psychiatric: She has a  normal mood and affect. Her behavior is normal.         ED Course   Procedures  Labs Reviewed   TROPONIN I - Abnormal; Notable for the following:        Result Value    Troponin I 0.271 (*)     All other components within normal limits   B-TYPE NATRIURETIC PEPTIDE - Abnormal; Notable for the following:     BNP 1375 (*)     All other components within normal limits   COMPREHENSIVE METABOLIC PANEL - Abnormal; Notable for the following:     CO2 19 (*)     Glucose 112 (*)     Albumin 3.4 (*)     Total Bilirubin 1.1 (*)     All other components within normal limits   URINALYSIS   CBC W/ AUTO DIFFERENTIAL   COMPREHENSIVE METABOLIC PANEL   TSH   PROTIME-INR   PROTIME-INR   APTT   APTT   PROTIME-INR   CBC W/ AUTO DIFFERENTIAL   TROPONIN I   TSH   APTT     EKG Readings: (Independently Interpreted)   Initial Reading: No STEMI. Heart Rate: 88. T Waves Flipped: I and AVL.       X-Rays:   Independently Interpreted Readings:   Other Readings:  CXR - no acute process    Medical Decision Making:   Independently Interpreted Test(s):   I have ordered and independently interpreted X-rays - see prior notes.  I have ordered and independently interpreted EKG Reading(s) - see prior notes  Clinical Tests:   Lab Tests: Ordered and Reviewed  Radiological Study: Ordered and Reviewed  Medical Tests: Ordered and Reviewed  ED Management:  Chest pain with hx of CAD/MI.  No CP at this time but elevated troponin.  No STEMI on EKG.  ASA given.  Will admit for NSTEMI.  Other:   I have discussed this case with another health care provider.                   ED Course     Clinical Impression:   The primary encounter diagnosis was NSTEMI (non-ST elevated myocardial infarction). Diagnoses of Chest pain, unspecified type, Coronary artery disease, angina presence unspecified, unspecified vessel or lesion type, unspecified whether native or transplanted heart, and Essential hypertension were also pertinent to this visit.          Kortney Gaffney MD  03/18/17  1759       Kortney Gaffney MD  03/18/17 1759       Kortney Gaffney MD  03/18/17 1903       Kortney Gaffney MD  03/18/17 2008

## 2017-03-18 NOTE — ED NOTES
Pt lying in bed, AAO x's 3, daughter at bedside. Pt came to the ER via EMS on stretcher with c/o chest pain since early this morning, but has since resolved. Pt denies any pain at this time, denies n/v, HA. Pts daughter stated that she gave her a baby aspirin this morning and her CP was better.   APPEARANCE: Alert, oriented and in no acute distress.  CARDIAC: Normal rate and rhythm, no murmur heard.   PERIPHERAL VASCULAR: peripheral pulses present. Normal cap refill. No edema. Warm to touch.    RESPIRATORY:Normal rate and effort, breath sounds clear bilaterally throughout chest. Respirations are equal and unlabored no obvious signs of distress.  GASTRO: soft, bowel sounds normal, no tenderness, no abdominal distention.  MUSC: Full ROM. No bony tenderness or soft tissue tenderness. No obvious deformity.  SKIN: Skin is warm and dry, normal skin turgor, mucous membranes moist.  NEURO: 5/5 strength major flexors/extensors bilaterally. Sensory intact to light touch bilaterally. William coma scale: eyes open spontaneously-4, oriented & converses-5, obeys commands-6. No neurological abnormalities.   MENTAL STATUS: awake, alert and aware of environment.  EYE: PERRL, both eyes: pupils brisk and reactive to light. Normal size.  ENT: EARS: no obvious drainage. NOSE: no active bleeding.   .

## 2017-03-19 LAB
ALBUMIN SERPL BCP-MCNC: 3.2 G/DL
ALP SERPL-CCNC: 84 U/L
ALT SERPL W/O P-5'-P-CCNC: 11 U/L
ANION GAP SERPL CALC-SCNC: 13 MMOL/L
APTT BLDCRRT: 41.2 SEC
AST SERPL-CCNC: 21 U/L
BASOPHILS # BLD AUTO: 0.06 K/UL
BASOPHILS NFR BLD: 0.6 %
BILIRUB SERPL-MCNC: 1.1 MG/DL
BUN SERPL-MCNC: 19 MG/DL
CALCIUM SERPL-MCNC: 9.3 MG/DL
CHLORIDE SERPL-SCNC: 106 MMOL/L
CO2 SERPL-SCNC: 23 MMOL/L
CREAT SERPL-MCNC: 0.9 MG/DL
DIFFERENTIAL METHOD: ABNORMAL
EOSINOPHIL # BLD AUTO: 0.4 K/UL
EOSINOPHIL NFR BLD: 3.9 %
ERYTHROCYTE [DISTWIDTH] IN BLOOD BY AUTOMATED COUNT: 15.2 %
EST. GFR  (AFRICAN AMERICAN): >60 ML/MIN/1.73 M^2
EST. GFR  (NON AFRICAN AMERICAN): 57 ML/MIN/1.73 M^2
FERRITIN SERPL-MCNC: 135 NG/ML
FOLATE SERPL-MCNC: 14.4 NG/ML
GLUCOSE SERPL-MCNC: 109 MG/DL
HCT VFR BLD AUTO: 32 %
HGB BLD-MCNC: 10.3 G/DL
IRON SERPL-MCNC: 48 UG/DL
LYMPHOCYTES # BLD AUTO: 3.4 K/UL
LYMPHOCYTES NFR BLD: 32.4 %
MAGNESIUM SERPL-MCNC: 2 MG/DL
MCH RBC QN AUTO: 30 PG
MCHC RBC AUTO-ENTMCNC: 32.2 %
MCV RBC AUTO: 93 FL
MONOCYTES # BLD AUTO: 0.5 K/UL
MONOCYTES NFR BLD: 5.1 %
NEUTROPHILS # BLD AUTO: 6.2 K/UL
NEUTROPHILS NFR BLD: 58 %
PLATELET # BLD AUTO: 228 K/UL
PLATELET BLD QL SMEAR: ABNORMAL
PMV BLD AUTO: 10.5 FL
POTASSIUM SERPL-SCNC: 3.4 MMOL/L
PROT SERPL-MCNC: 6.7 G/DL
RBC # BLD AUTO: 3.43 M/UL
SATURATED IRON: 12 %
SODIUM SERPL-SCNC: 142 MMOL/L
TOTAL IRON BINDING CAPACITY: 386 UG/DL
TRANSFERRIN SERPL-MCNC: 261 MG/DL
TROPONIN I SERPL DL<=0.01 NG/ML-MCNC: 0.95 NG/ML
TROPONIN I SERPL DL<=0.01 NG/ML-MCNC: 1.2 NG/ML
VIT B12 SERPL-MCNC: 262 PG/ML
WBC # BLD AUTO: 10.63 K/UL

## 2017-03-19 PROCEDURE — 25000003 PHARM REV CODE 250: Performed by: STUDENT IN AN ORGANIZED HEALTH CARE EDUCATION/TRAINING PROGRAM

## 2017-03-19 PROCEDURE — 25000003 PHARM REV CODE 250: Performed by: HOSPITALIST

## 2017-03-19 PROCEDURE — 82728 ASSAY OF FERRITIN: CPT

## 2017-03-19 PROCEDURE — 93005 ELECTROCARDIOGRAM TRACING: CPT

## 2017-03-19 PROCEDURE — 94761 N-INVAS EAR/PLS OXIMETRY MLT: CPT

## 2017-03-19 PROCEDURE — 83540 ASSAY OF IRON: CPT

## 2017-03-19 PROCEDURE — 25000003 PHARM REV CODE 250: Performed by: INTERNAL MEDICINE

## 2017-03-19 PROCEDURE — 85730 THROMBOPLASTIN TIME PARTIAL: CPT

## 2017-03-19 PROCEDURE — 82746 ASSAY OF FOLIC ACID SERUM: CPT

## 2017-03-19 PROCEDURE — 83735 ASSAY OF MAGNESIUM: CPT

## 2017-03-19 PROCEDURE — 85025 COMPLETE CBC W/AUTO DIFF WBC: CPT

## 2017-03-19 PROCEDURE — 99223 1ST HOSP IP/OBS HIGH 75: CPT | Mod: ,,, | Performed by: INTERNAL MEDICINE

## 2017-03-19 PROCEDURE — 63600175 PHARM REV CODE 636 W HCPCS: Performed by: HOSPITALIST

## 2017-03-19 PROCEDURE — 84484 ASSAY OF TROPONIN QUANT: CPT

## 2017-03-19 PROCEDURE — 82607 VITAMIN B-12: CPT

## 2017-03-19 PROCEDURE — 80053 COMPREHEN METABOLIC PANEL: CPT

## 2017-03-19 PROCEDURE — 11000001 HC ACUTE MED/SURG PRIVATE ROOM

## 2017-03-19 RX ORDER — FUROSEMIDE 10 MG/ML
40 INJECTION INTRAMUSCULAR; INTRAVENOUS DAILY
Status: DISCONTINUED | OUTPATIENT
Start: 2017-03-19 | End: 2017-03-20

## 2017-03-19 RX ORDER — POTASSIUM CHLORIDE 20 MEQ/1
20 TABLET, EXTENDED RELEASE ORAL
Status: COMPLETED | OUTPATIENT
Start: 2017-03-19 | End: 2017-03-19

## 2017-03-19 RX ORDER — METOPROLOL SUCCINATE 25 MG/1
25 TABLET, EXTENDED RELEASE ORAL DAILY
Status: DISCONTINUED | OUTPATIENT
Start: 2017-03-19 | End: 2017-03-20 | Stop reason: HOSPADM

## 2017-03-19 RX ADMIN — POTASSIUM CHLORIDE 20 MEQ: 20 TABLET, EXTENDED RELEASE ORAL at 09:03

## 2017-03-19 RX ADMIN — POTASSIUM CHLORIDE 20 MEQ: 20 TABLET, EXTENDED RELEASE ORAL at 11:03

## 2017-03-19 RX ADMIN — CLOPIDOGREL BISULFATE 225 MG: 75 TABLET ORAL at 11:03

## 2017-03-19 RX ADMIN — LEVOTHYROXINE SODIUM 50 MCG: 50 TABLET ORAL at 06:03

## 2017-03-19 RX ADMIN — CLOPIDOGREL BISULFATE 75 MG: 75 TABLET ORAL at 08:03

## 2017-03-19 RX ADMIN — BUSPIRONE HYDROCHLORIDE 15 MG: 5 TABLET ORAL at 08:03

## 2017-03-19 RX ADMIN — BUSPIRONE HYDROCHLORIDE 15 MG: 5 TABLET ORAL at 09:03

## 2017-03-19 RX ADMIN — ATORVASTATIN CALCIUM 40 MG: 40 TABLET, FILM COATED ORAL at 08:03

## 2017-03-19 RX ADMIN — FUROSEMIDE 40 MG: 10 INJECTION, SOLUTION INTRAMUSCULAR; INTRAVENOUS at 08:03

## 2017-03-19 RX ADMIN — METOPROLOL SUCCINATE 25 MG: 25 TABLET, FILM COATED, EXTENDED RELEASE ORAL at 04:03

## 2017-03-19 RX ADMIN — ASPIRIN 81 MG 81 MG: 81 TABLET ORAL at 08:03

## 2017-03-19 NOTE — ASSESSMENT & PLAN NOTE
Presumed CAD.  Now on ASA and plavix.  Reasonable to continue.  Can discontinue heparin.  Would contine statin.  Will add low dose beta-blocker for medical management.  Per patient's desires, continue with conservative management.

## 2017-03-19 NOTE — PLAN OF CARE
Pt presents to ER after an episode of chest pain. Pt has memory problems so she can't remember much about the episode. Pt states she went on a trip with friends to see [employer]. She felt fine walking around, even up and down steps. Prior to admit she developed a discomfort in the center of her chest.     Per previous admit notes, pt has Alzheimer's.       03/19/17 1054   Discharge Assessment   Assessment Type Discharge Planning Assessment   Confirmed/corrected address and phone number on facesheet? Yes   Assessment information obtained from? Patient   Expected Length of Stay (days) (to be determined)   Communicated expected length of stay with patient/caregiver no   Prior to hospitilization cognitive status: Alert/Oriented   Prior to hospitalization functional status: Assistive Equipment;Needs Assistance   Current cognitive status: Alert/Oriented   Current Functional Status: Assistive Equipment;Needs Assistance   Arrived From home or self-care   Lives With grandchild(teodoro);child(teodoro), adult  (daughter and grand daughter)   Able to Return to Prior Arrangements yes   Is patient able to care for self after discharge? Yes   Does the patient have family/friends to help with healtcare needs after discharge? yes   How many people do you have in your home that can help with your care after discharge? 2   Who are your caregiver(s) and their phone number(s)? royce Eng [phone]   Patient's perception of discharge disposition home or selfcare   Readmission Within The Last 30 Days previous discharge plan unsuccessful  (previous admit Dx: acute ischemic heart disease, unspecified)   Patient currently being followed by outpatient case management? No   Patient currently receives home health services? No  ([facility] [phone])   Equipment Currently Used at Home bath bench;bedside commode;wheelchair;walker, rolling   Do you have any problems affording any of your  prescribed medications? No   Is the patient taking medications as prescribed? yes   Do you have any financial concerns preventing you from receiving the healthcare you need? No   Does the patient have transportation to healthcare appointments? Yes   Transportation Available family or friend will provide   On Dialysis? No   Does the patient receive services at the Coumadin Clinic? No   Are there any open cases? No   Discharge Plan A Home with family   Discharge Plan B Home Health   Patient/Family In Agreement With Plan yes     Traci Haskins RN Transitional Navigator  [phone]

## 2017-03-19 NOTE — PROGRESS NOTES
Pt arrived on unit via stretcher accompanied by Ventive tech at 2030. No current complaints of pain or discomfort. AAOx3. Oriented to room and call bell. Updated on POC as outlined on whiteboard. VS stable. Telemetry monitor #[id number] in place; pt NSR HR in the 80's. Oxygen at 2L via NC. Ambulated to bathroom with steady gait. Notified to call staff for standby assist. Yellow non-skid socks in place, yellow fall risk band placed, and bell alarm activated and audible. Heparin drip infusing as ordered. Updated pharmacy on weight taken upon arrival to unit on bed scale. Pt in NAD, bed in lowest position, call light within reach, siderails up x2. Safety maintained. Encouraged to call for any needs or concerns. Will continue to monitor and reassess.

## 2017-03-19 NOTE — PROGRESS NOTES
"[facility] Resident HO-I Progress Note    Subjective:      Janine Awad is a 89 y.o.  female who is being followed by the [facility] service at [facility] for NSTEMI.     Troponins peaked overnight, no changes in EKGs.    This morning Ms. Awad feels well. She denies chest pain, shortness of breath, abdominal pain.      Objective:   Last 24 Hour Vital Signs:  BP  Min: 95/52  Max: 123/56  Temp  Av.8 °F (36.6 °C)  Min: 97 °F (36.1 °C)  Max: 98.4 °F (36.9 °C)  Pulse  Av.1  Min: 73  Max: 90  Resp  Av.3  Min: 14  Max: 21  SpO2  Av.1 %  Min: 93 %  Max: 99 %  Height  Av' 3" (160 cm)  Min: 5' 3" (160 cm)  Max: 5' 3" (160 cm)  Weight  Av.4 kg (144 lb 1.6 oz)  Min: 63.5 kg (140 lb)  Max: 67.2 kg (148 lb 3.2 oz)       Physical Examination:  General appearance: alert, cooperative and no distress  Head: Normocephalic, without obvious abnormality, atraumatic  Eyes: resolving subconjunctival hemorrhage in left eye   Throat: MMM  Lungs: normal effort, crackles in b/l bases  Heart: RRR, normal s1/s2, III/VI MAXIMINO  Abdomen: soft, non-tender; bowel sounds normal; no masses,  no organomegaly  Extremities: trace pitting edema   Pulses: 2+ and symmetric      Laboratory:  Laboratory Data Reviewed: yes  Pertinent Findings:  H/H 10.3/32  K 3.4  Alb 3.2  Bili 1.1  Troponin 1.137 --> 1.195 --> 0.951  TSH 0.012  T4 WNL        Other Results:  EKG (my interpretation): unchanged from previous tracings.    Radiology Data Reviewed: yes  Pertinent Findings:  No new imaging     Current Medications:     Infusions:   heparin (porcine) in D5W 12 Units/kg/hr (17)        Scheduled:   aspirin  81 mg Oral Daily    atorvastatin  40 mg Oral Daily    busPIRone  15 mg Oral BID    clopidogrel  225 mg Oral Once    clopidogrel  75 mg Oral Daily    levothyroxine  50 mcg Oral Before breakfast        PRN:  dextrose 50%, dextrose 50%, glucagon (human recombinant), glucose, glucose, heparin " (PORCINE), heparin (PORCINE)    Antibiotics and Day Number of Therapy:  None    Lines and Day Number of Therapy:  PIV    Assessment:     Janine Awad is a 89 y.o.female with     Plan:     NSTEMI  -Troponin elevated to 0.271, EKG unchanged from prior   -Had similar presentation on 2/15 to [facility]. Pt refused cath and was treated with medical management. Heparin ggt discontinued after subconjunctival hemorrhage   -Discussed ACS treatment with pt, she is amenable to medical management of ACS and possible LHC if necessary  -Given 325 mg ASA in the ED   -Discussed case with Dr. Chapman who is Ms. Awad's cardiologist and he is in agreement with starting dual antiplatelet therapy and heparin ggt. Cardiology to evaluate today  -Troponin peaked at 1.195, will stop trending   -Continue heparin ggt, plavix   -Continue daily ASA     Acute on Chronic Heart Failure Preserved EF  -BNP elevated to 1375 on admission  -Last echo 1 month ago (2/2) with normal EF, diastolic dysfunction, severe aortic stenosis   -Chest x-ray and physical exam with evidence of fluid overload   -Will diurese with IV lasix, good response to dose overnight. Will give additional 40 mg IV today   -Strict I/Os, daily weights     Severe Aortic Stenosis   -Severe AS noted on prior echos, repeat pending   -TAVR discussed with pt previously as an outpt and she has stated that she does not want invasive measures taken  -Diuresis as above  -Mechanical trauma of RBCs from AS likely cause of elevated bili and contributing to anemia      Coronary Artery Disease    -Known disease, however pt has denied LHC in the past   -Continue home ASA, atorvastatin  -Holding imdur for low blood pressure      Normocytic Anemia   -Hgb 10.3, MCV 93, b/l ~11-12  -Iron studies pending     Hypokalemia   -3.4 today   -Mg pending   -Will replete with PO K    Hypoalbuminemia  -3.4 on admission labs, today 3.2   -Encourage improved nutrition      Hyperbilirubinemia   -1.1 on  admission labs, today 1.1  -Likely mechanical trauma from severe AS     Hyperlipidemia   -Lipid panel from 2/17 Total cholesterol 232, triglycerides 164, , HDL 55  -Continue atorvastatin      Hypothyroidism   -TSH 0.012   -Will continue home synthroid       Hypertension   -BP low on admission, 101/52  -Holding Imdur and losartan   -Monitor and add medications as needed      Dementia   -No acute issues   -Monitor     Anxiety  -No acute issue    -Continue home Buspar     GERD  -Continue home PPI     F: None  E: Monitor and replete as needed   N: Cardiac diet      PPx: Heparin ggt, SCD     Dispo: pending cardiology evaluation     Tom Patel  [facility] HO-I  [facility] Hospitalist Service Team B    [facility] Medicine Hospitalist Pager numbers:   [facility] Hospitalist Medicine Team A (Demarcus/Frank): [phone]  [facility] Hospitalist Medicine Team B (Azul/Katarzyna):  [phone]

## 2017-03-19 NOTE — ASSESSMENT & PLAN NOTE
Currently controlled.  Goal for this octogenarian and given severe AS is more liberal with goal of 150/90.

## 2017-03-19 NOTE — ASSESSMENT & PLAN NOTE
Signs of diastolic CHF/HF-pEF chronic on TTE.  Currently no signs of Acute decompensated CHF.  Is on lasix.

## 2017-03-19 NOTE — PLAN OF CARE
Problem: Patient Care Overview  Goal: Plan of Care Review  Outcome: Ongoing (interventions implemented as appropriate)  Pt on RA spo2 95%.  No respiratory distress.  Will continue to monitor.

## 2017-03-19 NOTE — PROGRESS NOTES
[facility]  Cardiology  Progress Note    Patient Name: Janine Awad  MRN: [id number]  Admission Date: 3/18/2017  Hospital Length of Stay: 1 days  Code Status: Full Code   Attending Physician: Gerry Liang MD   Primary Care Physician: Oleg Enciso DO  Expected Discharge Date:   Principal Problem:NSTEMI (non-ST elevated myocardial infarction)    Subjective:     Hospital Course:   Admitted to telemetry, started on ASA and plavix plus heparin.  Initial troponin 1.2, down to 0.9 this am.  NO further symptoms.      Interval History: no new events overnite    Review of Systems   Constitution: Negative for chills, decreased appetite, diaphoresis, fever, weakness and malaise/fatigue.   Cardiovascular: Positive for chest pain. Negative for claudication, cyanosis, dyspnea on exertion, irregular heartbeat, leg swelling, near-syncope, orthopnea, palpitations, paroxysmal nocturnal dyspnea and syncope.   Hematologic/Lymphatic: Negative for bleeding problem. Does not bruise/bleed easily.     Objective:     Vital Signs (Most Recent):  Temp: 97.9 °F (36.6 °C) (03/19/17 0800)  Pulse: 76 (03/19/17 1144)  Resp: 15 (03/19/17 1144)  BP: 130/61 (03/19/17 0800)  SpO2: 95 % (03/19/17 1144) Vital Signs (24h Range):  Temp:  [97 °F (36.1 °C)-98.4 °F (36.9 °C)] 97.9 °F (36.6 °C)  Pulse:  [73-90] 76  Resp:  [14-21] 15  SpO2:  [93 %-99 %] 95 %  BP: ()/(50-61) 130/61     Weight: 67.2 kg (148 lb 3.2 oz)  Body mass index is 26.25 kg/(m^2).     SpO2: 95 %  O2 Device (Oxygen Therapy): room air      Intake/Output Summary (Last 24 hours) at 03/19/17 1225  Last data filed at 03/19/17 0600   Gross per 24 hour   Intake           309.92 ml   Output             1650 ml   Net         -1340.08 ml       Lines/Drains/Airways     Peripheral Intravenous Line                 Peripheral IV - Single Lumen 03/18/17 Left Hand 1 day         Peripheral IV - Single Lumen 03/19/17 0515 Right Forearm less than 1 day                 Physical Exam   Constitutional: She appears well-developed and well-nourished. No distress.   HENT:   Head: Normocephalic and atraumatic.   Neck: No JVD present.   Cardiovascular: Normal rate, regular rhythm and intact distal pulses.    Murmur heard.   Harsh mid to late systolic murmur is present with a grade of 4/6  at the upper right sternal border radiating to the neck  Pulmonary/Chest: Effort normal and breath sounds normal. No respiratory distress. She has no wheezes.   Abdominal: Soft. Bowel sounds are normal.   Musculoskeletal: She exhibits no edema.   Neurological: She is alert.   Skin: Skin is warm and dry. She is not diaphoretic. No erythema.   Psychiatric: She has a normal mood and affect. Her behavior is normal.   Vitals reviewed.      Significant Labs:   BMP:   Recent Labs  Lab 03/18/17  1452 03/19/17  0457   * 109    142   K 4.2 3.4*    106   CO2 19* 23   BUN 18 19   CREATININE 0.8 0.9   CALCIUM 9.6 9.3   MG  --  2.0   , CBC   Recent Labs  Lab 03/18/17  1843 03/19/17  0457   WBC 11.94 10.63   HGB 10.6* 10.3*   HCT 33.7* 32.0*    228   , INR   Recent Labs  Lab 03/18/17  1456   INR 1.1    and Troponin   Recent Labs  Lab 03/18/17  2136 03/18/17  2331 03/19/17  0457   TROPONINI 1.137* 1.195* 0.951*       Significant Imaging: X-Ray: CXR: X-Ray Chest 1 View (CXR): No results found for this visit on 03/18/17. and X-Ray Chest PA and Lateral (CXR):   Results for orders placed or performed during the hospital encounter of 03/18/17   X-Ray Chest PA And Lateral    Narrative    Comparison: 02/15/2017    Technique: PA and lateral views of the chest was obtained    Findings: Cardiac silhouette remains enlarged.  The pulmonary vasculature appears diffusely congested with prominence of the interstitium suggesting possible interstitial edema.  Similar to prior allowing for some differences in projection and technique. Visualized osseous structures demonstrate no acute abnormality.     Impression    Unchanged appearance of the chest as above.              Electronically signed by: SERGIO DUARTE MD  Date:     03/18/17  Time:    15:38      Assessment and Plan:     Brief HPI: no new issues overnite    * NSTEMI (non-ST elevated myocardial infarction)  Presumed CAD.  Now on ASA and plavix.  Reasonable to continue.  Can discontinue heparin.  Would contine statin.  Will add low dose beta-blocker for medical management.  Per patient's desires, continue with conservative management.      Essential hypertension  Currently controlled.  Goal for this [age]ogenarian and given severe AS is more liberal with goal of 150/90.      Severe aortic stenosis  Known severe AS on sequential echo's.  Likely also contributing to chest pain.  No other symptoms as of yet.  Rediscussed potential for TAVR evaluation, at present patient disinterested/undecided.      Acute on chronic diastolic heart failure  Signs of diastolic CHF/HF-pEF chronic on TTE.  Currently no signs of Acute decompensated CHF.  Is on lasix.        VTE Risk Mitigation         Ordered     Medium Risk of VTE  Once      03/18/17 1848     Place sequential compression device  Until discontinued      03/18/17 1848          Lester Chapman MD  Cardiology  [facility]

## 2017-03-19 NOTE — PLAN OF CARE
03/19/17 1050   Readmission Questionnaire   At the time of your discharge, did someone talk to you about what your health problems were? Yes   At the time of discharge, did someone talk to you about what to watch out for regarding worsening of your health problem? Yes   At the time of discharge, did someone talk to you about what to do if you experienced worsening of your health problem? Yes   At the time of discharge, did someone talk to you about which medication to take when you left the hospital and which ones to stop taking? Yes   At the time of discharge, did someone talk to you about when and where to follow up with a doctor after you left the hospital? Yes   What do you believe caused you to be sick enough to be re-admitted? an episode of chest pain    How often do you need to have someone help you when you read instructions, pamphlets, or other written material from your doctor or pharmacy? Never   Do you have problems taking your medications as prescribed? No   Do you have any problems affording any of  your prescribed medications? No   Do you have problems obtaining/receiving your medications? Yes   Does the patient have transportation to healthcare appointments? Yes   Lives With grandchild(teodoro);child(teodoro), adult  (daughter and grand daughter)   Living Arrangements house   Does the patient have family/friends to help with healtcare needs after discharge? yes   Who are your caregiver(s) and their phone number(s)? renarachel Edwards Velasquez [phone]   Does your caregiver provide all the help you need? Yes   Are you currently feeling confused? No   Are you currently having problems thinking? No   Are you currently having memory problems? No   Have you felt down, depressed, or hopeless? 0   Have you felt little interest or pleasure in doing things? 0   In the last 7 days, my sleep quality was: rene Haskins, RN Transitional Navigator  [phone]

## 2017-03-19 NOTE — SUBJECTIVE & OBJECTIVE
Interval History: no new events overnite    Review of Systems   Constitution: Negative for chills, decreased appetite, diaphoresis, fever, weakness and malaise/fatigue.   Cardiovascular: Positive for chest pain. Negative for claudication, cyanosis, dyspnea on exertion, irregular heartbeat, leg swelling, near-syncope, orthopnea, palpitations, paroxysmal nocturnal dyspnea and syncope.   Hematologic/Lymphatic: Negative for bleeding problem. Does not bruise/bleed easily.     Objective:     Vital Signs (Most Recent):  Temp: 97.9 °F (36.6 °C) (03/19/17 0800)  Pulse: 76 (03/19/17 1144)  Resp: 15 (03/19/17 1144)  BP: 130/61 (03/19/17 0800)  SpO2: 95 % (03/19/17 1144) Vital Signs (24h Range):  Temp:  [97 °F (36.1 °C)-98.4 °F (36.9 °C)] 97.9 °F (36.6 °C)  Pulse:  [73-90] 76  Resp:  [14-21] 15  SpO2:  [93 %-99 %] 95 %  BP: ()/(50-61) 130/61     Weight: 67.2 kg (148 lb 3.2 oz)  Body mass index is 26.25 kg/(m^2).     SpO2: 95 %  O2 Device (Oxygen Therapy): room air      Intake/Output Summary (Last 24 hours) at 03/19/17 1225  Last data filed at 03/19/17 0600   Gross per 24 hour   Intake           309.92 ml   Output             1650 ml   Net         -1340.08 ml       Lines/Drains/Airways     Peripheral Intravenous Line                 Peripheral IV - Single Lumen 03/18/17 Left Hand 1 day         Peripheral IV - Single Lumen 03/19/17 0515 Right Forearm less than 1 day                Physical Exam   Constitutional: She appears well-developed and well-nourished. No distress.   HENT:   Head: Normocephalic and atraumatic.   Neck: No JVD present.   Cardiovascular: Normal rate, regular rhythm and intact distal pulses.    Murmur heard.   Harsh mid to late systolic murmur is present with a grade of 4/6  at the upper right sternal border radiating to the neck  Pulmonary/Chest: Effort normal and breath sounds normal. No respiratory distress. She has no wheezes.   Abdominal: Soft. Bowel sounds are normal.   Musculoskeletal: She exhibits  no edema.   Neurological: She is alert.   Skin: Skin is warm and dry. She is not diaphoretic. No erythema.   Psychiatric: She has a normal mood and affect. Her behavior is normal.   Vitals reviewed.      Significant Labs:   BMP:   Recent Labs  Lab 03/18/17  1452 03/19/17  0457   * 109    142   K 4.2 3.4*    106   CO2 19* 23   BUN 18 19   CREATININE 0.8 0.9   CALCIUM 9.6 9.3   MG  --  2.0   , CBC   Recent Labs  Lab 03/18/17  1843 03/19/17  0457   WBC 11.94 10.63   HGB 10.6* 10.3*   HCT 33.7* 32.0*    228   , INR   Recent Labs  Lab 03/18/17  1456   INR 1.1    and Troponin   Recent Labs  Lab 03/18/17  2136 03/18/17  2331 03/19/17  0457   TROPONINI 1.137* 1.195* 0.951*       Significant Imaging: X-Ray: CXR: X-Ray Chest 1 View (CXR): No results found for this visit on 03/18/17. and X-Ray Chest PA and Lateral (CXR):   Results for orders placed or performed during the hospital encounter of 03/18/17   X-Ray Chest PA And Lateral    Narrative    Comparison: 02/15/2017    Technique: PA and lateral views of the chest was obtained    Findings: Cardiac silhouette remains enlarged.  The pulmonary vasculature appears diffusely congested with prominence of the interstitium suggesting possible interstitial edema.  Similar to prior allowing for some differences in projection and technique. Visualized osseous structures demonstrate no acute abnormality.    Impression    Unchanged appearance of the chest as above.              Electronically signed by: SERGIO DUARTE MD  Date:     03/18/17  Time:    15:38

## 2017-03-20 ENCOUNTER — TELEPHONE (OUTPATIENT)
Dept: CARDIOLOGY | Facility: CLINIC | Age: 82
End: 2017-03-20

## 2017-03-20 VITALS
TEMPERATURE: 97 F | SYSTOLIC BLOOD PRESSURE: 110 MMHG | OXYGEN SATURATION: 98 % | BODY MASS INDEX: 26.26 KG/M2 | HEIGHT: 63 IN | DIASTOLIC BLOOD PRESSURE: 51 MMHG | HEART RATE: 70 BPM | WEIGHT: 148.19 LBS | RESPIRATION RATE: 18 BRPM

## 2017-03-20 LAB
ALBUMIN SERPL BCP-MCNC: 3.3 G/DL
ALP SERPL-CCNC: 82 U/L
ALT SERPL W/O P-5'-P-CCNC: 11 U/L
ANION GAP SERPL CALC-SCNC: 12 MMOL/L
AORTIC VALVE REGURGITATION: ABNORMAL
AORTIC VALVE STENOSIS: ABNORMAL
AST SERPL-CCNC: 18 U/L
BASOPHILS # BLD AUTO: 0.05 K/UL
BASOPHILS NFR BLD: 0.5 %
BILIRUB SERPL-MCNC: 0.9 MG/DL
BUN SERPL-MCNC: 18 MG/DL
CALCIUM SERPL-MCNC: 9.8 MG/DL
CHLORIDE SERPL-SCNC: 105 MMOL/L
CO2 SERPL-SCNC: 24 MMOL/L
CREAT SERPL-MCNC: 0.8 MG/DL
DIASTOLIC DYSFUNCTION: YES
DIFFERENTIAL METHOD: ABNORMAL
EOSINOPHIL # BLD AUTO: 0.4 K/UL
EOSINOPHIL NFR BLD: 4.2 %
ERYTHROCYTE [DISTWIDTH] IN BLOOD BY AUTOMATED COUNT: 15.2 %
EST. GFR  (AFRICAN AMERICAN): >60 ML/MIN/1.73 M^2
EST. GFR  (NON AFRICAN AMERICAN): >60 ML/MIN/1.73 M^2
ESTIMATED PA SYSTOLIC PRESSURE: 33.25
GLUCOSE SERPL-MCNC: 101 MG/DL
HCT VFR BLD AUTO: 34.1 %
HGB BLD-MCNC: 10.7 G/DL
LYMPHOCYTES # BLD AUTO: 2.7 K/UL
LYMPHOCYTES NFR BLD: 27.5 %
MCH RBC QN AUTO: 30 PG
MCHC RBC AUTO-ENTMCNC: 31.4 %
MCV RBC AUTO: 96 FL
MITRAL VALVE MOBILITY: ABNORMAL
MITRAL VALVE REGURGITATION: ABNORMAL
MITRAL VALVE STENOSIS: ABNORMAL
MONOCYTES # BLD AUTO: 0.7 K/UL
MONOCYTES NFR BLD: 7.5 %
NEUTROPHILS # BLD AUTO: 5.9 K/UL
NEUTROPHILS NFR BLD: 60.3 %
PLATELET # BLD AUTO: 242 K/UL
PMV BLD AUTO: 10.5 FL
POTASSIUM SERPL-SCNC: 4.3 MMOL/L
PROT SERPL-MCNC: 6.8 G/DL
RBC # BLD AUTO: 3.57 M/UL
RETIRED EF AND QEF - SEE NOTES: 55 (ref 55–65)
SODIUM SERPL-SCNC: 141 MMOL/L
TROPONIN I SERPL DL<=0.01 NG/ML-MCNC: 0.5 NG/ML
TROPONIN I SERPL DL<=0.01 NG/ML-MCNC: 0.5 NG/ML
WBC # BLD AUTO: 9.72 K/UL

## 2017-03-20 PROCEDURE — 25000003 PHARM REV CODE 250: Performed by: INTERNAL MEDICINE

## 2017-03-20 PROCEDURE — 84484 ASSAY OF TROPONIN QUANT: CPT

## 2017-03-20 PROCEDURE — 93306 TTE W/DOPPLER COMPLETE: CPT | Mod: 26,,, | Performed by: INTERNAL MEDICINE

## 2017-03-20 PROCEDURE — 36415 COLL VENOUS BLD VENIPUNCTURE: CPT

## 2017-03-20 PROCEDURE — 93306 TTE W/DOPPLER COMPLETE: CPT

## 2017-03-20 PROCEDURE — 85025 COMPLETE CBC W/AUTO DIFF WBC: CPT

## 2017-03-20 PROCEDURE — 93005 ELECTROCARDIOGRAM TRACING: CPT

## 2017-03-20 PROCEDURE — 63600175 PHARM REV CODE 636 W HCPCS: Performed by: STUDENT IN AN ORGANIZED HEALTH CARE EDUCATION/TRAINING PROGRAM

## 2017-03-20 PROCEDURE — 94761 N-INVAS EAR/PLS OXIMETRY MLT: CPT

## 2017-03-20 PROCEDURE — 80053 COMPREHEN METABOLIC PANEL: CPT

## 2017-03-20 PROCEDURE — 99232 SBSQ HOSP IP/OBS MODERATE 35: CPT | Mod: ,,, | Performed by: NURSE PRACTITIONER

## 2017-03-20 PROCEDURE — 63600175 PHARM REV CODE 636 W HCPCS: Performed by: HOSPITALIST

## 2017-03-20 PROCEDURE — 25000003 PHARM REV CODE 250: Performed by: HOSPITALIST

## 2017-03-20 RX ORDER — METOPROLOL SUCCINATE 25 MG/1
25 TABLET, EXTENDED RELEASE ORAL DAILY
Qty: 90 TABLET | Refills: 3 | Status: SHIPPED | OUTPATIENT
Start: 2017-03-20 | End: 2017-05-31 | Stop reason: SDUPTHER

## 2017-03-20 RX ORDER — ACETAMINOPHEN 325 MG/1
325 TABLET ORAL ONCE
Status: COMPLETED | OUTPATIENT
Start: 2017-03-20 | End: 2017-03-20

## 2017-03-20 RX ORDER — CYANOCOBALAMIN 1000 UG/ML
100 INJECTION, SOLUTION INTRAMUSCULAR; SUBCUTANEOUS DAILY
Status: DISCONTINUED | OUTPATIENT
Start: 2017-03-20 | End: 2017-03-20

## 2017-03-20 RX ORDER — CYANOCOBALAMIN 1000 UG/ML
1000 INJECTION, SOLUTION INTRAMUSCULAR; SUBCUTANEOUS DAILY
Status: DISCONTINUED | OUTPATIENT
Start: 2017-03-21 | End: 2017-03-20

## 2017-03-20 RX ORDER — CLOPIDOGREL BISULFATE 75 MG/1
75 TABLET ORAL DAILY
Qty: 90 TABLET | Refills: 3 | Status: ON HOLD | OUTPATIENT
Start: 2017-03-20 | End: 2018-07-29 | Stop reason: HOSPADM

## 2017-03-20 RX ORDER — CYANOCOBALAMIN 1000 UG/ML
1000 INJECTION, SOLUTION INTRAMUSCULAR; SUBCUTANEOUS DAILY
Status: DISCONTINUED | OUTPATIENT
Start: 2017-03-20 | End: 2017-03-20 | Stop reason: HOSPADM

## 2017-03-20 RX ORDER — LANOLIN ALCOHOL/MO/W.PET/CERES
1000 CREAM (GRAM) TOPICAL DAILY
Qty: 90 TABLET | Refills: 3 | Status: SHIPPED | OUTPATIENT
Start: 2017-03-20

## 2017-03-20 RX ORDER — FUROSEMIDE 20 MG/1
20 TABLET ORAL DAILY
Status: DISCONTINUED | OUTPATIENT
Start: 2017-03-21 | End: 2017-03-20 | Stop reason: HOSPADM

## 2017-03-20 RX ADMIN — ACETAMINOPHEN 325 MG: 325 TABLET ORAL at 05:03

## 2017-03-20 RX ADMIN — BUSPIRONE HYDROCHLORIDE 15 MG: 5 TABLET ORAL at 09:03

## 2017-03-20 RX ADMIN — METOPROLOL SUCCINATE 25 MG: 25 TABLET, FILM COATED, EXTENDED RELEASE ORAL at 09:03

## 2017-03-20 RX ADMIN — CLOPIDOGREL BISULFATE 75 MG: 75 TABLET ORAL at 09:03

## 2017-03-20 RX ADMIN — ATORVASTATIN CALCIUM 40 MG: 40 TABLET, FILM COATED ORAL at 09:03

## 2017-03-20 RX ADMIN — FUROSEMIDE 40 MG: 10 INJECTION, SOLUTION INTRAMUSCULAR; INTRAVENOUS at 09:03

## 2017-03-20 RX ADMIN — ASPIRIN 81 MG 81 MG: 81 TABLET ORAL at 09:03

## 2017-03-20 RX ADMIN — CYANOCOBALAMIN 100 MCG: 1000 INJECTION, SOLUTION INTRAMUSCULAR; SUBCUTANEOUS at 09:03

## 2017-03-20 RX ADMIN — LEVOTHYROXINE SODIUM 50 MCG: 50 TABLET ORAL at 05:03

## 2017-03-20 NOTE — PROGRESS NOTES
.Pharmacy New Medication Education    Patient accepted medication education.    Pharmacy educated patient on the following medications, using the teach-back method.   Asa  Lipitor  Buspar  Plavix  Lasix  Synthroid  Toprol    Learners of pharmacy medication education included:  Patient     Patient +/- learner response:  verbalize understanding

## 2017-03-20 NOTE — PLAN OF CARE
Problem: Patient Care Overview  Goal: Plan of Care Review  Outcome: Ongoing (interventions implemented as appropriate)  Pt in NAD, bed in lowest position, call light within reach, siderails up x2. Pt rested in bed throughout the night. No complaints of pain or discomfort besides hip pain this morning. VS stable. Frequently reoriented to situation as pt states she cant remember why she is here. Medication administered as ordered. Safety maintained. Encouraged to call for any needs or concerns. Will continue to monitor and reassess.

## 2017-03-20 NOTE — PROGRESS NOTES
[facility] Resident [facility] Progress Note    Subjective:      Janine Awad is a 89 y.o.  female who is being followed by the [facility] service at [facility] for NSTEMI.     This morning Ms. Awad denies chest pain, shortness of breath, n/v. She has been tolerating cardiac diet.      Objective:   Last 24 Hour Vital Signs:  BP  Min: 106/57  Max: 130/61  Temp  Av °F (36.7 °C)  Min: 97.5 °F (36.4 °C)  Max: 99 °F (37.2 °C)  Pulse  Av.4  Min: 67  Max: 90  Resp  Av.9  Min: 15  Max: 18  SpO2  Av.2 %  Min: 92 %  Max: 95 %  I/O last 3 completed shifts:  In: 549.9 [P.O.:480; I.V.:69.9]  Out: 5700 [Urine:5700]    Physical Examination:  General appearance: alert, cooperative and no distress  Head: Normocephalic, without obvious abnormality, atraumatic  Eyes: resolving subconjunctival hemorrhage in left eye   Throat: MMM  Lungs: normal effort, improved crackles in b/l bases   Heart: RRR, normal s1/s2, III/VI MAXIMINO  Abdomen: soft, non-tender; bowel sounds normal; no masses,  no organomegaly  Extremities: trace pitting edema   Pulses: 2+ and symmetric      Laboratory:  Laboratory Data Reviewed: yes  Pertinent Findings:  H/H 10.7/34.1  Iron panel WNL  B12 262    Other Results:  EKG (my interpretation): unchanged from previous tracings.    Radiology Data Reviewed: yes  Pertinent Findings:  No new imaging     Current Medications:     Infusions:        Scheduled:   aspirin  81 mg Oral Daily    atorvastatin  40 mg Oral Daily    busPIRone  15 mg Oral BID    clopidogrel  75 mg Oral Daily    cyanocobalamin  100 mcg Intramuscular Daily    furosemide  40 mg Intravenous Daily    levothyroxine  50 mcg Oral Before breakfast    metoprolol succinate  25 mg Oral Daily        PRN:  dextrose 50%, dextrose 50%, glucagon (human recombinant), glucose, glucose    Antibiotics and Day Number of Therapy:  None    Lines and Day Number of Therapy:  PIV    Assessment:     Janine Awad is a 89  y.o.female with     Plan:     NSTEMI  -Troponin elevated to 0.271, EKG unchanged from prior   -Had similar presentation on 2/15 to [facility]. Pt refused cath and was treated with medical management. Heparin ggt discontinued after subconjunctival hemorrhage   -Discussed ACS treatment with pt, she is amenable to medical management of ACS and possible LHC if necessary  -Given 325 mg ASA in the ED   -Discussed case with Dr. Chapman who is Ms. Awad's cardiologist and he is in agreement with starting dual antiplatelet therapy and heparin ggt. Cardiology following   -Discontinued heparin ggt yesterday, added toprolol XL for maximal medical management   -Troponin peaked at 1.195, stopped trending  -Continue plavix, ASA, BB, statin      Acute on Chronic Heart Failure Preserved EF  -BNP elevated to 1375 on admission  -Last echo 1 month ago (2/2) with normal EF, diastolic dysfunction, severe aortic stenosis   -Chest x-ray and physical exam with evidence of fluid overload   -Will diurese with IV lasix, good response to dose overnight. Will give additional 40 mg IV today   -Strict I/Os, daily weights     Severe Aortic Stenosis   -Severe AS noted on prior echos, repeat pending   -TAVR discussed with pt previously as an outpt and she has stated that she does not want invasive measures taken  -Diuresis as above  -Mechanical trauma of RBCs from AS likely cause of elevated bili and contributing to anemia      Coronary Artery Disease    -Known disease, however pt has denied LHC in the past   -Continue home ASA, atorvastatin  -Holding home imdur for low blood pressure      Normocytic Anemia with B12 deficiency    -Hgb 10.3, MCV 93, b/l ~11-12  -Hgb 10.7  -B12 low, iron panel and folate WNL  -Replace B12 with IM B12  -Mechanical trauma from severe AS also likely contributing     Hypokalemia   -3.4 3/19  -Mg WNL  -Repleted with PO K    Hypoalbuminemia  -3.4 on admission labs, today 3.2   -Encourage improved nutrition       Hyperbilirubinemia   -1.1 on admission labs, stable on follow up  -Likely mechanical trauma from severe AS     Hyperlipidemia   -Lipid panel from 2/17 Total cholesterol 232, triglycerides 164, , HDL 55  -Continue atorvastatin      Hypothyroidism   -TSH 0.012   -Will continue home synthroid       Hypertension   -BP low on admission, 101/52  -Holding Imdur and losartan   -Monitor and add medications as needed      Dementia   -No acute issues   -Monitor     Anxiety  -No acute issue    -Continue home Buspar     GERD  -Continue home PPI     F: None  E: Monitor and replete as needed   N: Cardiac diet      PPx: SCD     Dispo: possible discharge home today     Tom Patel  [facility] HO-I  [facility] Hospitalist Service Team B    [facility] Medicine Hospitalist Pager numbers:   [facility] Hospitalist Medicine Team A (Demarcus/Frank): [phone]  [facility] Hospitalist Medicine Team B (Azul/Katarzyna):  [phone]

## 2017-03-20 NOTE — PLAN OF CARE
Problem: Patient Care Overview  Goal: Plan of Care Review  Outcome: Ongoing (interventions implemented as appropriate)  Received pt on RA; no distress noted. Will cont to monitor.

## 2017-03-20 NOTE — CONSULTS
[facility]  Cardiology  Consult Note    Patient Name: Janine Awad  MRN: [id number]  Admission Date: 3/18/2017  Hospital Length of Stay: 2 days  Code Status: Full Code   Attending Provider: Gerry Liang MD   Consulting Provider: Nito Hartman NP  Primary Care Physician: Oleg Enciso DO  Principal Problem:NSTEMI (non-ST elevated myocardial infarction)    Patient information was obtained from patient, past medical records and ER records.     Inpatient consult to [facility]  Consult performed by: NITO HARTMAN  Consult ordered by: GREGORIA MANN  Reason for consult: NSTEMI, severe AS        Subjective:     Chief Complaint:    Chest Pain    HPI: 88 yo F with severe AS, anxiety, Alzheimer's dimentia, recently admitted (02/15/2017) to  for CP lasting for more than 1 hour, radiating down her L arm and into her back and shoulder. Patient with known AS and has been evaluated for TAVR in the past which she refused and continues to decline. Recent admission LHC +/- PCI was recommended and patient declined any invasive procedures including catheterization. She was started on asa, Plavix and heparin gtt for ACS at [facility] which was stopped due to subconjunctival hemorrhage. She was seen in our clinic on 03/03/2017 where her daughter reported intermittent chest pain. Patient noted confused and could not recall discussion from minute to minute. Options for continued medical management, referral to TAVR clinic if interested and LHC were discussed if med management failed. Medical management was decided for the time being and the daughter was hesitant to resume Plavix given subconjunctival hemorrhage. Her request was honored.   Patient presented to the ED on 03/18/2017 with chest pain x 1 day. Patient woke her daughter up complaining of chest pain. She was admitted to telemetry, started on ASA and plavix plus heparin. Initial troponin 1.2, down to 0.9 on last check. Patient  denies pain at the present but has difficulty remembering minute to minute due to severe dementia. Heparin gtt has been discontinued and she is tolerating medical management.      Past Medical History:   Diagnosis Date    Acquired hypothyroidism     Closed displaced intertrochanteric fracture of left femur s/p IM nail on 6/5/2016 6/5/2016    Coronary artery disease involving native coronary artery of native heart without angina pectoris     Essential hypertension     GERD (gastroesophageal reflux disease)     Hx of colonic polyps     Hyperlipidemia     Macular degeneration     Mitral valve stenosis     Severe aortic stenosis 9/22/2014    Syncope 1/5/2015       Past Surgical History:   Procedure Laterality Date    INTERTROCHANTERIC HIP FRACTURE SURGERY Left 06/05/2016    IM nail       Review of patient's allergies indicates:   Allergen Reactions    Codeine Nausea And Vomiting     chills    Lisinopril Other (See Comments)     cough       No current facility-administered medications on file prior to encounter.      Current Outpatient Prescriptions on File Prior to Encounter   Medication Sig    artificial tears (ISOPTO TEARS) 0.5 % ophthalmic solution Place 1 drop into the left eye 4 (four) times daily.    aspirin 81 MG Chew Take 1 tablet (81 mg total) by mouth once daily.    atorvastatin (LIPITOR) 40 MG tablet Take 1 tablet (40 mg total) by mouth once daily.    busPIRone (BUSPAR) 15 MG tablet Take 1 tablet (15 mg total) by mouth 2 (two) times daily.    COCONUT OIL ORAL Take by mouth.    estrogens, conjugated, (PREMARIN) 0.3 MG tablet Take 0.3 mg by mouth once daily.    fish oil-omega-3 fatty acids 300-1,000 mg capsule Take 2 g by mouth once daily.    furosemide (LASIX) 20 MG tablet Take 1 tablet (20 mg total) by mouth once daily.    GINSENG ORAL Take by mouth.    hydrOXYzine HCl (ATARAX) 25 MG tablet Take 1 tablet (25 mg total) by mouth nightly as needed (insomnia).    isosorbide mononitrate  (IMDUR) 30 MG 24 hr tablet Take 1 tablet (30 mg total) by mouth once daily.    levothyroxine (SYNTHROID) 50 MCG tablet Take 1 tablet (50 mcg total) by mouth before breakfast.    losartan (COZAAR) 25 MG tablet Take 0.5 tablets (12.5 mg total) by mouth once daily. Hold for SBP < 110.    omeprazole (PRILOSEC) 20 MG capsule Take 20 mg by mouth once daily.     ondansetron (ZOFRAN) 4 MG tablet Take 1 tablet (4 mg total) by mouth every 8 (eight) hours as needed for Nausea.    vitamin D 1000 units Tab Take 185 mg by mouth once daily.    ZINC ACETATE ORAL Take by mouth.     Family History     Problem Relation (Age of Onset)    Cancer Mother    Heart disease Mother, Father    No Known Problems Sister, Brother, Maternal Grandmother, Maternal Grandfather, Paternal Grandmother, Paternal Grandfather, Maternal Aunt, Maternal Uncle, Paternal Aunt, Paternal Uncle    Skin cancer Father        Social History Main Topics    Smoking status: Never Smoker    Smokeless tobacco: Never Used    Alcohol use 0.6 oz/week     1 Standard drinks or equivalent per week      Comment: rare     Drug use: No    Sexual activity: Not on file     Review of Systems   Unable to perform ROS: dementia     Objective:     Vital Signs (Most Recent):  Temp: 97.7 °F (36.5 °C) (03/20/17 0800)  Pulse: 70 (03/20/17 0800)  Resp: 18 (03/20/17 0800)  BP: 129/60 (03/20/17 0800)  SpO2: (!) 94 % (03/20/17 0800) Vital Signs (24h Range):  Temp:  [97.5 °F (36.4 °C)-99 °F (37.2 °C)] 97.7 °F (36.5 °C)  Pulse:  [67-90] 70  Resp:  [15-18] 18  SpO2:  [92 %-95 %] 94 %  BP: (106-129)/(55-62) 129/60     Weight: 67.2 kg (148 lb 3.2 oz)  Body mass index is 26.25 kg/(m^2).    SpO2: (!) 94 %  O2 Device (Oxygen Therapy): room air      Intake/Output Summary (Last 24 hours) at 03/20/17 1006  Last data filed at 03/20/17 0400   Gross per 24 hour   Intake              240 ml   Output             3650 ml   Net            -3410 ml       Lines/Drains/Airways     Peripheral Intravenous  Line                 Peripheral IV - Single Lumen 03/19/17 0515 Right Forearm 1 day                Physical Exam   Constitutional: No distress.   HENT:   Head: Normocephalic and atraumatic.   Eyes: Right eye exhibits no discharge. Left eye exhibits no discharge.   Neck: No JVD present.   Cardiovascular: Normal rate and regular rhythm.    Murmur heard.  Pulmonary/Chest: Effort normal and breath sounds normal.   Abdominal: Soft. Bowel sounds are normal.   Musculoskeletal: She exhibits no edema.   Neurological: She is alert.   Skin: Skin is warm and dry. She is not diaphoretic.   Psychiatric: Cognition and memory are impaired. She exhibits a depressed mood. She exhibits abnormal recent memory and abnormal remote memory.       Significant Labs:   CMP   Recent Labs  Lab 03/18/17  1452 03/19/17  0457    142   K 4.2 3.4*    106   CO2 19* 23   * 109   BUN 18 19   CREATININE 0.8 0.9   CALCIUM 9.6 9.3   PROT 7.1 6.7   ALBUMIN 3.4* 3.2*   BILITOT 1.1* 1.1*   ALKPHOS 93 84   AST 25 21   ALT 12 11   ANIONGAP 15 13   ESTGFRAFRICA >60 >60   EGFRNONAA >60 57*   , CBC   Recent Labs  Lab 03/18/17  1843 03/19/17  0457 03/20/17  0708   WBC 11.94 10.63 9.72   HGB 10.6* 10.3* 10.7*   HCT 33.7* 32.0* 34.1*    228 242   , INR   Recent Labs  Lab 03/18/17  1456   INR 1.1   , Troponin   Recent Labs  Lab 03/18/17  2136 03/18/17  2331 03/19/17  0457   TROPONINI 1.137* 1.195* 0.951*    and All pertinent lab results from the last 24 hours have been reviewed.    Significant Imaging: Echocardiogram:   2D echo with color flow doppler:   Results for orders placed or performed during the hospital encounter of 02/01/17   2D echo with color flow doppler   Result Value Ref Range    EF 55 55 - 65    Mitral Valve Regurgitation MODERATE (A)     Diastolic Dysfunction Yes (A)     Aortic Valve Regurgitation TRIVIAL     Aortic Valve Stenosis SEVERE (A)     Mitral Valve Mobility MILDLY RESTRICTED     Tricuspid Valve Regurgitation  TRIVIAL TO MILD     and X-Ray: CXR: X-Ray Chest 1 View (CXR): No results found for this visit on 03/18/17.  Assessment and Plan:     Active Diagnoses:    Diagnosis Date Noted POA    PRINCIPAL PROBLEM:  NSTEMI (non-ST elevated myocardial infarction) [I21.4]/ CAD Patient presented to the ED with reports of chest pain; troponin elevated and peaked at 1.2, trending down at this time; Patient treated medically per patient and family wishes; Continue asa, statin, plavix, Imdur and BB  03/18/2017 Yes    Acute on chronic diastolic heart failure [I50.33] EF 55%, DD, trivial AR, severe AS on echo 02/02/2017; no ADHF on exam she is euvolemic; takes lasix 20 mg at home- continued; continue conservative treatment as outlined above  03/18/2017 Yes    Late onset Alzheimer's disease without behavioral disturbance [G30.1, F02.80]  ? If hospice has been discussed with family; no family at bedside at present 08/04/2016 Yes    Severe aortic stenosis [I35.0] She has been seen by TAVR clinic and declined; continue to honor wishes and treat conservatively 09/22/2014 Yes    Essential hypertension [I10] -120s; continue BB  Yes      Problems Resolved During this Admission:    Diagnosis Date Noted Date Resolved POA       VTE Risk Mitigation         Ordered     Medium Risk of VTE  Once      03/18/17 1848     Place sequential compression device  Until discontinued      03/18/17 1848          Thank you for your consult. I will follow-up with patient. Please contact us if you have any additional questions.    Nito Shultz NP  Cardiology   [facility]

## 2017-03-20 NOTE — DISCHARGE SUMMARY
"[facility] Medicine Discharge Summary    Primary Team: [facility] Medicine Team B  Attending Physician: Gerry Liang MD  Resident: Dr Milly Bell  Intern: Dr Tom Patel    Date of Admit: 3/18/2017  Date of Discharge: 3/20/2017    Discharge to: home with HHPT/OT  Condition: stable    Discharge Diagnoses     NSTEMI with CAD  Acute on chronic diastolic HF  Severe AS  B12 deficiency anemia  HLD  Hypothyroidism  HTN  Dementia  Anxiety  GERD    Consultants and Procedures     Consultants:  Cardiology    Procedures:   None    Brief History of Present Illness     Janine Awad is a 89 y.o. female with CAD, severe AS, HTN, HLD and dementia presenting with chest pain for 1 day. Due to pts dementia history is limited, daughter is able to provide some history. On the night prior to admission Ms. Awad woke her daughter up complaining of chest pain. She did not characterize the pain, but she pointed to the center of her chest. Her daughter was unsure if this was cardiac or anxiety so she gave her a Buspar and an ASA. The pain resolved and she went back to bed. On the morning of admit, she again complained of chest pain. This time the daughter notes that Ms. Awad was also "breathing hard" when she was having the pain. No complaints of nausea or radiating pain. Ms. Awad cannot recall if this pain is similar to her prior cardiac events. The pain resolved prior to EMS reaching the house, but her daughter felt that since she had pain twice in one day she should come to the hospital to be evaluated. Ms. Awad's daughter denies that her mom had any syncopal events, recent illness, fevers, diarrhea, dysuria.     Hospital Course By Problem with Pertinent Findings     NSTEMI with CAD  Patient presented with chest pain and peak troponin of 1.19 that down-trended. No acute EKG changes. Cardiology was consulted. Patient was started on a heparin infusion, and Plavix loaded. Her home aspirin and statin were resumed. She " was started on a beta-blocker and Plavix. She will resume Imdur. She remains non-amenable to coronary angiogram. Continue outpatient follow-up with her cardiologist Dr. Chapman.     Acute on Chronic Heart Failure, Preserved EF  Patient presented with a BNP elevated to 1375 as well as pulmonary edema. Echo unchanged with normal EF, diastolic dysfunction, severe aortic stenosis, and moderate mitral regurgitation. She was diuresed with IV Lasix 40 mg daily, and was net negative 5 liters by the time of discharge. She will be continued on her home Lasix dose of 20 mg daily, per Cardiology's recommendation. Continue low sodium diet.     Severe Aortic Stenosis   Refuses TAVR. Resume outpatient Cardiology follow-up.        Normocytic Anemia with B12 deficiency    Hemoglobin has been stable at ~10 without evidence of bleeding. Anemia workup consistent with B12 deficiency. Will start oral supplementation.     Hyperlipidemia   Lipid panel from 2/17 with total cholesterol 232, triglycerides 164, , HDL 55. Continue atorvastatin       Hypothyroidism   TSH 0.012. Continue home synthroid       Hypertension   Controlled. Resume home Imdur and Lasix. She was started on Toprol XL for NSTEMI as above. Losartan was discontinued as her BP's were low-normal.       Dementia   No acute issues       Anxiety  No acute issues. Continue home Buspar      GERD  No acute issues. Continue home PPI    Discharge Medications        Medication List      START taking these medications          clopidogrel 75 mg tablet   Commonly known as:  PLAVIX   Take 1 tablet (75 mg total) by mouth once daily.       cyanocobalamin 1000 MCG tablet   Commonly known as:  VITAMIN B-12   Take 1 tablet (1,000 mcg total) by mouth once daily.       metoprolol succinate 25 MG 24 hr tablet   Commonly known as:  TOPROL-XL   Take 1 tablet (25 mg total) by mouth once daily.         CONTINUE taking these medications          artificial tears 0.5 % ophthalmic solution    Commonly known as:  ISOPTO TEARS   Place 1 drop into the left eye 4 (four) times daily.       aspirin 81 MG Chew   Take 1 tablet (81 mg total) by mouth once daily.       atorvastatin 40 MG tablet   Commonly known as:  LIPITOR   Take 1 tablet (40 mg total) by mouth once daily.       busPIRone 15 MG tablet   Commonly known as:  BUSPAR   Take 1 tablet (15 mg total) by mouth 2 (two) times daily.       COCONUT OIL ORAL       fish oil-omega-3 fatty acids 300-1,000 mg capsule       furosemide 20 MG tablet   Commonly known as:  LASIX   Take 1 tablet (20 mg total) by mouth once daily.       GINSENG ORAL       isosorbide mononitrate 30 MG 24 hr tablet   Commonly known as:  IMDUR   Take 1 tablet (30 mg total) by mouth once daily.       levothyroxine 50 MCG tablet   Commonly known as:  SYNTHROID   Take 1 tablet (50 mcg total) by mouth before breakfast.       omeprazole 20 MG capsule   Commonly known as:  PRILOSEC       ondansetron 4 MG tablet   Commonly known as:  ZOFRAN   Take 1 tablet (4 mg total) by mouth every 8 (eight) hours as needed for Nausea.       vitamin D 1000 units Tab       ZINC ACETATE ORAL         STOP taking these medications          estrogens (conjugated) 0.3 MG tablet   Commonly known as:  PREMARIN       hydrOXYzine HCl 25 MG tablet   Commonly known as:  ATARAX       losartan 25 MG tablet   Commonly known as:  COZAAR            Where to Get Your Medications      You can get these medications from any pharmacy     Bring a paper prescription for each of these medications     clopidogrel 75 mg tablet    cyanocobalamin 1000 MCG tablet    metoprolol succinate 25 MG 24 hr tablet             Discharge Information:   Diet:  Cardiac     Physical Activity:  As tolerated    Instructions:  1. Take all medications as prescribed  2. Keep all follow-up appointments  3. Return to the hospital or call your primary care physicians if any worsening symptoms such as chest pain, sob, or any other acute issues  occur.      Follow-Up Appointments:  PCP [place]   Cardiology Adela Bell  [facility] Internal Medicine, -[facility]

## 2017-03-20 NOTE — NURSING
Patient complains of shortness of breath and dizziness.  Per patient's daughter, these are the first symptoms each time she has had a cardiac event.  [facility]  Discharge orders were received, but will hold patient for observation at this time.

## 2017-03-20 NOTE — PLAN OF CARE
Plan for pt to d/c today after Tropinin level result and reviewed.    orders faxed to [facility] ([place], [place]) and update intake with  Referral to resume services. Pt has no DME needs on d/c.     Daughter Grace cline at bedside and updated of above. States mother had another episode of chest pain and they are currently waiting for MD to come.   Future Appointments  Date Time Provider Department Center   3/31/2017 10:20 AM Lester Chapman MD [facility] CARDIO [facility]   4/5/2017 2:00 PM Micky Palma MD [facility] NEURO [facility]   4/19/2017 1:00 PM LAB, APPOINTMENT [facility] LAB VNP [facility]   4/26/2017 1:40 PM Vishal Avendaño MD [facility] IM [place]           03/20/17 1528   Final Note   Assessment Type Final Discharge Note   Discharge Disposition Home   Discharge planning education complete? Yes   What phone number can be called within the next 1-3 days to see how you are doing after discharge? [phone]   Hospital Follow Up  Appt(s) scheduled? Yes   Discharge plans and expectations educations in teach back method with documentation complete? Yes   Offered [facility] -- Bedside Delivery? Yes   Discharge/Hospital Encounter Summary to ([facility]) PCP Yes   Referral to Outpatient Case Management complete? Yes   Referral to / orders for Home Health Complete? Yes   30 day supply of medicines given at discharge, if documented non-compliance / non-adherence? n/a   Any social issues identified prior to discharge? n/a   Did you assess the readiness or willingness of the family or caregiver to support self management of care? Yes   Right Care Referral Info   Post Acute Recommendation Home-care   Referral Type home health    Facility Name [facility]

## 2017-03-20 NOTE — PLAN OF CARE
Patient complained of dizziness, SOB, and mild chest pain while getting dressed and transporting herself after discharge. Discharge was canceled, and a stat troponin and EKG were ordered. The EKG was unchanged from prior, and the troponin level was decreased from prior level. The patient was again discharged at this point, but again complained of chest pain with exertion before leaving. I discussed with the patient her diagnosis of severe aortic stenosis and the association with her angina like symptoms. The patient verbalized understanding and again stated that she would like to continue with medical management only. She will have close follow-up with her cardiologist next week.

## 2017-03-20 NOTE — TELEPHONE ENCOUNTER
----- Message from Nito Shutlz NP sent at 3/20/2017 12:33 PM CDT -----  Please make patient an appointment with Dr Chapman within 1 week.

## 2017-04-19 ENCOUNTER — LAB VISIT (OUTPATIENT)
Dept: LAB | Facility: HOSPITAL | Age: 82
End: 2017-04-19
Attending: INTERNAL MEDICINE
Payer: MEDICARE

## 2017-04-19 DIAGNOSIS — I10 ESSENTIAL HYPERTENSION: ICD-10-CM

## 2017-04-19 DIAGNOSIS — E78.5 HYPERLIPIDEMIA, UNSPECIFIED HYPERLIPIDEMIA TYPE: ICD-10-CM

## 2017-04-19 DIAGNOSIS — E03.9 ACQUIRED HYPOTHYROIDISM: ICD-10-CM

## 2017-04-19 DIAGNOSIS — D63.8 ANEMIA OF CHRONIC DISEASE: ICD-10-CM

## 2017-04-19 LAB
ALBUMIN SERPL BCP-MCNC: 3.7 G/DL
ALP SERPL-CCNC: 82 U/L
ALT SERPL W/O P-5'-P-CCNC: 10 U/L
ANION GAP SERPL CALC-SCNC: 12 MMOL/L
AST SERPL-CCNC: 16 U/L
BASOPHILS # BLD AUTO: 0.06 K/UL
BASOPHILS NFR BLD: 0.8 %
BILIRUB SERPL-MCNC: 1 MG/DL
BUN SERPL-MCNC: 17 MG/DL
CALCIUM SERPL-MCNC: 9.4 MG/DL
CHLORIDE SERPL-SCNC: 104 MMOL/L
CHOLEST/HDLC SERPL: 3.9 {RATIO}
CO2 SERPL-SCNC: 27 MMOL/L
CREAT SERPL-MCNC: 0.8 MG/DL
DIFFERENTIAL METHOD: ABNORMAL
EOSINOPHIL # BLD AUTO: 0.1 K/UL
EOSINOPHIL NFR BLD: 1.5 %
ERYTHROCYTE [DISTWIDTH] IN BLOOD BY AUTOMATED COUNT: 15.6 %
EST. GFR  (AFRICAN AMERICAN): >60 ML/MIN/1.73 M^2
EST. GFR  (NON AFRICAN AMERICAN): >60 ML/MIN/1.73 M^2
GLUCOSE SERPL-MCNC: 107 MG/DL
HCT VFR BLD AUTO: 36.2 %
HDL/CHOLESTEROL RATIO: 25.9 %
HDLC SERPL-MCNC: 212 MG/DL
HDLC SERPL-MCNC: 55 MG/DL
HGB BLD-MCNC: 11.5 G/DL
LDLC SERPL CALC-MCNC: 106.8 MG/DL
LYMPHOCYTES # BLD AUTO: 2 K/UL
LYMPHOCYTES NFR BLD: 24.9 %
MCH RBC QN AUTO: 30.1 PG
MCHC RBC AUTO-ENTMCNC: 31.8 %
MCV RBC AUTO: 95 FL
MONOCYTES # BLD AUTO: 0.4 K/UL
MONOCYTES NFR BLD: 5.3 %
NEUTROPHILS # BLD AUTO: 5.3 K/UL
NEUTROPHILS NFR BLD: 67.1 %
NONHDLC SERPL-MCNC: 157 MG/DL
PLATELET # BLD AUTO: 224 K/UL
PMV BLD AUTO: 10.6 FL
POTASSIUM SERPL-SCNC: 4.3 MMOL/L
PROT SERPL-MCNC: 7.2 G/DL
RBC # BLD AUTO: 3.82 M/UL
SODIUM SERPL-SCNC: 143 MMOL/L
TRIGL SERPL-MCNC: 251 MG/DL
TSH SERPL DL<=0.005 MIU/L-ACNC: 0.64 UIU/ML
WBC # BLD AUTO: 7.88 K/UL

## 2017-04-19 PROCEDURE — 80061 LIPID PANEL: CPT

## 2017-04-19 PROCEDURE — 36415 COLL VENOUS BLD VENIPUNCTURE: CPT

## 2017-04-19 PROCEDURE — 80053 COMPREHEN METABOLIC PANEL: CPT

## 2017-04-19 PROCEDURE — 85025 COMPLETE CBC W/AUTO DIFF WBC: CPT

## 2017-04-19 PROCEDURE — 84443 ASSAY THYROID STIM HORMONE: CPT

## 2017-05-11 ENCOUNTER — HOSPITAL ENCOUNTER (EMERGENCY)
Facility: HOSPITAL | Age: 82
Discharge: HOME | End: 2017-05-11
Attending: EMERGENCY MEDICINE
Payer: MEDICARE

## 2017-05-11 VITALS
OXYGEN SATURATION: 97 % | SYSTOLIC BLOOD PRESSURE: 112 MMHG | WEIGHT: 148 LBS | DIASTOLIC BLOOD PRESSURE: 44 MMHG | BODY MASS INDEX: 26.22 KG/M2 | HEIGHT: 63 IN | RESPIRATION RATE: 20 BRPM | HEART RATE: 66 BPM

## 2017-05-11 DIAGNOSIS — R07.9 CHEST PAIN: ICD-10-CM

## 2017-05-11 LAB
ALBUMIN SERPL BCP-MCNC: 3.6 G/DL
ALP SERPL-CCNC: 77 U/L
ALT SERPL W/O P-5'-P-CCNC: 10 U/L
ANION GAP SERPL CALC-SCNC: 10 MMOL/L
AST SERPL-CCNC: 15 U/L
BASOPHILS # BLD AUTO: 0.06 K/UL
BASOPHILS NFR BLD: 0.5 %
BILIRUB SERPL-MCNC: 1.3 MG/DL
BUN SERPL-MCNC: 14 MG/DL
CALCIUM SERPL-MCNC: 9.2 MG/DL
CHLORIDE SERPL-SCNC: 106 MMOL/L
CO2 SERPL-SCNC: 25 MMOL/L
CREAT SERPL-MCNC: 0.7 MG/DL
DIFFERENTIAL METHOD: ABNORMAL
EOSINOPHIL # BLD AUTO: 0.2 K/UL
EOSINOPHIL NFR BLD: 1.7 %
ERYTHROCYTE [DISTWIDTH] IN BLOOD BY AUTOMATED COUNT: 15.9 %
EST. GFR  (AFRICAN AMERICAN): >60 ML/MIN/1.73 M^2
EST. GFR  (NON AFRICAN AMERICAN): >60 ML/MIN/1.73 M^2
GLUCOSE SERPL-MCNC: 108 MG/DL
HCT VFR BLD AUTO: 34.7 %
HGB BLD-MCNC: 11.3 G/DL
LYMPHOCYTES # BLD AUTO: 1.6 K/UL
LYMPHOCYTES NFR BLD: 14.1 %
MCH RBC QN AUTO: 30.1 PG
MCHC RBC AUTO-ENTMCNC: 32.6 %
MCV RBC AUTO: 93 FL
MONOCYTES # BLD AUTO: 0.5 K/UL
MONOCYTES NFR BLD: 4.5 %
NEUTROPHILS # BLD AUTO: 9 K/UL
NEUTROPHILS NFR BLD: 78.9 %
PLATELET # BLD AUTO: 218 K/UL
PMV BLD AUTO: 10.2 FL
POTASSIUM SERPL-SCNC: 3.9 MMOL/L
PROT SERPL-MCNC: 6.6 G/DL
RBC # BLD AUTO: 3.75 M/UL
SODIUM SERPL-SCNC: 141 MMOL/L
TROPONIN I SERPL DL<=0.01 NG/ML-MCNC: 0.03 NG/ML
TROPONIN I SERPL DL<=0.01 NG/ML-MCNC: 0.04 NG/ML
WBC # BLD AUTO: 11.38 K/UL

## 2017-05-11 PROCEDURE — 99283 EMERGENCY DEPT VISIT LOW MDM: CPT | Mod: GC,,, | Performed by: INTERNAL MEDICINE

## 2017-05-11 PROCEDURE — 99285 EMERGENCY DEPT VISIT HI MDM: CPT | Mod: 25

## 2017-05-11 PROCEDURE — 93005 ELECTROCARDIOGRAM TRACING: CPT

## 2017-05-11 PROCEDURE — 80053 COMPREHEN METABOLIC PANEL: CPT

## 2017-05-11 PROCEDURE — 84484 ASSAY OF TROPONIN QUANT: CPT

## 2017-05-11 PROCEDURE — 96374 THER/PROPH/DIAG INJ IV PUSH: CPT

## 2017-05-11 PROCEDURE — 85025 COMPLETE CBC W/AUTO DIFF WBC: CPT

## 2017-05-11 PROCEDURE — 93010 ELECTROCARDIOGRAM REPORT: CPT | Mod: ,,, | Performed by: INTERNAL MEDICINE

## 2017-05-11 PROCEDURE — 25000003 PHARM REV CODE 250: Performed by: EMERGENCY MEDICINE

## 2017-05-11 PROCEDURE — 63600175 PHARM REV CODE 636 W HCPCS: Performed by: EMERGENCY MEDICINE

## 2017-05-11 RX ORDER — FUROSEMIDE 10 MG/ML
20 INJECTION INTRAMUSCULAR; INTRAVENOUS
Status: COMPLETED | OUTPATIENT
Start: 2017-05-11 | End: 2017-05-11

## 2017-05-11 RX ORDER — ACETAMINOPHEN 325 MG/1
650 TABLET ORAL
Status: COMPLETED | OUTPATIENT
Start: 2017-05-11 | End: 2017-05-11

## 2017-05-11 RX ADMIN — FUROSEMIDE 20 MG: 10 INJECTION, SOLUTION INTRAMUSCULAR; INTRAVENOUS at 12:05

## 2017-05-11 RX ADMIN — ACETAMINOPHEN 650 MG: 325 TABLET ORAL at 11:05

## 2017-05-11 NOTE — ED NOTES
89 year old female presents to ed with chief complaint of midsternal chest pain that began at 0700 this am. Patient states pressure in chest that radiates to back. Patient denies sob. Awake alert able to identify name and  placed on cardiac monitor nurse will continue to monitor. Patients daughter at bedside

## 2017-05-11 NOTE — CONSULTS
[facility]  Cardiology  Consult Note    Patient Name: Janine Awad  MRN: [id number]  Admission Date: 5/11/2017  Hospital Length of Stay: 0 days  Code Status: Prior   Attending Provider: Awais Whatley MD   Consulting Provider: MADDIE Coates, ANP  Primary Care Physician: Oleg Enciso DO  Principal Problem:<principal problem not specified>    Patient information was obtained from patient and past medical records.     Consults  Subjective:     Chief Complaint:  Chest pain      HPI:   89yo female with history of AS, CAD, HTN, HLP, Alzheimers/dementia, hypothyroidism and chronic diastolic heart failure who presented to the ER with complaints of chest pain. Ms. Awad is accompanied by her daughter who helps provide her history. She complains of chest pain this morning after awakening that was described as a dull pain associated with radiation to her arm and nausea but was not associated with vomiting, SOB or diaphoresis. She is uncertain how long it lasted but notified her daughter and asked to come to the ER. She is followed by Dr. Chapman in the clinic and has been admitted for NSTEMI in the past with evidence of AS on echocardiogram but deferred invasive procedures. She was started on Imdur and her daughter feels her chest pain has improved but reports she is fatigued and is not as active as normal. She reports dizziness and weakness but denies orthopnea, syncope or peripheral edema     Past Medical History:   Diagnosis Date    Acquired hypothyroidism     Closed displaced intertrochanteric fracture of left femur s/p IM nail on 6/5/2016 6/5/2016    Coronary artery disease involving native coronary artery of native heart without angina pectoris     Essential hypertension     GERD (gastroesophageal reflux disease)     Hx of colonic polyps     Hyperlipidemia     Macular degeneration     Mitral valve stenosis     Severe aortic stenosis 9/22/2014    Syncope 1/5/2015        Past Surgical History:   Procedure Laterality Date    INTERTROCHANTERIC HIP FRACTURE SURGERY Left 06/05/2016    IM nail       Review of patient's allergies indicates:   Allergen Reactions    Codeine Nausea And Vomiting     chills    Lisinopril Other (See Comments)     cough       No current facility-administered medications on file prior to encounter.      Current Outpatient Prescriptions on File Prior to Encounter   Medication Sig    artificial tears (ISOPTO TEARS) 0.5 % ophthalmic solution Place 1 drop into the left eye 4 (four) times daily.    aspirin 81 MG Chew Take 1 tablet (81 mg total) by mouth once daily.    atorvastatin (LIPITOR) 40 MG tablet Take 1 tablet (40 mg total) by mouth once daily.    busPIRone (BUSPAR) 15 MG tablet Take 1 tablet (15 mg total) by mouth 2 (two) times daily.    clopidogrel (PLAVIX) 75 mg tablet Take 1 tablet (75 mg total) by mouth once daily.    COCONUT OIL ORAL Take by mouth.    cyanocobalamin (VITAMIN B-12) 1000 MCG tablet Take 1 tablet (1,000 mcg total) by mouth once daily.    fish oil-omega-3 fatty acids 300-1,000 mg capsule Take 2 g by mouth once daily.    furosemide (LASIX) 20 MG tablet Take 1 tablet (20 mg total) by mouth once daily.    GINSENG ORAL Take by mouth.    isosorbide mononitrate (IMDUR) 30 MG 24 hr tablet Take 1 tablet (30 mg total) by mouth once daily.    levothyroxine (SYNTHROID) 50 MCG tablet Take 1 tablet (50 mcg total) by mouth before breakfast.    metoprolol succinate (TOPROL-XL) 25 MG 24 hr tablet Take 1 tablet (25 mg total) by mouth once daily.    omeprazole (PRILOSEC) 20 MG capsule Take 20 mg by mouth once daily.     ondansetron (ZOFRAN) 4 MG tablet Take 1 tablet (4 mg total) by mouth every 8 (eight) hours as needed for Nausea.    vitamin D 1000 units Tab Take 185 mg by mouth once daily.    ZINC ACETATE ORAL Take by mouth.     Family History     Problem Relation (Age of Onset)    Cancer Mother    Heart disease Mother, Father    No  Known Problems Sister, Brother, Maternal Grandmother, Maternal Grandfather, Paternal Grandmother, Paternal Grandfather, Maternal Aunt, Maternal Uncle, Paternal Aunt, Paternal Uncle    Skin cancer Father        Social History Main Topics    Smoking status: Never Smoker    Smokeless tobacco: Never Used    Alcohol use 0.6 oz/week     1 Standard drinks or equivalent per week      Comment: rare     Drug use: No    Sexual activity: Not on file     Review of Systems   Constitution: Positive for weakness and malaise/fatigue.   Cardiovascular: Positive for chest pain. Negative for claudication, cyanosis, dyspnea on exertion, irregular heartbeat, leg swelling, near-syncope, orthopnea, palpitations, paroxysmal nocturnal dyspnea and syncope.   Respiratory: Negative for cough, shortness of breath and wheezing.    Gastrointestinal: Positive for vomiting. Negative for bloating, abdominal pain, constipation, diarrhea and nausea.   Neurological: Positive for dizziness.     Objective:     Vital Signs (Most Recent):  Pulse: 66 (05/11/17 1321)  Resp: 20 (05/11/17 1245)  BP: (!) 112/44 (05/11/17 1321)  SpO2: 97 % (05/11/17 1321) Vital Signs (24h Range):  Pulse:  [66-76] 66  Resp:  [20] 20  SpO2:  [97 %] 97 %  BP: (107-140)/(44-63) 112/44     Weight: 67.1 kg (148 lb)  Body mass index is 26.22 kg/(m^2).    SpO2: 97 %  O2 Device (Oxygen Therapy): nasal cannula    No intake or output data in the 24 hours ending 05/11/17 1450    Lines/Drains/Airways          No matching active lines, drains, or airways          Physical Exam   Constitutional: She is oriented to person, place, and time. She appears well-developed and well-nourished. No distress.   Cardiovascular: Normal rate and regular rhythm.    Murmur heard.  Pulmonary/Chest: Effort normal and breath sounds normal. No respiratory distress. She has no wheezes.   Abdominal: Soft. Bowel sounds are normal. She exhibits no distension. There is no tenderness.   Neurological: She is alert  and oriented to person, place, and time.   Skin: Skin is warm and dry.       Significant Labs:       Recent Labs  Lab 05/11/17  0925   WBC 11.38   RBC 3.75*   HGB 11.3*   HCT 34.7*      MCV 93   MCH 30.1   MCHC 32.6       Recent Labs  Lab 05/11/17  0925   CALCIUM 9.2   PROT 6.6      K 3.9   CO2 25      BUN 14   CREATININE 0.7   ALKPHOS 77   ALT 10   AST 15   BILITOT 1.3*       Recent Labs  Lab 05/11/17  1205   TROPONINI 0.039*       Significant Imaging: Echocardiogram:   2D echo with color flow doppler:   Results for orders placed or performed during the hospital encounter of 03/18/17   2D echo with color flow doppler   Result Value Ref Range    EF 55 55 - 65    Mitral Valve Regurgitation MODERATE (A)     Diastolic Dysfunction Yes (A)     Aortic Valve Regurgitation MILD     Aortic Valve Stenosis SEVERE (A)     Est. PA Systolic Pressure 33.25     Mitral Valve Mobility MILDLY RESTRICTED     Mitral Valve Stenosis MILD (A)      Assessment and Plan:     Severe aortic stenosis  Echo with normal LVEF with severe AS with mean gradient 70mmHg and KALYANI .59cm2;  Previously deferred evaluation for TAVR-followed by Dr. Chapman in clinic; long discussion today by Dr. Phelps with patient and daughter in regards to etiology of chest pain due to severe AS with discussion of continued chest pain without intervention; patient and daughter no reconsidering evaluation for TAVR-will follow up with Dr. Chapman as scheduled tomorrow for ongoing conversation; encouraged patient and daughter to discuss further with strong consideration of proceeding and discussed risk of mortality with no intervention    Chest pain at rest  Complaints of chest pain at rest; EKG with no acute changes; troponin .026 & .039; feel chest pain related to severe AS; ischemic etiology is also a concern but overall feel AS is more contributing factor; discussion previously by Dr. Chapman in regards to workup with [facility] with deferrment per patient and  daughter; now reconsidering; recommend continuation of current medical regimen with follow up tomorrow as scheduled with Dr. Chapman and do not feel admission is needed at this time       VTE Risk Mitigation     None          Thank you for your consult. I will follow-up with patient. Please contact us if you have any additional questions.    MADDIE Coates, ANP  Cardiology   [facility]

## 2017-05-11 NOTE — SUBJECTIVE & OBJECTIVE
Past Medical History:   Diagnosis Date    Acquired hypothyroidism     Closed displaced intertrochanteric fracture of left femur s/p IM nail on 6/5/2016 6/5/2016    Coronary artery disease involving native coronary artery of native heart without angina pectoris     Essential hypertension     GERD (gastroesophageal reflux disease)     Hx of colonic polyps     Hyperlipidemia     Macular degeneration     Mitral valve stenosis     Severe aortic stenosis 9/22/2014    Syncope 1/5/2015       Past Surgical History:   Procedure Laterality Date    INTERTROCHANTERIC HIP FRACTURE SURGERY Left 06/05/2016    IM nail       Review of patient's allergies indicates:   Allergen Reactions    Codeine Nausea And Vomiting     chills    Lisinopril Other (See Comments)     cough       No current facility-administered medications on file prior to encounter.      Current Outpatient Prescriptions on File Prior to Encounter   Medication Sig    artificial tears (ISOPTO TEARS) 0.5 % ophthalmic solution Place 1 drop into the left eye 4 (four) times daily.    aspirin 81 MG Chew Take 1 tablet (81 mg total) by mouth once daily.    atorvastatin (LIPITOR) 40 MG tablet Take 1 tablet (40 mg total) by mouth once daily.    busPIRone (BUSPAR) 15 MG tablet Take 1 tablet (15 mg total) by mouth 2 (two) times daily.    clopidogrel (PLAVIX) 75 mg tablet Take 1 tablet (75 mg total) by mouth once daily.    COCONUT OIL ORAL Take by mouth.    cyanocobalamin (VITAMIN B-12) 1000 MCG tablet Take 1 tablet (1,000 mcg total) by mouth once daily.    fish oil-omega-3 fatty acids 300-1,000 mg capsule Take 2 g by mouth once daily.    furosemide (LASIX) 20 MG tablet Take 1 tablet (20 mg total) by mouth once daily.    GINSENG ORAL Take by mouth.    isosorbide mononitrate (IMDUR) 30 MG 24 hr tablet Take 1 tablet (30 mg total) by mouth once daily.    levothyroxine (SYNTHROID) 50 MCG tablet Take 1 tablet (50 mcg total) by mouth before breakfast.     metoprolol succinate (TOPROL-XL) 25 MG 24 hr tablet Take 1 tablet (25 mg total) by mouth once daily.    omeprazole (PRILOSEC) 20 MG capsule Take 20 mg by mouth once daily.     ondansetron (ZOFRAN) 4 MG tablet Take 1 tablet (4 mg total) by mouth every 8 (eight) hours as needed for Nausea.    vitamin D 1000 units Tab Take 185 mg by mouth once daily.    ZINC ACETATE ORAL Take by mouth.     Family History     Problem Relation (Age of Onset)    Cancer Mother    Heart disease Mother, Father    No Known Problems Sister, Brother, Maternal Grandmother, Maternal Grandfather, Paternal Grandmother, Paternal Grandfather, Maternal Aunt, Maternal Uncle, Paternal Aunt, Paternal Uncle    Skin cancer Father        Social History Main Topics    Smoking status: Never Smoker    Smokeless tobacco: Never Used    Alcohol use 0.6 oz/week     1 Standard drinks or equivalent per week      Comment: rare     Drug use: No    Sexual activity: Not on file     Review of Systems   Constitution: Positive for weakness and malaise/fatigue.   Cardiovascular: Positive for chest pain. Negative for claudication, cyanosis, dyspnea on exertion, irregular heartbeat, leg swelling, near-syncope, orthopnea, palpitations, paroxysmal nocturnal dyspnea and syncope.   Respiratory: Negative for cough, shortness of breath and wheezing.    Gastrointestinal: Positive for vomiting. Negative for bloating, abdominal pain, constipation, diarrhea and nausea.   Neurological: Positive for dizziness.     Objective:     Vital Signs (Most Recent):  Pulse: 66 (05/11/17 1321)  Resp: 20 (05/11/17 1245)  BP: (!) 112/44 (05/11/17 1321)  SpO2: 97 % (05/11/17 1321) Vital Signs (24h Range):  Pulse:  [66-76] 66  Resp:  [20] 20  SpO2:  [97 %] 97 %  BP: (107-140)/(44-63) 112/44     Weight: 67.1 kg (148 lb)  Body mass index is 26.22 kg/(m^2).    SpO2: 97 %  O2 Device (Oxygen Therapy): nasal cannula    No intake or output data in the 24 hours ending 05/11/17  1450    Lines/Drains/Airways          No matching active lines, drains, or airways          Physical Exam   Constitutional: She is oriented to person, place, and time. She appears well-developed and well-nourished. No distress.   Cardiovascular: Normal rate and regular rhythm.    Murmur heard.  Pulmonary/Chest: Effort normal and breath sounds normal. No respiratory distress. She has no wheezes.   Abdominal: Soft. Bowel sounds are normal. She exhibits no distension. There is no tenderness.   Neurological: She is alert and oriented to person, place, and time.   Skin: Skin is warm and dry.       Significant Labs:       Recent Labs  Lab 05/11/17  0925   WBC 11.38   RBC 3.75*   HGB 11.3*   HCT 34.7*      MCV 93   MCH 30.1   MCHC 32.6       Recent Labs  Lab 05/11/17  0925   CALCIUM 9.2   PROT 6.6      K 3.9   CO2 25      BUN 14   CREATININE 0.7   ALKPHOS 77   ALT 10   AST 15   BILITOT 1.3*       Recent Labs  Lab 05/11/17  1205   TROPONINI 0.039*       Significant Imaging: Echocardiogram:   2D echo with color flow doppler:   Results for orders placed or performed during the hospital encounter of 03/18/17   2D echo with color flow doppler   Result Value Ref Range    EF 55 55 - 65    Mitral Valve Regurgitation MODERATE (A)     Diastolic Dysfunction Yes (A)     Aortic Valve Regurgitation MILD     Aortic Valve Stenosis SEVERE (A)     Est. PA Systolic Pressure 33.25     Mitral Valve Mobility MILDLY RESTRICTED     Mitral Valve Stenosis MILD (A)

## 2017-05-11 NOTE — ED AVS SNAPSHOT
[facility]  [address] [place] [address]               Janine Awad   2017  8:54 AM   ED    Description:  Female : 1927   Department:  [facility]           Your Care was Coordinated By:     Provider Role From To    Awais Morrison MD Attending Provider 17 0854 --      Reason for Visit     Chest Pain           Diagnoses this Visit        Comments    Chest pain           ED Disposition     None           To Do List           Follow-up Information     Follow up with Lester Chapman MD In 1 day.    Specialties:  Cardiology, INTERVENTIONAL CARDIOLOGY    Contact information:    [address] [place] [address]  [phone]        [facility]     [facility] Care Line -  Assistance  Unless otherwise directed by your provider, please contact [facility], our nurse care line that is available for  assistance.     Registered nurses in the [facility] provide: appointment scheduling, clinical advisement, health education, and other advisory services.  Call: [phone] (toll free)               Medications           Message regarding Medications     Verify the changes and/or additions to your medication regime listed below are the same as discussed with your clinician today.  If any of these changes or additions are incorrect, please notify your healthcare provider.        These medications were administered today        Dose Freq    acetaminophen tablet 650 mg 650 mg ED 1 Time    Sig: Take 2 tablets (650 mg total) by mouth ED 1 Time.    Class: Normal    Route: Oral    furosemide injection 20 mg 20 mg ED 1 Time    Sig: Inject 2 mLs (20 mg total) into the vein ED 1 Time.    Class: Normal    Route: Intravenous           Verify that the below list of medications is an accurate representation of the medications you are currently taking.  If none reported, the list may be blank. If incorrect,  "please contact your healthcare provider. Carry this list with you in case of emergency.           Current Medications     artificial tears (ISOPTO TEARS) 0.5 % ophthalmic solution Place 1 drop into the left eye 4 (four) times daily.    aspirin 81 MG Chew Take 1 tablet (81 mg total) by mouth once daily.    atorvastatin (LIPITOR) 40 MG tablet Take 1 tablet (40 mg total) by mouth once daily.    busPIRone (BUSPAR) 15 MG tablet Take 1 tablet (15 mg total) by mouth 2 (two) times daily.    clopidogrel (PLAVIX) 75 mg tablet Take 1 tablet (75 mg total) by mouth once daily.    COCONUT OIL ORAL Take by mouth.    cyanocobalamin (VITAMIN B-12) 1000 MCG tablet Take 1 tablet (1,000 mcg total) by mouth once daily.    fish oil-omega-3 fatty acids 300-1,000 mg capsule Take 2 g by mouth once daily.    furosemide (LASIX) 20 MG tablet Take 1 tablet (20 mg total) by mouth once daily.    furosemide injection 20 mg Inject 2 mLs (20 mg total) into the vein ED 1 Time.    GINSENG ORAL Take by mouth.    isosorbide mononitrate (IMDUR) 30 MG 24 hr tablet Take 1 tablet (30 mg total) by mouth once daily.    levothyroxine (SYNTHROID) 50 MCG tablet Take 1 tablet (50 mcg total) by mouth before breakfast.    metoprolol succinate (TOPROL-XL) 25 MG 24 hr tablet Take 1 tablet (25 mg total) by mouth once daily.    omeprazole (PRILOSEC) 20 MG capsule Take 20 mg by mouth once daily.     ondansetron (ZOFRAN) 4 MG tablet Take 1 tablet (4 mg total) by mouth every 8 (eight) hours as needed for Nausea.    vitamin D 1000 units Tab Take 185 mg by mouth once daily.    ZINC ACETATE ORAL Take by mouth.           Clinical Reference Information           Your Vitals Were     BP Pulse Resp Height Weight Last Period    112/44 66 20 5' 3" (1.6 m) 67.1 kg (148 lb) (LMP Unknown)    SpO2 BMI             97% 26.22 kg/m2         Allergies as of 5/11/2017        Reactions    Codeine Nausea And Vomiting    chills    Lisinopril Other (See Comments)    cough      Immunizations " Administered on Date of Encounter - 5/11/2017     None      ED Micro, Lab, POCT     Start Ordered       Status Ordering Provider    05/11/17 1144 05/11/17 1143  Troponin I  STAT      Final result     05/11/17 0911 05/11/17 0911  CBC auto differential  STAT      Final result     05/11/17 0911 05/11/17 0911  Comprehensive metabolic panel  STAT      Final result     05/11/17 0911 05/11/17 0911  Troponin I  STAT      Final result       ED Imaging Orders     Start Ordered       Status Ordering Provider    05/11/17 0911 05/11/17 0911  X-Ray Chest 1 View  1 time imaging      Final result         Discharge Instructions         Uncertain Causes of Chest Pain    Chest pain can happen for a number of reasons. Sometimes the cause can't be determined. If your condition does not seem serious, and your pain does not appear to be coming from your heart, your healthcare provider may recommend watching it closely. Sometimes the signs of a serious problem take more time to appear. Many problems not related to your heart can cause chest pain.These include:  · Musculoskeletal. Costochondritis, an inflammation of the tissues around the ribs that can occur from trauma or overuse injuries  · Respiratory. Pneumonia, pneumothorax, or pneumonitis (inflammation of the lining of the chest and lungs)  · Gastrointestinal. Esophageal reflux, heartburn, or gallbladder disease  · Anxiety and panic disorders  · Nerve compression and neuritis  · Miscellaneous problems such as aortic aneurysm or pulmonary embolism (a blood clot in the lungs)  Home care  After your visit, follow these recommendations:  · Rest today and avoid strenuous activity.  · Take any prescribed medicine as directed.  · Be aware of any recurrent chest pain and notice any changes  Follow-up care  Follow up with your healthcare provider if you do not start to feel better within 24 hours, or as advised.  Call 911  Call 911 if any of these occur:  · A change in the type of pain: if it  feels different, becomes more severe, lasts longer, or begins to spread into your shoulder, arm, neck, jaw or back  · Shortness of breath or increased pain with breathing  · Weakness, dizziness, or fainting  · Rapid heart beat  · Crushing sensation in your chest  When to seek medical advice  Call your healthcare provider right away if any of the following occur:  · Cough with dark colored sputum (phlegm) or blood  · Fever of 100.4ºF (38ºC) or higher, or as directed by your healthcare provider  · Swelling, pain or redness in one leg  · Shortness of breath  Date Last Reviewed: 12/30/2015  © 1376-4575 [employer]. [address] [place] [address], [place] [address]. All rights reserved. This information is not intended as a substitute for professional medical care. Always follow your healthcare professional's instructions.          Your Scheduled Appointments     May 12, 2017  3:00 PM CDT   Established Patient Visit with MD Hardeep Orozco - Cardiology ([facility])    [address] [place] [address], [address]  [facility] [address] [phone]               [facility] complies with applicable Federal civil rights laws and does not discriminate on the basis of race, color, national origin, age, disability, or sex.        Language Assistance Services     ATTENTION: Language assistance services are available, free of charge. Please call [phone].      ATENCIÓN: Si habla arcadio, tiene a peñaloza disposición servicios gratuitos de asistencia lingüística. Llame al [phone].     CHÚ Ý: N?u b?n nói Ti?ng Vi?t, có các d?ch v? h? tr? ngôn ng? mi?n phí dành cho b?n. G?i s? [phone].

## 2017-05-11 NOTE — ED PROVIDER NOTES
Encounter Date: 5/11/2017       History     Chief Complaint   Patient presents with    Chest Pain     Pt stated that she woke up with chest pain, took a baby ASA and called EMS. Upon arrival the pt stated that her chest pain has resolved.      Review of patient's allergies indicates:   Allergen Reactions    Codeine Nausea And Vomiting     chills    Lisinopril Other (See Comments)     cough     Patient is a 89 y.o. female presenting with the following complaint: chest pain. The history is provided by the patient.   Chest Pain   The current episode started 2 to 3 hours ago. The chest pain is resolved. The chest pain is currently at 0/10. The pain radiates to the left shoulder. Pertinent negatives for primary symptoms include no fever, no shortness of breath, no cough, no nausea and no vomiting. She tried aspirin for the symptoms.   Her past medical history is significant for CAD, hyperlipidemia, hypertension and MI.     Past Medical History:   Diagnosis Date    Acquired hypothyroidism     Closed displaced intertrochanteric fracture of left femur s/p IM nail on 6/5/2016 6/5/2016    Coronary artery disease involving native coronary artery of native heart without angina pectoris     Essential hypertension     GERD (gastroesophageal reflux disease)     Hx of colonic polyps     Hyperlipidemia     Macular degeneration     Mitral valve stenosis     Severe aortic stenosis 9/22/2014    Syncope 1/5/2015     Past Surgical History:   Procedure Laterality Date    INTERTROCHANTERIC HIP FRACTURE SURGERY Left 06/05/2016    IM nail     Family History   Problem Relation Age of Onset    Heart disease Mother     Cancer Mother     Heart disease Father     Skin cancer Father     No Known Problems Sister     No Known Problems Brother     No Known Problems Maternal Grandmother     No Known Problems Maternal Grandfather     No Known Problems Paternal Grandmother     No Known Problems Paternal Grandfather     No Known  Problems Maternal Aunt     No Known Problems Maternal Uncle     No Known Problems Paternal Aunt     No Known Problems Paternal Uncle     Anemia Neg Hx     Arrhythmia Neg Hx     Asthma Neg Hx     Clotting disorder Neg Hx     Fainting Neg Hx     Heart attack Neg Hx     Heart failure Neg Hx     Hyperlipidemia Neg Hx     Hypertension Neg Hx     Stroke Neg Hx     Atrial Septal Defect Neg Hx      Social History   Substance Use Topics    Smoking status: Never Smoker    Smokeless tobacco: Never Used    Alcohol use 0.6 oz/week     1 Standard drinks or equivalent per week      Comment: rare      Review of Systems   Constitutional: Negative for fever.   Respiratory: Negative for cough and shortness of breath.    Cardiovascular: Positive for chest pain.   Gastrointestinal: Negative for nausea and vomiting.   All other systems reviewed and are negative.      Physical Exam   Initial Vitals   BP Pulse Resp Temp SpO2   05/11/17 0852 05/11/17 0852 05/11/17 0852 -- 05/11/17 0852   140/63 76 20  97 %     Physical Exam    Constitutional: No distress.   HENT:   Head: Normocephalic and atraumatic.   Eyes: EOM are normal.   Neck: Normal range of motion. Neck supple.   Cardiovascular:   MAXIMINO.   Pulmonary/Chest:   Bibasilar crackles.   Abdominal: Soft. There is no tenderness.   Musculoskeletal: Normal range of motion. She exhibits no edema.   Neurological: She is alert.   Skin: Skin is warm and dry.   Psychiatric: Her behavior is normal. Thought content normal.         ED Course   Procedures  Labs Reviewed   CBC W/ AUTO DIFFERENTIAL - Abnormal; Notable for the following:        Result Value    RBC 3.75 (*)     Hemoglobin 11.3 (*)     Hematocrit 34.7 (*)     RDW 15.9 (*)     Gran # 9.0 (*)     Gran% 78.9 (*)     Lymph% 14.1 (*)     All other components within normal limits   COMPREHENSIVE METABOLIC PANEL - Abnormal; Notable for the following:     Total Bilirubin 1.3 (*)     All other components within normal limits   TROPONIN  I - Abnormal; Notable for the following:     Troponin I 0.039 (*)     All other components within normal limits   TROPONIN I     Imaging Results         X-Ray Chest 1 View (Final result) Result time:  05/11/17 10:13:31    Final result by Beau Varela MD (05/11/17 10:13:31)    Impression:     Examination suggestive of CHF, an underlying inflammatory process could have similar appearance.      Electronically signed by: BEAU VARELA MD  Date:     05/11/17  Time:    10:13     Narrative:    AP chest    Comparison: 3/18/17  Results: The cardiac silhouette is enlarged and the pulmonary vascularity is increased.  Increased interstitial markings seen throughout both lung fields. No focal airspace disease.  No pleural effusion.  No pneumothorax.              EKG Readings: (Independently Interpreted)   Rhythm: Normal Sinus Rhythm. Heart Rate: 84. ST Segments: Normal ST Segments.          Medical Decision Making:   Clinical Tests:   Lab Tests: Ordered and Reviewed  Radiological Study: Ordered and Reviewed  ED Management:  89-year-old female with chest pain.  Patient had a mild elevation in a second troponin done here in the ED.  She was seen and evaluated by Dr. Phelps.  Patient was felt to be safe for discharge to home with a follow-up appointment tomorrow with Dr. Chapman.  She will return to the ED for any worsening pain occurring prior to follow-up.                   ED Course     Clinical Impression:   The encounter diagnosis was Chest pain.          Awais Morrison MD  05/11/17 1545

## 2017-05-11 NOTE — DISCHARGE INSTRUCTIONS

## 2017-05-11 NOTE — ASSESSMENT & PLAN NOTE
Complaints of chest pain at rest; EKG with no acute changes; troponin .026 & .039; feel chest pain related to severe AS; ischemic etiology is also a concern but overall feel AS is more contributing factor; discussion previously by Dr. Chapman in regards to workup with [facility] with deferrment per patient and daughter; now reconsidering; recommend continuation of current medical regimen with follow up tomorrow as scheduled with Dr. Chapman and do not feel admission is needed at this time

## 2017-05-11 NOTE — ED NOTES
APPEARANCE: Alert, oriented and in no acute distress.  PERIPHERAL VASCULAR: peripheral pulses present. Normal cap refill. No edema. Warm to touch.    RESPIRATORY:Normal rate and effort, breath sounds clear bilaterally throughout chest. Respirations are equal and unlabored no obvious signs of distress.  GASTRO: soft, bowel sounds normal, no tenderness, no abdominal distention.  MUSC: Full ROM. No bony tenderness or soft tissue tenderness. No obvious deformity.  SKIN: Skin is warm and dry, normal skin turgor, mucous membranes moist.  NEURO: 5/5 strength major flexors/extensors bilaterally. Sensory intact to light touch bilaterally. William coma scale: eyes open spontaneously-4, oriented & converses-5, obeys commands-6. No neurological abnormalities.   MENTAL STATUS: awake, alert and aware of environment.  EYE: PERRL, both eyes: pupils brisk and reactive to light. Normal size.  ENT: EARS: no obvious drainage. NOSE: no active bleeding.

## 2017-05-11 NOTE — ASSESSMENT & PLAN NOTE
Echo with normal LVEF with severe AS with mean gradient 70mmHg and KALYANI .59cm2;  Previously deferred evaluation for TAVR-followed by Dr. Chapman in clinic; long discussion today by Dr. Phelps with patient and daughter in regards to etiology of chest pain due to severe AS with discussion of continued chest pain without intervention; patient and daughter no reconsidering evaluation for TAVR-will follow up with Dr. Chapman as scheduled tomorrow for ongoing conversation; encouraged patient and daughter to discuss further with strong consideration of proceeding and discussed risk of mortality with no intervention

## 2017-05-12 ENCOUNTER — OFFICE VISIT (OUTPATIENT)
Dept: CARDIOLOGY | Facility: CLINIC | Age: 82
End: 2017-05-12
Payer: MEDICARE

## 2017-05-12 VITALS
WEIGHT: 144.13 LBS | BODY MASS INDEX: 25.54 KG/M2 | HEIGHT: 63 IN | HEART RATE: 72 BPM | SYSTOLIC BLOOD PRESSURE: 111 MMHG | OXYGEN SATURATION: 95 % | DIASTOLIC BLOOD PRESSURE: 60 MMHG

## 2017-05-12 DIAGNOSIS — I21.4 NSTEMI (NON-ST ELEVATED MYOCARDIAL INFARCTION): ICD-10-CM

## 2017-05-12 DIAGNOSIS — I35.0 SEVERE AORTIC STENOSIS: ICD-10-CM

## 2017-05-12 DIAGNOSIS — R07.9 CHEST PAIN, UNSPECIFIED: Primary | ICD-10-CM

## 2017-05-12 DIAGNOSIS — R07.9 CHEST PAIN AT REST: Primary | ICD-10-CM

## 2017-05-12 DIAGNOSIS — I25.118 CORONARY ARTERY DISEASE OF NATIVE ARTERY OF NATIVE HEART WITH STABLE ANGINA PECTORIS: ICD-10-CM

## 2017-05-12 DIAGNOSIS — I10 ESSENTIAL HYPERTENSION: ICD-10-CM

## 2017-05-12 PROCEDURE — 1126F AMNT PAIN NOTED NONE PRSNT: CPT | Mod: S$GLB,,, | Performed by: INTERNAL MEDICINE

## 2017-05-12 PROCEDURE — 1160F RVW MEDS BY RX/DR IN RCRD: CPT | Mod: S$GLB,,, | Performed by: INTERNAL MEDICINE

## 2017-05-12 PROCEDURE — 99215 OFFICE O/P EST HI 40 MIN: CPT | Mod: S$GLB,,, | Performed by: INTERNAL MEDICINE

## 2017-05-12 PROCEDURE — 99999 PR PBB SHADOW E&M-EST. PATIENT-LVL III: CPT | Mod: PBBFAC,,, | Performed by: INTERNAL MEDICINE

## 2017-05-12 PROCEDURE — 1157F ADVNC CARE PLAN IN RCRD: CPT | Mod: S$GLB,,, | Performed by: INTERNAL MEDICINE

## 2017-05-12 PROCEDURE — 1159F MED LIST DOCD IN RCRD: CPT | Mod: S$GLB,,, | Performed by: INTERNAL MEDICINE

## 2017-05-12 NOTE — PATIENT INSTRUCTIONS
Understanding Transradial Cardiac Catheterization    Cardiac catheterization (cardiac cath) is a common, non-surgical procedure. During the procedure, your doctor will insert a long, thin tube (catheter) into an artery and move it up into your heart. Transradial means the catheter is inserted into an artery in the wrist (the radial artery). This procedure can be used to diagnose and treat certain heart problems.  Why do I need a transradial cardiac cath?  You may need a cardiac cath if signs indicate a problem with your heart. These may include:  · Symptoms of chest pain, tightness, or heaviness (known as angina). This is a common symptom of blocked heart arteries, known as coronary artery disease.  · Symptoms of weakness, dizziness, trouble breathing, or swollen legs or feet. These may be symptoms of a problem with a heart valve or the heart muscle.  · Other test results show heart problems. Tests may include stress tests, heart scans, and echocardiography.  During a cardiac cath, your doctor can see the condition of the coronary arteries and heart valves. He or she can also check how well the heart pumps and the flow of blood through the heart. Your doctor can also measure pressures and take blood samples. And, if needed, he or she can open blocked arteries. This can help reduce symptoms of angina.  Cardiac cath is often done using a catheter inserted into an artery in the groin. During transradial cardiac cath, the catheter is inserted into an artery in the wrist. This can mean less bleeding and a faster recovery. Some people may have blockages in the groin arteries as well as in the heart arteries, making it difficult to reach the heart. The transradial approach can be used to get around this problem.   What happens during a transradial cardiac cath?  The procedure is done in the hospital or a surgery center. First, an IV line is put in your arm or hand to deliver fluids and medicines. You will likely be given  medicine to relax you and make you drowsy. When the procedure begins:  · You lie on an X-ray table.  · The skin over the insertion site in your wrist is numbed.  · The doctor makes a tiny puncture or incision into the artery in the wrist. He or she then inserts a catheter and threads it through the blood vessel into your heart.    · The doctor may inject a contrast fluid through the catheter into the arteries. This fluid makes the arteries show up better on X-rays.  · Tests may be done to check the condition of your heart and arteries. If needed, the doctor can clear blockages in the arteries or do other repairs.  · When the doctor is finished, he or she will remove the catheter and put direct pressure on the site to prevent bleeding.  · You will stay for a time to recover, and then go home.  What are the risks of transradial cardiac cath?  These include:  · Bleeding, bruising, infection, or blood clots  · Damage to the radial artery that may cause injury to the hand  · Allergic reaction to the contrast fluid  · Abnormal heartbeat (arrhythmia)  · Damage to blood vessels or tissues  · Kidney damage or failure  · The need for emergency heart surgery  · Heart attack, stroke, or death  Date Last Reviewed: 5/1/2016  © 3694-0871 [employer]. [address] [place] [address], [place] [address]. All rights reserved. This information is not intended as a substitute for professional medical care. Always follow your healthcare professional's instructions.

## 2017-05-12 NOTE — LETTER
May 15, 2017      Nito Shultz, ELLIE  [address] [facility] [address]           [facility]  [address] [facility], [address]  [facility] [address]  Phone: [phone]          Patient: Janine Awad   MR Number: [id number]   YOB: 1927   Date of Visit: 5/12/2017       Dear Nito Shultz:    Thank you for referring Janine Awad to me for evaluation. Attached you will find relevant portions of my assessment and plan of care.    If you have questions, please do not hesitate to call me. I look forward to following Janine Awad along with you.    Sincerely,        Enclosure  CC:  No Recipients    If you would like to receive this communication electronically, please contact [email or web address] or [phone] to request more information on [facility] access.    For providers and/or their staff who would like to refer a patient to [facility], please contact us through our one-stop-shop provider referral line, Angelic Major, at [phone].    If you feel you have received this communication in error or would no longer like to receive these types of communications, please e-mail [email or web address]

## 2017-05-15 NOTE — PROGRESS NOTES
Chief Complaint:  Janine Awad is a 89 y.o. female who presents for follow-up of CAD and AS.    HPI:   Mrs. Awad is an 89 year-old  Female with severe AS, presumed CAD with admit for ACS earlier this year, and Alzheimer's dementia.  She lives at home with assitance from friends/family.     She had elected thus far to defer invasive evaluation and treatment for CAD and AS.    Last week she was seen in the ED for chest pain, which is the 4th time this calendar year she has presented with chest pain.    She is more amenable now to consideration cardiac catheterization and possibly TAVR.    She does have frequent angina, almost ley.  She does not recall her symptoms however..  She is accompanied by her daughter who provides much of the history.  She also had an episode of near-syncope a couple of weeks ago.  She has not been taking SL NTG.    On prior visits we discussed extensively her ongoing symptoms and the likelihood of continued symptoms if no invasive testing performed.    Patient Active Problem List    Diagnosis Date Noted    NSTEMI (non-ST elevated myocardial infarction) 2017    Acute on chronic diastolic heart failure 2017    Insomnia 03/10/2017    Senile purpura 03/10/2017    Aortic atherosclerosis 03/10/2017    Mitral regurgitation 2017    Chest pain at rest 02/15/2017    Late onset Alzheimer's disease without behavioral disturbance 2016    Depression     Anemia of chronic disease 2016    Memory loss 2016    Macular degeneration     Severe aortic stenosis 2014    Essential hypertension     Hyperlipidemia     Acquired hypothyroidism     Coronary artery disease of native artery of native heart with stable angina pectoris     GERD (gastroesophageal reflux disease)        Right Arm BP - Sittin/60  Left Arm BP - Sittin/60    Current Outpatient Prescriptions   Medication Sig    artificial tears (ISOPTO TEARS) 0.5 % ophthalmic  solution Place 1 drop into the left eye 4 (four) times daily.    aspirin 81 MG Chew Take 1 tablet (81 mg total) by mouth once daily.    atorvastatin (LIPITOR) 40 MG tablet Take 1 tablet (40 mg total) by mouth once daily.    busPIRone (BUSPAR) 15 MG tablet Take 1 tablet (15 mg total) by mouth 2 (two) times daily.    clopidogrel (PLAVIX) 75 mg tablet Take 1 tablet (75 mg total) by mouth once daily.    COCONUT OIL ORAL Take by mouth.    cyanocobalamin (VITAMIN B-12) 1000 MCG tablet Take 1 tablet (1,000 mcg total) by mouth once daily.    fish oil-omega-3 fatty acids 300-1,000 mg capsule Take 2 g by mouth once daily.    furosemide (LASIX) 20 MG tablet Take 1 tablet (20 mg total) by mouth once daily.    GINSENG ORAL Take by mouth.    isosorbide mononitrate (IMDUR) 30 MG 24 hr tablet Take 1 tablet (30 mg total) by mouth once daily.    levothyroxine (SYNTHROID) 50 MCG tablet Take 1 tablet (50 mcg total) by mouth before breakfast.    metoprolol succinate (TOPROL-XL) 25 MG 24 hr tablet Take 1 tablet (25 mg total) by mouth once daily.    omeprazole (PRILOSEC) 20 MG capsule Take 20 mg by mouth once daily.     ondansetron (ZOFRAN) 4 MG tablet Take 1 tablet (4 mg total) by mouth every 8 (eight) hours as needed for Nausea.    vitamin D 1000 units Tab Take 185 mg by mouth once daily.    ZINC ACETATE ORAL Take by mouth.     No current facility-administered medications for this visit.        Review of Systems   Cardiovascular: Positive for chest pain and dyspnea on exertion. Negative for claudication, cyanosis, irregular heartbeat, leg swelling, near-syncope, orthopnea, palpitations, paroxysmal nocturnal dyspnea and syncope.   Respiratory: Positive for shortness of breath. Negative for cough, hemoptysis, sleep disturbances due to breathing, snoring, sputum production and wheezing.          Objective:     Physical Exam   Constitutional: She is oriented to person, place, and time.   HENT:   Head: Normocephalic and  atraumatic.   Cardiovascular: Normal rate.    Murmur heard.   Harsh midsystolic murmur is present  at the upper right sternal border radiating to the neck  Pulmonary/Chest: Effort normal and breath sounds normal.   Neurological: She is alert and oriented to person, place, and time.   Skin: Skin is warm and dry.   Psychiatric: She has a normal mood and affect. Her behavior is normal. Judgment and thought content normal.   Vitals reviewed.      LABS    CMP  Sodium   Date Value Ref Range Status   05/11/2017 141 136 - 145 mmol/L Final     Potassium   Date Value Ref Range Status   05/11/2017 3.9 3.5 - 5.1 mmol/L Final     Chloride   Date Value Ref Range Status   05/11/2017 106 95 - 110 mmol/L Final     CO2   Date Value Ref Range Status   05/11/2017 25 23 - 29 mmol/L Final     Glucose   Date Value Ref Range Status   05/11/2017 108 70 - 110 mg/dL Final     BUN, Bld   Date Value Ref Range Status   05/11/2017 14 8 - 23 mg/dL Final     Creatinine   Date Value Ref Range Status   05/11/2017 0.7 0.5 - 1.4 mg/dL Final     Calcium   Date Value Ref Range Status   05/11/2017 9.2 8.7 - 10.5 mg/dL Final     Total Protein   Date Value Ref Range Status   05/11/2017 6.6 6.0 - 8.4 g/dL Final     Albumin   Date Value Ref Range Status   05/11/2017 3.6 3.5 - 5.2 g/dL Final     Total Bilirubin   Date Value Ref Range Status   05/11/2017 1.3 (H) 0.1 - 1.0 mg/dL Final     Comment:     For infants and newborns, interpretation of results should be based  on gestational age, weight and in agreement with clinical  observations.  Premature Infant recommended reference ranges:  Up to 24 hours.............<8.0 mg/dL  Up to 48 hours............<12.0 mg/dL  3-5 days..................<15.0 mg/dL  6-29 days.................<15.0 mg/dL       Alkaline Phosphatase   Date Value Ref Range Status   05/11/2017 77 55 - 135 U/L Final     AST   Date Value Ref Range Status   05/11/2017 15 10 - 40 U/L Final     ALT   Date Value Ref Range Status   05/11/2017 10 10 - 44  U/L Final     Anion Gap   Date Value Ref Range Status   05/11/2017 10 8 - 16 mmol/L Final     eGFR if    Date Value Ref Range Status   05/11/2017 >60 >60 mL/min/1.73 m^2 Final     eGFR if non    Date Value Ref Range Status   05/11/2017 >60 >60 mL/min/1.73 m^2 Final     Comment:     Calculation used to obtain the estimated glomerular filtration  rate (eGFR) is the CKD-EPI equation. Since race is unknown   in our information system, the eGFR values for   -American and Non--American patients are given   for each creatinine result.         Lab Results   Component Value Date    WBC 11.38 05/11/2017    HGB 11.3 (L) 05/11/2017    HCT 34.7 (L) 05/11/2017    MCV 93 05/11/2017     05/11/2017       Assessment:     1. Chest pain at rest    2. Coronary artery disease of native artery of native heart with stable angina pectoris    3. NSTEMI (non-ST elevated myocardial infarction)    4. Essential hypertension    5. Severe aortic stenosis      Greater than 50% of the visit of 45 minutes was spent counseling, educating, and coordinating the care of the patient.    Extensively discussed cardiac catheterization vs. Continued management again.  She has intermittent CCS IV angina despite medical therapy.  Though she has significant dementia, she still lives at home with daughter, and is amenable to treatment that may help her symptoms and quality of life.  Plan:     Proceed with cardiac catheterization to evaluate for CAD that may be able to improve QOL if treated.  Daughter to provide consent.    Continue with current medical plan and lifestyle changes.    I have reviewed the patient's medical history in detail and updated the computerized patient record.    No orders of the defined types were placed in this encounter.      Follow up as scheduled. Return sooner for concerns or questions      She expressed verbal understanding and agreed with the plan          Lester Chapman MD,  FACC, CCDS  Interventional Cardiology  [facility]

## 2017-05-30 ENCOUNTER — LAB VISIT (OUTPATIENT)
Dept: LAB | Facility: HOSPITAL | Age: 82
End: 2017-05-30
Attending: INTERNAL MEDICINE
Payer: MEDICARE

## 2017-05-30 ENCOUNTER — OFFICE VISIT (OUTPATIENT)
Dept: INTERNAL MEDICINE | Facility: CLINIC | Age: 82
End: 2017-05-30
Payer: MEDICARE

## 2017-05-30 VITALS
RESPIRATION RATE: 16 BRPM | TEMPERATURE: 98 F | WEIGHT: 145.31 LBS | DIASTOLIC BLOOD PRESSURE: 63 MMHG | BODY MASS INDEX: 26.74 KG/M2 | HEART RATE: 76 BPM | SYSTOLIC BLOOD PRESSURE: 106 MMHG | HEIGHT: 62 IN

## 2017-05-30 DIAGNOSIS — F41.9 ANXIETY: ICD-10-CM

## 2017-05-30 DIAGNOSIS — R53.83 FATIGUE, UNSPECIFIED TYPE: ICD-10-CM

## 2017-05-30 DIAGNOSIS — R53.83 FATIGUE, UNSPECIFIED TYPE: Primary | ICD-10-CM

## 2017-05-30 LAB
ANION GAP SERPL CALC-SCNC: 10 MMOL/L
BASOPHILS # BLD AUTO: 0.06 K/UL
BASOPHILS NFR BLD: 0.7 %
BUN SERPL-MCNC: 13 MG/DL
CALCIUM SERPL-MCNC: 9.2 MG/DL
CHLORIDE SERPL-SCNC: 106 MMOL/L
CO2 SERPL-SCNC: 27 MMOL/L
CREAT SERPL-MCNC: 0.8 MG/DL
DIFFERENTIAL METHOD: ABNORMAL
EOSINOPHIL # BLD AUTO: 0.2 K/UL
EOSINOPHIL NFR BLD: 2.1 %
ERYTHROCYTE [DISTWIDTH] IN BLOOD BY AUTOMATED COUNT: 16.5 %
EST. GFR  (AFRICAN AMERICAN): >60 ML/MIN/1.73 M^2
EST. GFR  (NON AFRICAN AMERICAN): >60 ML/MIN/1.73 M^2
GLUCOSE SERPL-MCNC: 170 MG/DL
HCT VFR BLD AUTO: 34.7 %
HGB BLD-MCNC: 11.1 G/DL
LYMPHOCYTES # BLD AUTO: 2 K/UL
LYMPHOCYTES NFR BLD: 24.3 %
MCH RBC QN AUTO: 30.2 PG
MCHC RBC AUTO-ENTMCNC: 32 %
MCV RBC AUTO: 95 FL
MONOCYTES # BLD AUTO: 0.5 K/UL
MONOCYTES NFR BLD: 6.3 %
NEUTROPHILS # BLD AUTO: 5.5 K/UL
NEUTROPHILS NFR BLD: 66.4 %
PLATELET # BLD AUTO: 236 K/UL
PMV BLD AUTO: 10.8 FL
POTASSIUM SERPL-SCNC: 4.1 MMOL/L
RBC # BLD AUTO: 3.67 M/UL
SODIUM SERPL-SCNC: 143 MMOL/L
WBC # BLD AUTO: 8.28 K/UL

## 2017-05-30 PROCEDURE — 99214 OFFICE O/P EST MOD 30 MIN: CPT | Mod: S$GLB,,, | Performed by: INTERNAL MEDICINE

## 2017-05-30 PROCEDURE — 80048 BASIC METABOLIC PNL TOTAL CA: CPT

## 2017-05-30 PROCEDURE — 1157F ADVNC CARE PLAN IN RCRD: CPT | Mod: S$GLB,,, | Performed by: INTERNAL MEDICINE

## 2017-05-30 PROCEDURE — 1126F AMNT PAIN NOTED NONE PRSNT: CPT | Mod: S$GLB,,, | Performed by: INTERNAL MEDICINE

## 2017-05-30 PROCEDURE — 1159F MED LIST DOCD IN RCRD: CPT | Mod: S$GLB,,, | Performed by: INTERNAL MEDICINE

## 2017-05-30 PROCEDURE — 99999 PR PBB SHADOW E&M-EST. PATIENT-LVL III: CPT | Mod: PBBFAC,,, | Performed by: INTERNAL MEDICINE

## 2017-05-30 PROCEDURE — 85025 COMPLETE CBC W/AUTO DIFF WBC: CPT

## 2017-05-30 PROCEDURE — 36415 COLL VENOUS BLD VENIPUNCTURE: CPT | Mod: PO

## 2017-05-30 RX ORDER — BUSPIRONE HYDROCHLORIDE 30 MG/1
30 TABLET ORAL 2 TIMES DAILY
Qty: 180 TABLET | Refills: 3 | Status: SHIPPED | OUTPATIENT
Start: 2017-05-30 | End: 2018-10-22

## 2017-05-30 NOTE — PROGRESS NOTES
Subjective:       Patient ID: Janine Awad is a 89 y.o. female.    Chief Complaint: Fatigue    HPI   89 yo F with CAD, severe AS, anxiety, Alzheimer's dimentia, insomnia, HLD, hypothyroidism, GERD, senile purpura, aortic atherosclerosis is here for f/u. Pt has been experiencing generalized weakness over the last 2 weeks associated with dizziness at times. Standing makes her symptoms worse. Her BP is currently low at 106/63. No focal weakness noted on exam. No abdominal pain, chest pain, dark colored stools, fever/chills. Pt is also c/o increased anxiety recently associated with anger issues. She would like to increase her Buspar. No SI/HI.   Review of Systems   Constitutional: Positive for fatigue. Negative for activity change, appetite change, chills, diaphoresis, fever and unexpected weight change.   HENT: Negative for postnasal drip, rhinorrhea, sinus pressure, sneezing, sore throat, trouble swallowing and voice change.    Respiratory: Negative for cough, shortness of breath and wheezing.    Cardiovascular: Negative for chest pain, palpitations and leg swelling.   Gastrointestinal: Negative for abdominal pain, blood in stool, constipation, diarrhea, nausea and vomiting.   Genitourinary: Negative for dysuria.   Musculoskeletal: Negative for arthralgias and myalgias.   Skin: Negative for rash and wound.   Allergic/Immunologic: Negative for environmental allergies and food allergies.   Neurological: Positive for weakness. Negative for dizziness, tremors, seizures, syncope, facial asymmetry, speech difficulty, light-headedness, numbness and headaches.   Hematological: Negative for adenopathy. Does not bruise/bleed easily.   Psychiatric/Behavioral: Negative for self-injury and suicidal ideas. The patient is nervous/anxious.        Objective:      Physical Exam   Constitutional: She is oriented to person, place, and time. She appears well-developed and well-nourished. No distress.   HENT:   Head: Normocephalic  and atraumatic.   Right Ear: External ear normal.   Left Ear: External ear normal.   Nose: Nose normal.   Mouth/Throat: Oropharynx is clear and moist. No oropharyngeal exudate.   Eyes: Conjunctivae and EOM are normal. Pupils are equal, round, and reactive to light. Right eye exhibits no discharge. Left eye exhibits no discharge. No scleral icterus.   Neck: Neck supple. No JVD present.   Cardiovascular: Normal rate, regular rhythm and intact distal pulses.    Murmur heard.   Systolic murmur is present   Pulmonary/Chest: Effort normal and breath sounds normal. No respiratory distress. She has no wheezes. She has no rales.   Abdominal: Soft. Bowel sounds are normal. There is no tenderness.   Musculoskeletal: She exhibits no edema.   Lymphadenopathy:     She has no cervical adenopathy.   Neurological: She is alert and oriented to person, place, and time.   Skin: Skin is warm and dry. No rash noted. She is not diaphoretic. No pallor.       Assessment:       1. Fatigue, unspecified type    2. Anxiety        Plan:    1. Suspect a component from low BP       Pt advised to increase intake of fluids some       Get up slowly from a lying/sitting position        CBC/BMP today    2. Increase Buspar to 30 mg BID   3. F/u in 6 months for annual exam

## 2017-05-31 ENCOUNTER — TELEPHONE (OUTPATIENT)
Dept: INTERNAL MEDICINE | Facility: CLINIC | Age: 82
End: 2017-05-31

## 2017-05-31 DIAGNOSIS — I25.118 CORONARY ARTERY DISEASE OF NATIVE ARTERY OF NATIVE HEART WITH STABLE ANGINA PECTORIS: ICD-10-CM

## 2017-05-31 RX ORDER — LEVOTHYROXINE SODIUM 50 UG/1
50 TABLET ORAL
Qty: 90 TABLET | Refills: 3 | Status: SHIPPED | OUTPATIENT
Start: 2017-05-31 | End: 2018-06-30 | Stop reason: SDUPTHER

## 2017-05-31 RX ORDER — FUROSEMIDE 20 MG/1
20 TABLET ORAL DAILY
Qty: 90 TABLET | Refills: 3 | Status: SHIPPED | OUTPATIENT
Start: 2017-05-31 | End: 2018-07-23 | Stop reason: SDUPTHER

## 2017-05-31 RX ORDER — LEVOTHYROXINE SODIUM 50 UG/1
50 TABLET ORAL
Qty: 90 TABLET | Refills: 1 | Status: SHIPPED | OUTPATIENT
Start: 2017-05-31 | End: 2017-05-31 | Stop reason: SDUPTHER

## 2017-05-31 RX ORDER — METOPROLOL SUCCINATE 25 MG/1
25 TABLET, EXTENDED RELEASE ORAL DAILY
Qty: 90 TABLET | Refills: 3 | Status: SHIPPED | OUTPATIENT
Start: 2017-05-31 | End: 2018-04-06 | Stop reason: SDUPTHER

## 2017-05-31 RX ORDER — ISOSORBIDE MONONITRATE 30 MG/1
30 TABLET, EXTENDED RELEASE ORAL DAILY
Qty: 90 TABLET | Refills: 3 | Status: SHIPPED | OUTPATIENT
Start: 2017-05-31 | End: 2018-04-06 | Stop reason: SDUPTHER

## 2017-05-31 NOTE — TELEPHONE ENCOUNTER
----- Message from Diana Gray sent at 5/31/2017  2:50 PM CDT -----  Contact: Grace Eng (Daughter) [phone]  RX request - refill or new RX.  Is this a refill or new RX:  Refill   RX name and strength: metoprolol succinate (TOPROL-XL) 25 MG 24 hr tablet   Directions: Take 1 tablet (25 mg total) by mouth once daily.  Is this a 30 day or 90 day RX: 90       RX request - refill or new RX.  Is this a refill or new RX:  Refill   RX name and strength: isosorbide mononitrate (IMDUR) 30 MG 24 hr tablet   Directions: Take 1 tablet (30 mg total) by mouth once daily  Is this a 30 day or 90 day RX:  90    RX request - refill or new RX.  Is this a refill or new RX:  Refill   RX name and strength: furosemide (LASIX) 20 MG tablet   Directions: Take 1 tablet (20 mg total) by mouth once daily.   Is this a 30 day or 90 day RX:  90    RX request - refill or new RX.  Is this a refill or new RX: refill    RX name and strength: levothyroxine (SYNTHROID) 50 MCG tablet   Directions: Take 1 tablet (50 mcg total) by mouth before breakfast  Is this a 30 day or 90 day RX:  90  Pharmacy name and phone #: [facility]  [phone] (Phone)  [phone] (Fax)  Comments:  Pt was told if RX fax to [phone] request can process quicker

## 2017-06-15 ENCOUNTER — TELEPHONE (OUTPATIENT)
Dept: CARDIOLOGY | Facility: CLINIC | Age: 82
End: 2017-06-15

## 2017-06-15 NOTE — TELEPHONE ENCOUNTER
----- Message from Yessy Garner sent at 6/15/2017  2:10 PM CDT -----  Contact: Grace(daughter)/[phone]  Patient is returning your call.  Please advise

## 2017-06-21 ENCOUNTER — TELEPHONE (OUTPATIENT)
Dept: CARDIOLOGY | Facility: CLINIC | Age: 82
End: 2017-06-21

## 2017-06-30 ENCOUNTER — TELEPHONE (OUTPATIENT)
Dept: CARDIOLOGY | Facility: CLINIC | Age: 82
End: 2017-06-30

## 2017-06-30 NOTE — TELEPHONE ENCOUNTER
----- Message from Royce Hogan sent at 6/30/2017  3:15 PM CDT -----  Contact: Ms. Edwards  Wants to know why office keeps calling as mother has not appointments in the future.. No need to call back.

## 2017-06-30 NOTE — TELEPHONE ENCOUNTER
I do not have an explanation for this as I am not calling patient's daughter.  I return the call after patient's daughter calls office.  Have contacted cath lab as patient was scheduled for procedure and possibility they're calling to reschedule.  Patient/daughter does not wish to be called back please.  Thank you

## 2017-07-05 ENCOUNTER — TELEPHONE (OUTPATIENT)
Dept: CARDIOLOGY | Facility: CLINIC | Age: 82
End: 2017-07-05

## 2017-07-05 NOTE — TELEPHONE ENCOUNTER
----- Message from Naina Moore sent at 7/5/2017 12:06 PM CDT -----  Contact: Grace Eng [phone]  Patient Returning Your Phone Call

## 2017-07-26 ENCOUNTER — HOSPITAL ENCOUNTER (INPATIENT)
Facility: HOSPITAL | Age: 82
LOS: 1 days | Discharge: HOME HEALTH | DRG: 871 | End: 2017-07-27
Attending: EMERGENCY MEDICINE | Admitting: INTERNAL MEDICINE
Payer: MEDICARE

## 2017-07-26 DIAGNOSIS — G30.9 ALZHEIMER'S DEMENTIA WITH BEHAVIORAL DISTURBANCE, UNSPECIFIED TIMING OF DEMENTIA ONSET: ICD-10-CM

## 2017-07-26 DIAGNOSIS — A41.9 SEPSIS DUE TO PNEUMONIA: ICD-10-CM

## 2017-07-26 DIAGNOSIS — E78.2 MIXED HYPERLIPIDEMIA: ICD-10-CM

## 2017-07-26 DIAGNOSIS — I50.30 (HFPEF) HEART FAILURE WITH PRESERVED EJECTION FRACTION: ICD-10-CM

## 2017-07-26 DIAGNOSIS — I34.0 MODERATE MITRAL REGURGITATION: ICD-10-CM

## 2017-07-26 DIAGNOSIS — J18.9 SEPSIS DUE TO PNEUMONIA: ICD-10-CM

## 2017-07-26 DIAGNOSIS — F41.9 ANXIETY: ICD-10-CM

## 2017-07-26 DIAGNOSIS — F02.81 ALZHEIMER'S DEMENTIA WITH BEHAVIORAL DISTURBANCE, UNSPECIFIED TIMING OF DEMENTIA ONSET: ICD-10-CM

## 2017-07-26 DIAGNOSIS — R13.11 ORAL PHASE DYSPHAGIA: ICD-10-CM

## 2017-07-26 DIAGNOSIS — I25.10 CORONARY ARTERY DISEASE INVOLVING NATIVE CORONARY ARTERY OF NATIVE HEART WITHOUT ANGINA PECTORIS: ICD-10-CM

## 2017-07-26 DIAGNOSIS — J18.9 PNEUMONIA DUE TO INFECTIOUS ORGANISM, UNSPECIFIED LATERALITY, UNSPECIFIED PART OF LUNG: Primary | ICD-10-CM

## 2017-07-26 DIAGNOSIS — R41.0 ACUTE DELIRIUM: ICD-10-CM

## 2017-07-26 DIAGNOSIS — I35.0 SEVERE AORTIC STENOSIS: ICD-10-CM

## 2017-07-26 DIAGNOSIS — I10 ESSENTIAL HYPERTENSION: ICD-10-CM

## 2017-07-26 DIAGNOSIS — R53.81 PHYSICAL DECONDITIONING: ICD-10-CM

## 2017-07-26 DIAGNOSIS — R13.12 OROPHARYNGEAL DYSPHAGIA: ICD-10-CM

## 2017-07-26 DIAGNOSIS — I50.33 ACUTE ON CHRONIC DIASTOLIC HEART FAILURE: ICD-10-CM

## 2017-07-26 LAB
ALBUMIN SERPL BCP-MCNC: 3.8 G/DL
ALP SERPL-CCNC: 60 U/L
ALT SERPL W/O P-5'-P-CCNC: 9 U/L
ANION GAP SERPL CALC-SCNC: 9 MMOL/L
ANISOCYTOSIS BLD QL SMEAR: SLIGHT
AST SERPL-CCNC: 17 U/L
BASOPHILS # BLD AUTO: 0.03 K/UL
BASOPHILS NFR BLD: 0.2 %
BILIRUB SERPL-MCNC: 1 MG/DL
BILIRUB UR QL STRIP: NEGATIVE
BNP SERPL-MCNC: 1114 PG/ML
BUN SERPL-MCNC: 24 MG/DL
CALCIUM SERPL-MCNC: 9.7 MG/DL
CHLORIDE SERPL-SCNC: 107 MMOL/L
CLARITY UR: CLEAR
CO2 SERPL-SCNC: 24 MMOL/L
COLOR UR: YELLOW
CREAT SERPL-MCNC: 0.9 MG/DL
DIFFERENTIAL METHOD: ABNORMAL
EOSINOPHIL # BLD AUTO: 0.2 K/UL
EOSINOPHIL NFR BLD: 1 %
EOSINOPHIL URNS QL WRIGHT STN: NORMAL
ERYTHROCYTE [DISTWIDTH] IN BLOOD BY AUTOMATED COUNT: 16.7 %
EST. GFR  (AFRICAN AMERICAN): >60 ML/MIN/1.73 M^2
EST. GFR  (NON AFRICAN AMERICAN): 56 ML/MIN/1.73 M^2
FLUAV AG SPEC QL IA: NEGATIVE
FLUBV AG SPEC QL IA: NEGATIVE
GLUCOSE SERPL-MCNC: 122 MG/DL
GLUCOSE UR QL STRIP: NEGATIVE
HCT VFR BLD AUTO: 36.3 %
HGB BLD-MCNC: 11.4 G/DL
HGB UR QL STRIP: NEGATIVE
KETONES UR QL STRIP: NEGATIVE
LACTATE SERPL-SCNC: 2.2 MMOL/L
LEUKOCYTE ESTERASE UR QL STRIP: ABNORMAL
LYMPHOCYTES # BLD AUTO: 1.2 K/UL
LYMPHOCYTES NFR BLD: 7.4 %
MCH RBC QN AUTO: 29.3 PG
MCHC RBC AUTO-ENTMCNC: 31.4 G/DL
MCV RBC AUTO: 93 FL
MICROSCOPIC COMMENT: NORMAL
MONOCYTES # BLD AUTO: 0.5 K/UL
MONOCYTES NFR BLD: 2.7 %
NEUTROPHILS # BLD AUTO: 14.9 K/UL
NEUTROPHILS NFR BLD: 88.7 %
NITRITE UR QL STRIP: NEGATIVE
PH UR STRIP: 6 [PH] (ref 5–8)
PLATELET # BLD AUTO: 223 K/UL
PLATELET BLD QL SMEAR: ABNORMAL
PMV BLD AUTO: 11.8 FL
POIKILOCYTOSIS BLD QL SMEAR: SLIGHT
POTASSIUM SERPL-SCNC: 4 MMOL/L
PROCALCITONIN SERPL IA-MCNC: 0.05 NG/ML
PROT SERPL-MCNC: 7.7 G/DL
PROT UR QL STRIP: NEGATIVE
RBC # BLD AUTO: 3.89 M/UL
SODIUM SERPL-SCNC: 140 MMOL/L
SODIUM UR-SCNC: 96 MMOL/L
SP GR UR STRIP: 1.01 (ref 1–1.03)
SPECIMEN SOURCE: NORMAL
SQUAMOUS #/AREA URNS HPF: 1 /HPF
TROPONIN I SERPL DL<=0.01 NG/ML-MCNC: 0.04 NG/ML
TROPONIN I SERPL DL<=0.01 NG/ML-MCNC: 0.31 NG/ML
URN SPEC COLLECT METH UR: ABNORMAL
UROBILINOGEN UR STRIP-ACNC: NEGATIVE EU/DL
UUN UR-MCNC: 309 MG/DL
WBC # BLD AUTO: 16.79 K/UL
WBC #/AREA URNS HPF: 1 /HPF (ref 0–5)

## 2017-07-26 PROCEDURE — 96375 TX/PRO/DX INJ NEW DRUG ADDON: CPT

## 2017-07-26 PROCEDURE — 84145 PROCALCITONIN (PCT): CPT

## 2017-07-26 PROCEDURE — 25000003 PHARM REV CODE 250: Performed by: DERMATOLOGY

## 2017-07-26 PROCEDURE — 96374 THER/PROPH/DIAG INJ IV PUSH: CPT

## 2017-07-26 PROCEDURE — 25000003 PHARM REV CODE 250: Performed by: INTERNAL MEDICINE

## 2017-07-26 PROCEDURE — 94761 N-INVAS EAR/PLS OXIMETRY MLT: CPT

## 2017-07-26 PROCEDURE — 11000001 HC ACUTE MED/SURG PRIVATE ROOM

## 2017-07-26 PROCEDURE — 96361 HYDRATE IV INFUSION ADD-ON: CPT

## 2017-07-26 PROCEDURE — G8998 SWALLOW D/C STATUS: HCPCS | Mod: CI

## 2017-07-26 PROCEDURE — 93010 ELECTROCARDIOGRAM REPORT: CPT | Mod: 77,,, | Performed by: INTERNAL MEDICINE

## 2017-07-26 PROCEDURE — 84484 ASSAY OF TROPONIN QUANT: CPT | Mod: 91

## 2017-07-26 PROCEDURE — 81000 URINALYSIS NONAUTO W/SCOPE: CPT

## 2017-07-26 PROCEDURE — 80053 COMPREHEN METABOLIC PANEL: CPT

## 2017-07-26 PROCEDURE — 84484 ASSAY OF TROPONIN QUANT: CPT

## 2017-07-26 PROCEDURE — 87205 SMEAR GRAM STAIN: CPT

## 2017-07-26 PROCEDURE — 93005 ELECTROCARDIOGRAM TRACING: CPT

## 2017-07-26 PROCEDURE — 87040 BLOOD CULTURE FOR BACTERIA: CPT

## 2017-07-26 PROCEDURE — 92610 EVALUATE SWALLOWING FUNCTION: CPT

## 2017-07-26 PROCEDURE — 83880 ASSAY OF NATRIURETIC PEPTIDE: CPT

## 2017-07-26 PROCEDURE — 63600175 PHARM REV CODE 636 W HCPCS: Performed by: INTERNAL MEDICINE

## 2017-07-26 PROCEDURE — 25000003 PHARM REV CODE 250: Performed by: EMERGENCY MEDICINE

## 2017-07-26 PROCEDURE — 84540 ASSAY OF URINE/UREA-N: CPT

## 2017-07-26 PROCEDURE — 93010 ELECTROCARDIOGRAM REPORT: CPT | Mod: ,,, | Performed by: INTERNAL MEDICINE

## 2017-07-26 PROCEDURE — 63600175 PHARM REV CODE 636 W HCPCS: Performed by: EMERGENCY MEDICINE

## 2017-07-26 PROCEDURE — 36415 COLL VENOUS BLD VENIPUNCTURE: CPT

## 2017-07-26 PROCEDURE — G8997 SWALLOW GOAL STATUS: HCPCS | Mod: CI

## 2017-07-26 PROCEDURE — 99285 EMERGENCY DEPT VISIT HI MDM: CPT | Mod: 25

## 2017-07-26 PROCEDURE — 83605 ASSAY OF LACTIC ACID: CPT

## 2017-07-26 PROCEDURE — 97530 THERAPEUTIC ACTIVITIES: CPT

## 2017-07-26 PROCEDURE — 87400 INFLUENZA A/B EACH AG IA: CPT

## 2017-07-26 PROCEDURE — 84300 ASSAY OF URINE SODIUM: CPT

## 2017-07-26 PROCEDURE — 97161 PT EVAL LOW COMPLEX 20 MIN: CPT

## 2017-07-26 PROCEDURE — 27000221 HC OXYGEN, UP TO 24 HOURS

## 2017-07-26 PROCEDURE — 85025 COMPLETE CBC W/AUTO DIFF WBC: CPT

## 2017-07-26 PROCEDURE — G8996 SWALLOW CURRENT STATUS: HCPCS | Mod: CI

## 2017-07-26 RX ORDER — LANOLIN ALCOHOL/MO/W.PET/CERES
1000 CREAM (GRAM) TOPICAL DAILY
Status: DISCONTINUED | OUTPATIENT
Start: 2017-07-26 | End: 2017-07-27 | Stop reason: HOSPADM

## 2017-07-26 RX ORDER — LEVOTHYROXINE SODIUM 50 UG/1
50 TABLET ORAL
Status: DISCONTINUED | OUTPATIENT
Start: 2017-07-26 | End: 2017-07-27 | Stop reason: HOSPADM

## 2017-07-26 RX ORDER — PANTOPRAZOLE SODIUM 40 MG/1
40 TABLET, DELAYED RELEASE ORAL DAILY
Status: DISCONTINUED | OUTPATIENT
Start: 2017-07-26 | End: 2017-07-27 | Stop reason: HOSPADM

## 2017-07-26 RX ORDER — METOPROLOL SUCCINATE 25 MG/1
25 TABLET, EXTENDED RELEASE ORAL DAILY
Status: DISCONTINUED | OUTPATIENT
Start: 2017-07-26 | End: 2017-07-27

## 2017-07-26 RX ORDER — SODIUM CHLORIDE 9 MG/ML
INJECTION, SOLUTION INTRAVENOUS CONTINUOUS
Status: ACTIVE | OUTPATIENT
Start: 2017-07-26 | End: 2017-07-26

## 2017-07-26 RX ORDER — NAPROXEN SODIUM 220 MG/1
81 TABLET, FILM COATED ORAL DAILY
Status: DISCONTINUED | OUTPATIENT
Start: 2017-07-26 | End: 2017-07-27 | Stop reason: HOSPADM

## 2017-07-26 RX ORDER — HALOPERIDOL 5 MG/ML
1 INJECTION INTRAMUSCULAR ONCE
Status: DISCONTINUED | OUTPATIENT
Start: 2017-07-27 | End: 2017-07-27

## 2017-07-26 RX ORDER — FUROSEMIDE 20 MG/1
20 TABLET ORAL DAILY
Status: DISCONTINUED | OUTPATIENT
Start: 2017-07-26 | End: 2017-07-26

## 2017-07-26 RX ORDER — ISOSORBIDE MONONITRATE 30 MG/1
30 TABLET, EXTENDED RELEASE ORAL DAILY
Status: DISCONTINUED | OUTPATIENT
Start: 2017-07-26 | End: 2017-07-27

## 2017-07-26 RX ORDER — FUROSEMIDE 10 MG/ML
40 INJECTION INTRAMUSCULAR; INTRAVENOUS ONCE
Status: COMPLETED | OUTPATIENT
Start: 2017-07-26 | End: 2017-07-26

## 2017-07-26 RX ORDER — ATORVASTATIN CALCIUM 40 MG/1
40 TABLET, FILM COATED ORAL DAILY
Status: DISCONTINUED | OUTPATIENT
Start: 2017-07-26 | End: 2017-07-27 | Stop reason: HOSPADM

## 2017-07-26 RX ORDER — BUSPIRONE HYDROCHLORIDE 5 MG/1
30 TABLET ORAL 2 TIMES DAILY
Status: DISCONTINUED | OUTPATIENT
Start: 2017-07-26 | End: 2017-07-27 | Stop reason: HOSPADM

## 2017-07-26 RX ORDER — ACETAMINOPHEN 500 MG
1000 TABLET ORAL
Status: COMPLETED | OUTPATIENT
Start: 2017-07-26 | End: 2017-07-26

## 2017-07-26 RX ORDER — ENOXAPARIN SODIUM 100 MG/ML
40 INJECTION SUBCUTANEOUS EVERY 24 HOURS
Status: DISCONTINUED | OUTPATIENT
Start: 2017-07-26 | End: 2017-07-27 | Stop reason: HOSPADM

## 2017-07-26 RX ADMIN — GUAIFENESIN AND DEXTROMETHORPHAN HYDROBROMIDE 1 TABLET: 600; 30 TABLET, EXTENDED RELEASE ORAL at 09:07

## 2017-07-26 RX ADMIN — PANTOPRAZOLE SODIUM 40 MG: 40 TABLET, DELAYED RELEASE ORAL at 08:07

## 2017-07-26 RX ADMIN — ISOSORBIDE MONONITRATE 30 MG: 30 TABLET, EXTENDED RELEASE ORAL at 08:07

## 2017-07-26 RX ADMIN — LEVOTHYROXINE SODIUM 50 MCG: 50 TABLET ORAL at 07:07

## 2017-07-26 RX ADMIN — ENOXAPARIN SODIUM 40 MG: 100 INJECTION SUBCUTANEOUS at 05:07

## 2017-07-26 RX ADMIN — BUSPIRONE HYDROCHLORIDE 30 MG: 5 TABLET ORAL at 08:07

## 2017-07-26 RX ADMIN — GUAIFENESIN AND DEXTROMETHORPHAN HYDROBROMIDE 1 TABLET: 600; 30 TABLET, EXTENDED RELEASE ORAL at 08:07

## 2017-07-26 RX ADMIN — MOXIFLOXACIN HYDROCHLORIDE 400 MG: 400 INJECTION, SOLUTION INTRAVENOUS at 07:07

## 2017-07-26 RX ADMIN — LOSARTAN POTASSIUM 12.5 MG: 25 TABLET, FILM COATED ORAL at 09:07

## 2017-07-26 RX ADMIN — ATORVASTATIN CALCIUM 40 MG: 40 TABLET, FILM COATED ORAL at 08:07

## 2017-07-26 RX ADMIN — FUROSEMIDE 40 MG: 10 INJECTION, SOLUTION INTRAMUSCULAR; INTRAVENOUS at 08:07

## 2017-07-26 RX ADMIN — SODIUM CHLORIDE: 0.9 INJECTION, SOLUTION INTRAVENOUS at 11:07

## 2017-07-26 RX ADMIN — ACETAMINOPHEN 1000 MG: 500 TABLET ORAL at 06:07

## 2017-07-26 RX ADMIN — METOPROLOL SUCCINATE 25 MG: 25 TABLET, EXTENDED RELEASE ORAL at 08:07

## 2017-07-26 RX ADMIN — BUSPIRONE HYDROCHLORIDE 30 MG: 5 TABLET ORAL at 09:07

## 2017-07-26 RX ADMIN — CYANOCOBALAMIN TAB 1000 MCG 1000 MCG: 1000 TAB at 09:07

## 2017-07-26 RX ADMIN — ASPIRIN 81 MG 81 MG: 81 TABLET ORAL at 08:07

## 2017-07-26 NOTE — ED TRIAGE NOTES
Pt. To ER via EMS with c/o nausea, vomiting and generalized weakness. Pt. Is awake, alert and oriented. Placed on cardiac, BP and pulse oximetry monitoring. Skin is warm and dry. Cardiac monitor shows NSR with HR-88. Abdomen soft and flat pt. Denies diarrhea. Oxygen saturations 90-92% on room air. Pt. Placed on 2 L NC., and sats increased to 96%. Daughter at the bedside.Will continue to monitor.

## 2017-07-26 NOTE — PLAN OF CARE
TN completed assessment via telephone with daughter , Grace.       Daughter states patient was very independent prior to this scenario. States started all of the sudden.    Daughter requesting oxygen for home use for patient and pt to be checked for [place].          07/26/17 1827   Discharge Assessment   Assessment Type Discharge Planning Assessment   Confirmed/corrected address and phone number on facesheet? Yes   Assessment information obtained from? Caregiver  (grace- [phone])   Communicated expected length of stay with patient/caregiver yes   Prior to hospitilization cognitive status: Alert/Oriented   Prior to hospitalization functional status: Assistive Equipment   Current cognitive status: Alert/Oriented   Current Functional Status: Assistive Equipment   Arrived From admitted as an inpatient   Lives With child(teodoro), adult  (Grace Eng)   Able to Return to Prior Arrangements yes   Is patient able to care for self after discharge? No   How many people do you have in your home that can help with your care after discharge? 1   Patient's perception of discharge disposition home or selfcare   Readmission Within The Last 30 Days no previous admission in last 30 days   Patient currently being followed by outpatient case management? No   Patient currently receives home health services? No   Does the patient currently use HME? Yes   Patient currently receives private duty nursing? No   Patient currently receives any other outside agency services? No   Equipment Currently Used at Home walker, rolling;wheelchair   Do you have any problems affording any of your prescribed medications? No   Is the patient taking medications as prescribed? yes   Do you have any financial concerns preventing you from receiving the healthcare you need? No   Does the patient have transportation to healthcare appointments? Yes   Transportation Available family or friend will provide   On Dialysis? No   Does the patient receive  services at the [facility]? No   Are there any open cases? No   Discharge Plan A Home with family;Home Health   Discharge Plan B Skilled Nursing Facility   Patient/Family In Agreement With Plan yes                07/26/17 1827   Discharge Assessment   Assessment Type Discharge Planning Assessment   Confirmed/corrected address and phone number on facesheet? Yes   Assessment information obtained from? Caregiver  (tree [phone])   Communicated expected length of stay with patient/caregiver yes   Prior to hospitilization cognitive status: Alert/Oriented   Prior to hospitalization functional status: Assistive Equipment   Current cognitive status: Alert/Oriented   Current Functional Status: Assistive Equipment   Arrived From admitted as an inpatient   Lives With child(teodoro), adult  (Katerine Eng)   Able to Return to Prior Arrangements yes   Is patient able to care for self after discharge? No   How many people do you have in your home that can help with your care after discharge? 1   Patient's perception of discharge disposition home or selfcare   Readmission Within The Last 30 Days no previous admission in last 30 days   Patient currently being followed by outpatient case management? No   Patient currently receives home health services? No   Does the patient currently use HME? Yes   Patient currently receives private duty nursing? No   Patient currently receives any other outside agency services? No   Equipment Currently Used at Home walker, rolling;wheelchair   Do you have any problems affording any of your prescribed medications? No   Is the patient taking medications as prescribed? yes   Do you have any financial concerns preventing you from receiving the healthcare you need? No   Does the patient have transportation to healthcare appointments? Yes   Transportation Available family or friend will provide   On Dialysis? No   Does the patient receive services at the [facility]? No   Are there any open  cases? No   Discharge Plan A Home with family;Home Health   Discharge Plan B Skilled Nursing Facility   Patient/Family In Agreement With Plan yes

## 2017-07-26 NOTE — ED NOTES
Pt. Coughed and urinated on herself. New underwear provided. Pt. Instructed to notify nurse when she needs to urinate to obtain sample.

## 2017-07-26 NOTE — PLAN OF CARE
Problem: SLP Goal  Goal: SLP Goal  Short Term Goals:   1. Pt will participate in a bedside swallow study to determine the safest and least restrictive possible po diet with possible updated goals to follow pending results. -MET    Outcome: Outcome(s) achieved Date Met: 07/26/17  Bedside swallow study completed. Pt presented with munching pattern and slow oral transit time. No overt s/s of aspiration noted with all trials. Pt would benefit from a dental soft/chopped meat diet and thin liquids with universal aspiration precautions. No further skilled ST services warranted at this time. Please re-consult as needed.   NITIN Solano., CCC-SLP  07/26/2017

## 2017-07-26 NOTE — PT/OT/SLP EVAL
Physical Therapy  Evaluation    Janine Awad   MRN: [id number]   Admitting Diagnosis: Sepsis due to pneumonia    PT Received On: 07/26/17  PT Start Time: 1440     PT Stop Time: 1505    PT Total Time (min): 25 min       Billable Minutes:  Evaluation 10 and Therapeutic Activity 15    Diagnosis: Sepsis due to pneumonia  The primary encounter diagnosis was Pneumonia due to infectious organism, unspecified laterality, unspecified part of lung. Diagnoses of Sepsis due to pneumonia, Acute on chronic diastolic heart failure, Oropharyngeal dysphagia, Physical deconditioning, Severe aortic stenosis, Moderate mitral regurgitation, Coronary artery disease involving native coronary artery of native heart without angina pectoris, Essential hypertension, Mixed hyperlipidemia, Alzheimer's dementia with behavioral disturbance, unspecified timing of dementia onset, and Anxiety were also pertinent to this visit.      Past Medical History:   Diagnosis Date    Acquired hypothyroidism     Closed displaced intertrochanteric fracture of left femur s/p IM nail on 6/5/2016 6/5/2016    Coronary artery disease involving native coronary artery of native heart without angina pectoris     Essential hypertension     GERD (gastroesophageal reflux disease)     Hx of colonic polyps     Hyperlipidemia     Macular degeneration     Mitral valve stenosis     Severe aortic stenosis 9/22/2014    Syncope 1/5/2015      Past Surgical History:   Procedure Laterality Date    INTERTROCHANTERIC HIP FRACTURE SURGERY Left 06/05/2016    IM nail       Referring physician: Frank  Date referred to PT: 7/26/2017    General Precautions: Standard, fall  Orthopedic Precautions: N/A   Braces: N/A            Patient History:  Lives With: grandchild(teodoro), child(teodoro), adult  Living Arrangements: house  Home Layout: Able to live on 1st floor  Transportation Available: family or friend will provide  Equipment Currently Used at Home: walker, rolling  DME  owned (not currently used): none    Previous Level of Function:  Ambulation Skills: needs device  Transfer Skills: needs device  ADL Skills: needs assist    Subjective:  Communicated with primary nurse prior to session.    Chief Complaint: dizziness/lightheaded with sitting EOB  Patient goals: go home    Pain/Comfort  Pain Rating 1: 0/10  Pain Rating Post-Intervention 1: 0/10      Objective:   Patient found with: telemetry, peripheral IV     Cognitive Exam:  Oriented to: Person and Place    Follows Commands/attention: Follows one-step commands  Communication: clear  Safety awareness/insight to disability: impaired    Physical Exam:  Postural examination/scapula alignment: Rounded shoulder and Head forward    Skin integrity: Visible skin intact  Edema: Mild BLE    Sensation:   Intact grossly    Upper Extremity Range of Motion:  Right Upper Extremity: WFL  Left Upper Extremity: WFL    Upper Extremity Strength:  Right Upper Extremity: WFL  Left Upper Extremity: WFL    Lower Extremity Range of Motion:  Right Lower Extremity: WFL  Left Lower Extremity: WFL    Lower Extremity Strength:  Right Lower Extremity: 3/5 to 4/5  Left Lower Extremity: 3/5 to 4/5     Fine motor coordination:  na    Gross motor coordination: WFL    Functional Mobility:  Bed Mobility:  Supine to Sit: Contact Guard Assistance  Sit to Supine: Stand by Assistance, Contact Guard Assistance    Transfers:  Sit <> Stand Assistance: Activity did not occur    Gait:   Gait Distance: did not occur    Stairs:  na    Balance:   Static Sit: FAIR+: Able to take MINIMAL challenges from all directions  Dynamic Sit: FAIR: Cannot move trunk without losing balance  Static Stand: na  Dynamic stand: na    Therapeutic Activities and Exercises:  Patient supine to sit with CG/SBA, sat EOB ~ 10 minutes with increasing complaints of lightheadedness BP sitting 99/54 returned to supine /57    AM-PAC 6 CLICK MOBILITY  How much help from another person does this patient  currently need?   1 = Unable, Total/Dependent Assistance  2 = A lot, Maximum/Moderate Assistance  3 = A little, Minimum/Contact Guard/Supervision  4 = None, Modified [place]/Independent    Turning over in bed (including adjusting bedclothes, sheets and blankets)?: 4  Sitting down on and standing up from a chair with arms (e.g., wheelchair, bedside commode, etc.): 3  Moving from lying on back to sitting on the side of the bed?: 4  Moving to and from a bed to a chair (including a wheelchair)?: 3  Need to walk in hospital room?: 3  Climbing 3-5 steps with a railing?: 3  Total Score: 20     AM-PAC Raw Score CMS G-Code Modifier Level of Impairment Assistance   6 % Total / Unable   7 - 9 CM 80 - 100% Maximal Assist   10 - 14 CL 60 - 80% Moderate Assist   15 - 19 CK 40 - 60% Moderate Assist   20 - 22 CJ 20 - 40% Minimal Assist   23 CI 1-20% SBA / CGA   24 CH 0% Independent/ Mod I     Patient left HOB elevated with all lines intact, call button in reach and bed alarm on.    Assessment:   Janine Awad is a 89 y.o. female with a medical diagnosis of Sepsis due to pneumonia and presents with weakness,and decline from PLOF. Patient should benefit from PT for strengthening and gait training.    Rehab identified problem list/impairments: Rehab identified problem list/impairments: weakness, impaired self care skills, impaired functional mobilty, impaired cognition, impaired endurance, impaired cardiopulmonary response to activity, decreased safety awareness    Rehab potential is good.    Activity tolerance: Fair    Discharge recommendations: Discharge Facility/Level Of Care Needs: home with home health, home health PT, home health OT (if progress made with functional mobility)     Barriers to discharge: Barriers to Discharge: None    Equipment recommendations: Equipment Needed After Discharge:  (TBD, Patient poor historian)     GOALS:    Physical Therapy Goals        Problem: Physical Therapy Goal    Goal  Priority Disciplines Outcome Goal Variances Interventions   Physical Therapy Goal     PT/OT, PT Ongoing (interventions implemented as appropriate)     Description:  Goals to be met by: 2017     Patient will increase functional independence with mobility by performin. Supine to sit with Stand-by Assistance  2. Sit to stand transfer with Supervision  3. Gait  x 150 feet with Supervision using Rolling Walker/no AD.                       PLAN:    Patient to be seen 6 x/week to address the above listed problems via gait training, therapeutic activities, therapeutic exercises  Plan of Care expires: 17  Plan of Care reviewed with: patient          Minor TRAY Ojeda, PT  2017

## 2017-07-26 NOTE — ED PROVIDER NOTES
Encounter Date: 7/26/2017       History     Chief Complaint   Patient presents with    Shortness of Breath     pt. reports shortness of breath most of the night. associated symptoms are n/v, weakness. ems reports initial sao2-86% on ra with increase to 95% on o24l nc. accucheck-168 per ems.     The history is provided by the patient and a relative.   Shortness of Breath   This is a new problem. The current episode started 6 to 12 hours ago. The problem has been gradually worsening. Associated symptoms include a fever, cough, sputum production and vomiting. Pertinent negatives include no headaches, no sore throat, no chest pain and no leg swelling. She has had prior hospitalizations. She has had prior ED visits. Associated medical issues include pneumonia.     Review of patient's allergies indicates:   Allergen Reactions    Codeine Nausea And Vomiting     chills    Lisinopril Other (See Comments)     cough     Past Medical History:   Diagnosis Date    Acquired hypothyroidism     Closed displaced intertrochanteric fracture of left femur s/p IM nail on 6/5/2016 6/5/2016    Coronary artery disease involving native coronary artery of native heart without angina pectoris     Essential hypertension     GERD (gastroesophageal reflux disease)     Hx of colonic polyps     Hyperlipidemia     Macular degeneration     Mitral valve stenosis     Severe aortic stenosis 9/22/2014    Syncope 1/5/2015     Past Surgical History:   Procedure Laterality Date    INTERTROCHANTERIC HIP FRACTURE SURGERY Left 06/05/2016    IM nail     Family History   Problem Relation Age of Onset    Heart disease Mother     Cancer Mother     Heart disease Father     Skin cancer Father     No Known Problems Sister     No Known Problems Brother     No Known Problems Maternal Grandmother     No Known Problems Maternal Grandfather     No Known Problems Paternal Grandmother     No Known Problems Paternal Grandfather     No Known Problems  Maternal Aunt     No Known Problems Maternal Uncle     No Known Problems Paternal Aunt     No Known Problems Paternal Uncle     Anemia Neg Hx     Arrhythmia Neg Hx     Asthma Neg Hx     Clotting disorder Neg Hx     Fainting Neg Hx     Heart attack Neg Hx     Heart failure Neg Hx     Hyperlipidemia Neg Hx     Hypertension Neg Hx     Stroke Neg Hx     Atrial Septal Defect Neg Hx      Social History   Substance Use Topics    Smoking status: Never Smoker    Smokeless tobacco: Never Used    Alcohol use 0.6 oz/week     1 Standard drinks or equivalent per week      Comment: rare      Review of Systems   Constitutional: Positive for chills and fever.   HENT: Negative for sinus pressure and sore throat.    Respiratory: Positive for cough, sputum production and shortness of breath.    Cardiovascular: Negative for chest pain, palpitations and leg swelling.   Gastrointestinal: Positive for nausea and vomiting.   Genitourinary: Negative for difficulty urinating and dysuria.   Neurological: Negative for headaches.   All other systems reviewed and are negative.      Physical Exam     Initial Vitals [07/26/17 0517]   BP Pulse Resp Temp SpO2   (!) 150/89 91 (!) 28 99.3 °F (37.4 °C) 96 %      MAP       109.33         Physical Exam    Nursing note and vitals reviewed.  Constitutional: She appears well-developed and well-nourished. She is not diaphoretic. She appears distressed (mild).   HENT:   Head: Normocephalic.   Mouth/Throat: Oropharynx is clear and moist.   Eyes: Conjunctivae and EOM are normal.   Neck: Normal range of motion. Neck supple.   Cardiovascular: Normal rate, regular rhythm and intact distal pulses.   Murmur heard.  Pulmonary/Chest: She is in respiratory distress. She has rhonchi (R sided).   Abdominal: Soft. There is no tenderness.   Musculoskeletal: Normal range of motion. She exhibits no edema or tenderness.   Neurological: She is alert and oriented to person, place, and time. She has normal  strength.   Skin: Skin is warm and dry. Capillary refill takes less than 2 seconds. No rash noted.   Psychiatric: She has a normal mood and affect.         ED Course   Procedures  Labs Reviewed   CBC W/ AUTO DIFFERENTIAL - Abnormal; Notable for the following:        Result Value    WBC 16.79 (*)     RBC 3.89 (*)     Hemoglobin 11.4 (*)     Hematocrit 36.3 (*)     MCHC 31.4 (*)     RDW 16.7 (*)     All other components within normal limits   COMPREHENSIVE METABOLIC PANEL - Abnormal; Notable for the following:     Glucose 122 (*)     BUN, Bld 24 (*)     ALT 9 (*)     eGFR if non  56 (*)     All other components within normal limits   CULTURE, BLOOD   CULTURE, BLOOD   LACTIC ACID, PLASMA   INFLUENZA A AND B ANTIGEN   TROPONIN I   B-TYPE NATRIURETIC PEPTIDE   URINALYSIS     EKG Readings: (Independently Interpreted)   Initial Reading: No STEMI. Rhythm: Normal Sinus Rhythm. Heart Rate: 95. Ectopy: No Ectopy. Conduction: Normal. ST Segments: Non-Specific ST Segment Depression. T Waves: Normal. Axis: Left Axis Deviation. Clinical Impression: Normal Sinus Rhythm       X-Rays:   Independently Interpreted Readings:   Other Readings:  X-Ray Chest AP Portable (SOB) (Final result)   Result time 07/26/17 05:43:59   Final result by Barbara Peter MD (07/26/17 05:43:59)             Impression:       Cardiomegaly with findings suggestive of pulmonary edema/CHF; however, correlation for ongoing infection is advised.      Electronically signed by: BARBARA PETER  Date: 07/26/17  Time: 05:43         Narrative:     Comparison: 5/11/2017    Technique: Single AP portable chest radiograph.    Findings:   Cardiac monitoring leads overlie the chest.  The cardiomediastinal silhouette is enlarged. The trachea is midline. The lungs are symmetrically expanded and demonstrates patchy increased interstitial and parenchymal attenuation bilaterally, predominantly within the perihilar distribution.  There is no evidence of pneumothorax  or large volume of pleural fluid.  The osseous structures demonstrates stable appearing degenerative changes.              Medical Decision Making:   Differential Diagnosis:   Not limited to PNA, CHF, UTI, sepsis, flu  ED Management:  Pt feels improved on oxygen.  Imaging with edema vs infiltrate, the latter of which appears more likely given clinical presentation.  Discussed with [facility] who will admit.                   ED Course     Clinical Impression:   The encounter diagnosis was Pneumonia due to infectious organism, unspecified laterality, unspecified part of lung.    Disposition:   Disposition: Admitted  Condition: Stable                        Guy J. Lefort, MD  07/26/17 0659

## 2017-07-26 NOTE — ED NOTES
Report received from chloe hager patient awake alert ox4 in no acute distress family at bedside nurse will continue to monitor

## 2017-07-26 NOTE — PLAN OF CARE
Problem: Physical Therapy Goal  Goal: Physical Therapy Goal  Goals to be met by: 2017     Patient will increase functional independence with mobility by performin. Supine to sit with Stand-by Assistance  2. Sit to stand transfer with Supervision  3. Gait  x 150 feet with Supervision using Rolling Walker/no AD.     Outcome: Ongoing (interventions implemented as appropriate)  Recommend Home with HH vs SNF if no progress made with functional mobility

## 2017-07-26 NOTE — PT/OT/SLP EVAL
Speech Language Pathology  Bedside Swallow Study  Evaluation    Janine Awad   MRN: [id number]   ED 05/ED 05    Admitting Diagnosis: Sepsis due to pneumonia    Diet recommendations: Solid Diet Level: Dental Soft, Chopped meat  Liquid Diet Level: Thin Feed only when awake/alert, HOB to 90 degrees, Remain upright 30 minutes post meal, Meds whole 1 at a time and Eliminate distractions    SLP Treatment Date: 07/26/17  Speech Start Time: 1055     Speech Stop Time: 1105     Speech Total (min): 10 min       TREATMENT BILLABLE MINUTES:  Eval Swallow and Oral Function 10    Diagnosis: Sepsis due to pneumonia    H&P: Janine Awad is a 89 year old female who has a past medical history history of anxiety, hypothyroidism, NSTEMI, coronary artery disease, dementia, diastolic heart failure (last echo on 3/20/17 with EF of 55%, diastolic dysfunction, moderate mitral regurgitation, and severe aortic stenosis) essential hypertension, GERD, hyperlipidemia, normocytic anemia with B12 deficiency, and severe aortic stenosis who presented to [facility] on 7/26/17 with a primary complaint of shortness of breath for two days.  Patient states that she began to feel slightly short of breath yesterday and developed a nonproductive cough in the early evening. She woke up this morning around 3:30 am with significantly increased shortness of breath and generalized weakness to the point that she was unable to stand. She also became very nauseous and vomited. She endorses multiple episodes of chills, but did not take her temperature. She denies any recent sick contacts or illnesses.   She denies chest pain, abdominal pain, diarrhea, constipation, lower extremity edema, orthopnea, and PND.    CXR: Cardiomegaly with findings suggestive of pulmonary edema/CHF; however, correlation for ongoing infection is advised.    Past Medical History:   Diagnosis Date    Acquired hypothyroidism     Closed displaced intertrochanteric  fracture of left femur s/p IM nail on 6/5/2016 6/5/2016    Coronary artery disease involving native coronary artery of native heart without angina pectoris     Essential hypertension     GERD (gastroesophageal reflux disease)     Hx of colonic polyps     Hyperlipidemia     Macular degeneration     Mitral valve stenosis     Severe aortic stenosis 9/22/2014    Syncope 1/5/2015     Past Surgical History:   Procedure Laterality Date    INTERTROCHANTERIC HIP FRACTURE SURGERY Left 06/05/2016    IM nail       Has the patient been evaluated by SLP for swallowing? : Yes  Keep patient NPO?: No   General Precautions: Standard,      Current Respiratory Status: nasal cannula     Social Hx: Patient lives (unknown; pt unreliable reported, no family present)    Prior diet: unknown; pt unreliable . No family present.      Subjective:  Pt pleasantly confused. Awake and cooperative. Asked SLP multiple times throughout session where she is and what is wrong. SLP provided education each time.   Patient goals: none stated.    Pain/Comfort  Pain Rating 1: 0/10    Objective:        Oral Musculature Evaluation  Oral Musculature: WFL  Dentition: present and adequate  Mucosal Quality: adequate  Mandibular Strength and Mobility: WFL  Oral Labial Strength and Mobility: WFL  Lingual Strength and Mobility: WFL  Velar Elevation: WFL  Buccal Strength and Mobility: WFL, decreased tone  Volitional Cough: elicited  Volitional Swallow: timely  Voice Prior to PO Intake: clear     Bedside Swallow Eval:  Consistencies Assessed:   THIN:- self regulated cup sip of water x2, self regulated straw sip of water x2   PUREE:- tsp bites of pudding x2   DENTAL SOFT:- tsp bites of peaches x3  SOLID: -bite of bailey cracker x2  Oral Phase: excess chewing and slow oral transit time  Pharyngeal Phase: no overt clinical  signs/symptoms of aspiration and no overt clinical signs/symptoms of pharyngeal dysphagia    Bedside swallow study completed. Pt  presented with munching pattern and slow oral transit time. No overt s/s of aspiration noted with all trials. Pt would benefit from a dental soft/chopped meat diet and thin liquids with universal aspiration precautions. No further skilled ST services warranted at this time. Please re-consult as needed.     Assessment:  Janine Awad is a 89 y.o. female with a medical diagnosis of Sepsis due to pneumonia and presents with oral dysphagia c/b munching pattern, slow oral transit time, and excess chewing. Pt is at baseline. No further skilled ST services warranted at this time. Please re-consult as needed.           Discharge recommendations: Discharge Facility/Level Of Care Needs:  (return to prior level of care)     Goals:    SLP Goals     Not on file          Multidisciplinary Problems (Resolved)        Problem: SLP Goal    Goal Priority Disciplines Outcome   SLP Goal   (Resolved)     SLP Outcome(s) achieved   Description:  Short Term Goals:   1. Pt will participate in a bedside swallow study to determine the safest and least restrictive possible po diet with possible updated goals to follow pending results. -MET                       Plan:   Patient to be seen     Plan of Care expires:    Plan of Care reviewed with: patient (RN)  SLP Follow-up?: No         SLP G-Codes  Functional Assessment Tool Used: noms  Score: 6  Functional Limitations: Swallowing  Swallow Current Status (): CI  Swallow Goal Status (): CI  Swallow Discharge Status (): CINTHIA Martin, CCC-SLP  07/26/2017

## 2017-07-26 NOTE — H&P
U Internal Medicine History and Physical - Resident Note    Admitting Team: Medicine Team A  Attending Physician: Dr. Marie  Resident: Dr. Lia Palma and Dr. Maximo Cagle  Interns: Dr. Shara Armstrong and Dr. Aura Robles    Date of Admit: 7/26/2017    Chief Complaint     Shortness of breath for two days    Subjective:      History of Present Illness:    Janine Awad is a 89 year old female who has a past medical history history of anxiety, hypothyroidism, NSTEMI, coronary artery disease, dementia, diastolic heart failure (last echo on 3/20/17 with EF of 55%, diastolic dysfunction, moderate mitral regurgitation, and severe aortic stenosis) essential hypertension, GERD, hyperlipidemia, normocytic anemia with B12 deficiency, and severe aortic stenosis who presented to [facility] on 7/26/17 with a primary complaint of shortness of breath for two days.    Patient states that she began to feel slightly short of breath yesterday and developed a nonproductive cough in the early evening. She woke up this morning around 3:30 am with significantly increased shortness of breath and generalized weakness to the point that she was unable to stand. She also became very nauseous and vomited. She endorses multiple episodes of chills, but did not take her temperature. She denies any recent sick contacts or illnesses.     She denies chest pain, abdominal pain, diarrhea, constipation, lower extremity edema, orthopnea, and PND.    Past Medical History:  Past Medical History:   Diagnosis Date    Acquired hypothyroidism     Closed displaced intertrochanteric fracture of left femur s/p IM nail on 6/5/2016 6/5/2016    Coronary artery disease involving native coronary artery of native heart without angina pectoris     Essential hypertension     GERD (gastroesophageal reflux disease)     Hx of colonic polyps     Hyperlipidemia     Macular degeneration     Mitral valve stenosis     Severe aortic stenosis  9/22/2014    Syncope 1/5/2015       Past Surgical History:  Past Surgical History:   Procedure Laterality Date    INTERTROCHANTERIC HIP FRACTURE SURGERY Left 06/05/2016    IM nail       Allergies:  Review of patient's allergies indicates:   Allergen Reactions    Codeine Nausea And Vomiting     chills    Lisinopril Other (See Comments)     cough       Home Medications:  Prior to Admission medications    Medication Sig Start Date End Date Taking? Authorizing Provider   artificial tears (ISOPTO TEARS) 0.5 % ophthalmic solution Place 1 drop into the left eye 4 (four) times daily. 2/17/17  Yes Shena Lord MD   aspirin 81 MG Chew Take 1 tablet (81 mg total) by mouth once daily. 3/3/17 3/3/18 Yes Nito Shultz NP   atorvastatin (LIPITOR) 40 MG tablet Take 1 tablet (40 mg total) by mouth once daily. 1/26/17  Yes Vishal Avendaño MD   busPIRone (BUSPAR) 30 MG Tab Take 1 tablet (30 mg total) by mouth 2 (two) times daily. 5/30/17 5/30/18 Yes Oleg Enciso, DO   clopidogrel (PLAVIX) 75 mg tablet Take 1 tablet (75 mg total) by mouth once daily. 3/20/17 3/20/18 Yes Milly Bell MD   COCONUT OIL ORAL Take by mouth.   Yes Historical Provider, MD   cyanocobalamin (VITAMIN B-12) 1000 MCG tablet Take 1 tablet (1,000 mcg total) by mouth once daily. 3/20/17  Yes Milly Bell MD   fish oil-omega-3 fatty acids 300-1,000 mg capsule Take 2 g by mouth once daily.   Yes Historical Provider, MD   furosemide (LASIX) 20 MG tablet Take 1 tablet (20 mg total) by mouth once daily. 5/31/17 5/31/18 Yes Oleg Enciso DO   GINSENG ORAL Take by mouth.   Yes Historical Provider, MD   isosorbide mononitrate (IMDUR) 30 MG 24 hr tablet Take 1 tablet (30 mg total) by mouth once daily. 5/31/17  Yes Oleg Enciso DO   levothyroxine (SYNTHROID) 50 MCG tablet Take 1 tablet (50 mcg total) by mouth before breakfast. 5/31/17 5/31/18 Yes Oleg Enciso, DO   metoprolol succinate (TOPROL-XL) 25 MG 24 hr tablet Take 1 tablet  (25 mg total) by mouth once daily. 5/31/17 5/31/18 Yes Oleg Enciso,    omeprazole (PRILOSEC) 20 MG capsule Take 20 mg by mouth once daily.  12/30/12  Yes Historical Provider, MD   vitamin D 1000 units Tab Take 185 mg by mouth once daily.   Yes Historical Provider, MD   ZINC ACETATE ORAL Take by mouth.   Yes Historical Provider, MD   ondansetron (ZOFRAN) 4 MG tablet Take 1 tablet (4 mg total) by mouth every 8 (eight) hours as needed for Nausea. 2/3/17   Shena Lord MD       Family History:  Family History   Problem Relation Age of Onset    Heart disease Mother     Cancer Mother     Heart disease Father     Skin cancer Father     No Known Problems Sister     No Known Problems Brother     No Known Problems Maternal Grandmother     No Known Problems Maternal Grandfather     No Known Problems Paternal Grandmother     No Known Problems Paternal Grandfather     No Known Problems Maternal Aunt     No Known Problems Maternal Uncle     No Known Problems Paternal Aunt     No Known Problems Paternal Uncle     Anemia Neg Hx     Arrhythmia Neg Hx     Asthma Neg Hx     Clotting disorder Neg Hx     Fainting Neg Hx     Heart attack Neg Hx     Heart failure Neg Hx     Hyperlipidemia Neg Hx     Hypertension Neg Hx     Stroke Neg Hx     Atrial Septal Defect Neg Hx        Social History:  Social History   Substance Use Topics    Smoking status: Never Smoker    Smokeless tobacco: Never Used    Alcohol use 0.6 oz/week     1 Standard drinks or equivalent per week      Comment: rare        Review of Systems:  Pertinent positives and negatives are listed in HPI.  All other systems are reviewed and are negative.    Health Maintaince :   Primary Care Physician: Dr. Becker  Immunizations:   TDap is up to date.  Influenza is up to date.  Pneumovax is up to date.  Cancer Screening:  PAP: patient no longer receives  Mammogram: patient no longer receives  Colonoscopy: is up to date.      Objective:  "  Last 24 Hour Vital Signs:  BP  Min: 124/53  Max: 150/89  Temp  Av.6 °F (38.1 °C)  Min: 99.3 °F (37.4 °C)  Max: 101.9 °F (38.8 °C)  Pulse  Av.4  Min: 88  Max: 96  Resp  Av  Min: 19  Max: 28  SpO2  Av.3 %  Min: 94 %  Max: 96 %  Height  Av' 2" (157.5 cm)  Min: 5' 2" (157.5 cm)  Max: 5' 2" (157.5 cm)  Weight  Av.5 kg (140 lb)  Min: 63.5 kg (140 lb)  Max: 63.5 kg (140 lb)  Body mass index is 25.61 kg/m².  No intake/output data recorded.    Physical Examination:  General: Alert and awake in NAD  Head:  Normocephalic and atraumatic  Eyes:  PERRL; EOMI with anicteric sclera and clear conjunctivae  Mouth:  Oropharynx clear and without exudate; moist mucous membranes  Cardio:  Regular rate and rhythm with normal S1 and S2; holosystolic murmur  Resp:  Unlabored with no wheezes or crackles; diffuse rhonchi  Abdom: Soft, NTND with normoactive bowel sounds  Extrem: WWP with no clubbing, cyanosis or edema  Skin:  No rashes, lesions, or color changes  Pulses: 2+ and symmetric distally  Neuro:  AAOx3; cooperative and pleasant with no focal deficits    Laboratory:  Most Recent Data:  CBC: Lab Results   Component Value Date    WBC 16.79 (H) 2017    HGB 11.4 (L) 2017    HCT 36.3 (L) 2017     2017    MCV 93 2017    RDW 16.7 (H) 2017     BMP: Lab Results   Component Value Date     2017    K 4.0 2017     2017    CO2 24 2017    BUN 24 (H) 2017    CREATININE 0.9 2017     (H) 2017    CALCIUM 9.7 2017    MG 2.0 2017    PHOS 2.6 (L) 2016     LFTs: Lab Results   Component Value Date    PROT 7.7 2017    ALBUMIN 3.8 2017    BILITOT 1.0 2017    AST 17 2017    ALKPHOS 60 2017    ALT 9 (L) 2017     Trended Lab Data:    Recent Labs  Lab 17  0613   WBC 16.79*   HGB 11.4*   HCT 36.3*      MCV 93   RDW 16.7*      K 4.0      CO2 24   BUN 24* "   CREATININE 0.9   *   PROT 7.7   ALBUMIN 3.8   BILITOT 1.0   AST 17   ALKPHOS 60   ALT 9*       Trended Cardiac Data:    Recent Labs  Lab 07/26/17  0613   TROPONINI 0.038*   BNP 1,114*       Microbiology Data:  Blood cultures drawn on 7/26/17    Other Laboratory Data:  Influenze A and B negative  Lactate 2.2    Other Results:  EKG (my interpretation):   Normal sinus with LVH    Radiology:  Imaging Results          X-Ray Chest AP Portable (SOB) (Final result)  Result time 07/26/17 05:43:59    Final result by Barbara Peter MD (07/26/17 05:43:59)                 Impression:      Cardiomegaly with findings suggestive of pulmonary edema/CHF; however, correlation for ongoing infection is advised.      Electronically signed by: BARBARA PETER  Date:     07/26/17  Time:    05:43              Narrative:    Comparison: 5/11/2017    Technique: Single AP portable chest radiograph.    Findings:   Cardiac monitoring leads overlie the chest.  The cardiomediastinal silhouette is enlarged. The trachea is midline. The lungs are symmetrically expanded and demonstrates patchy increased interstitial and parenchymal attenuation bilaterally, predominantly within the perihilar distribution.  There is no evidence of pneumothorax or large volume of pleural fluid.  The osseous structures demonstrates stable appearing degenerative changes.                                 Assessment:     Janine Awad is a 89 y.o. female with past medical history history of anxiety, hypothyroidism, NSTEMI (March, 2017 with no intervention), coronary artery disease, dementia, diastolic heart failure (last echo on 3/20/17 with EF of 55%, diastolic dysfunction, moderate mitral regurgitation, and severe aortic stenosis) essential hypertension, GERD, hyperlipidemia, normocytic anemia with B12 deficiency, and severe aortic stenosis who presented to [facility] on 7/26/17 admitted for community acquired pneumonia     Plan:      Community acquired pneumonia  -patient with two days of SOB, subjective fevers, and cough  -chest x-ray with patchy increased interstitial and parenchymal attenuation bilaterally  -febrile to 101.9 while in the ED; O2 sat at 95% on 2L NC  -WBC elevated to 16.79; lactate WNL at 2.2  -CURB65 score of 3 (BUN > 19, diastolic BP < 60, and age > 65)  -patient was given a dose of moxifloxacin 400mg IV while in the ED; will continue daily while inpatient  -respiratory culture ordered  -robitussin for cough    Acute on chronic diastolic heart failure  -chest x-ray with evidence of pulmonary edema  -no crackles on exam, no lower extremity edema  -BNP on admit of 1,114 with initial troponin of 0.038 - will trend (likely demand ischemia)  -EKG without signs of ischemia - will trend  -will diurese with 40mg lasix and monitor I/Os  -continue home metoprolol 25mg, imdur 30mg, and losartan 12.5mg daily     Hypertension  -systolic pressures have been slightly elevated while in the ED  -continue home metoprolol 25mg, imdur 30mg, and losartan 12.5mg daily    CAD  -NSTEMI in March of 2017 with no interventions, patient refused angiography  -continue aspirin 81mg daily    B12 deficiency anemia  -H/H on admit 11.4/36.3 with MCV of 93  -continue home B12 1,000mcg daily    Hyperlipidemia  -continue home atorvastatin 40mg daily    Hypothyroidism  -no acute issues; TSH on 4/19/17 of 0.636  -continue home levothyroxine 50mcg daily    GERD  -no acute issues  -pantoprazole 40mg daily    Anxiety  -no acute issues  -continue home buspirone 30mg BID    Diet: regular  Prophylaxis: Lovenox  Dispo: pending clinical improvement and culture results     Code Status:     Full    Griffin Andino  [facility] Internal Medicine HO-I  U Medicine Service    [facility] Medicine Hospitalist Pager numbers:   [facility] Hospitalist Medicine Team A (Demarcus/Frank): [phone]  [facility] Hospitalist Medicine Team B (Azul/Katarzyna):  [phone]

## 2017-07-26 NOTE — PLAN OF CARE
Problem: Patient Care Overview  Goal: Plan of Care Review  Outcome: Ongoing (interventions implemented as appropriate)  Plan of care discussed with patient.  Verbalized understanding.  Patient remained awake and alert.  Oriented x 4 but forgetfulness noted.  Repeats questions and repeated instruction needed on room orientation.  Pt on bedrest due to severe dizziness when sitting up.  Blood pressure significantly decreases when sitting.  Bedside commode in room. Pt able to ambulate to commode with one person assist.  SpO2 noted to be 90% on room air, following activity.  Pt placed on 3 L nasal cannula to maintain SpO2 at 94%.  Resting in bed. Will continue to monitor.

## 2017-07-27 VITALS
TEMPERATURE: 98 F | SYSTOLIC BLOOD PRESSURE: 127 MMHG | HEIGHT: 62 IN | HEART RATE: 81 BPM | BODY MASS INDEX: 25.76 KG/M2 | RESPIRATION RATE: 16 BRPM | DIASTOLIC BLOOD PRESSURE: 59 MMHG | WEIGHT: 140 LBS | OXYGEN SATURATION: 93 %

## 2017-07-27 PROBLEM — R41.0 ACUTE DELIRIUM: Status: ACTIVE | Noted: 2017-07-27

## 2017-07-27 PROBLEM — I50.30 (HFPEF) HEART FAILURE WITH PRESERVED EJECTION FRACTION: Status: ACTIVE | Noted: 2017-07-27

## 2017-07-27 PROBLEM — R13.11 ORAL PHASE DYSPHAGIA: Status: ACTIVE | Noted: 2017-07-27

## 2017-07-27 LAB
ALBUMIN SERPL BCP-MCNC: 3.2 G/DL
ALP SERPL-CCNC: 40 U/L
ALT SERPL W/O P-5'-P-CCNC: 8 U/L
ANION GAP SERPL CALC-SCNC: 9 MMOL/L
AST SERPL-CCNC: 17 U/L
BASOPHILS # BLD AUTO: 0.04 K/UL
BASOPHILS NFR BLD: 0.4 %
BILIRUB SERPL-MCNC: 1.2 MG/DL
BUN SERPL-MCNC: 21 MG/DL
CALCIUM SERPL-MCNC: 8.9 MG/DL
CHLORIDE SERPL-SCNC: 106 MMOL/L
CO2 SERPL-SCNC: 24 MMOL/L
CREAT SERPL-MCNC: 0.9 MG/DL
DIFFERENTIAL METHOD: ABNORMAL
EOSINOPHIL # BLD AUTO: 0.2 K/UL
EOSINOPHIL NFR BLD: 2.3 %
ERYTHROCYTE [DISTWIDTH] IN BLOOD BY AUTOMATED COUNT: 16.2 %
EST. GFR  (AFRICAN AMERICAN): >60 ML/MIN/1.73 M^2
EST. GFR  (NON AFRICAN AMERICAN): 56 ML/MIN/1.73 M^2
GLUCOSE SERPL-MCNC: 108 MG/DL
HCT VFR BLD AUTO: 27 %
HGB BLD-MCNC: 8.8 G/DL
LYMPHOCYTES # BLD AUTO: 2.3 K/UL
LYMPHOCYTES NFR BLD: 21.5 %
MCH RBC QN AUTO: 29.4 PG
MCHC RBC AUTO-ENTMCNC: 32.6 G/DL
MCV RBC AUTO: 90 FL
MONOCYTES # BLD AUTO: 0.4 K/UL
MONOCYTES NFR BLD: 3.9 %
NEUTROPHILS # BLD AUTO: 7.6 K/UL
NEUTROPHILS NFR BLD: 71.7 %
PLATELET # BLD AUTO: 214 K/UL
PMV BLD AUTO: 10.5 FL
POTASSIUM SERPL-SCNC: 3.6 MMOL/L
PROCALCITONIN SERPL IA-MCNC: 0.48 NG/ML
PROT SERPL-MCNC: 6.3 G/DL
RBC # BLD AUTO: 2.99 M/UL
SODIUM SERPL-SCNC: 139 MMOL/L
TROPONIN I SERPL DL<=0.01 NG/ML-MCNC: 0.77 NG/ML
WBC # BLD AUTO: 10.64 K/UL

## 2017-07-27 PROCEDURE — 80053 COMPREHEN METABOLIC PANEL: CPT

## 2017-07-27 PROCEDURE — 84484 ASSAY OF TROPONIN QUANT: CPT

## 2017-07-27 PROCEDURE — 85025 COMPLETE CBC W/AUTO DIFF WBC: CPT

## 2017-07-27 PROCEDURE — 97110 THERAPEUTIC EXERCISES: CPT

## 2017-07-27 PROCEDURE — 97165 OT EVAL LOW COMPLEX 30 MIN: CPT

## 2017-07-27 PROCEDURE — 84145 PROCALCITONIN (PCT): CPT

## 2017-07-27 PROCEDURE — 25000003 PHARM REV CODE 250: Performed by: INTERNAL MEDICINE

## 2017-07-27 PROCEDURE — 27000221 HC OXYGEN, UP TO 24 HOURS

## 2017-07-27 PROCEDURE — 93005 ELECTROCARDIOGRAM TRACING: CPT

## 2017-07-27 PROCEDURE — 36415 COLL VENOUS BLD VENIPUNCTURE: CPT

## 2017-07-27 PROCEDURE — 63600175 PHARM REV CODE 636 W HCPCS: Performed by: INTERNAL MEDICINE

## 2017-07-27 PROCEDURE — 97116 GAIT TRAINING THERAPY: CPT

## 2017-07-27 PROCEDURE — 94761 N-INVAS EAR/PLS OXIMETRY MLT: CPT

## 2017-07-27 PROCEDURE — 93010 ELECTROCARDIOGRAM REPORT: CPT | Mod: ,,, | Performed by: INTERNAL MEDICINE

## 2017-07-27 RX ORDER — MOXIFLOXACIN HYDROCHLORIDE 400 MG/1
400 TABLET ORAL DAILY
Qty: 5 TABLET | Refills: 0 | Status: SHIPPED | OUTPATIENT
Start: 2017-07-28 | End: 2017-08-02

## 2017-07-27 RX ORDER — HALOPERIDOL 5 MG/ML
1 INJECTION INTRAMUSCULAR ONCE
Status: COMPLETED | OUTPATIENT
Start: 2017-07-27 | End: 2017-07-27

## 2017-07-27 RX ORDER — MOXIFLOXACIN HYDROCHLORIDE 400 MG/1
400 TABLET ORAL DAILY
Status: DISCONTINUED | OUTPATIENT
Start: 2017-07-28 | End: 2017-07-27 | Stop reason: HOSPADM

## 2017-07-27 RX ADMIN — HALOPERIDOL LACTATE 1 MG: 5 INJECTION, SOLUTION INTRAMUSCULAR at 01:07

## 2017-07-27 RX ADMIN — LEVOTHYROXINE SODIUM 50 MCG: 50 TABLET ORAL at 05:07

## 2017-07-27 RX ADMIN — ASPIRIN 81 MG 81 MG: 81 TABLET ORAL at 08:07

## 2017-07-27 RX ADMIN — ATORVASTATIN CALCIUM 40 MG: 40 TABLET, FILM COATED ORAL at 08:07

## 2017-07-27 RX ADMIN — CYANOCOBALAMIN TAB 1000 MCG 1000 MCG: 1000 TAB at 08:07

## 2017-07-27 RX ADMIN — GUAIFENESIN AND DEXTROMETHORPHAN HYDROBROMIDE 1 TABLET: 600; 30 TABLET, EXTENDED RELEASE ORAL at 08:07

## 2017-07-27 RX ADMIN — MOXIFLOXACIN HYDROCHLORIDE 400 MG: 400 INJECTION, SOLUTION INTRAVENOUS at 08:07

## 2017-07-27 RX ADMIN — BUSPIRONE HYDROCHLORIDE 30 MG: 5 TABLET ORAL at 08:07

## 2017-07-27 RX ADMIN — PANTOPRAZOLE SODIUM 40 MG: 40 TABLET, DELAYED RELEASE ORAL at 08:07

## 2017-07-27 NOTE — PROGRESS NOTES
[facility] IM Resident HO-III Progress Note    Subjective:      Pt states that she feels well this morning. Very poor short term memory - unable to recall that she has been dx'd with PNA and was having significant cough at presentation. Sitting up eating breakfast this morning.    Objective:   Last 24 Hour Vital Signs:  BP  Min: 65/36  Max: 132/58  Temp  Av.4 °F (36.9 °C)  Min: 97.1 °F (36.2 °C)  Max: 99.3 °F (37.4 °C)  Pulse  Av.5  Min: 58  Max: 84  Resp  Av.5  Min: 16  Max: 21  SpO2  Av.5 %  Min: 92 %  Max: 100 %  I/O last 3 completed shifts:  In: 150 [P.O.:150]  Out: 1950 [Urine:1950]    Physical Examination:  General:  Alert and awake in NAD  Head: Normocephalic and atraumatic  Eyes:  PERRL; EOMI with anicteric sclera and clear conjunctivae  Mouth: Oropharynx clear and without exudate; moist mucous membranes  Cardio: Regular rate and rhythm with normal S1 and S2; holosystolic murmur  Resp: Unlabored with no wheezes or crackles; diffuse rhonchi (improving)  Abdom: Soft, NTND with normoactive bowel sounds  Extrem:  o clubbing, cyanosis or edema  Skin: No rashes, lesions, or color changes  Pulses: 2+ and symmetric distally  Neuro: awake and alert but oriented only to self and place (not year or situation); cooperative and pleasant with no other focal deficits       Laboratory:  Laboratory Data Reviewed: yes  Pertinent Findings:  WBC 10.6  H/H 8.8/27  Plt 214  BUN/Cr 21/0.9  Oquendo stain: neg  Troponin 0.038 -> 0.313    Microbiology Data Reviewed: yes  Pertinent Findings:  Blood cxs : NGTD  Resp cx : not collected    Other Results:  EKG (my interpretation): NSR; no ST segment changes    Radiology Data Reviewed: yes  Pertinent Findings:  None new    Current Medications:        Scheduled:   aspirin  81 mg Oral Daily    atorvastatin  40 mg Oral Daily    busPIRone  30 mg Oral BID    cyanocobalamin  1,000 mcg Oral Daily    dextromethorphan-guaifenesin  mg  1 tablet Oral BID    enoxaparin   40 mg Subcutaneous Daily    isosorbide mononitrate  30 mg Oral Daily    levothyroxine  50 mcg Oral Before breakfast    losartan  12.5 mg Oral Daily    metoprolol succinate  25 mg Oral Daily    moxifloxacin  400 mg Intravenous Q24H    pantoprazole  40 mg Oral Daily     Antibiotics and Day Number of Therapy:  Moxifloxacin 7/26 - present    Lines and Day Number of Therapy:  PIV    Assessment:     Janine Awad is a 89 y.o. female with past medical history history of anxiety, hypothyroidism, NSTEMI (March, 2017 with no intervention), coronary artery disease, dementia, diastolic heart failure (last echo on 3/20/17 with EF of 55%, diastolic dysfunction, moderate mitral regurgitation, and severe aortic stenosis) essential hypertension, GERD, hyperlipidemia, normocytic anemia with B12 deficiency, and severe aortic stenosis who presented to [facility] on 7/26/17 admitted for community acquired pneumonia     Plan:      Sepsis 2/2 Community acquired pneumonia  -patient with two days of SOB, subjective fevers, and cough  -chest x-ray with patchy increased interstitial and parenchymal attenuation bilaterally  -febrile to 101.9 while in the ED; O2 sat at 95% on 2L NC  -WBC elevated to 16.79; lactate WNL at 2.2  -CURB65 score of 3 (BUN > 19, diastolic BP < 60, and age > 65)  -Started on moxifloxacin for CAP coverage  -respiratory culture ordered  -Blood cxs NGTD  -Lactate not elevated (2.2)  -Initial procalcitonin not elevated (0.05); repeat pending in setting of clinical findings c/w CAP  -continue mucinexD     Acute on chronic diastolic heart failure  -chest x-ray with evidence of pulmonary edema  -mild basilar crackles on lung exam  -BNP on admit of 1,114   - Initial troponin 0.038 (likely 2/2 demand ischemia); will continue to trend troponins and obtain serial EKGs  - Troponin 0.038 -> 0.313 ->  -EKGs remain without signs of ischemia or dynamic changes  -Given 1x dose of IV lasix (40mg) while  in ED with significant decrease in BP noted not long after lasix administration so was given a 500cc NS bolus with improvement in pressures; pt was given her home antihypertensive meds at same time as lasix so unclear how much of drop in pressures attributable to lasix; pt with severe AS therefore preload dependent, making diuresis tenuous  -holding home metoprolol 25mg, imdur 30mg, lasix 20mg, and losartan 12.5mg daily in setting of pt with sepsis and low to low-normal pressures since admission; will re-start as possible     Hypertension  - home med regimen of lasix 20mg, metoprolol 25mg, imdur 30mg, and losartan 12.5mg daily  - currently holding all antihypertensive meds in setting of pt with sepsis and low to low-normal pressures since admission; will re-start as possible     CAD  -NSTEMI in March of 2017 with no interventions, patient refused angiography  -continue aspirin 81mg daily  -trending troponins and following serial EKGs as above     B12 deficiency anemia  -H/H on admit 11.4/36.3 with MCV of 93  -continue home B12 1,000mcg daily     Hyperlipidemia  -continue home atorvastatin 40mg daily     Hypothyroidism  -no acute issues; TSH on 4/19/17 of 0.636  -continue home levothyroxine 50mcg daily     GERD  -no acute issues  -pantoprazole 40mg daily     Anxiety  -no acute issues  -continue home buspirone 30mg BID    Deconditioning  - PT/OT evaluating and treating during admission  - Recommend home health PT/OT at discharge     Diet: Cardiac dental soft  Prophylaxis: Lovenox  Dispo: pending clinical improvement     Lia Palma  [facility] Internal Medicine HO-III  [facility] Hospitalist Service Team A    [facility] Medicine Hospitalist Pager numbers:   [facility] Hospitalist Medicine Team A (Demarcus/Frank): [phone]  [facility] Hospitalist Medicine Team B (Azul/Katarzyna):  [phone]

## 2017-07-27 NOTE — PT/OT/SLP PROGRESS
Occupational Therapy      Janine Awad  MRN: [id number]    Patient not seen today secondary to d/c prior to OT evaluation  .     Jordana Drake, OT  7/27/2017

## 2017-07-27 NOTE — PLAN OF CARE
Problem: Fall Risk (Adult)  Intervention: Patient Rounds  Call bell in reach; side rails up x3; bed alarm on; encouraged to call for any needs      Comments: Call bell in reach; side rails up x3; bed alarm on; encouraged to call for any needs

## 2017-07-27 NOTE — PLAN OF CARE
Problem: Physical Therapy Goal  Goal: Physical Therapy Goal  Goals to be met by: 2017     Patient will increase functional independence with mobility by performin. Supine to sit with Stand-by Assistance  2. Sit to stand transfer with Supervision  3. Gait  x 150 feet with Supervision using Rolling Walker/no AD.      Outcome: Ongoing (interventions implemented as appropriate)  Improving functional mobility /58  Gait with RW O2 sat 92 lowest without O2.

## 2017-07-27 NOTE — PROGRESS NOTES
Pharmacy New Medication Education     Patient accepted medication education.     Pharmacy educated patient on the following medications, using the teach-back method.   Asa  Lipitor  Buspar  Vit b12  Mucinex dm  Lvoenox  Synthroid  Avelox  pantoprazole      Learners of pharmacy medication education included:  patient     Patient +/- learner response:  verbalize understanding

## 2017-07-27 NOTE — PROGRESS NOTES
Pt ambulated in hallway with PT.  Oxygen saturation noted to maintain at 92-93% during exercise.  Will continue to monitor.

## 2017-07-27 NOTE — PT/OT/SLP DISCHARGE
Physical Therapy Discharge Summary    Janine Awad  MRN: [id number]   Sepsis due to pneumonia   Patient Discharged from acute Physical Therapy on 2017.  Please refer to prior PT noted date on 2017 for functional status.     Assessment:   Patient appropriate for care in another setting.  GOALS:    Physical Therapy Goals     Not on file          Multidisciplinary Problems (Resolved)        Problem: Physical Therapy Goal    Goal Priority Disciplines Outcome Goal Variances Interventions   Physical Therapy Goal   (Resolved)     PT/OT, PT Outcome(s) achieved     Description:  Goals to be met by: 2017     Patient will increase functional independence with mobility by performin. Supine to sit with Stand-by Assistance  2. Sit to stand transfer with Supervision  3. Gait  x 150 feet with Supervision using Rolling Walker/no AD.                     Reasons for Discontinuation of Therapy Services  Transfer to alternate level of care.      Plan:  Patient Discharged to: Home with Home Health Service.

## 2017-07-27 NOTE — DISCHARGE SUMMARY
[facility] Discharge Summary    Primary Team: [facility] Hospitalist Team A  Attending Physician: Suzanne Marie MD  Resident: Lia Palma  Intern: Aura Robles    Date of Admit: 7/26/2017  Date of Discharge: 7/27/2017    Discharge to: home  Condition: stable    Discharge Diagnoses     - Sepsis 2/2 Community acquired PNA  - HFpEF  - Severe aortic stenosis  - hypothyroidism  - HTN  - HLD  - Vit B12 def  - GERD  - JAIDA  - Dementia (Alzheimers dz)  - Deconditioning    Consultants and Procedures     Consultants:  NA    Procedures:   NA    Brief History of Present Illness     Janine Awad is a 89 year old female who has a past medical history history of anxiety, hypothyroidism, NSTEMI, coronary artery disease, dementia, diastolic heart failure (last echo on 3/20/17 with EF of 55%, diastolic dysfunction, moderate mitral regurgitation, and severe aortic stenosis) essential hypertension, GERD, hyperlipidemia, normocytic anemia with B12 deficiency, and severe aortic stenosis who presented to [facility] on 7/26/17 with a primary complaint of shortness of breath for two days.Patient states that she began to feel slightly short of breath yesterday and developed a nonproductive cough in the early evening. She woke up this morning around 3:30 am with significantly increased shortness of breath and generalized weakness to the point that she was unable to stand. She also became very nauseous and vomited. She endorses multiple episodes of chills, but did not take her temperature. She denies any recent sick contacts or illnesses. She denies chest pain, abdominal pain, diarrhea, constipation, lower extremity edema, orthopnea, and PND.    Hospital Course By Problem with Pertinent Findings     Sepsis 2/2 Community acquired pneumonia  -Prior to presentation, patient with two days of SOB, subjective fevers, and cough  -chest x-ray with patchy increased interstitial and parenchymal attenuation bilaterally  -febrile to  101.9 while in the ED; O2 sat at 95% on 2L NC  -WBC elevated to 16.79; lactate WNL at 2.2  -CURB65 score of 3 (BUN > 19, diastolic BP < 60, and age > 65)  -Blood cxs NGTD; resp cx pending  - Procalcitonin elevated at 0.48  - Rapid flu neg  -Started on moxifloxacin IV for CAP coverage. Discharged with Rx for PO moxifloxacin to complete 7 day course  -Started on mucinexD; continue at discharge until symptomatic improvement  - Evaluated for need for home oxygen; pt does not currently meet requirements for home O2 - sat'ing 93% at rest and sitting up; not able to obtain ambulatory sat 2/2 deconditioning  - Recommend PCP re-evaluate for need for home O2 in clinic as pt's daughter (primary caretaker) states that she has intermittent SOB/increased WOB at home at baseline     Acute on chronic diastolic heart failure  -chest x-ray with evidence of pulmonary edema  -mild basilar crackles on lung exam  -BNP on admit of 1,114   - Initial troponin 0.038 (likely 2/2 demand ischemia)  -EKGs remain without signs of ischemia or dynamic changes  -Given 1x dose of IV lasix (40mg) while in ED with significant decrease in BP noted not long after lasix administration so was given a 500cc NS bolus with improvement in pressures; pt was given her home antihypertensive meds at same time as lasix so unclear how much of drop in pressures attributable to lasix; pt with severe AS therefore preload dependent, making diuresis tenuous     Hypertension  - home med regimen of lasix 20mg, metoprolol 25mg, imdur 30mg, and losartan 12.5mg daily  - continue home regimen  - PCP may consider de-escalating antihypertensive regimen as possible in pt with low-normal BPs and intermittent c/o dizziness/unsteadiness     CAD  -NSTEMI in March of 2017 with no interventions, patient refused angiography  -continue aspirin 81mg daily, plavix 75mg qd  -trended troponins and followed serial EKGs as above; troponins remained with intermediate elevation and EKGs remained  with no dynamic changes; troponin elevation likely 2/2 demand ischemia in setting of sepsis  - Pt has been offered LHC in the past and refused; not amenable to any potential invasive coronary evaluation/intervention     Normocytic anemia - mixed anemia with B12 def and AOCD components  -H/H on admit 11.4/36.3 with MCV of 93 (baseline for this pt)  -continue home B12 1,000mcg daily  - iron studies 3/17 with ferritin of 135, TIBC 386  - H/H dropped to 8.8/27 on 7/27; no s/s of bleeding - likely 2/2 dilutional effect after administration of IVF  - Recommend PCP repeat CBC in 1wk to ensure stability     Hyperlipidemia  -continue home atorvastatin 40mg daily     Hypothyroidism  -no acute issues; TSH on 4/19/17 of 0.636  -continue home levothyroxine 50mcg daily     GERD  -no acute issues  -pantoprazole 40mg daily     Anxiety  -no acute issues  -continue home buspirone 30mg BID     Deconditioning  - PT/OT evaluating and treating during admission  - Recommend home health PT/OT at discharge which was ordered    Discharge Medications        Medication List      START taking these medications    dextromethorphan-guaifenesin  mg  mg per 12 hr tablet  Commonly known as:  MUCINEX DM  Take 1 tablet by mouth 2 (two) times daily. Stop taking after improvement in cough.     moxifloxacin 400 mg tablet  Commonly known as:  AVELOX  Take 1 tablet (400 mg total) by mouth once daily. Take for 5 days.  Start taking on:  7/28/2017        CONTINUE taking these medications    artificial tears 0.5 % ophthalmic solution  Commonly known as:  ISOPTO TEARS  Place 1 drop into the left eye 4 (four) times daily.     aspirin 81 MG Chew  Take 1 tablet (81 mg total) by mouth once daily.     atorvastatin 40 MG tablet  Commonly known as:  LIPITOR  Take 1 tablet (40 mg total) by mouth once daily.     busPIRone 30 MG Tab  Commonly known as:  BUSPAR  Take 1 tablet (30 mg total) by mouth 2 (two) times daily.     clopidogrel 75 mg tablet  Commonly  known as:  PLAVIX  Take 1 tablet (75 mg total) by mouth once daily.     cyanocobalamin 1000 MCG tablet  Commonly known as:  VITAMIN B-12  Take 1 tablet (1,000 mcg total) by mouth once daily.     fish oil-omega-3 fatty acids 300-1,000 mg capsule     furosemide 20 MG tablet  Commonly known as:  LASIX  Take 1 tablet (20 mg total) by mouth once daily.     isosorbide mononitrate 30 MG 24 hr tablet  Commonly known as:  IMDUR  Take 1 tablet (30 mg total) by mouth once daily.     levothyroxine 50 MCG tablet  Commonly known as:  SYNTHROID  Take 1 tablet (50 mcg total) by mouth before breakfast.     metoprolol succinate 25 MG 24 hr tablet  Commonly known as:  TOPROL-XL  Take 1 tablet (25 mg total) by mouth once daily.     omeprazole 20 MG capsule  Commonly known as:  PRILOSEC     ondansetron 4 MG tablet  Commonly known as:  ZOFRAN  Take 1 tablet (4 mg total) by mouth every 8 (eight) hours as needed for Nausea.     vitamin D 1000 units Tab     ZINC ACETATE ORAL        STOP taking these medications    COCONUT OIL ORAL     GINSENG ORAL           Where to Get Your Medications      You can get these medications from any pharmacy    Bring a paper prescription for each of these medications  · dextromethorphan-guaifenesin  mg  mg per 12 hr tablet  · moxifloxacin 400 mg tablet         Discharge Information:   Diet:  Cardiac, dental soft consistency    Physical Activity:  As tolerated    Instructions:  1. Take all medications as prescribed  2. Keep all follow-up appointments  3. Return to the hospital or call your primary care physicians if any worsening symptoms such as temp >= 100.4, CP, SOB, any other concerns occur.    Follow-Up Appointments:  - PCP (Dr. Enciso) within 1 wk for post hospitalization f/u  - Home Health, home PT/OT    Lia Palma  [facility], [facility]

## 2017-07-27 NOTE — PLAN OF CARE
Pt to d/c today, daughter at bedside to  patient. D/c plan reviewed, daughter is primary caregiver and verbalizes understanding.     Follow-up With  Details  Why  Contact Info   Oelg Enciso DO  On 8/3/2017  @10am,post hospitalization f/u with PCP within 1 wk  [address]  [place] [address]  [phone]   [facility]  [address]  [address]  [facility] [address]  [phone]       ,  Future Appointments  Date Time Provider Department Center   8/3/2017 10:00 AM Oleg Enciso DO [facility] [place]            07/27/17 1333   Final Note   Assessment Type Final Discharge Note   Discharge Disposition [facility]  [facility])   Discharge planning education complete? Yes   What phone number can be called within the next 1-3 days to see how you are doing after discharge? [phone]   Hospital Follow Up  Appt(s) scheduled? Yes   Discharge plans and expectations educations in teach back method with documentation complete? Yes   Offered [facility] Bedside Delivery? Yes   Discharge/Hospital Encounter Summary to ([facility]) PCP Yes   Referral to Outpatient Case Management complete? n/a   Referral to / orders for Home Health Complete? Yes   30 day supply of medicines given at discharge, if documented non-compliance / non-adherence? n/a   Any social issues identified prior to discharge? n/a   Did you assess the readiness or willingness of the family or caregiver to support self management of care? Yes   Right Care Referral Info   Post Acute Recommendation Home-care   Referral Type    Facility Name [facility]

## 2017-07-27 NOTE — PROGRESS NOTES
Pt discharged to home.  All discharge instructions regarding medications and follow up appointments discussed with daughter.  Verbalized understanding. Transport placed for wheelchair for pt down to lobby accompanied by daughter.  Will continue to monitor.

## 2017-07-27 NOTE — PT/OT/SLP PROGRESS
Physical Therapy  Treatment    Janine Awad   MRN: [id number]   Admitting Diagnosis: Sepsis due to pneumonia    PT Received On: 07/27/17  PT Start Time: 1015     PT Stop Time: 1042    PT Total Time (min): 27 min       Billable Minutes:  Gait [id number] and Therapeutic Exercise 12    Treatment Type: Treatment                General Precautions: Standard, fall  Orthopedic Precautions: N/A   Braces: N/A         Subjective:  Communicated with primary nurse prior to session.      Pain/Comfort  Pain Rating 1: 0/10  Pain Rating Post-Intervention 1: 0/10    Objective:   Patient found with: oxygen, peripheral IV    Functional Mobility:  Bed Mobility:   Supine to Sit: Supervision, Stand by Assistance    Transfers:  Sit <> Stand Assistance: Stand By Assistance, Contact Guard Assistance  Sit <> Stand Assistive Device: Rolling Walker    Gait:   Gait Distance: 70 ft   Assistance 1: Contact Guard Assistance, Stand by Assistance  Gait Assistive Device: Rolling walker  Gait Pattern: reciprocal  Gait Deviation(s): decreased jeanne, decreased step length, decreased stride length    Stairs:  na    Balance:   Static Sit: GOOD-: Takes MODERATE challenges from all directions but inconsistently  Dynamic Sit: FAIR+: Maintains balance through MINIMAL excursions of active trunk motion  Static Stand: FAIR+: Takes MINIMAL challenges from all directions  Dynamic stand: FAIR: Needs CONTACT GUARD during gait     Therapeutic Activities and Exercises:  Preformed supine ex hip abd/add, heel slides, and ankle pumps 15 reps each     AM-PAC 6 CLICK MOBILITY  How much help from another person does this patient currently need?   1 = Unable, Total/Dependent Assistance  2 = A lot, Maximum/Moderate Assistance  3 = A little, Minimum/Contact Guard/Supervision  4 = None, Modified [place]/Independent    Turning over in bed (including adjusting bedclothes, sheets and blankets)?: 4  Sitting down on and standing up from a chair with arms (e.g.,  wheelchair, bedside commode, etc.): 4  Moving from lying on back to sitting on the side of the bed?: 4  Moving to and from a bed to a chair (including a wheelchair)?: 3  Need to walk in hospital room?: 3  Climbing 3-5 steps with a railing?: 3  Total Score: 21    AM-PAC Raw Score CMS G-Code Modifier Level of Impairment Assistance   6 % Total / Unable   7 - 9 CM 80 - 100% Maximal Assist   10 - 14 CL 60 - 80% Moderate Assist   15 - 19 CK 40 - 60% Moderate Assist   20 - 22 CJ 20 - 40% Minimal Assist   23 CI 1-20% SBA / CGA   24 CH 0% Independent/ Mod I     Patient left seated EOB working with O T. with all lines intact, call button in reach and O T present.    Assessment:  Janine Awad is a 89 y.o. female with a medical diagnosis of Sepsis due to pneumonia and presents with improved mobility still unsteady with gait using RW needs assistance.Does not seem to qualify for Home O2    Rehab identified problem list/impairments: Rehab identified problem list/impairments: weakness, impaired endurance, impaired cardiopulmonary response to activity, impaired cognition, impaired self care skills, impaired functional mobilty, decreased safety awareness, decreased lower extremity function, gait instability    Rehab potential is good.    Activity tolerance: Fair    Discharge recommendations: Discharge Facility/Level Of Care Needs: home with home health, home health PT, home health OT     Barriers to discharge: Barriers to Discharge: None    Equipment recommendations: Equipment Needed After Discharge: none     GOALS:    Physical Therapy Goals        Problem: Physical Therapy Goal    Goal Priority Disciplines Outcome Goal Variances Interventions   Physical Therapy Goal     PT/OT, PT Ongoing (interventions implemented as appropriate)     Description:  Goals to be met by: 2017     Patient will increase functional independence with mobility by performin. Supine to sit with Stand-by Assistance  2. Sit to stand  transfer with Supervision  3. Gait  x 150 feet with Supervision using Rolling Walker/no AD.                       PLAN:    Patient to be seen 6 x/week  to address the above listed problems via gait training, therapeutic activities, therapeutic exercises  Plan of Care expires: 08/26/17  Plan of Care reviewed with: patient         Minor FELIZ Rusty, PT  07/27/2017

## 2017-07-27 NOTE — PT/OT/SLP EVAL
Occupational Therapy  Evaluation/Discharge    Janine Awad   MRN: [id number]   Admitting Diagnosis: Sepsis due to pneumonia                 [id number]  Billable Minutes:  Eval 12    Diagnosis: Sepsis due to pneumonia     Past Medical History:   Diagnosis Date    Acquired hypothyroidism     Closed displaced intertrochanteric fracture of left femur s/p IM nail on 6/5/2016 6/5/2016    Coronary artery disease involving native coronary artery of native heart without angina pectoris     Essential hypertension     GERD (gastroesophageal reflux disease)     Hx of colonic polyps     Hyperlipidemia     Macular degeneration     Mitral valve stenosis     Severe aortic stenosis 9/22/2014    Syncope 1/5/2015      Past Surgical History:   Procedure Laterality Date    INTERTROCHANTERIC HIP FRACTURE SURGERY Left 06/05/2016    IM nail         General Precautions: Standard,    Orthopedic Precautions:    Braces:            Patient History:       Prior level of function:        Subjective:  Communicated with nurse prior to session.  Pt poor historian  Chief Complaint: none  Patient/Family stated goals: none stated         Objective:       Cognitive Exam:  Oriented to: Person  Follows Commands/attention: Follows one-step commands  Communication: clear/fluent  Memory:  Impaired STM, Impaired LTM and Poor immediate recall  Safety awareness/insight to disability: impaired  Coping skills/emotional control: flat affect    Physical Exam:  Postural examination/scapula alignment: Rounded shoulder  Skin integrity: Visible skin intact  Edema: None noted     Sensation:   Intact    Upper Extremity Range of Motion:  Right Upper Extremity: WFL  Left Upper Extremity: WFL    Upper Extremity Strength:  Right Upper Extremity: WFL  Left Upper Extremity: WFL   Strength: WFL    Fine motor coordination:   Intact    Gross motor coordination: WFL    Functional Mobility:  Bed Mobility:       Transfers:       Functional Ambulation: CGA-SBA  "w/RW    Activities of Daily Living:  toileting CGA  Toilet tf CGA    Balance:   Static Sit: GOOD: Takes MODERATE challenges from all directions  Dynamic Sit: GOOD: Maintains balance through MODERATE excursions of active trunk movement  Static Stand: FAIR: Maintains without assist but unable to take challenges  Dynamic stand: FAIR: Needs CONTACT GUARD during gait    Therapeutic Activities and Exercises:      AM-PAC 6 CLICK ADL  How much help from another person does this patient currently need?  1 = Unable, Total/Dependent Assistance  2 = A lot, Maximum/Moderate Assistance  3 = A little, Minimum/Contact Guard/Supervision  4 = None, Modified [place]/Independent         AM-PAC Raw Score CMS "G-Code Modifier Level of Impairment Assistance   6 % Total / Unable   7 - 9 CM 80 - 100% Maximal Assist   10-14 CL 60 - 80% Moderate Assist   15 - 19 CK 40 - 60% Moderate Assist   20 - 22 CJ 20 - 40% Minimal Assist   23 CI 1-20% SBA / CGA   24 CH 0% Independent/ Mod I       Patient left HOB elevated with all lines intact, call button in reach, bed alarm on and nurse notified    Assessment:  Janine Awad is a 89 y.o. female with a medical diagnosis of Sepsis due to pneumonia and presents with impaired memory, cognition, balance, endurance limiting indep all needs. Pt will benefit from skilled OT services.    Rehab identified problem list/impairments:      Rehab potential is fair.    Activity tolerance: Fair    Discharge recommendations:   home    Barriers to discharge:  none    Equipment recommendations:       GOALS:    Occupational Therapy Goals     Not on file                PLAN:  Discharge home       Gunnar Correa OT  07/27/2017  "

## 2017-07-28 ENCOUNTER — PATIENT OUTREACH (OUTPATIENT)
Dept: ADMINISTRATIVE | Facility: CLINIC | Age: 82
End: 2017-07-28

## 2017-07-28 ENCOUNTER — TELEPHONE (OUTPATIENT)
Dept: INTERNAL MEDICINE | Facility: CLINIC | Age: 82
End: 2017-07-28

## 2017-07-28 NOTE — PATIENT INSTRUCTIONS
Treating Pneumonia  Pneumonia is an infection of one or both of the lungs. Pneumonia:  · Is usually caused by a virus  · Can be very serious, especially in infants, young children, and older adults. And also in those with other long-term health problems or weakened immune systems  · Is sometimes treated at home and sometimes in the hospital    Antibiotic Medications  Antibiotics may be prescribed or administered for pneumonia caused by bacteria. They may be pills or oral medications, or shots or injections. Or they may be given intravenously (IV) or into the vein. If you are taking oral medications at home:  · Fill your prescription and start taking your medication as soon as you can.  · You will likely start to feel better in a day or two, but dont stop taking the antibiotic.  · Use a pill organizer to help you remember to take your medication.  · Let your health care provider know if you have side effects.  · Take your medication exactly as directed on the label. Talk to your pharmacist if you have any questions.  Antiviral Medications  Antiviral medication may be prescribed or given for pneumonia cause by a virus. For example, antiviral medication may be prescribed for pneumonia caused by the flu virus. Antibiotics do not work against viruses. If you are taking antiviral medication at home:  · Fill your prescription and start taking your medication as soon as you can.  · Talk with your provider or pharmacist about possible side effects.  · Take the medication exactly as instructed.  To Relieve Symptoms  There are many medications that can help relieve symptoms of pneumonia. Some are prescription and some are over-the-counter.  Your health care provider may recommend:  · Acetaminophen or ibuprofen to lower your fever and to lessen headache or other pain  · Cough medication to loosen mucus or to reduce coughing  Make sure you check with your health care provider or pharmacist before taking any over-the-counter  medications.  Special Treatments  If you are hospitalized for pneumonia, you may have other therapies, including:  · Inhaled medications to help with breathing or chest congestion  · Supplemental oxygen to increase low oxygen levels  Drink Fluids and Eat Healthy  You should eat healthy to help your body fight the infection and drink a lot of fluids to replace fluids lost from fever and to help loosen mucus in the chest.  · Diet. Make health food choices, including fruits and vegetables, lean meats and other proteins, 100% whole grain and low- or no-fat dairy products.  · Fluids. Drinks at least 6-8 tall glasses a day. Water and 100% fruit or vegetable juice are best.  Get Plenty of Rest and Sleep  You may be more tired than usual for a while. It is important to get enough sleep at night. And, it's important to rest during the day. Talk with your health care provider if coughing or other symptoms are interfering with your sleep.  Preventing the Spread of Germs  The best thing you can do to prevent spreading germs is to wash your hands often. You should:  · Rub your hand with soap and water for 20 to 30 seconds.  · Clean in between your fingers, the backs of your hands, and around your nails.  · Dry your hands on a separate towel or use paper towels.  You should also:  · Keep alcohol-based hand  nearby.  · Make sure you also clean surfaces that you touch. Use a product that kills all types of germs.  · Stay away from others until you are feeling better.  When to Call Your Health Care Provider  Call your health care provider if you have any of these:  · Symptoms get worse  · Fever continues  · Shortness of breath gets worse  · Increased mucus or mucus that is darker in color  · Coughing gets worse  · Lips or fingers are bluish in color  · Side effects from your medication   Date Last Reviewed: 8/5/2014  © 7656-7878 [employer]. [address] [place] [address], [place] [address]. All rights reserved. This  information is not intended as a substitute for professional medical care. Always follow your healthcare professional's instructions.

## 2017-07-28 NOTE — TELEPHONE ENCOUNTER
----- Message from Mc Mckinnon MA sent at 7/28/2017 11:30 AM CDT -----  Contact: daughter / jairo cline - [phone]   Requesting an afternoon appt for the patient's hosp f/u appt scheduled 8/3/17. Please call. Thanks!

## 2017-07-31 LAB
BACTERIA BLD CULT: NORMAL
BACTERIA BLD CULT: NORMAL

## 2017-08-03 ENCOUNTER — TELEPHONE (OUTPATIENT)
Dept: INTERNAL MEDICINE | Facility: CLINIC | Age: 82
End: 2017-08-03

## 2017-08-03 NOTE — TELEPHONE ENCOUNTER
Fax came in from [facility].     Requesting form be signed and returned.    Dr valentin given to approve.

## 2017-08-04 ENCOUNTER — TELEPHONE (OUTPATIENT)
Dept: INTERNAL MEDICINE | Facility: CLINIC | Age: 82
End: 2017-08-04

## 2017-08-04 NOTE — TELEPHONE ENCOUNTER
Called and she was looking for form . I mauricio spring faxed close to 5pm yesterday.  She will check with staff and call if needs anything else.

## 2017-08-04 NOTE — TELEPHONE ENCOUNTER
----- Message from Jany Peace sent at 8/4/2017  9:55 AM CDT -----  Contact: Klever [facility] at [phone]  Klever requesting a call back regarding orders for pt to begin hospice.

## 2017-08-25 ENCOUNTER — HOSPITAL ENCOUNTER (EMERGENCY)
Facility: HOSPITAL | Age: 82
Discharge: HOME | End: 2017-08-25
Attending: EMERGENCY MEDICINE
Payer: MEDICARE

## 2017-08-25 VITALS
OXYGEN SATURATION: 92 % | BODY MASS INDEX: 23.3 KG/M2 | SYSTOLIC BLOOD PRESSURE: 130 MMHG | WEIGHT: 145 LBS | HEART RATE: 78 BPM | RESPIRATION RATE: 18 BRPM | TEMPERATURE: 99 F | DIASTOLIC BLOOD PRESSURE: 77 MMHG | HEIGHT: 66 IN

## 2017-08-25 DIAGNOSIS — M25.552 HIP PAIN, ACUTE, LEFT: Primary | ICD-10-CM

## 2017-08-25 LAB
ALBUMIN SERPL BCP-MCNC: 3.8 G/DL
ALP SERPL-CCNC: 78 U/L
ALT SERPL W/O P-5'-P-CCNC: 10 U/L
ANION GAP SERPL CALC-SCNC: 8 MMOL/L
AST SERPL-CCNC: 17 U/L
BASOPHILS # BLD AUTO: 0.07 K/UL
BASOPHILS NFR BLD: 0.8 %
BILIRUB SERPL-MCNC: 1.1 MG/DL
BILIRUB UR QL STRIP: NEGATIVE
BUN SERPL-MCNC: 12 MG/DL
CALCIUM SERPL-MCNC: 9.3 MG/DL
CHLORIDE SERPL-SCNC: 108 MMOL/L
CLARITY UR REFRACT.AUTO: CLEAR
CO2 SERPL-SCNC: 28 MMOL/L
COLOR UR AUTO: NORMAL
CREAT SERPL-MCNC: 0.8 MG/DL
CRP SERPL-MCNC: 0.6 MG/L
DIFFERENTIAL METHOD: ABNORMAL
EOSINOPHIL # BLD AUTO: 0.2 K/UL
EOSINOPHIL NFR BLD: 1.9 %
ERYTHROCYTE [DISTWIDTH] IN BLOOD BY AUTOMATED COUNT: 16.7 %
ERYTHROCYTE [SEDIMENTATION RATE] IN BLOOD BY WESTERGREN METHOD: 21 MM/HR
EST. GFR  (AFRICAN AMERICAN): >60 ML/MIN/1.73 M^2
EST. GFR  (NON AFRICAN AMERICAN): >60 ML/MIN/1.73 M^2
GLUCOSE SERPL-MCNC: 104 MG/DL
GLUCOSE UR QL STRIP: NEGATIVE
HCT VFR BLD AUTO: 32.3 %
HGB BLD-MCNC: 10.5 G/DL
HGB UR QL STRIP: NEGATIVE
INR PPP: 1.1
KETONES UR QL STRIP: NEGATIVE
LEUKOCYTE ESTERASE UR QL STRIP: NEGATIVE
LYMPHOCYTES # BLD AUTO: 1.6 K/UL
LYMPHOCYTES NFR BLD: 18 %
MCH RBC QN AUTO: 29.3 PG
MCHC RBC AUTO-ENTMCNC: 32.5 G/DL
MCV RBC AUTO: 90 FL
MONOCYTES # BLD AUTO: 0.5 K/UL
MONOCYTES NFR BLD: 5.9 %
NEUTROPHILS # BLD AUTO: 6.4 K/UL
NEUTROPHILS NFR BLD: 73.1 %
NITRITE UR QL STRIP: NEGATIVE
PH UR STRIP: 6 [PH] (ref 5–8)
PLATELET # BLD AUTO: 227 K/UL
PMV BLD AUTO: 10.1 FL
POTASSIUM SERPL-SCNC: 4.1 MMOL/L
PROT SERPL-MCNC: 7.1 G/DL
PROT UR QL STRIP: NEGATIVE
PROTHROMBIN TIME: 11.2 SEC
RBC # BLD AUTO: 3.58 M/UL
SODIUM SERPL-SCNC: 144 MMOL/L
SP GR UR STRIP: 1 (ref 1–1.03)
URN SPEC COLLECT METH UR: NORMAL
UROBILINOGEN UR STRIP-ACNC: NEGATIVE EU/DL
WBC # BLD AUTO: 8.76 K/UL

## 2017-08-25 PROCEDURE — 80053 COMPREHEN METABOLIC PANEL: CPT

## 2017-08-25 PROCEDURE — 85025 COMPLETE CBC W/AUTO DIFF WBC: CPT

## 2017-08-25 PROCEDURE — 99285 EMERGENCY DEPT VISIT HI MDM: CPT

## 2017-08-25 PROCEDURE — 81003 URINALYSIS AUTO W/O SCOPE: CPT

## 2017-08-25 PROCEDURE — 85610 PROTHROMBIN TIME: CPT

## 2017-08-25 PROCEDURE — 85651 RBC SED RATE NONAUTOMATED: CPT

## 2017-08-25 PROCEDURE — 87086 URINE CULTURE/COLONY COUNT: CPT

## 2017-08-25 PROCEDURE — 86140 C-REACTIVE PROTEIN: CPT

## 2017-08-25 PROCEDURE — 99284 EMERGENCY DEPT VISIT MOD MDM: CPT | Mod: ,,, | Performed by: EMERGENCY MEDICINE

## 2017-08-25 NOTE — ED NOTES
Pt assisted to bedpan; updated on wait for CT scan.  Pt denies additional needs or complaints at this time.  Bed locked in lowest position; side rails up and locked x 2; bedside table and personal belongings within reach.  Will continue to monitor pt.

## 2017-08-25 NOTE — ED NOTES
Pt soiled of urine.  Pt cleaned; new linens and gown applied.  Bed locked in lowest position; side rails up and locked x 2;  bedside table, and personal belongings within reach.  Daughter remains at bedside; will continue to monitor pt.

## 2017-08-25 NOTE — ED NOTES
LOC: The patient is awake, alert, and oriented to self and place but disoriented to time and situation. Affect is appropriate.  Speech is appropriate and clear.     APPEARANCE: Patient resting on stretcher; appears uncomfortable but in no acute distress.  Patient is clean and well groomed.    SKIN: The skin is warm and dry; color consistent with ethnicity.  Patient has normal skin turgor and moist mucus membranes.  Skin intact; no breakdown or bruising noted.     MUSCULOSKELETAL: Patient moving upper and right lower extremities without difficulty but complains of pain in the left hip area with movement and palpation.  No obvious deformity or dislocation noted with visualization and palpation.  No shortening or rotation noted.  Denies weakness.     RESPIRATORY: Airway is open and patent. Respirations spontaneous, even, easy, and non-labored.  Patient has a normal effort and rate.  No accessory muscle use noted. Denies cough.     CARDIAC:  Normal rhythm and rate noted.  No peripheral edema noted. No complaints of chest pain.      ABDOMEN: Soft and non tender to palpation.  No distention noted.     NEUROLOGIC: Eyes open spontaneously.  Behavior appropriate to situation.  Follows commands; facial expression symmetrical.  Purposeful motor response noted; normal sensation in all extremities.

## 2017-08-25 NOTE — ED TRIAGE NOTES
Pt with left hip pain since waking this morning; was unable to get out of bed due to pain.  No known injury.  Daughter states pt was walking normally yesterday; was complaining of left hip pain but this is not new for pt.

## 2017-08-25 NOTE — ED PROVIDER NOTES
Encounter Date: 8/25/2017       History     Chief Complaint   Patient presents with    Hip Pain     Pt arrives via EMS with complaints of sudden onset left hip pain this morning - denies fall - no shortening or rotation of extremity - injury last year to hip     The patient is a 89 y/o F with CAD, HTN, HLD, MV stenosis, and AS, with a hx of closed displaced intertrochanteric fx s/p IM nail placement, presented to the ED with acute onset left hip pain.  The pain started this morning when she woke up. According to her daughter, she was still in bed and didn't sustain a fall or any triggers, and she was screaming out of pain. She gave her 2 Aleves and the pain was significantly alleviated by the time EMS brought her to the ED. she denies fever/chills, N/V, any trauma to the hip, or recent infections. She does c/o a urinary frequency that is changed compared to her baseline frequency 2/2 lasix that she is taking.          Review of patient's allergies indicates:   Allergen Reactions    Codeine Nausea And Vomiting     chills    Lisinopril Other (See Comments)     cough    Morphine Nausea And Vomiting     Past Medical History:   Diagnosis Date    Acquired hypothyroidism     Closed displaced intertrochanteric fracture of left femur s/p IM nail on 6/5/2016 6/5/2016    Coronary artery disease involving native coronary artery of native heart without angina pectoris     Essential hypertension     GERD (gastroesophageal reflux disease)     Hx of colonic polyps     Hyperlipidemia     Macular degeneration     Mitral valve stenosis     Severe aortic stenosis 9/22/2014    Syncope 1/5/2015     Past Surgical History:   Procedure Laterality Date    DILATION AND CURETTAGE OF UTERUS      HYSTERECTOMY      INTERTROCHANTERIC HIP FRACTURE SURGERY Left 06/05/2016    IM nail    TONSILLECTOMY       Family History   Problem Relation Age of Onset    Heart disease Mother     Cancer Mother     Heart disease Father     Skin  cancer Father     No Known Problems Sister     No Known Problems Brother     No Known Problems Maternal Grandmother     No Known Problems Maternal Grandfather     No Known Problems Paternal Grandmother     No Known Problems Paternal Grandfather     No Known Problems Maternal Aunt     No Known Problems Maternal Uncle     No Known Problems Paternal Aunt     No Known Problems Paternal Uncle     Anemia Neg Hx     Arrhythmia Neg Hx     Asthma Neg Hx     Clotting disorder Neg Hx     Fainting Neg Hx     Heart attack Neg Hx     Heart failure Neg Hx     Hyperlipidemia Neg Hx     Hypertension Neg Hx     Stroke Neg Hx     Atrial Septal Defect Neg Hx      Social History   Substance Use Topics    Smoking status: Never Smoker    Smokeless tobacco: Never Used    Alcohol use No      Comment: rare      Review of Systems   Constitutional: Negative for appetite change, chills, diaphoresis and fever.   HENT: Negative for trouble swallowing.    Eyes: Negative for photophobia and visual disturbance.   Respiratory: Negative for chest tightness and shortness of breath.    Cardiovascular: Negative for chest pain.   Gastrointestinal: Negative for abdominal distention, abdominal pain, constipation, diarrhea, nausea and vomiting.   Genitourinary: Positive for frequency. Negative for dysuria.   Musculoskeletal: Positive for myalgias. Negative for neck pain and neck stiffness.        Lt hip pain   Skin: Negative for pallor and wound.   Neurological: Negative for dizziness, weakness, light-headedness and headaches.   Hematological: Does not bruise/bleed easily.       Physical Exam     Initial Vitals [08/25/17 1056]   BP Pulse Resp Temp SpO2   (!) 160/80 80 16 97.8 °F (36.6 °C) 100 %      MAP       106.67         Physical Exam    Vitals reviewed.  Constitutional: She is cooperative.   HENT:   Head: Normocephalic and atraumatic.   Mouth/Throat: Oropharynx is clear and moist and mucous membranes are normal.   Eyes:  Conjunctivae and EOM are normal. Pupils are equal, round, and reactive to light.   Neck: Normal range of motion.   Cardiovascular: Normal rate, regular rhythm and normal pulses.   Murmur (Rt sternal border) heard.   Systolic murmur is present with a grade of 3/6   Pulmonary/Chest: Effort normal and breath sounds normal. No respiratory distress. She has no wheezes. She has no rales. She exhibits no tenderness.   Abdominal: Soft. Bowel sounds are normal. She exhibits no distension. There is no tenderness.   Musculoskeletal:        Left hip: She exhibits decreased range of motion (2/2 pain) and tenderness. She exhibits no swelling and no deformity.   No erythema, shortening or external rotation of the Lt LE   Neurological: She is alert. She has normal strength. GCS eye subscore is 4. GCS verbal subscore is 5. GCS motor subscore is 6.         ED Course   Procedures  Labs Reviewed   CBC W/ AUTO DIFFERENTIAL - Abnormal; Notable for the following:        Result Value    RBC 3.58 (*)     Hemoglobin 10.5 (*)     Hematocrit 32.3 (*)     RDW 16.7 (*)     Gran% 73.1 (*)     All other components within normal limits   COMPREHENSIVE METABOLIC PANEL - Abnormal; Notable for the following:     Total Bilirubin 1.1 (*)     All other components within normal limits   SEDIMENTATION RATE, MANUAL - Abnormal; Notable for the following:     Sed Rate 21 (*)     All other components within normal limits   CULTURE, URINE   PROTIME-INR   C-REACTIVE PROTEIN   URINALYSIS             Medical Decision Making:   Initial Assessment:   A 89 y/o f with CAD, HTN, HLD, MV stenosis, and AS, with a hx of closed displaced intertrochanteric fx s/p IM nail placement, presented to the ED with acute onset left hip pain, with tenderness to palpation on the exam, a/w urinary frequency. Differentials are including but not limited to hip joint septic arthritis, osteomyelitis, avascular necrosis of the femoral head. Will obtain CBC, ESR, CRP, u/a, and x-rays. She is  "afebrile and not in pain currently       APC / Resident Notes:   3:21 PM  No leukocytosis   No significant CRP and sed rate elevation  X-rays didn't reveal any acute process such as dislocation, fx, or problems related to the IM nail  She is stable and pain free  Will obtain a CT scan to look for any evidence of inflammation in the bone or joint  If it comes back negative she can be discharged      Natalya Sterling MD  PGY-1, Internal Medicine  08/25/2017        No evidence of fracture or new process on CT L hip. Patient now reporting 3/10 pain and feels OK to go. "Stable postoperative changes without evidence of new fractures or dislocations. No focal fluid collections." Patient discharged to home with instructions to follow up with PCP and to return if worsening pain/symptoms.     Oleg Franco MD  PGY-1 [facility]  08/25/2017         Attending Attestation:             Attending ED Notes:   Patient presenting to the ED because of hip discomfort.  The pain began earlier today there's no history of falls or any other triggers to cause this patient denies any fever patient was evaluated with blood work imaging and there is no obvious findings here there was no reason for any other procedure orthopedics did not to see the patient and the patient was discharged with pain medication she had improved considerably          ED Course     Clinical Impression:   The encounter diagnosis was Hip pain, acute, left.    Disposition:   Disposition: Discharged  Condition: Stable                        Oleg Franco MD  Resident  08/25/17 4711       Ike Street MD  09/14/17 6300    "

## 2017-08-25 NOTE — ED TRIAGE NOTES
Pt arrives via EMS with complaints of sudden onset left hip pain this morning - denies fall - no shortening or rotation of extremity - injury last year to hip

## 2017-08-26 LAB
BACTERIA UR CULT: NORMAL
BACTERIA UR CULT: NORMAL

## 2017-08-26 NOTE — DISCHARGE INSTRUCTIONS
If pain becomes worse/intolerable please return to ED, if pain or immobility worsens and can no longer bear weight or ambulate please return to ED.     Please utilize tylenol for pain at home and follow up with PCP on Monday.

## 2017-08-29 ENCOUNTER — OUTPATIENT CASE MANAGEMENT (OUTPATIENT)
Dept: ADMINISTRATIVE | Facility: OTHER | Age: 82
End: 2017-08-29

## 2017-08-29 NOTE — PROGRESS NOTES
The following patient has been assigned to Patricia Heller RN with [facility] for high risk screening.    Reason: High Risk    Please contact O[facility] at [phone] with any questions.    Thank you,  Myrna Arrington

## 2017-08-30 ENCOUNTER — OUTPATIENT CASE MANAGEMENT (OUTPATIENT)
Dept: ADMINISTRATIVE | Facility: OTHER | Age: 82
End: 2017-08-30

## 2017-08-30 NOTE — PROGRESS NOTES
First attempt to perform initial assessment for [facility]; talked to daughter Grace Eng and Grace has declined OPCM services at this time.  Encoruaged Grace to call this RN if having any questions/concerns.  Will dis-enroll and close case.  JACKSON Heller, OPCM-RN

## 2017-10-03 ENCOUNTER — TELEPHONE (OUTPATIENT)
Dept: INTERNAL MEDICINE | Facility: CLINIC | Age: 82
End: 2017-10-03

## 2017-10-03 DIAGNOSIS — E78.5 HYPERLIPIDEMIA, UNSPECIFIED HYPERLIPIDEMIA TYPE: ICD-10-CM

## 2017-10-03 DIAGNOSIS — Z00.00 ANNUAL PHYSICAL EXAM: Primary | ICD-10-CM

## 2017-10-03 DIAGNOSIS — E55.9 VITAMIN D INSUFFICIENCY: ICD-10-CM

## 2017-10-03 DIAGNOSIS — R53.83 FATIGUE, UNSPECIFIED TYPE: ICD-10-CM

## 2017-10-03 NOTE — TELEPHONE ENCOUNTER
----- Message from Merna Rincon sent at 10/3/2017 11:06 AM CDT -----  Doctor appointment and lab have been scheduled.  Please link lab orders to the lab appointment.  Date of doctor appointment:  12/14  Physical or EP:  Physical  Date of lab appointment:  12/7  Comments:

## 2017-12-06 ENCOUNTER — LAB VISIT (OUTPATIENT)
Dept: LAB | Facility: HOSPITAL | Age: 82
End: 2017-12-06
Attending: INTERNAL MEDICINE
Payer: MEDICARE

## 2017-12-06 DIAGNOSIS — Z00.00 ANNUAL PHYSICAL EXAM: ICD-10-CM

## 2017-12-06 DIAGNOSIS — E55.9 VITAMIN D INSUFFICIENCY: ICD-10-CM

## 2017-12-06 DIAGNOSIS — R53.83 FATIGUE, UNSPECIFIED TYPE: ICD-10-CM

## 2017-12-06 DIAGNOSIS — E78.5 HYPERLIPIDEMIA, UNSPECIFIED HYPERLIPIDEMIA TYPE: ICD-10-CM

## 2017-12-06 LAB
25(OH)D3+25(OH)D2 SERPL-MCNC: 35 NG/ML
ALBUMIN SERPL BCP-MCNC: 3.6 G/DL
ALP SERPL-CCNC: 87 U/L
ALT SERPL W/O P-5'-P-CCNC: 82 U/L
ANION GAP SERPL CALC-SCNC: 9 MMOL/L
AST SERPL-CCNC: 65 U/L
BASOPHILS # BLD AUTO: 0.08 K/UL
BASOPHILS NFR BLD: 0.9 %
BILIRUB SERPL-MCNC: 1.8 MG/DL
BUN SERPL-MCNC: 14 MG/DL
CALCIUM SERPL-MCNC: 9.6 MG/DL
CHLORIDE SERPL-SCNC: 106 MMOL/L
CHOLEST SERPL-MCNC: 187 MG/DL
CHOLEST/HDLC SERPL: 3.6 {RATIO}
CO2 SERPL-SCNC: 28 MMOL/L
CREAT SERPL-MCNC: 0.9 MG/DL
DIFFERENTIAL METHOD: ABNORMAL
EOSINOPHIL # BLD AUTO: 0.2 K/UL
EOSINOPHIL NFR BLD: 2.3 %
ERYTHROCYTE [DISTWIDTH] IN BLOOD BY AUTOMATED COUNT: 17.5 %
EST. GFR  (AFRICAN AMERICAN): >60 ML/MIN/1.73 M^2
EST. GFR  (NON AFRICAN AMERICAN): 56.5 ML/MIN/1.73 M^2
GLUCOSE SERPL-MCNC: 98 MG/DL
HCT VFR BLD AUTO: 34.8 %
HDLC SERPL-MCNC: 52 MG/DL
HDLC SERPL: 27.8 %
HGB BLD-MCNC: 11.1 G/DL
IMM GRANULOCYTES # BLD AUTO: 0.03 K/UL
IMM GRANULOCYTES NFR BLD AUTO: 0.3 %
LDLC SERPL CALC-MCNC: 108.8 MG/DL
LYMPHOCYTES # BLD AUTO: 2.1 K/UL
LYMPHOCYTES NFR BLD: 23 %
MCH RBC QN AUTO: 30.1 PG
MCHC RBC AUTO-ENTMCNC: 31.9 G/DL
MCV RBC AUTO: 94 FL
MONOCYTES # BLD AUTO: 0.5 K/UL
MONOCYTES NFR BLD: 5.5 %
NEUTROPHILS # BLD AUTO: 6.2 K/UL
NEUTROPHILS NFR BLD: 68 %
NONHDLC SERPL-MCNC: 135 MG/DL
NRBC BLD-RTO: 0 /100 WBC
PLATELET # BLD AUTO: 264 K/UL
PMV BLD AUTO: 10.9 FL
POTASSIUM SERPL-SCNC: 4.3 MMOL/L
PROT SERPL-MCNC: 7.3 G/DL
RBC # BLD AUTO: 3.69 M/UL
SODIUM SERPL-SCNC: 143 MMOL/L
T4 FREE SERPL-MCNC: 1.36 NG/DL
TRIGL SERPL-MCNC: 131 MG/DL
TSH SERPL DL<=0.005 MIU/L-ACNC: 0.4 UIU/ML
WBC # BLD AUTO: 9.16 K/UL

## 2017-12-06 PROCEDURE — 85025 COMPLETE CBC W/AUTO DIFF WBC: CPT

## 2017-12-06 PROCEDURE — 84439 ASSAY OF FREE THYROXINE: CPT

## 2017-12-06 PROCEDURE — 84443 ASSAY THYROID STIM HORMONE: CPT

## 2017-12-06 PROCEDURE — 82306 VITAMIN D 25 HYDROXY: CPT

## 2017-12-06 PROCEDURE — 80053 COMPREHEN METABOLIC PANEL: CPT

## 2017-12-06 PROCEDURE — 36415 COLL VENOUS BLD VENIPUNCTURE: CPT | Mod: PO

## 2017-12-06 PROCEDURE — 80061 LIPID PANEL: CPT

## 2017-12-14 ENCOUNTER — TELEPHONE (OUTPATIENT)
Dept: INTERNAL MEDICINE | Facility: CLINIC | Age: 82
End: 2017-12-14

## 2017-12-14 DIAGNOSIS — R74.8 ELEVATED LIVER ENZYMES: Primary | ICD-10-CM

## 2017-12-14 NOTE — TELEPHONE ENCOUNTER
Daughter cancelled appointment today mother did not want to leave the house. Need comments on labs.     :)

## 2017-12-14 NOTE — TELEPHONE ENCOUNTER
----- Message from lOeg Enciso DO sent at 12/14/2017  2:18 PM CST -----  Mild increase in liver enzymes- would like to repeat in 2 weeks  Slight anemia which is stable   All other labs including kidney, thyroid, cholesterol, Vitamin D level are normal. No electrolyte abnormality

## 2017-12-14 NOTE — TELEPHONE ENCOUNTER
----- Message from Merna Rincon sent at 12/14/2017  9:50 AM CST -----  Contact: Pt daughter Grace [phone]  Patient would like to get test results.  Name of test (lab, mammo, etc.):  Labs and urine  Date of test:  12/6  Ordering provider: Zaria  Where was the test performed:  [facility]   Comments:  Pt daughter cancelled appointment for today due to not being able to get her out of the house.

## 2017-12-14 NOTE — TELEPHONE ENCOUNTER
I gave results to daughter, jairo.    They do not use [facility]. I took her off that.    I gave her dr's comments and we changed lab appt time.  She expressed understanding all.

## 2017-12-20 ENCOUNTER — TELEPHONE (OUTPATIENT)
Dept: INTERNAL MEDICINE | Facility: CLINIC | Age: 82
End: 2017-12-20

## 2017-12-20 NOTE — TELEPHONE ENCOUNTER
Patient daughter (Grace) was notified not to give relaxer for hip pain, needs to be seen. Refused to schedule appt.

## 2017-12-20 NOTE — TELEPHONE ENCOUNTER
----- Message from Hoda Cooney sent at 12/20/2017  2:05 PM CST -----  Contact: call daughter jairo cline at [phone]  She is calling to find out if it is ok to give her mom a muscle relaxer for her hip pain

## 2018-01-15 ENCOUNTER — LAB VISIT (OUTPATIENT)
Dept: LAB | Facility: HOSPITAL | Age: 83
End: 2018-01-15
Attending: INTERNAL MEDICINE
Payer: MEDICARE

## 2018-01-15 DIAGNOSIS — R74.8 ELEVATED LIVER ENZYMES: ICD-10-CM

## 2018-01-15 LAB
ALT SERPL W/O P-5'-P-CCNC: 16 U/L
AST SERPL-CCNC: 20 U/L

## 2018-01-15 PROCEDURE — 84450 TRANSFERASE (AST) (SGOT): CPT

## 2018-01-15 PROCEDURE — 36415 COLL VENOUS BLD VENIPUNCTURE: CPT | Mod: PO

## 2018-01-15 PROCEDURE — 84460 ALANINE AMINO (ALT) (SGPT): CPT

## 2018-01-16 ENCOUNTER — OFFICE VISIT (OUTPATIENT)
Dept: INTERNAL MEDICINE | Facility: CLINIC | Age: 83
End: 2018-01-16
Payer: MEDICARE

## 2018-01-16 VITALS
HEIGHT: 62 IN | RESPIRATION RATE: 18 BRPM | SYSTOLIC BLOOD PRESSURE: 131 MMHG | DIASTOLIC BLOOD PRESSURE: 70 MMHG | TEMPERATURE: 98 F | WEIGHT: 134.94 LBS | HEART RATE: 67 BPM | BODY MASS INDEX: 24.83 KG/M2

## 2018-01-16 DIAGNOSIS — E03.9 ACQUIRED HYPOTHYROIDISM: ICD-10-CM

## 2018-01-16 DIAGNOSIS — E78.2 MIXED HYPERLIPIDEMIA: ICD-10-CM

## 2018-01-16 DIAGNOSIS — I35.0 SEVERE AORTIC STENOSIS: ICD-10-CM

## 2018-01-16 DIAGNOSIS — I50.32 CHRONIC DIASTOLIC HEART FAILURE: ICD-10-CM

## 2018-01-16 DIAGNOSIS — G30.1 LATE ONSET ALZHEIMER'S DISEASE WITHOUT BEHAVIORAL DISTURBANCE: ICD-10-CM

## 2018-01-16 DIAGNOSIS — I10 ESSENTIAL HYPERTENSION: ICD-10-CM

## 2018-01-16 DIAGNOSIS — D69.2 SENILE PURPURA: ICD-10-CM

## 2018-01-16 DIAGNOSIS — I70.0 AORTIC ATHEROSCLEROSIS: ICD-10-CM

## 2018-01-16 DIAGNOSIS — Z00.00 ANNUAL PHYSICAL EXAM: Primary | ICD-10-CM

## 2018-01-16 DIAGNOSIS — F02.80 LATE ONSET ALZHEIMER'S DISEASE WITHOUT BEHAVIORAL DISTURBANCE: ICD-10-CM

## 2018-01-16 DIAGNOSIS — I25.118 CORONARY ARTERY DISEASE OF NATIVE ARTERY OF NATIVE HEART WITH STABLE ANGINA PECTORIS: ICD-10-CM

## 2018-01-16 PROBLEM — I21.4 NSTEMI (NON-ST ELEVATED MYOCARDIAL INFARCTION): Status: RESOLVED | Noted: 2017-03-18 | Resolved: 2018-01-16

## 2018-01-16 PROBLEM — J18.9 SEPSIS DUE TO PNEUMONIA: Status: RESOLVED | Noted: 2017-02-01 | Resolved: 2018-01-16

## 2018-01-16 PROBLEM — A41.9 SEPSIS DUE TO PNEUMONIA: Status: RESOLVED | Noted: 2017-02-01 | Resolved: 2018-01-16

## 2018-01-16 PROBLEM — I50.33 ACUTE ON CHRONIC DIASTOLIC HEART FAILURE: Status: RESOLVED | Noted: 2017-03-18 | Resolved: 2018-01-16

## 2018-01-16 PROCEDURE — G0008 ADMIN INFLUENZA VIRUS VAC: HCPCS | Mod: S$GLB,,, | Performed by: INTERNAL MEDICINE

## 2018-01-16 PROCEDURE — 90662 IIV NO PRSV INCREASED AG IM: CPT | Mod: S$GLB,,, | Performed by: INTERNAL MEDICINE

## 2018-01-16 PROCEDURE — 99397 PER PM REEVAL EST PAT 65+ YR: CPT | Mod: S$GLB,,, | Performed by: INTERNAL MEDICINE

## 2018-01-16 PROCEDURE — 99499 UNLISTED E&M SERVICE: CPT | Mod: S$GLB,,, | Performed by: INTERNAL MEDICINE

## 2018-01-16 PROCEDURE — 99999 PR PBB SHADOW E&M-EST. PATIENT-LVL III: CPT | Mod: PBBFAC,,, | Performed by: INTERNAL MEDICINE

## 2018-01-16 RX ORDER — ATORVASTATIN CALCIUM 20 MG/1
TABLET, FILM COATED ORAL
COMMUNITY
End: 2018-01-16

## 2018-01-16 RX ORDER — FUROSEMIDE 40 MG/1
TABLET ORAL
COMMUNITY
End: 2018-01-16

## 2018-01-16 RX ORDER — LEVOTHYROXINE SODIUM 50 UG/1
CAPSULE ORAL
COMMUNITY
End: 2018-01-16

## 2018-01-16 RX ORDER — LOSARTAN POTASSIUM 25 MG/1
25 TABLET ORAL DAILY
COMMUNITY

## 2018-01-16 RX ORDER — ASPIRIN 81 MG/1
TABLET ORAL
COMMUNITY
End: 2018-01-16

## 2018-01-16 RX ORDER — ASCORBIC ACID 250 MG
TABLET,CHEWABLE ORAL
Status: ON HOLD | COMMUNITY
End: 2018-10-20

## 2018-01-16 NOTE — PROGRESS NOTES
Subjective:       Patient ID: Janine Awad is a 89 y.o. female.    Chief Complaint: Annual Exam (list of meds needed)    HPI   89 y.o. Female here for annual exam.     Cholesterol: (normal)  Vaccines: Influenza (2017); Tetanus (2017); Pneumovax (2016); Zoster (2018)  Eye exam: declined  Mammogram: declined  Gyn exam: declined  Colonoscopy: declined  DEXA: declined    Mobility: normal  Falls: none recently     Exercise: no  Diet: regular    Past Medical History:  No date: Acquired hypothyroidism  6/5/2016: Closed displaced intertrochanteric fracture of*  No date: Coronary artery disease involving native coron*  No date: Essential hypertension  No date: GERD (gastroesophageal reflux disease)  No date: Hx of colonic polyps  No date: Hyperlipidemia  No date: Macular degeneration  No date: Mitral valve stenosis  9/22/2014: Severe aortic stenosis  1/5/2015: Syncope  Past Surgical History:  No date: DILATION AND CURETTAGE OF UTERUS  No date: HYSTERECTOMY  06/05/2016: INTERTROCHANTERIC HIP FRACTURE SURGERY Left      Comment: IM nail  No date: TONSILLECTOMY  Social History    Marital status:              Spouse name:                       Years of education:                 Number of children:               Occupational History    Retired     Social History Main Topics    Smoking status: Never Smoker                                                                Smokeless tobacco: Never Used                        Alcohol use: No                 Comment: rare     Drug use: No              Sexual activity: Not on file          Review of patient's allergies indicates:   -- Codeine -- Nausea And Vomiting    --  chills   -- Lisinopril -- Other (See Comments)    --  cough   -- Morphine -- Nausea And Vomiting  Ms. Awad had no medications administered during this visit.    Review of Systems   Constitutional: Negative for activity change, appetite change, chills, diaphoresis, fatigue, fever and unexpected weight  change.   HENT: Negative for congestion, mouth sores, postnasal drip, rhinorrhea, sinus pressure, sneezing, sore throat, trouble swallowing and voice change.    Eyes: Negative for pain, discharge and visual disturbance.   Respiratory: Negative for cough, shortness of breath and wheezing.    Cardiovascular: Negative for chest pain, palpitations and leg swelling.   Gastrointestinal: Negative for abdominal pain, blood in stool, constipation, diarrhea, nausea and vomiting.   Endocrine: Negative for cold intolerance and heat intolerance.   Genitourinary: Negative for difficulty urinating, dysuria, frequency, hematuria and urgency.   Musculoskeletal: Positive for arthralgias. Negative for myalgias.   Skin: Negative for rash and wound.   Allergic/Immunologic: Negative for environmental allergies and food allergies.   Neurological: Negative for dizziness, tremors, seizures, syncope, weakness, light-headedness and headaches.   Hematological: Negative for adenopathy. Does not bruise/bleed easily.   Psychiatric/Behavioral: Positive for confusion. Negative for sleep disturbance. The patient is not nervous/anxious.        Objective:      Physical Exam   Constitutional: She is oriented to person, place, and time. She appears well-developed and well-nourished. No distress.   HENT:   Head: Normocephalic and atraumatic.   Right Ear: External ear normal.   Left Ear: External ear normal.   Nose: Nose normal.   Mouth/Throat: Oropharynx is clear and moist. No oropharyngeal exudate.   Eyes: Conjunctivae and EOM are normal. Pupils are equal, round, and reactive to light. Right eye exhibits no discharge. Left eye exhibits no discharge. No scleral icterus.   Neck: Neck supple. No JVD present. No thyromegaly present.   Cardiovascular: Normal rate, regular rhythm and intact distal pulses.    Murmur heard.   Systolic murmur is present   Pulmonary/Chest: Effort normal and breath sounds normal. No respiratory distress. She has no wheezes. She  has no rales. She exhibits no tenderness.   Abdominal: Soft. Bowel sounds are normal. She exhibits no distension. There is no tenderness. There is no guarding.   Musculoskeletal: She exhibits no edema.   Lymphadenopathy:     She has no cervical adenopathy.   Neurological: She is alert and oriented to person, place, and time.   Skin: Skin is warm and dry. No rash noted. She is not diaphoretic. No pallor.   Psychiatric: She has a normal mood and affect. Judgment normal.   Nursing note and vitals reviewed.      Assessment:       1. Annual physical exam    2. Coronary artery disease of native artery of native heart with stable angina pectoris    3. Severe aortic stenosis    4. Essential hypertension    5. Chronic diastolic heart failure    6. Acquired hypothyroidism    7. Mixed hyperlipidemia    8. Late onset Alzheimer's disease without behavioral disturbance    9. Aortic atherosclerosis    10. Senile purpura        Plan:    1. Complete blood work reviewed with pt       Vaccines: Influenza (2017); Tetanus (2017); Pneumovax (2016); Zoster (2018)       Eye exam: declined       Mammogram: declined       Gyn exam: declined       Colonoscopy: declined       DEXA: declined   2. CAD with stable angina- stable, followed by Cardiology    3. Severe AS- pt declining surgical correction   4. Chronic diastolic heart failure- stable, CPT     5. HTN- stable, CPT    6. HLD- controlled on Lipitor 40 mg daily   7. Hypothyroidism- stable on Synthroid 50 mcg daily    8. Alzheimer's disease- stable off meds       Has seen Neurology    9. Senile purpura- stable, continue to monitor  10. Aortic atherosclerosis- stable on statin/ASA  11. F/u in 6 months

## 2018-01-17 ENCOUNTER — TELEPHONE (OUTPATIENT)
Dept: INTERNAL MEDICINE | Facility: CLINIC | Age: 83
End: 2018-01-17

## 2018-01-17 NOTE — TELEPHONE ENCOUNTER
----- Message from Grace Mayfield sent at 1/16/2018  2:57 PM CST -----  Contact: pt grand daughter -[phone]  Pt would like a call from the nurse in regards to telling the doctor what medications and the doses she is giving the pt the pt was seen today,please advise

## 2018-01-30 ENCOUNTER — TELEPHONE (OUTPATIENT)
Dept: NEUROLOGY | Facility: CLINIC | Age: 83
End: 2018-01-30

## 2018-01-30 NOTE — TELEPHONE ENCOUNTER
----- Message from Brigidajeniffer Suárez sent at 1/30/2018 10:24 AM CST -----  Contact: Yulia [phone]  x_  1st Request  _  2nd Request  _  3rd Request        Who: Keshawn    Why: Requesting a call back in regards to getting a sooner appointment, please call her Granddaughter.     What Number to Call Back: [phone]    When to Expect a call back: (Within 24 hours)    Please return the call at earliest convenience. Thanks!

## 2018-02-21 ENCOUNTER — TELEPHONE (OUTPATIENT)
Dept: NEUROLOGY | Facility: CLINIC | Age: 83
End: 2018-02-21

## 2018-02-21 DIAGNOSIS — F02.818 LATE ONSET ALZHEIMER'S DISEASE WITH BEHAVIORAL DISTURBANCE: ICD-10-CM

## 2018-02-21 DIAGNOSIS — G30.1 LATE ONSET ALZHEIMER'S DISEASE WITH BEHAVIORAL DISTURBANCE: ICD-10-CM

## 2018-02-21 DIAGNOSIS — G30.1 LATE ONSET ALZHEIMER'S DISEASE WITHOUT BEHAVIORAL DISTURBANCE: Primary | ICD-10-CM

## 2018-02-21 DIAGNOSIS — F02.80 LATE ONSET ALZHEIMER'S DISEASE WITHOUT BEHAVIORAL DISTURBANCE: Primary | ICD-10-CM

## 2018-02-21 RX ORDER — OMEPRAZOLE 20 MG/1
CAPSULE, DELAYED RELEASE ORAL
Status: ON HOLD | COMMUNITY
End: 2018-07-29 | Stop reason: HOSPADM

## 2018-02-21 RX ORDER — CITALOPRAM 10 MG/1
10 TABLET ORAL DAILY
Qty: 30 TABLET | Refills: 1 | Status: SHIPPED | OUTPATIENT
Start: 2018-02-21 | End: 2018-04-02 | Stop reason: SDUPTHER

## 2018-02-21 NOTE — TELEPHONE ENCOUNTER
----- Message from Sammi Cannon sent at 2/21/2018  4:00 PM CST -----  Contact: cici  _x  1st Request  _  2nd Request  _  3rd Request    Who: granddaughter    Why: pt needs medication for agitated.... Pt agitation has worsen recently...Please advise    What Number to Call Back: [phone]    When to Expect a call back: (Before the end of the day)   -- if call after 3:00 call back will be tomorrow.

## 2018-02-21 NOTE — TELEPHONE ENCOUNTER
Discussed with granddaughter.  The patient last been seen by me in October 2016 and subsequently never returned in follow-up.  Over the past 6. she become increasingly irritable and agitated at times.  She has also been progressively worsening cognitive and now has a sitter in the daytime and her granddaughter is present the rest of the time.  She has been on BuSpar in the recent past but it has not been helping.  We will initiate citalopram 10 mg daily.  Patient has an appointment to be seen by me on 03/01/2018

## 2018-03-01 ENCOUNTER — OFFICE VISIT (OUTPATIENT)
Dept: NEUROLOGY | Facility: CLINIC | Age: 83
End: 2018-03-01
Payer: MEDICARE

## 2018-03-01 VITALS
WEIGHT: 136 LBS | DIASTOLIC BLOOD PRESSURE: 61 MMHG | HEART RATE: 57 BPM | SYSTOLIC BLOOD PRESSURE: 125 MMHG | BODY MASS INDEX: 25.03 KG/M2 | HEIGHT: 62 IN

## 2018-03-01 DIAGNOSIS — G30.9 MIXED ALZHEIMER'S AND VASCULAR DEMENTIA WITH BEHAVIOR DISTURBANCES: Primary | ICD-10-CM

## 2018-03-01 DIAGNOSIS — F32.A DEPRESSION, UNSPECIFIED DEPRESSION TYPE: ICD-10-CM

## 2018-03-01 DIAGNOSIS — I25.118 CORONARY ARTERY DISEASE OF NATIVE ARTERY OF NATIVE HEART WITH STABLE ANGINA PECTORIS: ICD-10-CM

## 2018-03-01 DIAGNOSIS — R41.3 MEMORY LOSS: ICD-10-CM

## 2018-03-01 DIAGNOSIS — E78.2 MIXED HYPERLIPIDEMIA: ICD-10-CM

## 2018-03-01 DIAGNOSIS — I35.0 SEVERE AORTIC STENOSIS: ICD-10-CM

## 2018-03-01 DIAGNOSIS — F02.818 MIXED ALZHEIMER'S AND VASCULAR DEMENTIA WITH BEHAVIOR DISTURBANCES: Primary | ICD-10-CM

## 2018-03-01 DIAGNOSIS — I10 ESSENTIAL HYPERTENSION: ICD-10-CM

## 2018-03-01 DIAGNOSIS — F01.518 MIXED ALZHEIMER'S AND VASCULAR DEMENTIA WITH BEHAVIOR DISTURBANCES: Primary | ICD-10-CM

## 2018-03-01 DIAGNOSIS — I70.0 AORTIC ATHEROSCLEROSIS: ICD-10-CM

## 2018-03-01 DIAGNOSIS — I50.32 CHRONIC DIASTOLIC HEART FAILURE: ICD-10-CM

## 2018-03-01 PROCEDURE — 99215 OFFICE O/P EST HI 40 MIN: CPT | Mod: S$GLB,,, | Performed by: PSYCHIATRY & NEUROLOGY

## 2018-03-01 PROCEDURE — 99499 UNLISTED E&M SERVICE: CPT | Mod: S$GLB,,, | Performed by: PSYCHIATRY & NEUROLOGY

## 2018-03-01 PROCEDURE — 99999 PR PBB SHADOW E&M-EST. PATIENT-LVL III: CPT | Mod: PBBFAC,,, | Performed by: PSYCHIATRY & NEUROLOGY

## 2018-03-01 NOTE — PROGRESS NOTES
Subjective:       Patient ID: Janine Awad is a 89 y.o. female.    Chief Complaint:  Memory Loss      History of Present Illness  HPI  This is an 80-year-old female who returns in follow-up.  She was last seen by me in October 2016.  She was accompanied by her granddaughter and presently is at home requiring 24-hour supervision.  Has a daytime sitter and her granddaughter helps take care of her after she is home in the evening.  She has had difficulty with retention of recent information and was not able to give an adequate recent history.  She had a fall in May 2016 sustaining a hip fracture requiring surgery and she subsequently spent time in the skilled nursing facility and rehabilitation.   Workup done in the past included an EEG that was normal.  A carotid ultrasound showed no significant stenosis.  The patient is not on any memory medications because of potential side effects.  Over the past 6 months her granddaughter reported that she had become somewhat irritable and anxious with occasional restlessness.  She was then started on citalopram 10 mg daily.  This has helped her behavior.  She is sleeping better.       Review of Systems  Review of Systems   Constitutional: Negative.    HENT: Negative.  Negative for hearing loss.    Eyes: Negative.  Negative for visual disturbance.   Respiratory: Negative.  Negative for shortness of breath.    Cardiovascular: Negative.  Negative for chest pain and palpitations.   Gastrointestinal: Negative.    Endocrine: Negative.    Genitourinary: Negative.    Musculoskeletal: Positive for arthralgias and gait problem. Negative for back pain and neck pain.   Skin: Negative.    Allergic/Immunologic: Negative.    Neurological: Negative for tremors, seizures, syncope, speech difficulty, weakness, numbness and headaches.   Hematological: Negative.    Psychiatric/Behavioral: Positive for decreased concentration. Negative for confusion. The patient is nervous/anxious.         Objective:      Neurologic Exam     Mental Status   Oriented to person.   Disoriented to place. Disoriented to area and number. Oriented to country, city and street.   Disoriented to time. Disoriented to year, month, date, day and season.   Registration: recalls 3 of 3 objects. Recall at 5 minutes: recalls 1 of 3 objects. Follows 3 step commands.   Attention: decreased. Concentration: decreased.   Speech: speech is normal   Level of consciousness: alert  Knowledge: good. Able to perform simple calculations.   Able to name object. Able to read. Able to repeat. Able to write. Normal comprehension.   Patient was alert and responsive but cannot give any adequate recent history.     Cranial Nerves   Cranial nerves II through XII intact.     Motor Exam   Muscle bulk: normal  Overall muscle tone: normal    Strength   Strength 5/5 throughout.     Sensory Exam   Light touch normal.   Proprioception normal.   Pinprick normal.     Gait, Coordination, and Reflexes     Gait  Gait: non-neurologic    Coordination   Romberg: negative  Finger to nose coordination: normal    Tremor   Resting tremor: absent  Intention tremor: absent  Action tremor: absent    Reflexes   Right brachioradialis: 1+  Left brachioradialis: 1+  Right biceps: 1+  Left biceps: 1+  Right triceps: 1+  Left triceps: 1+  Right patellar: 1+  Left patellar: 1+  Right achilles: 1+  Left achilles: 1+  Right plantar: normal  Left plantar: normalPatient is limited to a wheelchair because of recent hip fracture with surgery.       Physical Exam   Constitutional: She appears well-developed and well-nourished.   HENT:   Head: Normocephalic and atraumatic.   Neck: Normal range of motion. Neck supple. Carotid bruit is not present.   Neurological: She has normal strength. She has a normal Finger-Nose-Finger Test and a normal Romberg Test.   Reflex Scores:       Tricep reflexes are 1+ on the right side and 1+ on the left side.       Bicep reflexes are 1+ on the right side  and 1+ on the left side.       Brachioradialis reflexes are 1+ on the right side and 1+ on the left side.       Patellar reflexes are 1+ on the right side and 1+ on the left side.       Achilles reflexes are 1+ on the right side and 1+ on the left side.  Psychiatric: Her speech is normal.   Vitals reviewed.        Assessment:        1. 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 Memory loss    3. Depression, unspecified depression type    4. Severe aortic stenosis    5. Mixed hyperlipidemia    6. Essential hypertension    7. Coronary artery disease of native artery of native heart with stable angina pectoris    8. Chronic diastolic heart failure    9. Aortic atherosclerosis           Plan:       Discussed with patient and granddaughter.  She is on no memory medications and would hesitate to initiate them because of potential side effects.  Fall precautions discussed.  The patient is to be in a supervised environment at home.  Continue citalopram which has helped his behavior.  Also advised daughter that she would have significant difficulty making financial decisions or decisions regarding her medical care.  Follow-up in one year if stable.

## 2018-04-02 DIAGNOSIS — F02.80 LATE ONSET ALZHEIMER'S DISEASE WITHOUT BEHAVIORAL DISTURBANCE: ICD-10-CM

## 2018-04-02 DIAGNOSIS — G30.1 LATE ONSET ALZHEIMER'S DISEASE WITHOUT BEHAVIORAL DISTURBANCE: ICD-10-CM

## 2018-04-02 RX ORDER — CITALOPRAM 10 MG/1
10 TABLET ORAL DAILY
Qty: 90 TABLET | Refills: 1 | Status: ON HOLD | OUTPATIENT
Start: 2018-04-02 | End: 2018-10-21 | Stop reason: HOSPADM

## 2018-04-06 ENCOUNTER — PES CALL (OUTPATIENT)
Dept: ADMINISTRATIVE | Facility: CLINIC | Age: 83
End: 2018-04-06

## 2018-04-06 DIAGNOSIS — I25.118 CORONARY ARTERY DISEASE OF NATIVE ARTERY OF NATIVE HEART WITH STABLE ANGINA PECTORIS: ICD-10-CM

## 2018-04-09 RX ORDER — METOPROLOL SUCCINATE 25 MG/1
TABLET, EXTENDED RELEASE ORAL
Qty: 90 TABLET | Refills: 3 | Status: SHIPPED | OUTPATIENT
Start: 2018-04-09

## 2018-04-09 RX ORDER — ISOSORBIDE MONONITRATE 30 MG/1
TABLET, EXTENDED RELEASE ORAL
Qty: 90 TABLET | Refills: 3 | Status: SHIPPED | OUTPATIENT
Start: 2018-04-09 | End: 2018-04-13 | Stop reason: SDUPTHER

## 2018-04-10 RX ORDER — ATORVASTATIN CALCIUM 40 MG/1
40 TABLET, FILM COATED ORAL DAILY
Qty: 90 TABLET | Refills: 3 | Status: ON HOLD | OUTPATIENT
Start: 2018-04-10 | End: 2018-10-21 | Stop reason: HOSPADM

## 2018-04-13 DIAGNOSIS — I25.118 CORONARY ARTERY DISEASE OF NATIVE ARTERY OF NATIVE HEART WITH STABLE ANGINA PECTORIS: ICD-10-CM

## 2018-04-13 RX ORDER — ISOSORBIDE MONONITRATE 30 MG/1
30 TABLET, EXTENDED RELEASE ORAL DAILY
Qty: 90 TABLET | Refills: 3 | Status: SHIPPED | OUTPATIENT
Start: 2018-04-13 | End: 2018-06-30 | Stop reason: SDUPTHER
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
## 2018-06-30 DIAGNOSIS — I25.118 CORONARY ARTERY DISEASE OF NATIVE ARTERY OF NATIVE HEART WITH STABLE ANGINA PECTORIS: ICD-10-CM

## 2018-07-02 RX ORDER — ISOSORBIDE MONONITRATE 30 MG/1
TABLET, EXTENDED RELEASE ORAL
Qty: 90 TABLET | Refills: 3 | Status: SHIPPED | OUTPATIENT
Start: 2018-07-02

## 2018-07-02 RX ORDER — LEVOTHYROXINE SODIUM 50 UG/1
TABLET ORAL
Qty: 90 TABLET | Refills: 3 | Status: SHIPPED | OUTPATIENT
Start: 2018-07-02

## 2018-07-16 ENCOUNTER — HOSPITAL ENCOUNTER (EMERGENCY)
Facility: HOSPITAL | Age: 83
Discharge: HOME | End: 2018-07-16
Attending: EMERGENCY MEDICINE
Payer: MEDICARE

## 2018-07-16 VITALS
HEIGHT: 63 IN | RESPIRATION RATE: 16 BRPM | BODY MASS INDEX: 23.04 KG/M2 | DIASTOLIC BLOOD PRESSURE: 56 MMHG | WEIGHT: 130 LBS | HEART RATE: 80 BPM | SYSTOLIC BLOOD PRESSURE: 122 MMHG | OXYGEN SATURATION: 100 % | TEMPERATURE: 99 F

## 2018-07-16 DIAGNOSIS — S09.90XA CLOSED HEAD INJURY, INITIAL ENCOUNTER: Primary | ICD-10-CM

## 2018-07-16 DIAGNOSIS — M25.552 LEFT HIP PAIN: ICD-10-CM

## 2018-07-16 DIAGNOSIS — R55 SYNCOPE: ICD-10-CM

## 2018-07-16 LAB
ALBUMIN SERPL BCP-MCNC: 3.5 G/DL
ALP SERPL-CCNC: 80 U/L
ALT SERPL W/O P-5'-P-CCNC: 12 U/L
ANION GAP SERPL CALC-SCNC: 10 MMOL/L
AST SERPL-CCNC: 17 U/L
BASOPHILS # BLD AUTO: 0.06 K/UL
BASOPHILS NFR BLD: 0.5 %
BILIRUB SERPL-MCNC: 1.4 MG/DL
BUN SERPL-MCNC: 14 MG/DL
CALCIUM SERPL-MCNC: 9 MG/DL
CHLORIDE SERPL-SCNC: 106 MMOL/L
CO2 SERPL-SCNC: 25 MMOL/L
CREAT SERPL-MCNC: 0.9 MG/DL
DIFFERENTIAL METHOD: ABNORMAL
EOSINOPHIL # BLD AUTO: 0.2 K/UL
EOSINOPHIL NFR BLD: 1.7 %
ERYTHROCYTE [DISTWIDTH] IN BLOOD BY AUTOMATED COUNT: 14.7 %
EST. GFR  (AFRICAN AMERICAN): >60 ML/MIN/1.73 M^2
EST. GFR  (NON AFRICAN AMERICAN): 56.1 ML/MIN/1.73 M^2
GLUCOSE SERPL-MCNC: 95 MG/DL
HCT VFR BLD AUTO: 36.7 %
HGB BLD-MCNC: 12.1 G/DL
IMM GRANULOCYTES # BLD AUTO: 0.05 K/UL
IMM GRANULOCYTES NFR BLD AUTO: 0.4 %
LYMPHOCYTES # BLD AUTO: 1.9 K/UL
LYMPHOCYTES NFR BLD: 17.2 %
MCH RBC QN AUTO: 31.2 PG
MCHC RBC AUTO-ENTMCNC: 33 G/DL
MCV RBC AUTO: 95 FL
MONOCYTES # BLD AUTO: 0.8 K/UL
MONOCYTES NFR BLD: 7 %
NEUTROPHILS # BLD AUTO: 8.2 K/UL
NEUTROPHILS NFR BLD: 73.2 %
NRBC BLD-RTO: 0 /100 WBC
PLATELET # BLD AUTO: 207 K/UL
PMV BLD AUTO: 10.4 FL
POTASSIUM SERPL-SCNC: 4.1 MMOL/L
PROT SERPL-MCNC: 6.8 G/DL
RBC # BLD AUTO: 3.88 M/UL
SODIUM SERPL-SCNC: 141 MMOL/L
WBC # BLD AUTO: 11.18 K/UL

## 2018-07-16 PROCEDURE — 99285 EMERGENCY DEPT VISIT HI MDM: CPT | Mod: ,,, | Performed by: EMERGENCY MEDICINE

## 2018-07-16 PROCEDURE — 93005 ELECTROCARDIOGRAM TRACING: CPT

## 2018-07-16 PROCEDURE — 99285 EMERGENCY DEPT VISIT HI MDM: CPT | Mod: 25

## 2018-07-16 PROCEDURE — 85025 COMPLETE CBC W/AUTO DIFF WBC: CPT

## 2018-07-16 PROCEDURE — 93010 ELECTROCARDIOGRAM REPORT: CPT | Mod: ,,, | Performed by: INTERNAL MEDICINE

## 2018-07-16 PROCEDURE — 80053 COMPREHEN METABOLIC PANEL: CPT

## 2018-07-16 PROCEDURE — 25000003 PHARM REV CODE 250: Performed by: EMERGENCY MEDICINE

## 2018-07-16 RX ORDER — ACETAMINOPHEN 325 MG/1
650 TABLET ORAL
Status: COMPLETED | OUTPATIENT
Start: 2018-07-16 | End: 2018-07-16

## 2018-07-16 RX ADMIN — ACETAMINOPHEN 650 MG: 325 TABLET, FILM COATED ORAL at 11:07

## 2018-07-16 NOTE — DISCHARGE INSTRUCTIONS
You can take Tylenol and use ice packs as needed for pain. Make sure area is safe and patient uses walker as needed to prevent future falls. Return to the ER if patient has persistent vomiting, is difficult to wake up, complains or worsening headache or has any other concerning symptoms.
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
## 2018-07-16 NOTE — ED PROVIDER NOTES
Encounter Date: 7/16/2018       History     Chief Complaint   Patient presents with    Fall     Fell backwards while walking hitting the back of her head.  + hematoma.  + plavix.  Unknown loc     HPI   Patient is a 89 y.o. female with history of alzheimer's dementia, CAD on Plavix (comprehensive medical history shown below), BIB EMS after she fell and hit the back of her head. Granddaughter is with her at bedside, reports that she was in the other room when she heard patient fall. Found patient down on her back several minutes later. She was conscious. Patient reports she does not remember the event or why she fell. Per granddaughter this is not atypical due to her Alzheimer's, she has been at baseline since the event. On arrival patient reports some pain/tenderness to left occipital scalp. She denies any neck pain, chest pain, SOB, focal weakness or numbness/tingling.     Review of patient's allergies indicates:   Allergen Reactions    Codeine Nausea And Vomiting     chills    Lisinopril Other (See Comments)     cough    Morphine Nausea And Vomiting     Past Medical History:   Diagnosis Date    Acquired hypothyroidism     Closed displaced intertrochanteric fracture of left femur s/p IM nail on 6/5/2016 6/5/2016    Coronary artery disease involving native coronary artery of native heart without angina pectoris     Essential hypertension     GERD (gastroesophageal reflux disease)     Hx of colonic polyps     Hyperlipidemia     Macular degeneration     Mitral valve stenosis     Severe aortic stenosis 9/22/2014    Syncope 1/5/2015     Past Surgical History:   Procedure Laterality Date    DILATION AND CURETTAGE OF UTERUS      HYSTERECTOMY      INTERTROCHANTERIC HIP FRACTURE SURGERY Left 06/05/2016    IM nail    TONSILLECTOMY       Family History   Problem Relation Age of Onset    Heart disease Mother     Cancer Mother     Heart disease Father     Skin cancer Father     No Known Problems Sister      No Known Problems Brother     No Known Problems Maternal Grandmother     No Known Problems Maternal Grandfather     No Known Problems Paternal Grandmother     No Known Problems Paternal Grandfather     No Known Problems Maternal Aunt     No Known Problems Maternal Uncle     No Known Problems Paternal Aunt     No Known Problems Paternal Uncle     Anemia Neg Hx     Arrhythmia Neg Hx     Asthma Neg Hx     Clotting disorder Neg Hx     Fainting Neg Hx     Heart attack Neg Hx     Heart failure Neg Hx     Hyperlipidemia Neg Hx     Hypertension Neg Hx     Stroke Neg Hx     Atrial Septal Defect Neg Hx      Social History   Substance Use Topics    Smoking status: Never Smoker    Smokeless tobacco: Never Used    Alcohol use No      Comment: rare      Review of Systems   Constitutional: Negative for activity change, fever and unexpected weight change.   HENT: Negative for sore throat, trouble swallowing and voice change.    Eyes: Negative for visual disturbance.   Respiratory: Negative for cough, chest tightness and shortness of breath.    Cardiovascular: Negative for chest pain, palpitations and leg swelling.   Gastrointestinal: Negative for diarrhea, nausea and vomiting.   Genitourinary: Negative for dysuria.   Musculoskeletal: Negative for back pain, neck pain and neck stiffness.   Skin: Negative for pallor, rash and wound.   Neurological: Negative for seizures, weakness and light-headedness.   Hematological: Does not bruise/bleed easily.   All other systems reviewed and are negative.      Physical Exam     Initial Vitals [07/16/18 1009]   BP Pulse Resp Temp SpO2   126/86 70 18 98.3 °F (36.8 °C) 97 %      MAP       --         Physical Exam    Nursing note and vitals reviewed.  Constitutional: She appears well-developed and well-nourished. No distress.   HENT:   Head: Normocephalic. Head is without raccoon's eyes, without Trujillo's sign and without laceration.   Right Ear: External ear normal.   Left  Ear: External ear normal.   Nose: Nose normal.   Mouth/Throat: Oropharynx is clear and moist.   Swelling to left occipital scalp, about 3cm in diameter and TTP. No open lesions/active bleeding.    Eyes: EOM are normal. Pupils are equal, round, and reactive to light.   Neck: No spinous process tenderness present.   C-collar in place on arrival.   Cardiovascular: Normal rate, regular rhythm, normal heart sounds and intact distal pulses.   Pulmonary/Chest: Breath sounds normal. No respiratory distress. She has no wheezes. She has no rales.   Abdominal: Soft. Bowel sounds are normal. She exhibits no distension. There is no tenderness. There is no rebound and no guarding.   Musculoskeletal: Normal range of motion.   Full ROM intact without pain of bilateral upper and lower extremities.    Neurological: She is alert and oriented to person, place, and time. She has normal strength. No cranial nerve deficit. Coordination normal. GCS eye subscore is 4. GCS verbal subscore is 5. GCS motor subscore is 6.   Skin: Skin is warm and dry.         ED Course   Procedures  Labs Reviewed   CBC W/ AUTO DIFFERENTIAL - Abnormal; Notable for the following:        Result Value    RBC 3.88 (*)     Hematocrit 36.7 (*)     MCH 31.2 (*)     RDW 14.7 (*)     Gran # (ANC) 8.2 (*)     Immature Grans (Abs) 0.05 (*)     Gran% 73.2 (*)     Lymph% 17.2 (*)     All other components within normal limits   COMPREHENSIVE METABOLIC PANEL - Abnormal; Notable for the following:     Total Bilirubin 1.4 (*)     eGFR if non  56.1 (*)     All other components within normal limits   URINALYSIS, REFLEX TO URINE CULTURE     EKG Readings: (Independently Interpreted)   Initial Reading: No STEMI. Rhythm: Normal Sinus Rhythm.   Incomplete RBBB and LVH noted       Imaging Results          CT Head Without Contrast (Final result)  Result time 07/16/18 12:01:57    Final result by Saturnino Cedillo MD (07/16/18 12:01:57)                 Impression:      1.  No acute intracranial abnormalities.  2. Soft tissue hematoma overlying the left posterior parietal occipital calvarium.      Electronically signed by: Saturnino Cedillo MD  Date:    07/16/2018  Time:    12:01             Narrative:    EXAMINATION:  CT HEAD WITHOUT CONTRAST    CLINICAL HISTORY:  Head trauma, headache;    TECHNIQUE:  Low dose axial images were obtained through the head.  Coronal and sagittal reformations were also performed. Contrast was not administered.    COMPARISON:  None.    FINDINGS:  There is generalized cerebral volume loss.  There is hypoattenuation in a periventricular fashion, likely sequela of chronic microvascular ischemic change.  There is no evidence of acute major vascular territory infarct, hemorrhage, or mass.  There is no hydrocephalus.  There are no abnormal extra-axial fluid collections.  The paranasal sinuses and mastoid air cells are clear, and there is no evidence of calvarial fracture.  The visualized soft tissues are remarkable for a subcutaneous hematoma overlying the posterior left parietal occipital calvarium..                               CT Cervical Spine Without Contrast (Final result)  Result time 07/16/18 12:07:14    Final result by Saturnino Cedillo MD (07/16/18 12:07:14)                 Impression:      1. No acute displaced fracture or dislocation of the cervical spine noting degenerative changes and additional findings above.      Electronically signed by: Saturnino Cedillo MD  Date:    07/16/2018  Time:    12:07             Narrative:    EXAMINATION:  CT CERVICAL SPINE WITHOUT CONTRAST    CLINICAL HISTORY:  C-spine trauma, NEXUS/CCR negative, low risk;    TECHNIQUE:  Low dose axial images, sagittal and coronal reformations were performed though the cervical spine.  Contrast was not administered.    COMPARISON:  None    FINDINGS:  The visualized lung apices are grossly clear noting biapical atelectasis or scarring, left greater than right.  The thyroid gland and  visualized parotid glands are grossly unremarkable.  The remaining salivary glands are grossly unremarkable.  No significant cervical lymphadenopathy.  There is vascular calcification.  The paraspinous and hypo pharyngeal soft tissues are grossly unremarkable.  The airway is patent.    Sagittal reformatted imaging demonstrates minimal grade 1 anterolisthesis of C7 on T1, likely on the basis of degenerative change.  There is multilevel facet arthropathy.  There is multilevel disc space height loss involving C3-C4, C4-C5, C5-C6 and C6-C7.  There is marginal anterior osteophytosis involving C3 through C6.  There is multilevel disc osteophyte complex.  No convincing acute displaced fracture or dislocation of the cervical spine.  There is multilevel mild to moderate spinal canal stenosis and neuroforaminal narrowing.  There is degenerative change about the odontoid.                               X-Ray Hips Bilateral 2 View Incl AP Pelvis (Final result)  Result time 07/16/18 11:57:32    Final result by Adrian Reid MD (07/16/18 11:57:32)                 Impression:      No significant detrimental interval change since 08/25/2017 is appreciated.  No evidence of recent fracture noted.      Electronically signed by: Adrian Reid MD  Date:    07/16/2018  Time:    11:57             Narrative:    EXAMINATION:  XR HIPS BILATERAL 2 VIEW INCL AP PELVIS    TECHNIQUE:  Seven views of the hips were obtained.    COMPARISON:  Comparison is made to 08/25/2017.  Information obtained from the electronic medical record indicates a history of recent trauma.    FINDINGS:  Postoperative changes are again identified relating to prior internal fixation of a left femoral fracture, with an intramedullary nail in the left femur again seen.  Osseous structures appear stable since the prior examination, with no definite evidence of recent fracture, lytic destructive process, or other significant detrimental interval change since that time noted.  Hip  joint spaces remain well maintained, without narrowing.                              X-Rays:   Independently Interpreted Readings:   Head CT: No hemorrhage.  No acute stroke.  No skull fracture.   Other Readings:          Medical Decision Making:   Initial Assessment:   HO2 MDM  Patient is a 89 y.o. female with complicated medical history including Alzheimer's dementia, on Plavix, BIB EMS after fall from standing with closed head injury. On arrival she is alert/oriented with GCS 15, C-collar in place. Small hematoma to left occipital scalp on exam.   Differential Diagnosis:   Differential diagnosis includes but is not limited to intracranial hemorrhage, scalp contusion, concussion, pelvis/femur fracture, C-spine fracture. Given patient cannot remember what happened prior to the event cannot exclude a syncopal episode due to cardiac arrythmia, symptomatic anemia, electrolyte abnormality, sepsis. Have ordered CT head and C-spine without contrast, plain films of pelvis, CBC, CMP, EKG, UA. Will give ice pack and Tylenol for pain. Dispo pending results.   Brina Arellano MD  [facility], [facility]  10:40AM 7/16/18  ED Management:  HO2 Update  No pertinent abnormalities on imaging or bloodwork as noted above. C-spine cleared by NEXUS criteria, patient denies any vertebral tenderness, has full ROM w/o pain. Patient observed ambulating around the ED, stable gait and denies any lightheadedness. I believe patient is stable for discharge at this time, suspect this was a mechanical fall with closed head injury. Discussed return precautions with granddaughter who lives with and cares for patient. She verbalized understanding and is comfortable taking the patient home.   Brina Arellano MD  [facility], [facility]  12:41 PM, 7/16/18              Attending Attestation:   Physician Attestation Statement for Resident:  As the supervising MD   Physician Attestation Statement: I have  personally seen and examined this patient.   I agree with the above history. -:   As the supervising MD I agree with the above PE.    As the supervising MD I agree with the above treatment, course, plan, and disposition.            Attending ED Notes:   89y F presents after unwitness fall. Patient has dementia and poor memory. Contusion noted to posterior scalp. Placed in C collar by EMS. Mild hip tenderness on exam. Imaging negative. Labs unremarkable for clinically significant process.              Clinical Impression:   The primary encounter diagnosis was Closed head injury, initial encounter. Diagnoses of Syncope and Left hip pain were also pertinent to this visit.      Disposition:   Disposition: Discharged                        Brina Arellano MD  Resident  07/16/18 1310       Walt Arce MD  07/16/18 [id number]

## 2018-07-20 ENCOUNTER — PES CALL (OUTPATIENT)
Dept: ADMINISTRATIVE | Facility: CLINIC | Age: 83
End: 2018-07-20

## 2018-07-23 ENCOUNTER — OUTPATIENT CASE MANAGEMENT (OUTPATIENT)
Dept: ADMINISTRATIVE | Facility: OTHER | Age: 83
End: 2018-07-23

## 2018-07-23 RX ORDER — FUROSEMIDE 20 MG/1
TABLET ORAL
Qty: 90 TABLET | Refills: 1 | Status: SHIPPED | OUTPATIENT
Start: 2018-07-23

## 2018-07-23 NOTE — PROGRESS NOTES
The following patient has been assigned to Patricia Heller RN with Outpatient Complex Care Management for high risk screening.    Reason: High Risk Recently Discharged    Please contact [facility] at [phone] with any questions.    Thank you,    Myrna Garcia    Outpatient Case Management

## 2018-07-23 NOTE — PROGRESS NOTES
First attempt to perform initial assessment for [facility]; left voice message for [place], granddaughter,  to return call.  JACKSON Heller OPCM-RN

## 2018-07-24 ENCOUNTER — OUTPATIENT CASE MANAGEMENT (OUTPATIENT)
Dept: ADMINISTRATIVE | Facility: OTHER | Age: 83
End: 2018-07-24

## 2018-07-24 NOTE — LETTER
July 24, 2018    Janine Awad  [address] [place] [address]             [facility] [address] [place] [address] Dear Janine,    I work with [facility] Outpatient Case Management Department. I have been unsuccessful at reaching you to follow-up to see how you have been doing. If you require any future assistance or if any new concerns or problems arise, please do not hesitate to call.     The Outpatient Case Management Department can be reached at [phone] from 8:00AM to 4:30 PM on Monday thru Friday. [facility] also has a program where a nurse is available 24/7 to answer questions or provide medical advice, their number is [phone].    Thanks,      Patricia Heller,  RN  Outpatient Case Management

## 2018-07-24 NOTE — PROGRESS NOTES
Second attempt to perform initial assessment for [facility]; left voice message to return call. Letter sent. DANIELLA GormanM-RN

## 2018-07-25 ENCOUNTER — HOSPITAL ENCOUNTER (INPATIENT)
Facility: HOSPITAL | Age: 83
LOS: 4 days | Discharge: HOME | DRG: 083 | End: 2018-07-29
Admitting: NEUROLOGICAL SURGERY
Payer: MEDICARE

## 2018-07-25 DIAGNOSIS — S06.5X0A POST-TRAUMATIC SUBDURAL HEMATOMA, WITHOUT LOSS OF CONSCIOUSNESS, INITIAL ENCOUNTER: Primary | ICD-10-CM

## 2018-07-25 DIAGNOSIS — S06.5XAA SDH (SUBDURAL HEMATOMA): ICD-10-CM
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
## 2018-07-25 DIAGNOSIS — I35.0 AORTIC STENOSIS: ICD-10-CM

## 2018-07-25 LAB
ABO + RH BLD: NORMAL
ALBUMIN SERPL BCP-MCNC: 4.1 G/DL
ALP SERPL-CCNC: 92 U/L
ALT SERPL W/O P-5'-P-CCNC: 15 U/L
ANION GAP SERPL CALC-SCNC: 13 MMOL/L
APTT BLDCRRT: 22.9 SEC
AST SERPL-CCNC: 27 U/L
BACTERIA #/AREA URNS AUTO: NORMAL /HPF
BASOPHILS # BLD AUTO: 0.09 K/UL
BASOPHILS NFR BLD: 0.8 %
BILIRUB SERPL-MCNC: 1.5 MG/DL
BILIRUB UR QL STRIP: NEGATIVE
BLD GP AB SCN CELLS X3 SERPL QL: NORMAL
BUN SERPL-MCNC: 11 MG/DL
CALCIUM SERPL-MCNC: 9.8 MG/DL
CHLORIDE SERPL-SCNC: 106 MMOL/L
CLARITY UR REFRACT.AUTO: CLEAR
CO2 SERPL-SCNC: 22 MMOL/L
COLOR UR AUTO: YELLOW
CREAT SERPL-MCNC: 0.8 MG/DL
DIFFERENTIAL METHOD: ABNORMAL
EOSINOPHIL # BLD AUTO: 0.1 K/UL
EOSINOPHIL NFR BLD: 1.3 %
ERYTHROCYTE [DISTWIDTH] IN BLOOD BY AUTOMATED COUNT: 14.7 %
EST. GFR  (AFRICAN AMERICAN): >60 ML/MIN/1.73 M^2
EST. GFR  (NON AFRICAN AMERICAN): >60 ML/MIN/1.73 M^2
GLUCOSE SERPL-MCNC: 102 MG/DL
GLUCOSE UR QL STRIP: NEGATIVE
HCT VFR BLD AUTO: 41.6 %
HGB BLD-MCNC: 13.7 G/DL
HGB UR QL STRIP: NEGATIVE
IMM GRANULOCYTES # BLD AUTO: 0.06 K/UL
IMM GRANULOCYTES NFR BLD AUTO: 0.6 %
INR PPP: 1.1
KETONES UR QL STRIP: NEGATIVE
LEUKOCYTE ESTERASE UR QL STRIP: NEGATIVE
LYMPHOCYTES # BLD AUTO: 3 K/UL
LYMPHOCYTES NFR BLD: 27.9 %
MAGNESIUM SERPL-MCNC: 2.4 MG/DL
MCH RBC QN AUTO: 30.8 PG
MCHC RBC AUTO-ENTMCNC: 32.9 G/DL
MCV RBC AUTO: 94 FL
MICROSCOPIC COMMENT: NORMAL
MONOCYTES # BLD AUTO: 0.6 K/UL
MONOCYTES NFR BLD: 5.8 %
NEUTROPHILS # BLD AUTO: 6.9 K/UL
NEUTROPHILS NFR BLD: 63.6 %
NITRITE UR QL STRIP: NEGATIVE
NRBC BLD-RTO: 0 /100 WBC
PH UR STRIP: 7 [PH] (ref 5–8)
PLATELET # BLD AUTO: 242 K/UL
PMV BLD AUTO: 11 FL
POTASSIUM SERPL-SCNC: 4.3 MMOL/L
PROT SERPL-MCNC: 8 G/DL
PROT UR QL STRIP: NEGATIVE
PROTHROMBIN TIME: 11.3 SEC
RBC # BLD AUTO: 4.45 M/UL
RBC #/AREA URNS AUTO: 1 /HPF (ref 0–4)
SODIUM SERPL-SCNC: 141 MMOL/L
SP GR UR STRIP: 1.01 (ref 1–1.03)
TROPONIN I SERPL DL<=0.01 NG/ML-MCNC: 0.02 NG/ML
TSH SERPL DL<=0.005 MIU/L-ACNC: 0.78 UIU/ML
URN SPEC COLLECT METH UR: NORMAL
UROBILINOGEN UR STRIP-ACNC: NEGATIVE EU/DL
WBC # BLD AUTO: 10.86 K/UL
WBC #/AREA URNS AUTO: 0 /HPF (ref 0–5)

## 2018-07-25 PROCEDURE — 96375 TX/PRO/DX INJ NEW DRUG ADDON: CPT

## 2018-07-25 PROCEDURE — 96365 THER/PROPH/DIAG IV INF INIT: CPT

## 2018-07-25 PROCEDURE — 81001 URINALYSIS AUTO W/SCOPE: CPT

## 2018-07-25 PROCEDURE — 25000003 PHARM REV CODE 250: Performed by: STUDENT IN AN ORGANIZED HEALTH CARE EDUCATION/TRAINING PROGRAM

## 2018-07-25 PROCEDURE — 63600175 PHARM REV CODE 636 W HCPCS

## 2018-07-25 PROCEDURE — 99285 EMERGENCY DEPT VISIT HI MDM: CPT | Mod: 25

## 2018-07-25 PROCEDURE — 25000003 PHARM REV CODE 250: Performed by: INTERNAL MEDICINE

## 2018-07-25 PROCEDURE — 85025 COMPLETE CBC W/AUTO DIFF WBC: CPT

## 2018-07-25 PROCEDURE — 96376 TX/PRO/DX INJ SAME DRUG ADON: CPT

## 2018-07-25 PROCEDURE — 25000003 PHARM REV CODE 250

## 2018-07-25 PROCEDURE — 86850 RBC ANTIBODY SCREEN: CPT

## 2018-07-25 PROCEDURE — 80053 COMPREHEN METABOLIC PANEL: CPT

## 2018-07-25 PROCEDURE — 93010 ELECTROCARDIOGRAM REPORT: CPT | Mod: ,,, | Performed by: INTERNAL MEDICINE

## 2018-07-25 PROCEDURE — 85610 PROTHROMBIN TIME: CPT

## 2018-07-25 PROCEDURE — 84484 ASSAY OF TROPONIN QUANT: CPT

## 2018-07-25 PROCEDURE — 84443 ASSAY THYROID STIM HORMONE: CPT

## 2018-07-25 PROCEDURE — 83735 ASSAY OF MAGNESIUM: CPT

## 2018-07-25 PROCEDURE — 99223 1ST HOSP IP/OBS HIGH 75: CPT | Mod: ,,, | Performed by: INTERNAL MEDICINE

## 2018-07-25 PROCEDURE — 12000002 HC ACUTE/MED SURGE SEMI-PRIVATE ROOM

## 2018-07-25 PROCEDURE — 93005 ELECTROCARDIOGRAM TRACING: CPT

## 2018-07-25 PROCEDURE — 85730 THROMBOPLASTIN TIME PARTIAL: CPT

## 2018-07-25 PROCEDURE — 99285 EMERGENCY DEPT VISIT HI MDM: CPT | Mod: ,,,

## 2018-07-25 PROCEDURE — 99283 EMERGENCY DEPT VISIT LOW MDM: CPT | Mod: GC,,, | Performed by: NEUROLOGICAL SURGERY

## 2018-07-25 RX ORDER — IBUPROFEN 200 MG
16 TABLET ORAL
Status: DISCONTINUED | OUTPATIENT
Start: 2018-07-25 | End: 2018-07-29 | Stop reason: HOSPADM

## 2018-07-25 RX ORDER — METOPROLOL SUCCINATE 25 MG/1
25 TABLET, EXTENDED RELEASE ORAL DAILY
Status: DISCONTINUED | OUTPATIENT
Start: 2018-07-26 | End: 2018-07-29 | Stop reason: HOSPADM

## 2018-07-25 RX ORDER — LEVOTHYROXINE SODIUM 50 UG/1
50 TABLET ORAL
Status: DISCONTINUED | OUTPATIENT
Start: 2018-07-26 | End: 2018-07-29 | Stop reason: HOSPADM

## 2018-07-25 RX ORDER — IBUPROFEN 200 MG
24 TABLET ORAL
Status: DISCONTINUED | OUTPATIENT
Start: 2018-07-25 | End: 2018-07-29 | Stop reason: HOSPADM

## 2018-07-25 RX ORDER — LABETALOL HYDROCHLORIDE 5 MG/ML
10 INJECTION, SOLUTION INTRAVENOUS EVERY 6 HOURS PRN
Status: DISCONTINUED | OUTPATIENT
Start: 2018-07-25 | End: 2018-07-29 | Stop reason: HOSPADM

## 2018-07-25 RX ORDER — ACETAMINOPHEN 500 MG
1000 TABLET ORAL ONCE
Status: COMPLETED | OUTPATIENT
Start: 2018-07-25 | End: 2018-07-25

## 2018-07-25 RX ORDER — FUROSEMIDE 20 MG/1
20 TABLET ORAL DAILY
Status: DISCONTINUED | OUTPATIENT
Start: 2018-07-26 | End: 2018-07-29 | Stop reason: HOSPADM

## 2018-07-25 RX ORDER — ISOSORBIDE MONONITRATE 30 MG/1
30 TABLET, EXTENDED RELEASE ORAL DAILY
Status: DISCONTINUED | OUTPATIENT
Start: 2018-07-26 | End: 2018-07-29 | Stop reason: HOSPADM

## 2018-07-25 RX ORDER — SODIUM CHLORIDE 9 MG/ML
INJECTION, SOLUTION INTRAVENOUS CONTINUOUS
Status: DISCONTINUED | OUTPATIENT
Start: 2018-07-25 | End: 2018-07-27

## 2018-07-25 RX ORDER — GLUCAGON 1 MG
1 KIT INJECTION
Status: DISCONTINUED | OUTPATIENT
Start: 2018-07-25 | End: 2018-07-29 | Stop reason: HOSPADM

## 2018-07-25 RX ORDER — CITALOPRAM 10 MG/1
10 TABLET ORAL DAILY
Status: DISCONTINUED | OUTPATIENT
Start: 2018-07-26 | End: 2018-07-29 | Stop reason: HOSPADM

## 2018-07-25 RX ORDER — NICARDIPINE HYDROCHLORIDE 0.2 MG/ML
5 INJECTION INTRAVENOUS CONTINUOUS
Status: DISCONTINUED | OUTPATIENT
Start: 2018-07-25 | End: 2018-07-25

## 2018-07-25 RX ORDER — ONDANSETRON 2 MG/ML
4 INJECTION INTRAMUSCULAR; INTRAVENOUS
Status: DISPENSED | OUTPATIENT
Start: 2018-07-25 | End: 2018-07-26

## 2018-07-25 RX ORDER — ATORVASTATIN CALCIUM 20 MG/1
40 TABLET, FILM COATED ORAL DAILY
Status: DISCONTINUED | OUTPATIENT
Start: 2018-07-26 | End: 2018-07-29 | Stop reason: HOSPADM

## 2018-07-25 RX ORDER — OXYCODONE AND ACETAMINOPHEN 5; 325 MG/1; MG/1
1 TABLET ORAL
Status: COMPLETED | OUTPATIENT
Start: 2018-07-25 | End: 2018-07-25

## 2018-07-25 RX ORDER — PANTOPRAZOLE SODIUM 40 MG/1
40 TABLET, DELAYED RELEASE ORAL DAILY
Status: DISCONTINUED | OUTPATIENT
Start: 2018-07-26 | End: 2018-07-29 | Stop reason: HOSPADM

## 2018-07-25 RX ORDER — ONDANSETRON 2 MG/ML
4 INJECTION INTRAMUSCULAR; INTRAVENOUS EVERY 6 HOURS PRN
Status: DISCONTINUED | OUTPATIENT
Start: 2018-07-25 | End: 2018-07-29 | Stop reason: HOSPADM

## 2018-07-25 RX ORDER — LOSARTAN POTASSIUM 25 MG/1
25 TABLET ORAL DAILY
Status: DISCONTINUED | OUTPATIENT
Start: 2018-07-26 | End: 2018-07-29 | Stop reason: HOSPADM

## 2018-07-25 RX ORDER — ACETAMINOPHEN 10 MG/ML
1000 INJECTION, SOLUTION INTRAVENOUS ONCE
Status: COMPLETED | OUTPATIENT
Start: 2018-07-25 | End: 2018-07-25

## 2018-07-25 RX ORDER — ACETAMINOPHEN 325 MG/1
650 TABLET ORAL EVERY 4 HOURS PRN
Status: DISCONTINUED | OUTPATIENT
Start: 2018-07-25 | End: 2018-07-29 | Stop reason: HOSPADM

## 2018-07-25 RX ADMIN — LABETALOL HYDROCHLORIDE 10 MG: 5 INJECTION, SOLUTION INTRAVENOUS at 09:07

## 2018-07-25 RX ADMIN — NICARDIPINE HYDROCHLORIDE 5 MG/HR: 0.2 INJECTION, SOLUTION INTRAVENOUS at 07:07

## 2018-07-25 RX ADMIN — SODIUM CHLORIDE: 0.9 INJECTION, SOLUTION INTRAVENOUS at 09:07

## 2018-07-25 RX ADMIN — ACETAMINOPHEN 1000 MG: 10 INJECTION, SOLUTION INTRAVENOUS at 03:07

## 2018-07-25 RX ADMIN — LABETALOL HYDROCHLORIDE 10 MG: 5 INJECTION, SOLUTION INTRAVENOUS at 11:07

## 2018-07-25 RX ADMIN — OXYCODONE HYDROCHLORIDE AND ACETAMINOPHEN 1 TABLET: 5; 325 TABLET ORAL at 04:07

## 2018-07-25 RX ADMIN — ACETAMINOPHEN 1000 MG: 500 TABLET ORAL at 11:07

## 2018-07-25 NOTE — ED PROVIDER NOTES
SCRIBE #1 NOTE: I, Karime Villavicencio, am scribing for, and in the presence of, Dr. Lopez.       HISTORY OF PRESENT ILLNESS   History provided by: Patient   Used: No       HPI: This is a 89 y.o. female presents here via EMS with complaints of Fall (Pt presented to the ED via  EMS. Pt c/o fall. Fall was unwitness. Pt alert. Pt has a hematoma to the left. Pt is on plavix. )  Patient fell a week ago and hit the back of her head and was evaluated in the ED and she fell again today and hit the same part of her head again. Takes Plavix regularly. Patient's daughter states that she was in the bathroom when the fall occurred, but he patient does not remember the fall. C-Collar placed PTA.     1. Chief Complaint: Head and neck pain  2. Onset of symptoms: earlier today at approximately 1300.   3. What was patient doing when symptoms started (Context): see above  4. Severity: moderate to severe  5. Timing: constant  6. Activities that worsen symptoms: palpation and movement  7. Activities that improve symptoms: none  8. Quality: aching  9. Radiation of symptoms: none  10. Associated signs and Symptoms: No F/C, focal numbness or weakness, No chest/ hip/ rib/ arm/ shoulder/ leg pain.   11. Are symptoms worsening? No        MEDICAL HISTORY     Past Medical History:  Past Medical History:   Diagnosis Date    Acquired hypothyroidism     Closed displaced intertrochanteric fracture of left femur s/p IM nail on 6/5/2016 6/5/2016    Coronary artery disease involving native coronary artery of native heart without angina pectoris     Essential hypertension     GERD (gastroesophageal reflux disease)     Hx of colonic polyps     Hyperlipidemia     Macular degeneration     Mitral valve stenosis     Severe aortic stenosis 9/22/2014    Syncope 1/5/2015       Past Surgical History:  Past Surgical History:   Procedure Laterality Date    DILATION AND CURETTAGE OF UTERUS      HYSTERECTOMY      INTERTROCHANTERIC HIP FRACTURE  SURGERY Left 06/05/2016    IM nail    TONSILLECTOMY         Social History:  Social History     Social History    Marital status:      Spouse name: N/A    Number of children: N/A    Years of education: N/A     Occupational History    Not on file.     Social History Main Topics    Smoking status: Never Smoker    Smokeless tobacco: Never Used    Alcohol use No      Comment: rare     Drug use: No    Sexual activity: Not on file     Other Topics Concern    Not on file     Social History Narrative    No narrative on file       Family History:  Family History   Problem Relation Age of Onset    Heart disease Mother     Cancer Mother     Heart disease Father     Skin cancer Father     No Known Problems Sister     No Known Problems Brother     No Known Problems Maternal Grandmother     No Known Problems Maternal Grandfather     No Known Problems Paternal Grandmother     No Known Problems Paternal Grandfather     No Known Problems Maternal Aunt     No Known Problems Maternal Uncle     No Known Problems Paternal Aunt     No Known Problems Paternal Uncle     Anemia Neg Hx     Arrhythmia Neg Hx     Asthma Neg Hx     Clotting disorder Neg Hx     Fainting Neg Hx     Heart attack Neg Hx     Heart failure Neg Hx     Hyperlipidemia Neg Hx     Hypertension Neg Hx     Stroke Neg Hx     Atrial Septal Defect Neg Hx        Outpatient Medication:  Current Discharge Medication List      CONTINUE these medications which have NOT CHANGED    Details   artificial tears (ISOPTO TEARS) 0.5 % ophthalmic solution Place 1 drop into the left eye 4 (four) times daily.      ascorbic acid, vitamin C, (VITAMIN C) 250 mg Chew Take by mouth.      aspirin 81 MG Chew Take 1 tablet (81 mg total) by mouth once daily.  Refills: 0      atorvastatin (LIPITOR) 40 MG tablet Take 1 tablet (40 mg total) by mouth once daily.  Qty: 90 tablet, Refills: 3      busPIRone (BUSPAR) 30 MG Tab Take 1 tablet (30 mg total) by mouth 2  (two) times daily.  Qty: 180 tablet, Refills: 3      citalopram (CELEXA) 10 MG tablet Take 1 tablet (10 mg total) by mouth once daily.  Qty: 90 tablet, Refills: 1    Associated Diagnoses: Late onset Alzheimer's disease without behavioral disturbance      clopidogrel (PLAVIX) 75 mg tablet Take 1 tablet (75 mg total) by mouth once daily.  Qty: 90 tablet, Refills: 3      cyanocobalamin (VITAMIN B-12) 1000 MCG tablet Take 1 tablet (1,000 mcg total) by mouth once daily.  Qty: 90 tablet, Refills: 3      fish oil-omega-3 fatty acids 300-1,000 mg capsule Take 2 g by mouth once daily.      furosemide (LASIX) 20 MG tablet TAKE 1 TABLET ONE TIME DAILY  Qty: 90 tablet, Refills: 1      isosorbide mononitrate (IMDUR) 30 MG 24 hr tablet TAKE 1 TABLET ONE TIME DAILY  Qty: 90 tablet, Refills: 3    Associated Diagnoses: Coronary artery disease of native artery of native heart with stable angina pectoris      levothyroxine (SYNTHROID) 50 MCG tablet TAKE 1 TABLET BEFORE BREAKFAST.  Qty: 90 tablet, Refills: 3      losartan (COZAAR) 25 MG tablet Take by mouth.      metoprolol succinate (TOPROL-XL) 25 MG 24 hr tablet TAKE 1 TABLET ONE TIME DAILY  Qty: 90 tablet, Refills: 3      !! omeprazole (PRILOSEC) 20 MG capsule Take 20 mg by mouth once daily.       !! omeprazole (PRILOSEC) 20 MG capsule Take by mouth.      vitamin D 1000 units Tab Take 185 mg by mouth once daily.      ZINC ACETATE ORAL Take by mouth.      ZOSTAVAX, PF, 19,400 unit/0.65 mL injection        !! - Potential duplicate medications found. Please discuss with provider.            REVIEW OF SYSTEMS     REVIEW OF SYSTEMS  Constitutional: Negative for fever and chills.  Eyes- No visual changes   HEENT- no throat pain   Respiratory: Negative for cough and shortness of breath.    Cardiovascular: Negative for chest pain.   Gastrointestinal: Negative for nausea, vomiting, abdominal pain, and diarrhea.  Musculoskeletal: Positive for neck and head pain.   Skin: Positive for large  hematoma to back of head   Neurological:  Negative for sensory change and focal weakness.   All other systems reviewed and are negative.           PHYSICAL EXAM   PHYSICAL EXAM  Vitals:    07/26/18 1939   BP: (!) 177/75   Pulse: 85   Resp: 16   Temp: 98.4 °F (36.9 °C)       Physical Exam   Nursing note and vitals reviewed.  Constitutional: Pt is frail appearing elderly woman appears very anxious and in severe pain distress. Nontoxic.   ENT: Nose normal. Moist mucous membranes.  Eyes: Conjunctivae normal and EOM are normal. Pupils are equal, round, and reactive to light.    Neck: Range of motion limited due to pain. Neck supple. No mass. Negative for bony c-spine tenderness. Positive for cervical muscle tenderness to the left side.   Cardiovascular: Normal rate, regular rhythm and normal heart sounds.    Pulmonary/Chest: Effort normal and breath sounds normal. No respiratory distress. No tenderness.  Abdominal: Soft. Normal appearance and bowel sounds are normal. Pt exhibits no distension. There is no tenderness.   Musculoskeletal: Full range of motion with no pain. No tenderness.   Neurological: Pt is alert and oriented to person, place, and time. No focal neuro deficits   Skin: Positive for large hematoma to the left posterior parietal. Old bruises to the backs of hands and forearms.   Psychiatric: Appears anxious      MEDICAL DECISION MAKING       MEDICAL DECISION MAKING  MDM: This is a 89 y.o. female here with head Pain  Concerned for   1. Post-traumatic subdural hematoma, without loss of consciousness, initial encounter    2. Fall    3. Aortic stenosis    4. SDH (subdural hematoma)      DDx:   Includes but not limited to:IC injury, skull  Fracture, Strain/Sprain, Contusion  PLAN: Pain meds , Rest, Ice and xrays then reassess      Vital Signs: Reviewed the patients vital signs.   Nursing Notes: Reviewed and utilized available nursing notes.  Medical Records Reviewed: Reviewed available past medical  records.  Counseling: The emergency provider has spoken with the patient and discussed todays findings, in addition to providing specific details for the plan of care.  Questions are answered and there is agreement with the plan.      PROCEDURES    I have independently reviewed and interpreted the EKG on this patient     Normal Sinus Rhythm  Rate: 83 bpm  Axis: Normal   ST changes: depressions in inferior leads  Abnormal ECG when compared with ECG 16-JUL-2018 1052, ST now depressed in inferior leads.    Sudha Lopez MD    IMAGING STUDIES    The following imaging studies were independently interpreted by the Emergency Medicine Physician.  For full imaging study results please see chart.  Xray- no fracture or obvious bony abnormality      CT Head Without Contrast   Final Result      Stable thin subdural hematoma along the right posterior cerebral convexity.      Large left posterior scalp hematoma.      Senescent and chronic microvascular ischemic change.         Electronically signed by: Vickey Dominguez MD   Date:    07/26/2018   Time:    04:21      CT Cervical Spine Without Contrast   Final Result      1. No acute displaced fracture or dislocation of the cervical spine noting grossly stable degenerative changes.   2. Please see separately dictated report for details of the intracranial content.         Electronically signed by: Saturnino Cedillo MD   Date:    07/25/2018   Time:    16:40      CT Head Without Contrast   Final Result      1. Thin subdural hematoma overlying the posterior right parietal occipital calvarium.   2. Large subcutaneous hematoma overlying the posterior left parietal occipital calvarium.   3. Grossly stable sequela of chronic microvascular ischemic change and senescent change.         Electronically signed by: Saturnino Cedillo MD   Date:    07/25/2018   Time:    16:27          PULSE OXIMETRY    Oxygen Saturation by Pulse Oximetry: (!) 94%  Interventions: none  Interpretation: normal  Interpreted  independently by Emergency Physician      EMERGENCY DEPT. MEDICATIONS      Medications   ondansetron injection 4 mg (4 mg Intravenous Not Given 7/25/18 1530)   furosemide tablet 20 mg (20 mg Oral Given 7/26/18 0819)   isosorbide mononitrate 24 hr tablet 30 mg (30 mg Oral Given 7/26/18 0900)   levothyroxine tablet 50 mcg (50 mcg Oral Given 7/26/18 0625)   atorvastatin tablet 40 mg (40 mg Oral Given 7/26/18 0819)   metoprolol succinate (TOPROL-XL) 24 hr tablet 25 mg (25 mg Oral Given 7/26/18 0819)   citalopram tablet 10 mg (10 mg Oral Given 7/26/18 0819)   losartan tablet 25 mg (25 mg Oral Given 7/26/18 0818)   glucose chewable tablet 16 g (not administered)   glucose chewable tablet 16 g (not administered)   glucose chewable tablet 24 g (not administered)   dextrose 50% injection 12.5 g (not administered)   dextrose 50% injection 25 g (not administered)   glucagon (human recombinant) injection 1 mg (not administered)   0.9%  NaCl infusion ( Intravenous New Bag 7/26/18 1232)   acetaminophen tablet 650 mg (650 mg Oral Not Given 7/26/18 0744)   pantoprazole EC tablet 40 mg (40 mg Oral Given 7/26/18 0818)   docusate sodium capsule 100 mg (100 mg Oral Not Given 7/26/18 2100)   ondansetron injection 4 mg (not administered)   labetalol injection 10 mg (10 mg Intravenous Given 7/25/18 2325)   aspirin EC tablet 81 mg (not administered)   oxyCODONE-acetaminophen 5-325 mg per tablet 1 tablet (1 tablet Oral Given 7/25/18 1643)   acetaminophen (10 mg/mL) injection 1,000 mg (0 mg Intravenous Stopped 7/25/18 1600)   acetaminophen tablet 1,000 mg (1,000 mg Oral Given 7/25/18 2302)   acetaminophen (10 mg/mL) injection 1,000 mg (1,000 mg Intravenous New Bag 7/26/18 0918)       LABORATORY RESULTS    Ordered and independently interpreted AVAILABLE laboratory tests. Please see results section in chart for full details.    Results for orders placed or performed during the hospital encounter of 07/25/18   CBC auto differential   Result  Value Ref Range    WBC 10.86 3.90 - 12.70 K/uL    RBC 4.45 4.00 - 5.40 M/uL    Hemoglobin 13.7 12.0 - 16.0 g/dL    Hematocrit 41.6 37.0 - 48.5 %    MCV 94 82 - 98 fL    MCH 30.8 27.0 - 31.0 pg    MCHC 32.9 32.0 - 36.0 g/dL    RDW 14.7 (H) 11.5 - 14.5 %    Platelets 242 150 - 350 K/uL    MPV 11.0 9.2 - 12.9 fL    Immature Granulocytes 0.6 (H) 0.0 - 0.5 %    Gran # (ANC) 6.9 1.8 - 7.7 K/uL    Immature Grans (Abs) 0.06 (H) 0.00 - 0.04 K/uL    Lymph # 3.0 1.0 - 4.8 K/uL    Mono # 0.6 0.3 - 1.0 K/uL    Eos # 0.1 0.0 - 0.5 K/uL    Baso # 0.09 0.00 - 0.20 K/uL    nRBC 0 0 /100 WBC    Gran% 63.6 38.0 - 73.0 %    Lymph% 27.9 18.0 - 48.0 %    Mono% 5.8 4.0 - 15.0 %    Eosinophil% 1.3 0.0 - 8.0 %    Basophil% 0.8 0.0 - 1.9 %    Differential Method Automated    Comprehensive metabolic panel   Result Value Ref Range    Sodium 141 136 - 145 mmol/L    Potassium 4.3 3.5 - 5.1 mmol/L    Chloride 106 95 - 110 mmol/L    CO2 22 (L) 23 - 29 mmol/L    Glucose 102 70 - 110 mg/dL    BUN, Bld 11 10 - 30 mg/dL    Creatinine 0.8 0.5 - 1.4 mg/dL    Calcium 9.8 8.7 - 10.5 mg/dL    Total Protein 8.0 6.0 - 8.4 g/dL    Albumin 4.1 3.5 - 5.2 g/dL    Total Bilirubin 1.5 (H) 0.1 - 1.0 mg/dL    Alkaline Phosphatase 92 55 - 135 U/L    AST 27 10 - 40 U/L    ALT 15 10 - 44 U/L    Anion Gap 13 8 - 16 mmol/L    eGFR if African American >60.0 >60 mL/min/1.73 m^2    eGFR if non African American >60.0 >60 mL/min/1.73 m^2   Magnesium   Result Value Ref Range    Magnesium 2.4 1.6 - 2.6 mg/dL   Troponin I   Result Value Ref Range    Troponin I 0.019 0.000 - 0.026 ng/mL   TSH   Result Value Ref Range    TSH 0.783 0.400 - 4.000 uIU/mL   Urinalysis, Reflex to Urine Culture Urine, Clean Catch   Result Value Ref Range    Specimen UA Urine, Catheterized     Color, UA Yellow Yellow, Straw, Marie    Appearance, UA Clear Clear    pH, UA 7.0 5.0 - 8.0    Specific Gravity, UA 1.010 1.005 - 1.030    Protein, UA Negative Negative    Glucose, UA Negative Negative    Ketones,  UA Negative Negative    Bilirubin (UA) Negative Negative    Occult Blood UA Negative Negative    Nitrite, UA Negative Negative    Urobilinogen, UA Negative <2.0 EU/dL    Leukocytes, UA Negative Negative   Protime-INR   Result Value Ref Range    Prothrombin Time 11.3 9.0 - 12.5 sec    INR 1.1 0.8 - 1.2   APTT   Result Value Ref Range    aPTT 22.9 21.0 - 32.0 sec   Urinalysis Microscopic   Result Value Ref Range    RBC, UA 1 0 - 4 /hpf    WBC, UA 0 0 - 5 /hpf    Bacteria, UA Rare None-Occ /hpf    Microscopic Comment SEE COMMENT    CBC auto differential   Result Value Ref Range    WBC 11.78 3.90 - 12.70 K/uL    RBC 4.19 4.00 - 5.40 M/uL    Hemoglobin 13.1 12.0 - 16.0 g/dL    Hematocrit 39.2 37.0 - 48.5 %    MCV 94 82 - 98 fL    MCH 31.3 (H) 27.0 - 31.0 pg    MCHC 33.4 32.0 - 36.0 g/dL    RDW 14.6 (H) 11.5 - 14.5 %    Platelets 219 150 - 350 K/uL    MPV 10.5 9.2 - 12.9 fL    Immature Granulocytes 0.4 0.0 - 0.5 %    Gran # (ANC) 8.8 (H) 1.8 - 7.7 K/uL    Immature Grans (Abs) 0.05 (H) 0.00 - 0.04 K/uL    Lymph # 2.2 1.0 - 4.8 K/uL    Mono # 0.6 0.3 - 1.0 K/uL    Eos # 0.0 0.0 - 0.5 K/uL    Baso # 0.06 0.00 - 0.20 K/uL    nRBC 0 0 /100 WBC    Gran% 74.5 (H) 38.0 - 73.0 %    Lymph% 19.0 18.0 - 48.0 %    Mono% 5.3 4.0 - 15.0 %    Eosinophil% 0.3 0.0 - 8.0 %    Basophil% 0.5 0.0 - 1.9 %    Differential Method Automated    Basic metabolic panel   Result Value Ref Range    Sodium 138 136 - 145 mmol/L    Potassium 3.9 3.5 - 5.1 mmol/L    Chloride 107 95 - 110 mmol/L    CO2 21 (L) 23 - 29 mmol/L    Glucose 116 (H) 70 - 110 mg/dL    BUN, Bld 13 10 - 30 mg/dL    Creatinine 0.7 0.5 - 1.4 mg/dL    Calcium 8.6 (L) 8.7 - 10.5 mg/dL    Anion Gap 10 8 - 16 mmol/L    eGFR if African American >60.0 >60 mL/min/1.73 m^2    eGFR if non African American >60.0 >60 mL/min/1.73 m^2   2D echo with color flow doppler   Result Value Ref Range    EF 60 55 - 65    Mitral Valve Regurgitation MODERATE TO SEVERE (A)     Aortic Valve Stenosis SEVERE (A)      Est. PA Systolic Pressure 46.82 (A)     Mitral Valve Mobility MILDLY RESTRICTED    Type & Screen   Result Value Ref Range    Group & Rh O POS     Indirect Emilia NEG        REASSESSMENT    REASSESSMENT PRIOR TO DISPOSITION  Symptoms: Improving  Exam: Resting comfortably      DIAGNOSIS      Diagnosis:  1. Post-traumatic subdural hematoma, without loss of consciousness, initial encounter    2. Fall    3. Aortic stenosis    4. SDH (subdural hematoma)        Disposition:  Admit to Hospitalist in Stable Condition       Sudha Lopez MD  07/26/18 3579

## 2018-07-25 NOTE — ED NOTES
"Patient presents via EMS after fall at home. Per family, fall was witnessed by sitter as patient tried to go from bathroom to bed. Patient hit head, with posterior head hematoma noted. Patient currently on plavix. Patient c/o headache. States "I feel like my head is split open. Patient also c/o neck pain and has c collar in place. Per family patient is usually oriented to person and place.    "

## 2018-07-26 ENCOUNTER — OUTPATIENT CASE MANAGEMENT (OUTPATIENT)
Dept: ADMINISTRATIVE | Facility: OTHER | Age: 83
End: 2018-07-26

## 2018-07-26 DIAGNOSIS — S06.5XAA SDH (SUBDURAL HEMATOMA): Primary | ICD-10-CM

## 2018-07-26 LAB
ANION GAP SERPL CALC-SCNC: 10 MMOL/L
AORTIC VALVE STENOSIS: ABNORMAL
BASOPHILS # BLD AUTO: 0.06 K/UL
BASOPHILS NFR BLD: 0.5 %
BUN SERPL-MCNC: 13 MG/DL
CALCIUM SERPL-MCNC: 8.6 MG/DL
CHLORIDE SERPL-SCNC: 107 MMOL/L
CO2 SERPL-SCNC: 21 MMOL/L
CREAT SERPL-MCNC: 0.7 MG/DL
DIFFERENTIAL METHOD: ABNORMAL
EOSINOPHIL # BLD AUTO: 0 K/UL
EOSINOPHIL NFR BLD: 0.3 %
ERYTHROCYTE [DISTWIDTH] IN BLOOD BY AUTOMATED COUNT: 14.6 %
EST. GFR  (AFRICAN AMERICAN): >60 ML/MIN/1.73 M^2
EST. GFR  (NON AFRICAN AMERICAN): >60 ML/MIN/1.73 M^2
ESTIMATED PA SYSTOLIC PRESSURE: 46.82
GLUCOSE SERPL-MCNC: 116 MG/DL
HCT VFR BLD AUTO: 39.2 %
HGB BLD-MCNC: 13.1 G/DL
IMM GRANULOCYTES # BLD AUTO: 0.05 K/UL
IMM GRANULOCYTES NFR BLD AUTO: 0.4 %
LYMPHOCYTES # BLD AUTO: 2.2 K/UL
LYMPHOCYTES NFR BLD: 19 %
MCH RBC QN AUTO: 31.3 PG
MCHC RBC AUTO-ENTMCNC: 33.4 G/DL
MCV RBC AUTO: 94 FL
MITRAL VALVE MOBILITY: ABNORMAL
MITRAL VALVE REGURGITATION: ABNORMAL
MONOCYTES # BLD AUTO: 0.6 K/UL
MONOCYTES NFR BLD: 5.3 %
NEUTROPHILS # BLD AUTO: 8.8 K/UL
NEUTROPHILS NFR BLD: 74.5 %
NRBC BLD-RTO: 0 /100 WBC
PLATELET # BLD AUTO: 219 K/UL
PMV BLD AUTO: 10.5 FL
POTASSIUM SERPL-SCNC: 3.9 MMOL/L
RBC # BLD AUTO: 4.19 M/UL
RETIRED EF AND QEF - SEE NOTES: 60 (ref 55–65)
SODIUM SERPL-SCNC: 138 MMOL/L
WBC # BLD AUTO: 11.78 K/UL

## 2018-07-26 PROCEDURE — 80048 BASIC METABOLIC PNL TOTAL CA: CPT

## 2018-07-26 PROCEDURE — 99233 SBSQ HOSP IP/OBS HIGH 50: CPT | Mod: ,,, | Performed by: HOSPITALIST

## 2018-07-26 PROCEDURE — 63600175 PHARM REV CODE 636 W HCPCS: Performed by: HOSPITALIST

## 2018-07-26 PROCEDURE — 25000003 PHARM REV CODE 250: Performed by: STUDENT IN AN ORGANIZED HEALTH CARE EDUCATION/TRAINING PROGRAM

## 2018-07-26 PROCEDURE — 93306 TTE W/DOPPLER COMPLETE: CPT | Mod: 26,,, | Performed by: INTERNAL MEDICINE

## 2018-07-26 PROCEDURE — 11000001 HC ACUTE MED/SURG PRIVATE ROOM

## 2018-07-26 PROCEDURE — 93306 TTE W/DOPPLER COMPLETE: CPT

## 2018-07-26 PROCEDURE — 85025 COMPLETE CBC W/AUTO DIFF WBC: CPT

## 2018-07-26 PROCEDURE — 99232 SBSQ HOSP IP/OBS MODERATE 35: CPT | Mod: ,,, | Performed by: PHYSICIAN ASSISTANT

## 2018-07-26 RX ORDER — ACETAMINOPHEN 10 MG/ML
1000 INJECTION, SOLUTION INTRAVENOUS ONCE
Status: COMPLETED | OUTPATIENT
Start: 2018-07-26 | End: 2018-07-26

## 2018-07-26 RX ORDER — ASPIRIN 81 MG/1
81 TABLET ORAL DAILY
Status: DISCONTINUED | OUTPATIENT
Start: 2018-07-27 | End: 2018-07-29 | Stop reason: HOSPADM

## 2018-07-26 RX ADMIN — ATORVASTATIN CALCIUM 40 MG: 20 TABLET, FILM COATED ORAL at 08:07

## 2018-07-26 RX ADMIN — CITALOPRAM HYDROBROMIDE 10 MG: 10 TABLET ORAL at 08:07

## 2018-07-26 RX ADMIN — DOCUSATE SODIUM 100 MG: 50 CAPSULE, LIQUID FILLED ORAL at 08:07

## 2018-07-26 RX ADMIN — ACETAMINOPHEN 650 MG: 325 TABLET, FILM COATED ORAL at 06:07

## 2018-07-26 RX ADMIN — FUROSEMIDE 20 MG: 20 TABLET ORAL at 08:07

## 2018-07-26 RX ADMIN — LEVOTHYROXINE SODIUM 50 MCG: 50 TABLET ORAL at 06:07

## 2018-07-26 RX ADMIN — METOPROLOL SUCCINATE 25 MG: 25 TABLET, EXTENDED RELEASE ORAL at 08:07

## 2018-07-26 RX ADMIN — SODIUM CHLORIDE: 0.9 INJECTION, SOLUTION INTRAVENOUS at 12:07

## 2018-07-26 RX ADMIN — LOSARTAN POTASSIUM 25 MG: 25 TABLET, FILM COATED ORAL at 08:07

## 2018-07-26 RX ADMIN — ACETAMINOPHEN 1000 MG: 10 INJECTION, SOLUTION INTRAVENOUS at 09:07

## 2018-07-26 RX ADMIN — PANTOPRAZOLE SODIUM 40 MG: 40 TABLET, DELAYED RELEASE ORAL at 08:07

## 2018-07-26 RX ADMIN — ISOSORBIDE MONONITRATE 30 MG: 30 TABLET, EXTENDED RELEASE ORAL at 09:07

## 2018-07-26 NOTE — MEDICAL/APP STUDENT
Hospital Medicine  Progress note    Team: [facility] MED B Jean Marie Arevalo  Admit Date: 7/25/2018  OSIEL   Code status: Full Code    Principal Problem:  SDH (subdural hematoma)    Interval hx: CT shows no change in size of hematoma, patient still does not recall how she fell. Has an aortic stenosis, repeat echo ordered. Patient states she has only one fall in the past but may be incorrect with patients dementia history. PT/OT consulted. Headache last night, given tylenol. OK to restart ASA but hold plavix until seen in neuro clinic.       ROS     Pain Scale: 0/10  Respiratory: no cough or shortness of breath  Cardiovascular: no chest pain or palpitations  Gastrointestinal: no nausea or vomiting, no abdominal pain or change in bowel habits  Behavioral/Psych: no depression or anxiety      PEx  Temp:  [98.1 °F (36.7 °C)-98.8 °F (37.1 °C)]   Pulse:  [57-98]   Resp:  [17-20]   BP: (109-198)/(53-86)   SpO2:  [96 %-100 %]     Intake/Output Summary (Last 24 hours) at 07/26/18 0832  Last data filed at 07/26/18 0700   Gross per 24 hour   Intake                0 ml   Output              700 ml   Net             -700 ml     General appearance: no distress, elderly  woman  Mental status: Alert and oriented to person and place only  Neck: C collar in place  Pulm:   normal respiratory effort, CTA B, no c/w/r  Card: RRR, S1, S2 normal, no murmur, click, rub or gallop  Abd: soft, NT, ND, BS present; no masses, no organomegaly        Recent Labs  Lab 07/25/18  1528 07/26/18  0324   WBC 10.86 11.78   HGB 13.7 13.1   HCT 41.6 39.2    219       Recent Labs  Lab 07/25/18  1528 07/26/18  0324    138   K 4.3 3.9    107   CO2 22* 21*   BUN 11 13   CREATININE 0.8 0.7    116*   CALCIUM 9.8 8.6*   MG 2.4  --        Recent Labs  Lab 07/25/18  1528   ALKPHOS 92   ALT 15   AST 27   ALBUMIN 4.1   PROT 8.0   BILITOT 1.5*   INR 1.1      No results for input(s): POCTGLUCOSE in the last 168 hours.  Recent Labs       07/25/18   1528   TROPONINI  0.019       Scheduled Meds:   atorvastatin  40 mg Oral Daily    citalopram  10 mg Oral Daily    docusate sodium  100 mg Oral BID    furosemide  20 mg Oral Daily    isosorbide mononitrate  30 mg Oral Daily    levothyroxine  50 mcg Oral Before breakfast    losartan  25 mg Oral Daily    metoprolol succinate  25 mg Oral Daily    pantoprazole  40 mg Oral Daily     Continuous Infusions:   sodium chloride 0.9% 75 mL/hr at 07/25/18 2158     As Needed:  acetaminophen, dextrose 50%, dextrose 50%, glucagon (human recombinant), glucose, glucose, glucose, labetalol, ondansetron    Active Hospital Problems    Diagnosis  POA    *SDH (subdural hematoma) [I62.00]  Unknown    Post-traumatic subdural hematoma [S06.5X9A]  Yes    Coronary artery disease of native artery of native heart with stable angina pectoris [I25.118]  Yes    (HFpEF) heart failure with preserved ejection fraction [I50.30]  Yes    Essential hypertension [I10]  Yes    GERD (gastroesophageal reflux disease) [K21.9]  Yes      Resolved Hospital Problems    Diagnosis Date Resolved POA   No resolved problems to display.     Overview  1F h/o multiple prior falls, hip fx, dementia, CAD, HFpEF, Severe AS (declined intervention), presenting s/p  fall at home found to have sm. R posterior parietal SDH.      Assessment and Plan for Problems addressed today:     R posterior parietal SDH  -s/p fall with head trauma, SDH on CT, neurologically stable per NSG  -Holding Plavix until seen in neuro clinic, OK to restart ASA  -Continue C-collar  -Repeat CT head in AM  -Appreciate neurosurg recs  -NPO  -PRN tylenol for HA     HTN  -Hypertensive in ED to 180s/190s systolic  -Continue losartan 25mg daily  -Continue metoprolol succinate 25 daily  -Continue lasix 20mg daily  -Labetolol IVP 10mg PRN SBP >180     CAD  -Stable  -Continue statin, BB, ARB     HFpEF  -Continue statin, BB, ARB, imdur     Hypothyroidism  -Stable  -Continue synthroid 50mcg  daily     GERD  -Stable   -Continue protonix 40mg daily     DVT PPx: SCD    Provider    I personally scribed for Ambrose Diallo MD on 07/26/2018 at 2:38 PM. Electronically signed by ivanna Arevalo III on 07/26/2018 at 2:38 PM

## 2018-07-26 NOTE — ED NOTES
"Pt reoriented to situation several times throughout the night; pt states concern that "everyone has forgotten about me"; pt reassured that this is not the case and reoriented to situation; pt has had issue with short-term memory loss the entire night, these episodes are atypical to initial presentation however they are now occurring q5min   "

## 2018-07-26 NOTE — ED NOTES
"At 0550 pt was asked to remember three words; at present, pt is able to successfully remember those three words but not in the same order as before; pt was also able to remember that she has a "head bleeding" and that she "fell down", which is more detailed memory than previously witnessed   "

## 2018-07-26 NOTE — ED NOTES
Neurosurgery called for pt's increase in frequency for memory loss. Pt unable to be re oriented for more than 5-10 minutes at a time. Neurosurgery to come reassess pt.

## 2018-07-26 NOTE — ED NOTES
The patient is awake, needs reorientation. Airway is open and patent, respirations are spontaneous, normal respiratory effort and rate noted, skin warm and dry, moves all extremities well, appearance: no apparent distress noted. Side rails x 2. Call bell within reach. Will continue to monitor.

## 2018-07-26 NOTE — PLAN OF CARE
Problem: Patient Care Overview  Goal: Plan of Care Review  Very confused. No family present. BP stable. Got up out of bed. N/S infusing. Spoke with naga about home sitter. Will try.

## 2018-07-26 NOTE — HPI
89 F presents for eval of head trauma.  She had unwitnessed fall today resulting in left parietal scalp hematoma and small right parietal SDH.  She currently endorses HA and mild left sided neck pain.  No new focal numbness, weakness, parasthesias.  She has alzheimers at baseline.

## 2018-07-26 NOTE — SUBJECTIVE & OBJECTIVE
Interval History: Mrs. Awad complains of headache.  No new weakness or paresthesias.      Medications:  Continuous Infusions:   sodium chloride 0.9% 75 mL/hr at 07/26/18 1232     Scheduled Meds:   [START ON 7/27/2018] aspirin  81 mg Oral Daily    atorvastatin  40 mg Oral Daily    citalopram  10 mg Oral Daily    docusate sodium  100 mg Oral BID    furosemide  20 mg Oral Daily    isosorbide mononitrate  30 mg Oral Daily    levothyroxine  50 mcg Oral Before breakfast    losartan  25 mg Oral Daily    metoprolol succinate  25 mg Oral Daily    pantoprazole  40 mg Oral Daily     PRN Meds:acetaminophen, dextrose 50%, dextrose 50%, glucagon (human recombinant), glucose, glucose, glucose, labetalol, ondansetron     Review of Systems  Objective:     Weight: 59 kg (130 lb 1.1 oz)  Body mass index is 21 kg/m².  Vital Signs (Most Recent):  Temp: 98.1 °F (36.7 °C) (07/26/18 1558)  Pulse: 71 (07/26/18 1558)  Resp: 16 (07/26/18 1558)  BP: (!) 150/67 (07/26/18 1558)  SpO2: 98 % (07/26/18 1558) Vital Signs (24h Range):  Temp:  [96 °F (35.6 °C)-98.8 °F (37.1 °C)] 98.1 °F (36.7 °C)  Pulse:  [57-98] 71  Resp:  [16-20] 16  SpO2:  [93 %-100 %] 98 %  BP: (107-190)/(52-81) 150/67       Date 07/26/18 0700 - 07/27/18 0659   Shift 8682-3211 3127-1307 7056-3646 24 Hour Total   I  N  T  A  K  E   Shift Total  (mL/kg)       O  U  T  P  U  T   Urine  (mL/kg/hr) 1000  (2.1)   1000    Shift Total  (mL/kg) 1000  (17)   1000  (16.9)   Weight (kg) 59 59 59 59                        Urethral Catheter 07/26/18 0700 (Active)   Output (mL) 700 mL 7/26/2018  7:00 AM       Neurosurgery Physical Exam   General: well developed, well nourished, no distress  Head: normocephalic, atraumatic  Neurologic: Alert and oriented. Thought content appropriate  GCS: Motor: 6/Verbal: 5/Eyes: 4 GCS Total: 15  Mental Status: Awake, Alert, Oriented x 4  Language: No aphasia  Speech: No dysarthria  Cranial nerves: face symmetric, tongue midline, CN II-XII grossly  intact.   Eyes: pupils equal, round, reactive to light with accommodation, EOMI  Pulmonary: normal respirations, not labored, no accessory muscles used  Abdomen: soft, non-distended, not tender to palpation  Sensory: intact to light touch throughout  Motor Strength: Moves all extremities spontaneously with good tone.  Full strength upper and lower extremities. No abnormal movements seen.     Strength  Deltoids Triceps Biceps Wrist Extension Wrist Flexion Hand    Upper: R 5/5 5/5 5/5 5/5 5/5 5/5    L 5/5 5/5 5/5 5/5 5/5 5/5     Iliopsoas Quadriceps Knee  Flexion Tibialis  anterior Gastro- cnemius EHL   Lower: R 5/5 5/5 5/5 5/5 5/5 5/5    L 5/5 5/5 5/5 5/5 5/5 5/5     Pronator Drift: no drift noted  Finger-to-nose: Intact bilaterally  Oliveira: absent  Clonus: absent  Babinski: absent  Pulses: 2+ and symmetric radial and dorsalis pedis  Skin: warm, dry and intact, no rashes        Significant Labs:    Recent Labs  Lab 07/25/18  1528 07/26/18  0324    116*    138   K 4.3 3.9    107   CO2 22* 21*   BUN 11 13   CREATININE 0.8 0.7   CALCIUM 9.8 8.6*   MG 2.4  --        Recent Labs  Lab 07/25/18  1528 07/26/18  0324   WBC 10.86 11.78   HGB 13.7 13.1   HCT 41.6 39.2    219       Recent Labs  Lab 07/25/18  1528   INR 1.1   APTT 22.9     Microbiology Results (last 7 days)     ** No results found for the last 168 hours. **          Significant Diagnostics:  I personally reviewed CT Head & agree with the findings: Stable thin subdural hematoma along the right posterior cerebral convexity.    Large left posterior scalp hematoma.    Senescent and chronic microvascular ischemic change.    I personally reviewed CT Cspine & agree with the findings: . No acute displaced fracture or dislocation of the cervical spine noting grossly stable degenerative changes.  2. Please see separately dictated report for details of the intracranial content.

## 2018-07-26 NOTE — ED NOTES
Bed: [facility] 02  Expected date:   Expected time:   Means of arrival:   Comments:

## 2018-07-26 NOTE — PROGRESS NOTES
[facility]  Neurosurgery  Progress Note    Subjective:     History of Present Illness: 89 F presents for eval of head trauma.  She had unwitnessed fall today resulting in left parietal scalp hematoma and small right parietal SDH.  She currently endorses HA and mild left sided neck pain.  No new focal numbness, weakness, parasthesias.  She has alzheimers at baseline.    Post-Op Info:  * No surgery found *         Interval History: Mrs. Awad complains of headache.  No new weakness or paresthesias.      Medications:  Continuous Infusions:   sodium chloride 0.9% 75 mL/hr at 07/26/18 1232     Scheduled Meds:   [START ON 7/27/2018] aspirin  81 mg Oral Daily    atorvastatin  40 mg Oral Daily    citalopram  10 mg Oral Daily    docusate sodium  100 mg Oral BID    furosemide  20 mg Oral Daily    isosorbide mononitrate  30 mg Oral Daily    levothyroxine  50 mcg Oral Before breakfast    losartan  25 mg Oral Daily    metoprolol succinate  25 mg Oral Daily    pantoprazole  40 mg Oral Daily     PRN Meds:acetaminophen, dextrose 50%, dextrose 50%, glucagon (human recombinant), glucose, glucose, glucose, labetalol, ondansetron     Review of Systems  Objective:     Weight: 59 kg (130 lb 1.1 oz)  Body mass index is 21 kg/m².  Vital Signs (Most Recent):  Temp: 98.1 °F (36.7 °C) (07/26/18 1558)  Pulse: 71 (07/26/18 1558)  Resp: 16 (07/26/18 1558)  BP: (!) 150/67 (07/26/18 1558)  SpO2: 98 % (07/26/18 1558) Vital Signs (24h Range):  Temp:  [96 °F (35.6 °C)-98.8 °F (37.1 °C)] 98.1 °F (36.7 °C)  Pulse:  [57-98] 71  Resp:  [16-20] 16  SpO2:  [93 %-100 %] 98 %  BP: (107-190)/(52-81) 150/67       Date 07/26/18 0700 - 07/27/18 0659   Shift 6976-6708 8104-7626 1335-8182 24 Hour Total   I  N  T  A  K  E   Shift Total  (mL/kg)       O  U  T  P  U  T   Urine  (mL/kg/hr) 1000  (2.1)   1000    Shift Total  (mL/kg) 1000  (17)   1000  (16.9)   Weight (kg) 59 59 59 59                        Urethral Catheter 07/26/18 0700  (Active)   Output (mL) 700 mL 7/26/2018  7:00 AM       Neurosurgery Physical Exam   General: well developed, well nourished, no distress  Head: normocephalic, atraumatic  Neurologic: Alert and oriented. Thought content appropriate  GCS: Motor: 6/Verbal: 5/Eyes: 4 GCS Total: 15  Mental Status: Awake, Alert, Oriented x 4  Language: No aphasia  Speech: No dysarthria  Cranial nerves: face symmetric, tongue midline, CN II-XII grossly intact.   Eyes: pupils equal, round, reactive to light with accommodation, EOMI  Pulmonary: normal respirations, not labored, no accessory muscles used  Abdomen: soft, non-distended, not tender to palpation  Sensory: intact to light touch throughout  Motor Strength: Moves all extremities spontaneously with good tone.  Full strength upper and lower extremities. No abnormal movements seen.     Strength  Deltoids Triceps Biceps Wrist Extension Wrist Flexion Hand    Upper: R 5/5 5/5 5/5 5/5 5/5 5/5    L 5/5 5/5 5/5 5/5 5/5 5/5     Iliopsoas Quadriceps Knee  Flexion Tibialis  anterior Gastro- cnemius EHL   Lower: R 5/5 5/5 5/5 5/5 5/5 5/5    L 5/5 5/5 5/5 5/5 5/5 5/5     Pronator Drift: no drift noted  Finger-to-nose: Intact bilaterally  Oliveira: absent  Clonus: absent  Babinski: absent  Pulses: 2+ and symmetric radial and dorsalis pedis  Skin: warm, dry and intact, no rashes        Significant Labs:    Recent Labs  Lab 07/25/18  1528 07/26/18  0324    116*    138   K 4.3 3.9    107   CO2 22* 21*   BUN 11 13   CREATININE 0.8 0.7   CALCIUM 9.8 8.6*   MG 2.4  --        Recent Labs  Lab 07/25/18  1528 07/26/18  0324   WBC 10.86 11.78   HGB 13.7 13.1   HCT 41.6 39.2    219       Recent Labs  Lab 07/25/18  1528   INR 1.1   APTT 22.9     Microbiology Results (last 7 days)     ** No results found for the last 168 hours. **          Significant Diagnostics:  I personally reviewed CT Head & agree with the findings: Stable thin subdural hematoma along the right posterior  cerebral convexity.    Large left posterior scalp hematoma.    Senescent and chronic microvascular ischemic change.    I personally reviewed CT Cspine & agree with the findings: . No acute displaced fracture or dislocation of the cervical spine noting grossly stable degenerative changes.  2. Please see separately dictated report for details of the intracranial content.    Assessment/Plan:     * SDH (subdural hematoma)    89 F s/p unwitnessed fall on asa/plavix with small right posterior parietal SDH  -Pt at neuro baseline  -CT Head is stable  -Ok to continue to take aspirin  -Please hold Plavix until follow up in Nsurg clinic  -Will schedule 2 week follow up in Nsurg clinic with CT Head  -Will sign off at this time  -Discussed with Dr. Jj Zhang PAManojC  Neurosurgery  [facility]

## 2018-07-26 NOTE — ASSESSMENT & PLAN NOTE
89 F s/p unwitnessed fall on asa/plavix with small right posterior parietal SDH  -Pt at neuro baseline  -CT Head is stable  -Ok to continue to take aspirin  -Please hold Plavix until follow up in Nsurg clinic  -Will schedule 2 week follow up in Nsurg clinic with CT Head  -Will sign off at this time  -Discussed with Dr. Gardner

## 2018-07-26 NOTE — CONSULTS
[facility]  Neurosurgery  Consult Note    Consults  Subjective:     Chief Complaint/Reason for Admission: SDH    History of Present Illness: 89 F presents for eval of head trauma.  She had unwitnessed fall today resulting in left parietal scalp hematoma and small right parietal SDH.  She currently endorses HA and mild left sided neck pain.  No new focal numbness, weakness, parasthesias.  She has alzheimers at baseline.      (Not in a hospital admission)    Review of patient's allergies indicates:   Allergen Reactions    Codeine Nausea And Vomiting     chills    Lisinopril Other (See Comments)     cough    Morphine Nausea And Vomiting       Past Medical History:   Diagnosis Date    Acquired hypothyroidism     Closed displaced intertrochanteric fracture of left femur s/p IM nail on 6/5/2016 6/5/2016    Coronary artery disease involving native coronary artery of native heart without angina pectoris     Essential hypertension     GERD (gastroesophageal reflux disease)     Hx of colonic polyps     Hyperlipidemia     Macular degeneration     Mitral valve stenosis     Severe aortic stenosis 9/22/2014    Syncope 1/5/2015     Past Surgical History:   Procedure Laterality Date    DILATION AND CURETTAGE OF UTERUS      HYSTERECTOMY      INTERTROCHANTERIC HIP FRACTURE SURGERY Left 06/05/2016    IM nail    TONSILLECTOMY       Family History     Problem Relation (Age of Onset)    Cancer Mother    Heart disease Mother, Father    No Known Problems Sister, Brother, Maternal Grandmother, Maternal Grandfather, Paternal Grandmother, Paternal Grandfather, Maternal Aunt, Maternal Uncle, Paternal Aunt, Paternal Uncle    Skin cancer Father        Social History Main Topics    Smoking status: Never Smoker    Smokeless tobacco: Never Used    Alcohol use No      Comment: rare     Drug use: No    Sexual activity: Not on file     Review of Systems  Objective:     Weight: 59 kg (130 lb)  Body mass index  is 20.98 kg/m².  Vital Signs (Most Recent):  Temp: 98.3 °F (36.8 °C) (07/25/18 1406)  Pulse: 67 (07/25/18 1922)  Resp: 17 (07/25/18 1406)  BP: (!) 109/53 (07/25/18 1922)  SpO2: 99 % (07/25/18 1922) Vital Signs (24h Range):  Temp:  [98.3 °F (36.8 °C)] 98.3 °F (36.8 °C)  Pulse:  [67-81] 67  Resp:  [17] 17  SpO2:  [96 %-100 %] 99 %  BP: (109-198)/(53-86) 109/53                           Neurosurgery Physical Exam   Awake, alert, oriented to person/place  PERRL, EOMI, face symm, tongue midline  DAILEY symm, FC X4  SILT  No drift  No midline c spine tenderness    Significant Labs:    Recent Labs  Lab 07/25/18  1528         K 4.3      CO2 22*   BUN 11   CREATININE 0.8   CALCIUM 9.8   MG 2.4       Recent Labs  Lab 07/25/18  1528   WBC 10.86   HGB 13.7   HCT 41.6          Recent Labs  Lab 07/25/18  1528   INR 1.1   APTT 22.9     Microbiology Results (last 7 days)     ** No results found for the last 168 hours. **            Significant Diagnostics:  CT head: reviewed  CT c spine: reviewed    Assessment/Plan:     * SDH (subdural hematoma)    89 F s/p unwitnessed fall on asa/plavix with small right posterior parietal SDH.  -Pt at neuro baseline  -Admit to obs  -Hold asa/plavix  -Continue c collar, consider flex/ex c spine   -Repeat CT head in am                Hosea Lees DO  Neurosurgery  [facility]

## 2018-07-26 NOTE — SUBJECTIVE & OBJECTIVE
(Not in a hospital admission)    Review of patient's allergies indicates:   Allergen Reactions    Codeine Nausea And Vomiting     chills    Lisinopril Other (See Comments)     cough    Morphine Nausea And Vomiting       Past Medical History:   Diagnosis Date    Acquired hypothyroidism     Closed displaced intertrochanteric fracture of left femur s/p IM nail on 6/5/2016 6/5/2016    Coronary artery disease involving native coronary artery of native heart without angina pectoris     Essential hypertension     GERD (gastroesophageal reflux disease)     Hx of colonic polyps     Hyperlipidemia     Macular degeneration     Mitral valve stenosis     Severe aortic stenosis 9/22/2014    Syncope 1/5/2015     Past Surgical History:   Procedure Laterality Date    DILATION AND CURETTAGE OF UTERUS      HYSTERECTOMY      INTERTROCHANTERIC HIP FRACTURE SURGERY Left 06/05/2016    IM nail    TONSILLECTOMY       Family History     Problem Relation (Age of Onset)    Cancer Mother    Heart disease Mother, Father    No Known Problems Sister, Brother, Maternal Grandmother, Maternal Grandfather, Paternal Grandmother, Paternal Grandfather, Maternal Aunt, Maternal Uncle, Paternal Aunt, Paternal Uncle    Skin cancer Father        Social History Main Topics    Smoking status: Never Smoker    Smokeless tobacco: Never Used    Alcohol use No      Comment: rare     Drug use: No    Sexual activity: Not on file     Review of Systems  Objective:     Weight: 59 kg (130 lb)  Body mass index is 20.98 kg/m².  Vital Signs (Most Recent):  Temp: 98.3 °F (36.8 °C) (07/25/18 1406)  Pulse: 67 (07/25/18 1922)  Resp: 17 (07/25/18 1406)  BP: (!) 109/53 (07/25/18 1922)  SpO2: 99 % (07/25/18 1922) Vital Signs (24h Range):  Temp:  [98.3 °F (36.8 °C)] 98.3 °F (36.8 °C)  Pulse:  [67-81] 67  Resp:  [17] 17  SpO2:  [96 %-100 %] 99 %  BP: (109-198)/(53-86) 109/53                           Neurosurgery Physical Exam   Awake, alert, oriented to  person/place  PERRL, EOMI, face symm, tongue midline  DAILEY symm, FC X4  SILT  No drift  No midline c spine tenderness    Significant Labs:    Recent Labs  Lab 07/25/18  1528         K 4.3      CO2 22*   BUN 11   CREATININE 0.8   CALCIUM 9.8   MG 2.4       Recent Labs  Lab 07/25/18  1528   WBC 10.86   HGB 13.7   HCT 41.6          Recent Labs  Lab 07/25/18  1528   INR 1.1   APTT 22.9     Microbiology Results (last 7 days)     ** No results found for the last 168 hours. **            Significant Diagnostics:  CT head: reviewed  CT c spine: reviewed

## 2018-07-26 NOTE — H&P
Hospital Medicine  History and Physical Exam    Team: [facility] HOSP MED B David Hdz MD  Admit Date: 7/25/2018  OSIEL 7/27/2018  Principal Problem:  SDH (subdural hematoma)   Patient information was obtained from past medical records and ER records.   Primary care Physician: Oleg Enciso DO  Code status: Full Code    HPI:   89F h/o multiple prior falls, hip fx, dementia, CAD, HFpEF, Severe AS (declined intervention), presenting s/p  fall at home, witnessed by sitter, per records patient tried to go from bathroom to bed, did hit head with posterior head hematoma noted, pt currently on plavix. Patient has no recollection of event and no family members reachable or present at time of history. C-collar placed PTA. Neurosurgery evaluated patient as small SDH noted on CT head, no acute intervention planned, recommended admission to medicine for observation and repeat CT head in AM. Pt currently reports headache, denies any other focal complaints.    Past Medical History: Patient has a past medical history of Acquired hypothyroidism; Closed displaced intertrochanteric fracture of left femur s/p IM nail on 6/5/2016 (6/5/2016); Coronary artery disease involving native coronary artery of native heart without angina pectoris; Essential hypertension; GERD (gastroesophageal reflux disease); colonic polyps; Hyperlipidemia; Macular degeneration; Mitral valve stenosis; Severe aortic stenosis (9/22/2014); and Syncope (1/5/2015).    Past Surgical History: Patient has a past surgical history that includes Intertrochanteric hip fract. Surgery (Left, 06/05/2016); Hysterectomy; Dilation and curettage of uterus; and Tonsillectomy.    Social History: Patient reports that she has never smoked. She has never used smokeless tobacco. She reports that she does not drink alcohol or use drugs.    Family History: family history includes Cancer in her mother; Heart disease in her father and mother; No Known Problems in her brother, maternal  aunt, maternal grandfather, maternal grandmother, maternal uncle, paternal aunt, paternal grandfather, paternal grandmother, paternal uncle, and sister; Skin cancer in her father.    Medications:   Prior to Admission medications    Medication Sig Start Date End Date Taking? Authorizing Provider   artificial tears (ISOPTO TEARS) 0.5 % ophthalmic solution Place 1 drop into the left eye 4 (four) times daily. 2/17/17   Shena Lodr MD   ascorbic acid, vitamin C, (VITAMIN C) 250 mg Chew Take by mouth.    Historical Provider, MD   aspirin 81 MG Chew Take 1 tablet (81 mg total) by mouth once daily. 3/3/17 3/3/18  Nito Shultz, ELLIE   atorvastatin (LIPITOR) 40 MG tablet Take 1 tablet (40 mg total) by mouth once daily. 4/10/18   Sergey Manzanares MD   busPIRone (BUSPAR) 30 MG Tab Take 1 tablet (30 mg total) by mouth 2 (two) times daily. 5/30/17 5/30/18  Oleg Enciso DO   citalopram (CELEXA) 10 MG tablet Take 1 tablet (10 mg total) by mouth once daily. 4/2/18 9/29/18  Micky Palma MD   clopidogrel (PLAVIX) 75 mg tablet Take 1 tablet (75 mg total) by mouth once daily. 3/20/17 3/20/18  Milly Bell MD   cyanocobalamin (VITAMIN B-12) 1000 MCG tablet Take 1 tablet (1,000 mcg total) by mouth once daily. 3/20/17   Milly Bell MD   fish oil-omega-3 fatty acids 300-1,000 mg capsule Take 2 g by mouth once daily.    Historical Provider, MD   furosemide (LASIX) 20 MG tablet TAKE 1 TABLET ONE TIME DAILY 7/23/18   Oleg Enciso DO   isosorbide mononitrate (IMDUR) 30 MG 24 hr tablet TAKE 1 TABLET ONE TIME DAILY 7/2/18   Oleg Enciso DO   levothyroxine (SYNTHROID) 50 MCG tablet TAKE 1 TABLET BEFORE BREAKFAST. 7/2/18   Oleg Enciso DO   losartan (COZAAR) 25 MG tablet Take by mouth.    Historical Provider, MD   metoprolol succinate (TOPROL-XL) 25 MG 24 hr tablet TAKE 1 TABLET ONE TIME DAILY 4/9/18   Sergey Manzanares MD   omeprazole (PRILOSEC) 20 MG capsule Take 20 mg by mouth once daily.   12/30/12   Historical Provider, MD   omeprazole (PRILOSEC) 20 MG capsule Take by mouth.    Historical Provider, MD   vitamin D 1000 units Tab Take 185 mg by mouth once daily.    Historical Provider, MD   ZINC ACETATE ORAL Take by mouth.    Historical Provider, MD   ZOSTAVAX, PF, 19,400 unit/0.65 mL injection  3/30/18   Historical Provider, MD       Allergies: Patient is allergic to codeine; lisinopril; and morphine.    ROS    Unable to obtain 2/2 pt memory impairment    PEx  Temp:  [98.3 °F (36.8 °C)]   Pulse:  [64-98]   Resp:  [17]   BP: (109-198)/(53-86)   SpO2:  [96 %-100 %]   Body mass index is 20.98 kg/m².     General appearance: no distress, elderly  woman  Mental status: Alert and oriented to person and place only  HEENT:  conjunctivae/corneas clear, PERRL  Neck: C collar in place  Pulm:   normal respiratory effort, CTA B, no c/w/r  Card: RRR, S1, S2 normal, no murmur, click, rub or gallop  Abd: soft, NT, ND, BS present; no masses, no organomegaly  Ext: no c/c/e  Pulses: 2+, symmetric  Skin: color, texture, turgor normal. No rashes or lesions  Neuro: CN II-XII grossly intact,     No intake or output data in the 24 hours ending 07/25/18 2237    Recent Labs  Lab 07/25/18  1528   WBC 10.86   HGB 13.7   HCT 41.6          Recent Labs  Lab 07/25/18  1528      K 4.3      CO2 22*   BUN 11   CREATININE 0.8      CALCIUM 9.8   MG 2.4       Recent Labs  Lab 07/25/18  1528   ALKPHOS 92   ALT 15   AST 27   ALBUMIN 4.1   PROT 8.0   BILITOT 1.5*   INR 1.1      No results for input(s): POCTGLUCOSE in the last 168 hours.  Recent Labs      07/25/18   1528   TROPONINI  0.019       No results for input(s): LACTATE in the last 72 hours.     Active Hospital Problems    Diagnosis  POA    *SDH (subdural hematoma) [I62.00]  Unknown    Post-traumatic subdural hematoma [S06.5X9A]  Yes      Resolved Hospital Problems    Diagnosis Date Resolved POA   No resolved problems to display.       Overview  1F  h/o multiple prior falls, hip fx, dementia, CAD, HFpEF, Severe AS (declined intervention), presenting s/p  fall at home found to have sm. R posterior parietal SDH    Assessment and Plan for Problems addressed today:    R posterior parietal SDH  -s/p fall with head trauma, SDH on CT, neurologically stable per NSG  -Holding ASA/Plavix  -Continue C-collar  -Repeat CT head in AM  -Appreciate neurosurg recs  -NPO  -PRN tylenol for HA    HTN  -Hypertensive in ED to 180s/190s systolic  -Continue losartan 25mg daily  -Continue metoprolol succinate 25 daily  -Continue lasix 20mg daily  -Labetolol IVP 10mg PRN SBP >180    CAD  -Stable  -Continue statin, BB, ARB    HFpEF  -Continue statin, BB, ARB, imdur    Hypothyroidism  -Stable  -Continue synthroid 50mcg daily    GERD  -Stable   -Continue protonix 40mg daily    DVT PPx: SCD  Dispo: Pending CT head in AM    David Hdz MD  Department of Hospital Medicine  Pager: [phone]  Spectra: [phone]

## 2018-07-26 NOTE — ASSESSMENT & PLAN NOTE
89 F s/p unwitnessed fall on asa/plavix with small right posterior parietal SDH.  -Pt at neuro baseline  -Admit to obs  -Hold asa/plavix  -Continue c collar, consider flex/ex c spine   -Repeat CT head in am

## 2018-07-26 NOTE — PROGRESS NOTES
Hospital Medicine  Progress note     Team: [facility] MED B Jean Marie Arevalo  Admit Date: 7/25/2018  OSIEL   Code status: Full Code     Principal Problem:  SDH (subdural hematoma)     Interval hx: CT shows no change in size of hematoma, patient still does not recall how she fell. Has severe aortic stenosis, repeat echo ordered. has episodes of dizziness at home per grand daughter.  Patient states she has only one fall in the past but may be incorrect with patients dementia history. PT/OT consulted. Headache last night, given tylenol. OK to restart ASA but hold plavix until seen in neuro clinic.        ROS      Pain Scale: 0/10  Respiratory: no cough or shortness of breath  Cardiovascular: no chest pain or palpitations  Gastrointestinal: no nausea or vomiting, no abdominal pain or change in bowel habits  Behavioral/Psych: no depression or anxiety        PEx  Temp:  [98.1 °F (36.7 °C)-98.8 °F (37.1 °C)]   Pulse:  [57-98]   Resp:  [17-20]   BP: (109-198)/(53-86)   SpO2:  [96 %-100 %]      Intake/Output Summary (Last 24 hours) at 07/26/18 0832  Last data filed at 07/26/18 0700    Gross per 24 hour   Intake                0 ml   Output              700 ml   Net             -700 ml      General appearance: no distress, elderly  woman  Mental status: Alert and oriented to person and place only  Neck: C collar in place  Pulm:   normal respiratory effort, CTA B, no c/w/r  Card: RRR, S1, S2 normal, no murmur, click, rub or gallop  Abd: soft, NT, ND, BS present; no masses, no organomegaly           Recent Labs  Lab 07/25/18  1528 07/26/18  0324   WBC 10.86 11.78   HGB 13.7 13.1   HCT 41.6 39.2    219         Recent Labs  Lab 07/25/18  1528 07/26/18  0324    138   K 4.3 3.9    107   CO2 22* 21*   BUN 11 13   CREATININE 0.8 0.7    116*   CALCIUM 9.8 8.6*   MG 2.4  --          Recent Labs  Lab 07/25/18  1528   ALKPHOS 92   ALT 15   AST 27   ALBUMIN 4.1   PROT 8.0   BILITOT 1.5*   INR 1.1      No  results for input(s): POCTGLUCOSE in the last 168 hours.      Recent Labs      07/25/18   1528   TROPONINI  0.019         Scheduled Meds:   atorvastatin  40 mg Oral Daily    citalopram  10 mg Oral Daily    docusate sodium  100 mg Oral BID    furosemide  20 mg Oral Daily    isosorbide mononitrate  30 mg Oral Daily    levothyroxine  50 mcg Oral Before breakfast    losartan  25 mg Oral Daily    metoprolol succinate  25 mg Oral Daily    pantoprazole  40 mg Oral Daily      Continuous Infusions:   sodium chloride 0.9% 75 mL/hr at 07/25/18 2158      As Needed:  acetaminophen, dextrose 50%, dextrose 50%, glucagon (human recombinant), glucose, glucose, glucose, labetalol, ondansetron           Active Hospital Problems     Diagnosis   POA    *SDH (subdural hematoma) [I62.00]   Unknown    Post-traumatic subdural hematoma [S06.5X9A]   Yes    Coronary artery disease of native artery of native heart with stable angina pectoris [I25.118]   Yes    (HFpEF) heart failure with preserved ejection fraction [I50.30]   Yes    Essential hypertension [I10]   Yes    GERD (gastroesophageal reflux disease) [K21.9]   Yes       Resolved Hospital Problems     Diagnosis Date Resolved POA   No resolved problems to display.      Overview  1F h/o multiple prior falls, hip fx, dementia, CAD, HFpEF, Severe AS (declined intervention), presenting s/p  fall at home found to have sm. R posterior parietal SDH.      Assessment and Plan for Problems addressed today:     R posterior parietal SDH  -s/p unwitnessed fall on asa/plavix with small right posterior parietal SDH., SDH on CT- Stable thin subdural hematoma along the right posterior cerebral convexity on CT 07/26/ , neurologically stable per NSG  -Holding Plavix until seen in neuro clinic, OK to restart ASA  -Continue C-collar  -Appreciate neurosurg recs  -NPO  -PRN tylenol for HA     Severe aortic stenosis   repeat echo ordered -Moderate to severe mitral regurgitation.     6 Severe  aortic valve stenosis (KALYANI 0.41 cm2, AVAi 0.25 cm2/m2, peak aortic jet velocity 5.7 m/s,MG 84 mmHg, Pulmonary hypertension. The estimated PA systolic pressure is 47 mmHg. . has episodes of dizziness at home per grand daughter. refused prior intervention. telemetry.     HTN  -Hypertensive in ED to 180s/190s systolic  -Continue losartan 25mg daily  -Continue metoprolol succinate 25 daily  -Continue lasix 20mg daily  -Labetolol IVP 10mg PRN SBP >180     CAD  NSTEMI in March of 2017 with no interventions, patient refused angiography  -continue aspirin 81mg daily, plavix 75mg qd to be held until neurosurgery follow up   -Stable  -Continue statin, BB, ARB     HFpEF  -Continue statin, BB, ARB, imdur    Hyperlipidemia  -continue home atorvastatin 40mg daily     Hypothyroidism  -Stable  -Continue synthroid 50mcg daily     GERD  -Stable   -Continue protonix 40mg daily    Dementia (Alzheimers dz) AAOX2 today     DVT PPx: SCD     Provider     I personally scribed for Ambrose Diallo MD on 07/26/2018 at 2:38 PM. Electronically signed by ivanna Arevalo III on 07/26/2018 at 2:38 PM  The documentation recorded by the scribe accurately reflects service I personally performed and the decisions made by me.  Ambrose Diallo MD  Attending Staff Physician  [facility] Medicine  pager- [phone]  [facility] - [phone]

## 2018-07-26 NOTE — PROGRESS NOTES
Chart reviewed.  Patient currently is an inpatient.  Talked to NIRMAL Sunshine-CM in ED department at [phone].  Bita stated that patient is currently an inpatient and waiting for a bed to get on the medicine floor.  DANIELLA GormanM-RN

## 2018-07-26 NOTE — PROGRESS NOTES
Patient contact; granddaughter Natividad [phone].  Spoke to patient.  She is her caregiver.  Contacted Dr. Diallo.

## 2018-07-26 NOTE — ED NOTES
Pt identity confirmed; bed in lowest position & locked; rails up; call-light in reach & pt educated on its use; pt informed to press call if they should need anything, as well as to not get out of bed w/o nurse or tech present; pt verbalized understanding

## 2018-07-27 ENCOUNTER — OUTPATIENT CASE MANAGEMENT (OUTPATIENT)
Dept: ADMINISTRATIVE | Facility: OTHER | Age: 83
End: 2018-07-27

## 2018-07-27 ENCOUNTER — TELEPHONE (OUTPATIENT)
Dept: NEUROSURGERY | Facility: CLINIC | Age: 83
End: 2018-07-27

## 2018-07-27 LAB
ANION GAP SERPL CALC-SCNC: 10 MMOL/L
BASOPHILS # BLD AUTO: 0.11 K/UL
BASOPHILS NFR BLD: 0.9 %
BUN SERPL-MCNC: 8 MG/DL
CALCIUM SERPL-MCNC: 9 MG/DL
CHLORIDE SERPL-SCNC: 110 MMOL/L
CO2 SERPL-SCNC: 23 MMOL/L
CREAT SERPL-MCNC: 0.7 MG/DL
DIFFERENTIAL METHOD: ABNORMAL
EOSINOPHIL # BLD AUTO: 0.2 K/UL
EOSINOPHIL NFR BLD: 1.4 %
ERYTHROCYTE [DISTWIDTH] IN BLOOD BY AUTOMATED COUNT: 14.8 %
EST. GFR  (AFRICAN AMERICAN): >60 ML/MIN/1.73 M^2
EST. GFR  (NON AFRICAN AMERICAN): >60 ML/MIN/1.73 M^2
GLUCOSE SERPL-MCNC: 109 MG/DL
HCT VFR BLD AUTO: 37.7 %
HGB BLD-MCNC: 12.4 G/DL
IMM GRANULOCYTES # BLD AUTO: 0.04 K/UL
IMM GRANULOCYTES NFR BLD AUTO: 0.3 %
LYMPHOCYTES # BLD AUTO: 2.7 K/UL
LYMPHOCYTES NFR BLD: 23 %
MCH RBC QN AUTO: 31 PG
MCHC RBC AUTO-ENTMCNC: 32.9 G/DL
MCV RBC AUTO: 94 FL
MONOCYTES # BLD AUTO: 0.7 K/UL
MONOCYTES NFR BLD: 5.6 %
NEUTROPHILS # BLD AUTO: 8.1 K/UL
NEUTROPHILS NFR BLD: 68.8 %
NRBC BLD-RTO: 0 /100 WBC
PLATELET # BLD AUTO: 238 K/UL
PMV BLD AUTO: 10.6 FL
POTASSIUM SERPL-SCNC: 3.6 MMOL/L
RBC # BLD AUTO: 4 M/UL
SODIUM SERPL-SCNC: 143 MMOL/L
WBC # BLD AUTO: 11.77 K/UL

## 2018-07-27 PROCEDURE — 25000003 PHARM REV CODE 250: Performed by: PHYSICIAN ASSISTANT

## 2018-07-27 PROCEDURE — 97165 OT EVAL LOW COMPLEX 30 MIN: CPT

## 2018-07-27 PROCEDURE — S0166 INJ OLANZAPINE 2.5MG: HCPCS | Performed by: PHYSICIAN ASSISTANT

## 2018-07-27 PROCEDURE — 25000003 PHARM REV CODE 250: Performed by: HOSPITALIST

## 2018-07-27 PROCEDURE — G8987 SELF CARE CURRENT STATUS: HCPCS | Mod: CJ

## 2018-07-27 PROCEDURE — 99222 1ST HOSP IP/OBS MODERATE 55: CPT | Mod: ,,, | Performed by: PSYCHIATRY & NEUROLOGY

## 2018-07-27 PROCEDURE — G8998 SWALLOW D/C STATUS: HCPCS | Mod: CH

## 2018-07-27 PROCEDURE — 36415 COLL VENOUS BLD VENIPUNCTURE: CPT

## 2018-07-27 PROCEDURE — S0166 INJ OLANZAPINE 2.5MG: HCPCS | Performed by: HOSPITALIST

## 2018-07-27 PROCEDURE — 85025 COMPLETE CBC W/AUTO DIFF WBC: CPT

## 2018-07-27 PROCEDURE — 80048 BASIC METABOLIC PNL TOTAL CA: CPT

## 2018-07-27 PROCEDURE — 99233 SBSQ HOSP IP/OBS HIGH 50: CPT | Mod: ,,, | Performed by: HOSPITALIST

## 2018-07-27 PROCEDURE — G8980 MOBILITY D/C STATUS: HCPCS | Mod: CJ

## 2018-07-27 PROCEDURE — G8996 SWALLOW CURRENT STATUS: HCPCS | Mod: CH

## 2018-07-27 PROCEDURE — G8988 SELF CARE GOAL STATUS: HCPCS | Mod: CJ

## 2018-07-27 PROCEDURE — 97162 PT EVAL MOD COMPLEX 30 MIN: CPT

## 2018-07-27 PROCEDURE — 92523 SPEECH SOUND LANG COMPREHEN: CPT

## 2018-07-27 PROCEDURE — G8979 MOBILITY GOAL STATUS: HCPCS | Mod: CJ

## 2018-07-27 PROCEDURE — 11000001 HC ACUTE MED/SURG PRIVATE ROOM

## 2018-07-27 PROCEDURE — 92610 EVALUATE SWALLOWING FUNCTION: CPT

## 2018-07-27 PROCEDURE — 97535 SELF CARE MNGMENT TRAINING: CPT

## 2018-07-27 PROCEDURE — G8989 SELF CARE D/C STATUS: HCPCS | Mod: CJ

## 2018-07-27 PROCEDURE — G8978 MOBILITY CURRENT STATUS: HCPCS | Mod: CJ

## 2018-07-27 RX ORDER — OLANZAPINE 10 MG/2ML
2.5 INJECTION, POWDER, FOR SOLUTION INTRAMUSCULAR EVERY 30 MIN PRN
Status: COMPLETED | OUTPATIENT
Start: 2018-07-27 | End: 2018-07-27

## 2018-07-27 RX ORDER — OLANZAPINE 10 MG/2ML
2.5 INJECTION, POWDER, FOR SOLUTION INTRAMUSCULAR EVERY 6 HOURS PRN
Status: DISCONTINUED | OUTPATIENT
Start: 2018-07-27 | End: 2018-07-29 | Stop reason: HOSPADM

## 2018-07-27 RX ORDER — OLANZAPINE 10 MG/2ML
2.5 INJECTION, POWDER, FOR SOLUTION INTRAMUSCULAR EVERY 6 HOURS PRN
Status: DISCONTINUED | OUTPATIENT
Start: 2018-07-27 | End: 2018-07-27

## 2018-07-27 RX ADMIN — OLANZAPINE 2.5 MG: 10 INJECTION, POWDER, FOR SOLUTION INTRAMUSCULAR at 03:07

## 2018-07-27 RX ADMIN — OLANZAPINE 2.5 MG: 10 INJECTION, POWDER, FOR SOLUTION INTRAMUSCULAR at 01:07

## 2018-07-27 RX ADMIN — OLANZAPINE 2.5 MG: 10 INJECTION, POWDER, FOR SOLUTION INTRAMUSCULAR at 10:07

## 2018-07-27 NOTE — PLAN OF CARE
PCP:Oleg Enciso DO    Extended Emergency Contact Information  Primary Emergency Contact: Gudelia Sands   [place]  Mobile Phone: [phone]  Relation: Grandchild  Secondary Emergency Contact: Jennifer De La Rosa   [facility]  Home Phone: [phone]  Relation: Relative      [id number] - [place], [place] - [address]  [address]  [place] [address]  Phone: [phone] Fax: [phone]    [facility] - [place], [place] - [address]  [address]  [facility] [address]  Phone: [phone] Fax: [phone]    Payor: [employer] MEDICARE / Plan: [employer] MEDICARE HMO / Product Type: Capitation /     Patient was ambulating in the room unassisted. Patient lives at home with sitter's assistance. Cm called and left a message for the patient's granddaughter Gudelia but did not verify any information with her due to no return call. Cm could not verify information with the patient due to her confusion. Cm will continue to follow.     07/27/18 1647   Discharge Assessment   Assessment Type Discharge Planning Assessment   Confirmed/corrected address and phone number on facesheet? Yes   Assessment information obtained from? Medical Record   Expected Length of Stay (days) 2   Communicated expected length of stay with patient/caregiver yes   Prior to hospitilization cognitive status: Not Oriented to Time   Prior to hospitalization functional status: Independent   Current cognitive status: Not Oriented to Time   Current Functional Status: Independent   Facility Arrived From: Home   Lives With grandchild(teodoro)  (adult)   Able to Return to Prior Arrangements unable to determine at this time (comments)   Is patient able to care for self after discharge? Unable to determine at this time (comments)   Who are your caregiver(s) and their phone number(s)? Gudelia Sands (grandchild) [phone]   Patient's perception of discharge disposition home  or selfcare   Readmission Within The Last 30 Days no previous admission in last 30 days   Patient currently being followed by outpatient case management? No   Patient currently receives any other outside agency services? No   Equipment Currently Used at Home none   Do you have any problems affording any of your prescribed medications? No   Is the patient taking medications as prescribed? yes   Does the patient have transportation home? Yes   Transportation Available family or friend will provide   Does the patient receive services at the Coumadin Clinic? No   Discharge Plan A Home with family   Discharge Plan B Skilled Nursing Facility   Patient/Family In Agreement With Plan no  (pt is confused)

## 2018-07-27 NOTE — PLAN OF CARE
Problem: Patient Care Overview  Goal: Plan of Care Review  Outcome: Ongoing (interventions implemented as appropriate)  VSS. Intermittent complaints of headaches.  A/O to self mainly.  Patient will at times say she knows she's in the hospital then will say she's at home.  Patient in restraints after pulling out IV and multiple attempts at leaving the room.  Sitter at bedside.  One dose of zyprexa given with moderate results.  Patient has stopped yelling at people in the room but can still become aggressive towards them.  Safety maintained.  All questions answered. Patient updated on plan of care.  Will continue to monitor.

## 2018-07-27 NOTE — NURSING
Pt screaming, delusional, and combative. Zyprexa given at this time with x3 person assistance. Pt in bed at this time with sitter at bedside.

## 2018-07-27 NOTE — PT/OT/SLP EVAL
Occupational Therapy   Evaluation & Discharge Summary    Name: Janine Awad  MRN: [id number]  Admitting Diagnosis:  SDH (subdural hematoma)      Recommendations:     Discharge Recommendations: home with home health  Discharge Equipment Recommendations:  wheelchair, 3-in-1 commode  Barriers to discharge:  None    History:     Occupational Profile:  *pt unable to accurately report information and currently disoriented and paranoid--information retrieved via phone call (~10min) with granddaughter/primary caregiver  Living Environment: Pt lives with her grand-daughter (20yrs old) in a one story home without any steps to enter. She has a tub/shower combo with a TTB.   Previous level of function: Pt required min A for sequencing and physical assist for ADL tasks. Her grand-daughter is her primary caregiver. They utilize a sitter service whenever the granddaughter leaves the home, so pt has 24/7 supervision. Pt occasionally uses the RW for functional mobility, but grand-daughter reported that pt often forgets that she needs to use it.   Roles and Routines: caretaker to self, community dweller  Equipment Owned:  walker, rolling  Assistance upon Discharge: 24/7 assist     Past Medical History:   Diagnosis Date    Acquired hypothyroidism     Closed displaced intertrochanteric fracture of left femur s/p IM nail on 6/5/2016 6/5/2016    Coronary artery disease involving native coronary artery of native heart without angina pectoris     Essential hypertension     GERD (gastroesophageal reflux disease)     Hx of colonic polyps     Hyperlipidemia     Macular degeneration     Mitral valve stenosis     Severe aortic stenosis 9/22/2014    Syncope 1/5/2015       Past Surgical History:   Procedure Laterality Date    DILATION AND CURETTAGE OF UTERUS      HYSTERECTOMY      INTERTROCHANTERIC HIP FRACTURE SURGERY Left 06/05/2016    IM nail    TONSILLECTOMY         Subjective     Chief Complaint: paranoid perceptions  regarding staff   Patient/Family stated goals: to go home  Communicated with: RN prior to session. Eval completed with PT. Sitter present for session. Grand-daughter contacted via phone call after session.   Pain/Comfort:  · Pain Rating 1: 0/10    Patients cultural, spiritual, Zoroastrian conflicts given the current situation: none reported     Objective:     Patient found with: restraints, peripheral IV (sitter)    General Precautions: Standard, fall (Alzheimer's )   Orthopedic Precautions:N/A   Braces: N/A     Occupational Performance:    Bed Mobility:    · Pt found sitting EOB and left sitting EOB--based on observation of functional tasks, likely supervision for bed mobility    Functional Mobility/Transfers:  · Patient completed Sit <> Stand Transfer with contact guard assistance and x1 trial from EOB and x1 trial from toilet  with  no assistive device   · Patient completed Toilet Transfer Stand Pivot technique with contact guard assistance with  no AD  · Functional Mobility: min A for balance and weight shift for short household distance x2 trials and no AD     Activities of Daily Living:  · Feeding:  supervision to drink from a cup while standing  · Lower Body Dressing: supervision to don socks while seated EOB   · Toileting: supervision for hygiene after urination in toilet    Cognitive/Visual Perceptual:  Cognitive/Psychosocial Skills:     -       Oriented to: Person    -pt very disoriented otherwise--demo paranoid delusions regarding staff and refuses to believe she is in the hospital  -       Follows Commands/attention:Inattentive, Easily distracted and Follows multistep  commands  -       Communication: clear/fluent  -       Memory: Impaired STM  -       Safety awareness/insight to disability: impaired   -       Mood/Affect/Coping skills/emotional control: Anger, Lashes out, Agitated and Guarded  Visual/Perceptual:      -Intact    Physical Exam:  Balance:    -       supervision for static/dynamic sitting;  CGA for static standing, min A for dynamic standing  Postural examination/scapula alignment:    -       Rounded shoulders  Skin integrity: Visible skin intact  Edema:  None noted  Sensation:    -       Intact  Motor Planning:    -       intact   Dominant hand:    -       R  Upper Extremity Range of Motion:     -       Right Upper Extremity: WFL  -       Left Upper Extremity: WFL  Upper Extremity Strength:    -       Right Upper Extremity: WFL  -       Left Upper Extremity: WFL   Strength:    -       Right Upper Extremity: WFL  -       Left Upper Extremity: WFL  Fine Motor Coordination:    -       Intact    Patient left sitting EOB  with all lines intact, call button in reach, restraints reapplied at end of session, RN notified and sitter present    [facility] 6 Click:  [facility] Total Score: 22    Treatment & Education:  -Edu for OT role and POC  -Edu for importance of OOB activity and impact on healing  -Pt currently refusing to eat/drink due to paranoid delusions regarding staff--per granddaughter report, pt enjoys chocolate ice cream and ham sandwiches  -Whiteboard updated   -Questions and concerns addressed within OT scope of practice   Education:    Assessment:     Janine Awad is a 89 y.o. female with a medical diagnosis of SDH (subdural hematoma).  She presents with decreased functional independence in various areas of her life. However, pt is functioning near her baseline regarding functional tasks. Pt's greatest deficits are balance during functional mobility and her paranoid delusions regarding her situation. This is to be expected considering her prior diagnoses, and she will likely participate better in therapy in the home setting. She is currently inappropriate for therapy services in the acute setting due to inability to participate and functioning near her baseline; she would greatly benefit from HH therapy for maximum functional outcome and safety. Performance deficits affecting function are  "weakness, impaired endurance, impaired self care skills, impaired functional mobilty, gait instability, impaired balance, impaired cognition, decreased coordination, decreased safety awareness, impaired coordination, impaired cardiopulmonary response to activity.        Clinical Decision Makin.  OT Low:  "Pt evaluation falls under low complexity for evaluation coding due to performance deficits noted in 1-3 areas as stated above and 0 co-morbities affecting current functional status. Data obtained from problem focused assessments. No modifications or assistance was required for completion of evaluation. Only brief occupational profile and history review completed."     Plan:     HH therapy    Time Tracking:     OT Date of Treatment: 18  OT Start Time: 930  OT Stop Time: 946  OT Total Time (min): 16 min and ~10min phone call with grand-daughter    Billable Minutes:Evaluation 16  Self Care/Home Management 10    ROSEY Servin  2018    "

## 2018-07-27 NOTE — MEDICAL/APP STUDENT
"Consultation-Liaison Psychiatry Consult Note    7/27/2018 9:14 AM  Janine Awad  MRN: [id number]    Chief Complaint / Reason for Consult: Agitation    History of Present Illness:   Janine Awad is a 89 y.o. female w/ neurocognitive disorder who presented to [facility] due to a fall and was subsequently found to have a SDH on CT.  Psychiatry was originally consulted to address the patient's symptoms of agitation interfering with treatment.  The patient was agitated on interview and stated that she was being held captive in the hospital against her will and wanted to go home. Asked for the police to be called to come get her out. Stated that hospital workers were "all liars," that "the food is all stolen," and that no one believes her but instead "always believe the liars." Patient answered beginning of psych ROS but declined to answer subsequent questioning after becoming mistrustful and stated "you believe them don't you?" and "you guys think I'm crazy" and that "you're all working together."    Collateral:   Attempted to contact granddaughter Gudelia ([phone]) who patient said lives with her at home and helps with her care. Left callback number.    Obtained another number for Gudelia from nurse ([phone]) and left callback message.    Contacted Jennifer De La Rosa, her aunt (whom she grew up with) and obtained psychiatric review through her.     SUBJECTIVE:     Medical Review Of Systems:  Pertinent items are noted in HPI.    Psychiatric Review Of Systems - Is patient experiencing or having changes in:  sleep: no   appetite: no  weight: no  Energy/anergy: no  interest/pleasure/anhedonia: unknown  somatic symptoms: unknown  libido: unknown  anxiety/panic: unknown  guilty/hopelessness: unknown  concentration: unknown  S.I.B.s/risky behavior: unknown  any drugs:  unknown  alcohol: no        Past Psychiatric History:  Previous Medication Trials: unknown  Previous Psychiatric Hospitalizations:unknown   Previous " Suicide Attempts: unknown  History of Violence:unknown  Outpatient Psychiatrist:unknown    Social History:  Marital Status:   Children: 3   Employment Status/Info: never employed  Education: high school diploma/GED  Special Ed: unknown  Housing Status: owns home and lives with granddaughter Gudelia Sands  History of phys/sexual abuse: unknown  Access to gun: unknown    Substance Abuse History:  Recreational Drugs: no  Use of Alcohol: no  Rehab History:unknown   Tobacco Use:no  Use of Caffeine: unkown  Use of OTC: unkown  Legal consequences of chemical use: n/a  Is the patient aware of the biomedical complications associated with substance abuse and mental illness? n/a    Legal History:  Past Charges/Incarcerations:no  Pending charges:no     Family Psychiatric History:   unkown    Psychosocial Stressors: health.   Functioning Relationships: good relationship with granddaughter and aunt    Psychosocial Factors:  Maladaptive or problem behaviors: unknown  Peer group, social, ethic, cultural, emotional, and health factors: none  Living situation, family constellation, family circumstances/home: stays at home and has caretakers visit  Recovery environment: good  Community resources used by patient: unknown  Treatment acceptance/motivation for change: claimed to trust her doctors but not her at hospital    Home medications:  ***    Past Medical History:   Diagnosis Date    Acquired hypothyroidism     Closed displaced intertrochanteric fracture of left femur s/p IM nail on 6/5/2016 6/5/2016    Coronary artery disease involving native coronary artery of native heart without angina pectoris     Essential hypertension     GERD (gastroesophageal reflux disease)     Hx of colonic polyps     Hyperlipidemia     Macular degeneration     Mitral valve stenosis     Severe aortic stenosis 9/22/2014    Syncope 1/5/2015       Past Surgical History:   Procedure Laterality Date    DILATION AND CURETTAGE OF UTERUS       "HYSTERECTOMY      INTERTROCHANTERIC HIP FRACTURE SURGERY Left 06/05/2016    IM nail    TONSILLECTOMY         Social History     Social History    Marital status:      Spouse name: N/A    Number of children: N/A    Years of education: N/A     Social History Main Topics    Smoking status: Never Smoker    Smokeless tobacco: Never Used    Alcohol use No      Comment: rare     Drug use: No    Sexual activity: Not Asked     Other Topics Concern    None     Social History Narrative    None       Review of patient's allergies indicates:   Allergen Reactions    Codeine Nausea And Vomiting     chills    Lisinopril Other (See Comments)     cough    Morphine Nausea And Vomiting       Scheduled Meds:   aspirin  81 mg Oral Daily    atorvastatin  40 mg Oral Daily    citalopram  10 mg Oral Daily    docusate sodium  100 mg Oral BID    furosemide  20 mg Oral Daily    isosorbide mononitrate  30 mg Oral Daily    levothyroxine  50 mcg Oral Before breakfast    losartan  25 mg Oral Daily    metoprolol succinate  25 mg Oral Daily    pantoprazole  40 mg Oral Daily     acetaminophen, dextrose 50%, dextrose 50%, glucagon (human recombinant), glucose, glucose, glucose, labetalol, OLANZapine, ondansetron  Psychotherapeutics     Start     Stop Route Frequency Ordered    07/27/18 0151  OLANZapine injection 2.5 mg      -- IM Every 30 min PRN 07/27/18 0051    07/26/18 0900  citalopram tablet 10 mg      -- Oral Daily 07/25/18 2048          OBJECTIVE:     Vitals:    07/27/18 0430   BP: 136/63   Pulse: 78   Resp: 16   Temp:             No results found for: LITHIUM, PHENYTOIN, PHENOBARB, VALPROATE, CBMZ    {Results:20487::"Labs within the past 24 hours have been reviewed."}    Urinalysis  No results for input(s): COLORU, CLARITYU, SPECGRAV, PHUR, PROTEINUA, GLUCOSEU, BILIRUBINCON, BLOODU, WBCU, RBCU, BACTERIA, MUCUS, NITRITE, LEUKOCYTESUR, UROBILINOGEN, HYALINECASTS in the last 24 hours.    Physical Exam:  Gen: Alert, " "calm, cooperative, NAD   Head: NCAT, without obvious abnormality   Eyes: PERRL, conjunctiva/corneas clear, EOMI   Ears: Normal external ear canals bilaterally   Nose: Nares normal, septum midline, mucosa normal   Throat: Lips, mucosa, and tongue normal; teeth and gums normal   Neck: Supple, trachea midline, no carotid bruit, no thyromegally   Back: Symmetric, no curvature, ROM normal   Lungs: CTAB, respirations unlabored   Chest wall: No tenderness or deformity   Heart: RRR, S1/S2 normal, no M/R/G   Abdomen: S/NT/ND, +BS, no HSM, no masses   Extremities: Extremities normal, atraumatic, no cyanosis or edema   Pulses: 2+ and symmetric all extremities   Skin: Skin color, texture, turgor normal; no rashes or lesions   Neurologic: CN II-XII grossly intact, normal strength, sensations and reflexes throughout     Mental Status Exam:  Appearance: thin, sitting up on edge of bed, agitated  Level of Consciousness: alert, awake  Grooming: appropriate to situation  Psychomotor Behavior: hostile  Speech: normal tone, normal rate, normal pitch, normal volume  Language: english, fluid  Orientation: person, place  Attention Span/Concentration: Decreased  Memory: Registers and recalls 0/3 objects at 1 minute    Cognition: impaired due to neurocognitive decline  Mood: "angry"  Affect: agitated   Thought Process: illogical  Associations: illogical  Thought Content: paranoid, not suicidal or homicidal  Fund of Knowledge: adequate to education level  Insight: poor  Judgment: poor    ASSESSMENT:     Impression:  Ms. Awad is a 89 yo F w/ newly diagnosed neurocognitive disorder now medically cleared after a fall w/ SDH on CT who has agitation w/ paranoia vs agitation secondary to paranoia who is not compliant with staff      RECOMMENDATIONS      1. Scheduled Medication Recommendations:  Celexa 10 mg    2. PRN Recommendations:  Zyprexa 2.5 mg  Risperidol 0.5 (Qtc was 498 Jul 25) PO (dissolvable)    3.  Monitor:  Please obtain daily EKG to " monitor QTc    4. Legal Status/Precautions:  Continue PEC-CEC as pt is gravely disabled and refusing treatment.    5. Capacity:  Pt continues to have waxing and waning of symptoms and continues to refuse treatment at times. Therefore pt currently does NOT have medical decision making capacity due to Delirium. Please contact next of kin for making medical decisions currently.    6. Other:  [facility]- ***  DELIRI[facility] BEHAVIOR MANAGEMENT  PLEASE utilize CHEMICAL restraints with PRN meds first for agitation. Minimize use of PHYSICAL restraints  Keep window shades open and room lit during day and room dim at night in order to promote normal sleep-wake cycles  Encourage family at bedside. [place] patient often to situation, location, date.  Continue to Limit or Discontinue use of Narcotics, Benzos and Anti-cholinergic medications as they may worsen delirium.  Continue medical workup for causative etiology of Delirium.         Thank you for this consult.  Will continue to follow.    Case discussed with: Dr. Valladares

## 2018-07-27 NOTE — NURSING
2230: Page placed for on call to notify them about patient refusing to have cardiac monitor placed and patient wanting something to eat and drink although NPO.    2255:  Call back received from Tim Bangura.  Notified of patient agitation when attempting to place monitor.  Okay to leave off for now and try again later.  Order for low sodium diet received.

## 2018-07-27 NOTE — PROGRESS NOTES
Hospital Medicine  Progress note     Team: [facility] MED B Jean Marie Arevalo  Admit Date: 7/25/2018  OSIEL 7/31/2018  Code status: Full Code     Principal Problem:  SDH (subdural hematoma)     Interval hx: Echo shows moderate to severe mitral regurgitation with severe aortic valve stenosis and PA systolic pressure of 46.82. Neurosurgery states it is ok to start aspirin but hold plavix until follow up in neurosurgery clinic. Psychiatry started patient Depacon 150mg IV qHS and PRN zyprexa 2.5mg IM q6h for non-redirectable agitation. PT/OT state patient is at baseline. Low sodium diet started and NPO stopped.       ROS      Pain Scale: 0/10  Respiratory: no cough or shortness of breath  Cardiovascular: no chest pain or palpitations  Gastrointestinal: no nausea or vomiting, no abdominal pain or change in bowel habits  Behavioral/Psych: no depression or anxiety        PEx  Temp:  [96 °F (35.6 °C)-98.4 °F (36.9 °C)]   Pulse:  [67-85]   Resp:  [16]   BP: (107-177)/(52-75)   SpO2:  [91 %-98 %]      Intake/Output Summary (Last 24 hours) at 07/27/18 1038  Last data filed at 07/26/18 1900    Gross per 24 hour   Intake                0 ml   Output             1200 ml   Net            -1200 ml      General appearance: no distress, elderly  woman  Mental status: Alert and oriented to person and place only  Neck: neck pain ok  Pulm:   normal respiratory effort, CTA B, no c/w/r  Card: RRR, S1, S2 normal, no murmur, click, rub or gallop  Abd: soft, NT, ND, BS present; no masses, no organomegaly  Pscyh: Oriented x 1           Recent Labs  Lab 07/25/18  1528 07/26/18  0324 07/27/18  0752   WBC 10.86 11.78 11.77   HGB 13.7 13.1 12.4   HCT 41.6 39.2 37.7    219 238         Recent Labs  Lab 07/25/18  1528 07/26/18  0324 07/27/18  0752    138 143   K 4.3 3.9 3.6    107 110   CO2 22* 21* 23   BUN 11 13 8*   CREATININE 0.8 0.7 0.7    116* 109   CALCIUM 9.8 8.6* 9.0   MG 2.4  --   --          Recent Labs  Lab  07/25/18  1528   ALKPHOS 92   ALT 15   AST 27   ALBUMIN 4.1   PROT 8.0   BILITOT 1.5*   INR 1.1      No results for input(s): POCTGLUCOSE in the last 168 hours.      Recent Labs      07/25/18   1528   TROPONINI  0.019         Scheduled Meds:   aspirin  81 mg Oral Daily    atorvastatin  40 mg Oral Daily    citalopram  10 mg Oral Daily    docusate sodium  100 mg Oral BID    furosemide  20 mg Oral Daily    isosorbide mononitrate  30 mg Oral Daily    levothyroxine  50 mcg Oral Before breakfast    losartan  25 mg Oral Daily    metoprolol succinate  25 mg Oral Daily    pantoprazole  40 mg Oral Daily      Continuous Infusions:   sodium chloride 0.9% 75 mL/hr at 07/26/18 1232      As Needed:  acetaminophen, dextrose 50%, dextrose 50%, glucagon (human recombinant), glucose, glucose, glucose, labetalol, OLANZapine, ondansetron           Active Hospital Problems     Diagnosis   POA    *SDH (subdural hematoma) [I62.00]   Unknown    Post-traumatic subdural hematoma [S06.5X9A]   Yes    Coronary artery disease of native artery of native heart with stable angina pectoris [I25.118]   Yes    (HFpEF) heart failure with preserved ejection fraction [I50.30]   Yes    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      Overview  1F h/o multiple prior falls, hip fx, dementia, CAD, HFpEF, Severe AS (declined intervention), presenting s/p  fall at home found to have sm. R posterior parietal SDH.      Assessment and Plan for Problems addressed today:     R posterior parietal SDH  -s/p unwitnessed fall on asa/plavix with small right posterior parietal SDH., SDH on CT- Stable thin subdural hematoma along the right posterior cerebral convexity on CT 07/26/ , neurologically stable per  NSG  -Holding Plavix until seen in neuro clinic, OK to restart ASA  -Continue C-collar  -Appreciate neurosurg recs   started on low sodium dit   -PRN tylenol for HA      Severe aortic stenosis   repeat echo ordered -Moderate to severe mitral regurgitation.     6 Severe aortic valve stenosis (KALYANI 0.41 cm2, AVAi 0.25 cm2/m2, peak aortic jet velocity 5.7 m/s,MG 84 mmHg, Pulmonary hypertension. The estimated PA systolic pressure is 47 mmHg. . has episodes of dizziness at home per grand daughter. refused prior intervention. telemetry.      HTN  -Hypertensive in ED to 180s/190s systolic  -Continue losartan 25mg daily  -Continue metoprolol succinate 25 daily  -Continue lasix 20mg daily  -Labetolol IVP 10mg PRN SBP >180     CAD  NSTEMI in March of 2017 with no interventions, patient refused angiography  -continue aspirin 81mg daily, plavix 75mg qd to be held until neurosurgery follow up   -Stable  -Continue statin, BB, ARB     HFpEF  -Continue statin, BB, ARB, imdur     Hyperlipidemia  -continue home atorvastatin 40mg daily     Hypothyroidism  -Stable  -Continue synthroid 50mcg daily     GERD  -Stable   -Continue protonix 40mg daily     Dementia (Alzheimers dz) with agitation AAOX1 today   Psychiatry started patient Depacon 150mg IV qHS and PRN zyprexa 2.5mg IM q6h for non-redirectable agitation  ·    DVT PPx: SCD     Provider     I personally scribed for Ambrose Diallo MD on 07/27/2018 at 5:40 PM. Electronically signed by ivanna Arevalo III on 07/27/2018 at 5:40 PM  The documentation recorded by the scribe accurately reflects service I personally performed and the decisions made by me.  Ambrose Diallo MD  Attending Staff Physician  [facility] Medicine  pager- [phone]  [employer] - [phone]

## 2018-07-27 NOTE — TELEPHONE ENCOUNTER
Appt scheduled. Left vm . Will mail out appt letter.     ----- Message from Myrna Zhang PA-C sent at 7/26/2018  5:57 PM CDT -----  Hello,    Can you please schedule this patient in PA clinic in 2 weeks with CTH?  Scan is ordered.  Thanks, Myrna

## 2018-07-27 NOTE — PLAN OF CARE
Problem: Occupational Therapy Goal  Goal: Occupational Therapy Goal  Outcome: Outcome(s) achieved Date Met: 07/27/18  OT delmi completed this date. Pt DC from acute OT due to functioning near baseline and unable to fully participate in therapy in this setting. Per caregiver report, pt did well with HH therapy in the past; recommend HH at this time for maximum functional outcome and safety. ROSEY Servin 7/27/2018

## 2018-07-27 NOTE — PT/OT/SLP EVAL
"Speech Language Pathology Evaluation  Cognitive/Bedside Swallow    Patient Name:  Janine Awad   MRN:  [id number]  Admitting Diagnosis: SDH (subdural hematoma)    Recommendations:                  General Recommendations:  Follow-up not indicated  Diet recommendations:  Regular, Thin   Aspiration Precautions: Standard aspiration precautions   General Precautions: Standard,    Communication strategies:  none    History:     Past Medical History:   Diagnosis Date    Acquired hypothyroidism     Closed displaced intertrochanteric fracture of left femur s/p IM nail on 6/5/2016 6/5/2016    Coronary artery disease involving native coronary artery of native heart without angina pectoris     Essential hypertension     GERD (gastroesophageal reflux disease)     Hx of colonic polyps     Hyperlipidemia     Macular degeneration     Mitral valve stenosis     Severe aortic stenosis 9/22/2014    Syncope 1/5/2015       Past Surgical History:   Procedure Laterality Date    DILATION AND CURETTAGE OF UTERUS      HYSTERECTOMY      INTERTROCHANTERIC HIP FRACTURE SURGERY Left 06/05/2016    IM nail    TONSILLECTOMY         Social History: Patient lives with "home".    Prior diet: Regular / thin.      Subjective     "I am looking into your eyes and can tell your believing all of their (medical staff) lies"  "I'm trapped here lady"  "I swear I'll kill them if they touch my grand baby."  "Get a cab and walk me downstairs"    Pain/Comfort:  · Pain Rating 1: 0/10  · Pain Rating Post-Intervention 2: 0/10    Objective:     Cognitive Status:    Arousal/Alertness Appropriate response to stimuli  Attention No obvious deficits observed .  Perseveration Not present  Orientation Oriented x4  Memory: Impaired. Poor immediate recall and delayed recall. Likely baseline given Dementia diagnosis.   Problem Solving: WFL  Safety awareness: Impaired.     Receptive Language:   Comprehension:      Questions: Completed simple to complex " yes/no questions with 100% acc.   Commands: Completed simple to 3 step commands with 100% acc.     Pragmatics:    Abnormal affect.    Expressive Language:  Verbal:    Naming: Responsive and confrontation naming tasks completed with 100% acc.   Sentence completion: Completed with 100% acc.   Conversational speech WFL      Motor Speech:  WFL    Voice:   WFL    Visual-Spatial:  WFL    Reading:   WFL     Oral Musculature Evaluation  · Oral Musculature: WFL  · Dentition: present and adequate  · Secretion Management: adequate  · Mucosal Quality: adequate  · Mandibular Strength and Mobility: WFL  · Oral Labial Strength and Mobility: WFL  · Velar Elevation: WFL  · Buccal Strength and Mobility: WFL  · Volitional Cough: Present  · Volitional Swallow: Present    Bedside Swallow Eval:   Consistencies Assessed:  · Thin liquids via straw sips x5  · Solids via 1 whole cracker     Oral Phase:   · WFL    Pharyngeal Phase:   · no overt clinical signs/symptoms of aspiration  · no overt clinical signs/symptoms of pharyngeal dysphagia    Compensatory Strategies  · None    Treatment: Pt required max motivation for participation in evaluation.  Skilled education provided to patient regarding role of the SLP, diet recommendations, aspiration precautions, and discharge status from acute ST.     Assessment:     Janine Awad is a 89 y.o. female with an SLP diagnosis of Cognitive-Linguistic Impairment secondary to baseline Dementia. No further acute speech therapy services are warranted. ST suggesting deficits are behavioral/psych in nature.       Plan:     · Patient to be seen:      · Plan of Care expires:     · Plan of Care reviewed with:  patient   · SLP Follow-Up:  No       Discharge recommendations:  Discharge Facility/Level Of Care Needs:  (Per MD recs)     Time Tracking:     SLP Treatment Date:   07/27/18  Speech Start Time:  0955  Speech Stop Time:  1020     Speech Total Time (min):  25 min    Billable Minutes: Eval 15  and  Eval Swallow and Oral Function 10    Myrna Bond M.S., CCC-SLP  Speech Language Pathologist  [phone]  07/27/2018

## 2018-07-27 NOTE — ASSESSMENT & PLAN NOTE
1. Scheduled Medication Recommendations:  -Depacon 150mg IV qHS    2. PRN Recommendations:  -Zyprexa 2.5mg IM q6hr PRN for non-redirectable agitation    3. Legal Status/Precautions:  Consider PEC in the setting of gravely disability, if patient attempts to leave the hospital AMA    4.  Monitor:  Please obtain daily EKG to monitor QTc (7/25 QTc 498)    5. Capacity:  Pt continues to have waxing and waning of symptoms and continues to refuse treatment at times. Therefore pt currently does NOT have medical decision making capacity. Please contact next of kin for making medical decisions currently.    6. Other:  [facility] ICU-   [facility]HAVIOR MANAGEMENT  PLEASE utilize CHEMICAL restraints with PRN meds first for agitation. Minimize use of PHYSICAL restraints  Keep window shades open and room lit during day and room dim at night in order to promote normal sleep-wake cycles  Encourage family at bedside. [place] patient often to situation, location, date.  Continue to Limit or Discontinue use of Narcotics, Benzos and Anti-cholinergic medications as they may worsen delirium.

## 2018-07-27 NOTE — PT/OT/SLP EVAL
"Physical Therapy Evaluation and Discharge Note    Patient Name:  Janine Awad   MRN:  [id number]    Recommendations:     Discharge Recommendations:   ( PT)   Discharge Equipment Recommendations: none   Barriers to discharge: None    Assessment:     Janine Awad is a 89 y.o. female admitted with a medical diagnosis of SDH (subdural hematoma). .  At this time, patient is functioning at their prior level of function and does not require further acute PT services.     Recent Surgery: * No surgery found *      Plan:     During this hospitalization, patient does not require further acute PT services.  Please re-consult if situation changes.     Plan of Care Reviewed with: patient    Subjective     Communicated with RN prior to session.  Patient found sitting EOB with sitter upon PT entry to room, agreeable to evaluation.      Chief Complaint: multiple; pt delusional/parinoia with tangential thoughts that she was being held against her will and that "they" were trying to give her medication to kill her  Patient comments/goals: pt continually asked PT/OT to walk with her to the police station so she could get out of this place; pt denied being in the hospital. Pt became verbally abusive to sitter and with PT/OT when explained we could not let her leave the hospital on her own  Pain/Comfort:  · Pain Rating 1: 0/10  · Pain Rating 2: 0/10    Patients cultural, spiritual, Mandaeism conflicts given the current situation: none stated    Living Environment:  Per phone call with granddaughter pt lives in a [facility] with no CRISTIAN with her granddaughter but has sitter A when grand daugther is not available.   Prior to admission, patients level of function was req supervision/,min  with all ADLs and using RW PRN in home .  Patient has the following equipment:  (walker,rolling; transfer tub bench).  DME owned (not currently used): none.  Upon discharge, patient will have assistance from grand daughter/sitter.    Objective: "     Patient found with:  (BUE restraints; peripheral IV)     General Precautions: Standard, fall   Orthopedic Precautions:N/A   Braces: N/A       Exams    Cognition:  Patient is Oriented X person only, pt denied being in the hosptial, Combative, Agitated, Confused and Tangential  Patient Inattentive, Easily distracted and Follows one-step commands 75 % of the time.       Coordination  · appears to be WFL; difficult to assess 2/2 pt cooperation      Sensation  Patient's sensation was appears to be WFL; difficult to assess 2/2 pt cooperation     Skin integrity    · -       Skin integrity: Thin and Dry     Posture  · -       Rounded shoulders  · -       Forward head  · -       Posterior pelvic tilt    ROM and Strength    LLE ROM RLE ROM   WFL WFL   LLE Strength RLE Strength   WFL WFL       Functional Mobilty    Bed Mobility:  Scooting: supervision  Transfers:  Sit to Stand:  contact guard assistance with no AD  Toilet Transfer: contact guard assistance with  no AD  using  Step Transfer    Gait    Patient ambulated FWB/WBAT: bilateral lower extremity 20  feet on level tile with No Assistive Device with Minimal Assistance.     Pt with demonstarting a  2-point gait with decreased jeanne, increased time in double stance and decreased velocity of limb motion.Impairments contributing to gait deviations include impaired balance    Sitting Balance Static  Dynamic     GOOD+: Takes MAXIMAL challenges from all directions.   GOOD+: Maintains balance through MAXIMAL excursions of active trunk motion   Standing Balance GOOD-: Takes MODERATE challenges from all directions inconsistently GOOD-: Needs SUPERVISION only during gait and able to self right with moderate LOB inconsistently         AM-PAC 6 CLICK MOBILITY  Total Score:21       Therapeutic Activities and Exercises:     Patient education  · Patient educated on the role of PT and POC  · Whiteboard updated in patients room to current assistance level  · All of patients  questions were answered within the scope of PT  · Patient educated on importance of out of bed activity while in the hosptial per tolerance  · Patient educated on safe transfers with nursing as appropriate    Patient left sitting EOB with all lines intact, call button in reach, bed alarm on, restraints reapplied at end of session and sitter present.    GOALS:    Physical Therapy Goals     Not on file          Multidisciplinary Problems (Resolved)        Problem: Physical Therapy Goal    Goal Priority Disciplines Outcome Goal Variances Interventions   Physical Therapy Goal   (Resolved)     PT/OT, PT Outcome(s) achieved                     History:     Past Medical History:   Diagnosis Date    Acquired hypothyroidism     Closed displaced intertrochanteric fracture of left femur s/p IM nail on 6/5/2016 6/5/2016    Coronary artery disease involving native coronary artery of native heart without angina pectoris     Essential hypertension     GERD (gastroesophageal reflux disease)     Hx of colonic polyps     Hyperlipidemia     Macular degeneration     Mitral valve stenosis     Severe aortic stenosis 9/22/2014    Syncope 1/5/2015       Past Surgical History:   Procedure Laterality Date    DILATION AND CURETTAGE OF UTERUS      HYSTERECTOMY      INTERTROCHANTERIC HIP FRACTURE SURGERY Left 06/05/2016    IM nail    TONSILLECTOMY         Clinical Decision Making:     History  Co-morbidities and personal factors that may impact the plan of care Examination  Body Structures and Functions, activity limitations and participation restrictions that may impact the plan of care Clinical Presentation   Decision Making/ Complexity Score   Co-morbidities:   [x] Time since onset of injury / illness / exacerbation  [x] Status of current condition  [x]Patient's cognitive status and safety concerns    [x] Multiple Medical Problems (see med hx)  Personal Factors:   [x] Patient's age  [] Prior Level of function   [] Patient's  home situation (environment and family support)  [] Patient's level of motivation  [] Expected progression of patient      HISTORY:(criteria)    [] 78319 - no personal factors/history    [x] 11936 - has 1-2 personal factor/comorbidity     [] 43191 - has >3 personal factor/comorbidity     Body Regions:  [] Objective examination findings  [x] Head     []  Neck  [] Trunk   [] Upper Extremity  [x] Lower Extremity    Body Systems:  [x] For communication ability, affect, cognition, language, and learning style: the assessment of the ability to make needs known, consciousness, orientation (person, place, and time), expected emotional /behavioral responses, and learning preferences (eg, learning barriers, education  needs)  [x] For the neuromuscular system: a general assessment of gross coordinated movement (eg, balance, gait, locomotion, transfers, and transitions) and motor function  (motor control and motor learning)  [] For the musculoskeletal system: the assessment of gross symmetry, gross range of motion, gross strength, height, and weight  [] For the integumentary system: the assessment of pliability(texture), presence of scar formation, skin color, and skin integrity  [] For cardiovascular/pulmonary system: the assessment of heart rate, respiratory rate, blood pressure, and edema     Activity limitations:    [x] Patient's cognitive status and saf ety concerns          [] Status of current condition      [] Weight bearing restriction  [] Cardiopulmunary Restriction    Participation Restrictions:   [x] Goals and goal agreement with the patient     [x] Rehab potential (prognosis) and probable outcome      Examination of Body System: (criteria)    [] 16511 - addressing 1-2 elements    [x] 18295 - addressing a total of 3 or more elements     [] 01133 -  Addressing a total of 4 or more elements         Clinical Presentation: (criteria)  Evolving - 30496     On examination of body system using standardized tests and  measures patient presents with 3 or more elements from any of the following: body structures and functions, activity limitations, and/or participation restrictions.  Leading to a clinical presentation that is considered evolving with changing characteristics                              Clinical Decision Making  (Eval Complexity):  Moderate - 75816     Time Tracking:     PT Received On: 07/27/18  PT Start Time: 0930     PT Stop Time: 0947  PT Total Time (min): 17 min     Billable Minutes: Evaluation 15 min      Javan Mayfield, PT  07/27/2018

## 2018-07-27 NOTE — HPI
"History of Present Illness (Per Primary Team):  89F h/o multiple prior falls, hip fx, dementia, CAD, HFpEF, Severe AS (declined intervention), presenting s/p  fall at home, witnessed by sitter, per records patient tried to go from bathroom to bed, did hit head with posterior head hematoma noted, pt currently on plavix. Patient has no recollection of event and no family members reachable or present at time of history. C-collar placed PTA. Neurosurgery evaluated patient as small SDH noted on CT head, no acute intervention planned, recommended admission to medicine for observation and repeat CT head in AM. Pt currently reports headache, denies any other focal complaints.    Per CL MD:   Janine Awad is a 89 y.o. female with past psychiatric history of depression and anxiety, who presented to the [facility] ED due to a fall.  Psychiatry was originally consulted to address the patient's symptoms of non-redirectable agitation.    Chart reviewed. PRN Zyprexa 5mg IM given due to non-redirectable agitation. Upon initiation of interview, pt was dressed in hospital gown, standing next to her bed. She immediately asked this writer, "are you a doctor? Because you see these two (women), they're in on it." Pt then began to perseverate on persecutory delusion of [facility] staff out to kill her and her granddaughter. Pt remained paranoid throughout interview, but was redirectable after derailing in thought process. She was able to pass SAVEAHAART, however was only oriented to person and place. Pt has been agitated since yesterday, pulling out her PIV lines and refusing to eat. Pt was taken out of soft restraints, for said interview.       Collateral:  Patient contact; granddaughter Natividad [phone].  Spoke to patient.  She is her caregiver    Attempted to contact granddaughter Gudelia ([phone]) who patient said lives with her at home and helps with her care. Left callback number.     Obtained another number for Gudelia from nurse " ([phone]) and left callback message.     Contacted Jennifer De La Rosa, her aunt (whom she grew up with) and obtained psychiatric review through her.     SUBJECTIVE:     Medical Review Of Systems:  Pertinent items are noted in HPI.     Psychiatric Review Of Systems - Is patient experiencing or having changes in:  sleep: no           appetite: no  weight: no  Energy/anergy: no  interest/pleasure/anhedonia: unknown  somatic symptoms: unknown  libido: unknown  anxiety/panic: unknown  guilty/hopelessness: unknown  concentration: unknown  S.I.B.s/risky behavior: unknown  any drugs:  unknown  alcohol: no    Past Psychiatric History:  Previous Medication Trials: unknown  Previous Psychiatric Hospitalizations:unknown   Previous Suicide Attempts: unknown  History of Violence:unknown  Outpatient Psychiatrist:unknown     Social History:  Marital Status:   Children: 3   Employment Status/Info: never employed  Education: high school diploma/GED  Special Ed: unknown  Housing Status: owns home and lives with granddaughter Gudelia Sands  History of phys/sexual abuse: unknown  Access to gun: unknown     Substance Abuse History:  Recreational Drugs: no  Use of Alcohol: no  Rehab History:unknown   Tobacco Use:no  Use of Caffeine: unkown  Use of OTC: unkown  Legal consequences of chemical use: n/a  Is the patient aware of the biomedical complications associated with substance abuse and mental illness? n/a     Legal History:  Past Charges/Incarcerations:no  Pending charges:no      Family Psychiatric History:   unkown     Psychosocial Stressors: health.   Functioning Relationships: good relationship with granddaughter and aunt     Psychosocial Factors:  Maladaptive or problem behaviors: unknown  Peer group, social, ethic, cultural, emotional, and health factors: none  Living situation, family constellation, family circumstances/home: stays at home and has caretakers visit  Recovery environment: good  Community resources used by  patient: unknown  Treatment acceptance/motivation for change: claimed to trust her doctors but not her at hospital

## 2018-07-27 NOTE — CONSULTS
"[facility]  Psychiatry  Consult Note    Patient Name: Janine Awad  MRN: [id number]   Code Status: Full Code  Admission Date: 7/25/2018  Hospital Length of Stay: 2 days  Expected Discharge Date: 7/31/2018   Attending Physician: Ambrose Diallo MD  Primary Care Provider: Oleg Enciso DO    Current Legal Status: N/A    Patient information was obtained from patient and ER records.     Subjective:     Principal Problem:SDH (subdural hematoma)    Chief Complaint: Delirium on Dementia    HPI:   History of Present Illness (Per Primary Team):  89F h/o multiple prior falls, hip fx, dementia, CAD, HFpEF, Severe AS (declined intervention), presenting s/p  fall at home, witnessed by sitter, per records patient tried to go from bathroom to bed, did hit head with posterior head hematoma noted, pt currently on plavix. Patient has no recollection of event and no family members reachable or present at time of history. C-collar placed PTA. Neurosurgery evaluated patient as small SDH noted on CT head, no acute intervention planned, recommended admission to medicine for observation and repeat CT head in AM. Pt currently reports headache, denies any other focal complaints.    Per CL MD:   Janine Awad is a 89 y.o. female with past psychiatric history of depression and anxiety, who presented to the [facility] ED due to a fall.  Psychiatry was originally consulted to address the patient's symptoms of non-redirectable agitation.    Chart reviewed. PRN Zyprexa 5mg IM given due to non-redirectable agitation. Upon initiation of interview, pt was dressed in hospital gown, standing next to her bed. She immediately asked this writer, "are you a doctor? Because you see these two (women), they're in on it." Pt then began to perseverate on persecutory delusion of [facility] staff out to kill her and her granddaughter. Pt remained paranoid throughout interview, but was redirectable after derailing in thought process. " She was able to pass SAVEBragsterAART, however was only oriented to person and place. Pt has been agitated since yesterday, pulling out her PIV lines and refusing to eat. Pt was taken out of soft restraints, for said interview.       Collateral:  Patient contact; granddaughter Natividad [phone].  Spoke to patient.  She is her caregiver    Attempted to contact granddaughter Gudelia ([phone]) who patient said lives with her at home and helps with her care. Left callback number.     Obtained another number for Gudelia from nurse ([phone]) and left callback message.     Contacted Jennifer De La Rosa, her aunt (whom she grew up with) and obtained psychiatric review through her.     SUBJECTIVE:     Medical Review Of Systems:  Pertinent items are noted in HPI.     Psychiatric Review Of Systems - Is patient experiencing or having changes in:  sleep: no           appetite: no  weight: no  Energy/anergy: no  interest/pleasure/anhedonia: unknown  somatic symptoms: unknown  libido: unknown  anxiety/panic: unknown  guilty/hopelessness: unknown  concentration: unknown  S.I.B.s/risky behavior: unknown  any drugs:  unknown  alcohol: no    Past Psychiatric History:  Previous Medication Trials: unknown  Previous Psychiatric Hospitalizations:unknown   Previous Suicide Attempts: unknown  History of Violence:unknown  Outpatient Psychiatrist:unknown     Social History:  Marital Status:   Children: 3   Employment Status/Info: never employed  Education: high school diploma/GED  Special Ed: unknown  Housing Status: owns home and lives with granddamarcos Sands  History of phys/sexual abuse: unknown  Access to gun: unknown     Substance Abuse History:  Recreational Drugs: no  Use of Alcohol: no  Rehab History:unknown   Tobacco Use:no  Use of Caffeine: unkown  Use of OTC: unkown  Legal consequences of chemical use: n/a  Is the patient aware of the biomedical complications associated with substance abuse and mental illness?  "n/a     Legal History:  Past Charges/Incarcerations:no  Pending charges:no      Family Psychiatric History:   unkown     Psychosocial Stressors: health.   Functioning Relationships: good relationship with granddaughter and aunt     Psychosocial Factors:  Maladaptive or problem behaviors: unknown  Peer group, social, ethic, cultural, emotional, and health factors: none  Living situation, family constellation, family circumstances/home: stays at home and has caretakers visit  Recovery environment: good  Community resources used by patient: unknown  Treatment acceptance/motivation for change: claimed to trust her doctors but not her at hospital    Hospital Course: No notes on file    Interval History: n/a    Family History     Problem Relation (Age of Onset)    Cancer Mother    Heart disease Mother, Father    No Known Problems Sister, Brother, Maternal Grandmother, Maternal Grandfather, Paternal Grandmother, Paternal Grandfather, Maternal Aunt, Maternal Uncle, Paternal Aunt, Paternal Uncle    Skin cancer Father        Social History Main Topics    Smoking status: Never Smoker    Smokeless tobacco: Never Used    Alcohol use No      Comment: rare     Drug use: No    Sexual activity: Not on file     Psychotherapeutics     Start     Stop Route Frequency Ordered    07/27/18 0151  OLANZapine injection 2.5 mg      -- IM Every 30 min PRN 07/27/18 0051    07/26/18 0900  citalopram tablet 10 mg      -- Oral Daily 07/25/18 2048           Review of Systems  Objective:     Vital Signs (Most Recent):  Temp: 98.1 °F (36.7 °C) (07/27/18 1203)  Pulse:  (refused) (07/27/18 1100)  Resp: 16 (07/27/18 0430)  BP:  (refused) (07/27/18 1100)  SpO2: (!) 92 % (07/27/18 1203) Vital Signs (24h Range):  Temp:  [97.8 °F (36.6 °C)-98.4 °F (36.9 °C)] 98.1 °F (36.7 °C)  Pulse:  [71-85] 78  Resp:  [16] 16  SpO2:  [91 %-98 %] 92 %  BP: (136-177)/(63-75) 136/63     Height: 5' 5.98" (167.6 cm)  Weight: 59 kg (130 lb 1.1 oz)  Body mass index is 21 " "kg/m².      Intake/Output Summary (Last 24 hours) at 07/27/18 1345  Last data filed at 07/26/18 1900   Gross per 24 hour   Intake                0 ml   Output              900 ml   Net             -900 ml       Physical Exam    Modified CAM-ICU:  1. Acute change and/or fluctuating course of mental status: Yes  2. Inattention (SAVEAHAART): No  · "Squeeze my hand, only when you hear, the letter 'A'."  3. Altered Level of Consciousness: Yes  4. Disorganized Thinking (Errors >1/6): No  · "Will a stone float on water?"  · "Are there fish in the sea?"  · "Does one pound weigh more than two?"  · "Can you use a hammer to pound a nail?"  · Command(s):  · "Hold up 2 fingers."  · "Now do the same thing with the other hand."    Score: 1+2 AND, either 3 or 4 present = Positive CAM-ICU    Mental Status Exam:  Appearance: unremarkable, younger than stated age  Grooming: appropriate to situation  Psychomotor Behavior: reluctant to participate, redirectable  Level of Consciousness: alert, awake  Speech: normal tone, normal rate, normal pitch, normal volume  Language: english, fluid  Orientation: grossly intact, person, month of year, year  Attention Span/Concentration: Impaired to some degree  Memory: Recent and remote intact  Mood: "upset"  Affect: irritable  Thought Process: goal-directed  Associations: tangential  Thought Content: delusions: yes, paranoid  Fund of Knowledge: adequate to education level  Insight: limited  Judgment: limited     Significant Labs:   Last 24 Hours:   Recent Lab Results       07/27/18  0752 07/26/18  1500      Immature Granulocytes 0.3      Immature Grans (Abs) 0.04  Comment:  Mild elevation in immature granulocytes is non specific and   can be seen in a variety of conditions including stress response,   acute inflammation, trauma and pregnancy. Correlation with other   laboratory and clinical findings is essential.        Anion Gap 10      Aortic Valve Stenosis  SEVERE(A)     Baso # 0.11      " Basophil% 0.9      BUN, Bld 8(L)      Calcium 9.0      Chloride 110      CO2 23      Creatinine 0.7      Differential Method Automated      EF  60     eGFR if African American >60.0      eGFR if non  >60.0  Comment:  Calculation used to obtain the estimated glomerular filtration  rate (eGFR) is the CKD-EPI equation.         Eos # 0.2      Eosinophil% 1.4      Glucose 109      Gran # (ANC) 8.1(H)      Gran% 68.8      Hematocrit 37.7      Hemoglobin 12.4      Lymph # 2.7      Lymph% 23.0      MCH 31.0      MCHC 32.9      MCV 94      Mono # 0.7      Mono% 5.6      MPV 10.6      Mitral Valve Mobility  MILDLY RESTRICTED     Mitral Valve Regurgitation  MODERATE TO SEVERE(A)     nRBC 0      Est. PA Systolic Pressure  46.82(A)     Platelets 238      Potassium 3.6      RBC 4.00      RDW 14.8(H)      Sodium 143      WBC 11.77          Significant Imaging: I have reviewed all pertinent imaging results/findings within the past 24 hours.    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 Scheduled Medication Recommendations:  -Depacon 150mg IV qHS    2. PRN Recommendations:  -Zyprexa 2.5mg IM q6hr PRN for non-redirectable agitation    3. Legal Status/Precautions:  Consider PEC in the setting of gravely disability, if patient attempts to leave the hospital AMA    4.  Monitor:  Please obtain daily EKG to monitor QTc (7/25 QTc 498)    5. Capacity:  Pt continues to have waxing and waning of symptoms and continues to refuse treatment at times. Therefore pt currently does NOT have medical decision making capacity. Please contact next of kin for making medical decisions currently.    6. Other:  CAM ICU-   DELIRIUM BEHAVIOR MANAGEMENT  PLEASE utilize CHEMICAL restraints with PRN meds first for agitation. Minimize use of PHYSICAL restraints  Keep window shades open and room lit during day and room dim at night in order to promote normal sleep-wake cycles  Encourage family at  bedside. [place] patient often to situation, location, date.  Continue to Limit or Discontinue use of Narcotics, Benzos and Anti-cholinergic medications as they may worsen delirium.           Need for Continued Hospitalization:   Requires ongoing hospitalization for stabilization of medications.    Anticipated Disposition: Still a Patient     Total time:  35 with greater than 50% of this time spent in counseling and/or coordination of care.     Case discussed with: Dr. Areli Valladares MD, JESSICA  [facility], PGY-2  Pager Number [phone]

## 2018-07-27 NOTE — PLAN OF CARE
Problem: Physical Therapy Goal  Goal: Physical Therapy Goal  Outcome: Outcome(s) achieved Date Met: 07/27/18  Patient evaluated today. All goals established are appropriate for patient progression at this time.   Javan Mayfield PT, DPT  7/27/2018

## 2018-07-27 NOTE — NURSING
Pt in soft wrist restraints at this time. Repositioned and range of motion offered, pt attempted to pull on IV tubing, restraints replaced. Pt sitting on edge of bed at this time with sitter at bedside. Offered breakfast, refused. Cont to monitor.

## 2018-07-27 NOTE — NURSING
Arrived in pt room and pt had ripped IV out of L wrist despite restraint use. MD made aware. Refusing to eat.

## 2018-07-27 NOTE — NURSING
"0030: Patient has become more and more agitated.  Getting up out of bed without assistance.  She has pulled out one of her two IVs.  She walked to the door pushing sitter out of the way.  This RN and another RN helped patient back into bed.  Patient would at one point say she wasn't in the hospital then almost immediately say she knew she was in the hospital.  Patient stated she did not want the sitter in the room with her and that she did not trust this RN.  Patient stated multiple times she had both a daughter and/or granddaughter at home waiting on her.  This RN attempted to reorient patient without success.  Patient at this point paranoid ideations toward this RN and sitter.  Secure chat placed to get orders for restraints and medicine to calm patient down.    0050: Orders were placed for restraints and zyprexa.  Security was called by charge RN.  Security helped patient lie down in bed while restraints were placed.  One dose of zyprexa was given.    0100:  Patient still upset and yelling at times toward this RN.  IVFs restarted.  Will continue to monitor.    0130: Patient no longer yelling but resting fitfully.  Will continue to monitor.     0200: Patient resting until this RN did the restraint check then patient became hostile toward RN.  Stating "stay the hell away from me. Why are you keeping me here."  RN tried to reorient without success.  Will continue to monitor.  "

## 2018-07-27 NOTE — PLAN OF CARE
Problem: Patient Care Overview  Goal: Plan of Care Review  Outcome: Ongoing (interventions implemented as appropriate)  Pt alert to self. Combative, delusional and verbally abusive. Does not cooperate with care. Has no PIV d/t pulling them all out, MD aware. Sitter at bedside. Cont to monitor.

## 2018-07-27 NOTE — MEDICAL/APP STUDENT
Hospital Medicine  Progress note    Team: [facility] B Jean Marie Arevalo  Admit Date: 7/25/2018  OSIEL 7/31/2018  Code status: Full Code    Principal Problem:  SDH (subdural hematoma)    Interval hx: Echo shows moderate to severe mitral regurgitation with severe aortic valve stenosis and PA systolic pressure of 46.82. Neurosurgery states it is ok to start aspirin but hold plavix until follow up in neurosurgery clinic. Psychiatry started patient Depacon 150mg IV qHS and PRN zyprexa 2.5mg IM q6h for non-redirectable agitation. PT/OT state patient is at baseline. Low sodium diet started and NPO stopped.      ROS     Pain Scale: 0/10  Respiratory: no cough or shortness of breath  Cardiovascular: no chest pain or palpitations  Gastrointestinal: no nausea or vomiting, no abdominal pain or change in bowel habits  Behavioral/Psych: no depression or anxiety      PEx  Temp:  [96 °F (35.6 °C)-98.4 °F (36.9 °C)]   Pulse:  [67-85]   Resp:  [16]   BP: (107-177)/(52-75)   SpO2:  [91 %-98 %]     Intake/Output Summary (Last 24 hours) at 07/27/18 1038  Last data filed at 07/26/18 1900   Gross per 24 hour   Intake                0 ml   Output             1200 ml   Net            -1200 ml     General appearance: no distress, elderly  woman  Mental status: Alert and oriented to person and place only  Neck: neck pain ok  Pulm:   normal respiratory effort, CTA B, no c/w/r  Card: RRR, S1, S2 normal, no murmur, click, rub or gallop  Abd: soft, NT, ND, BS present; no masses, no organomegaly  Pscyh: Oriented x 1        Recent Labs  Lab 07/25/18  1528 07/26/18  0324 07/27/18  0752   WBC 10.86 11.78 11.77   HGB 13.7 13.1 12.4   HCT 41.6 39.2 37.7    219 238       Recent Labs  Lab 07/25/18  1528 07/26/18  0324 07/27/18  0752    138 143   K 4.3 3.9 3.6    107 110   CO2 22* 21* 23   BUN 11 13 8*   CREATININE 0.8 0.7 0.7    116* 109   CALCIUM 9.8 8.6* 9.0   MG 2.4  --   --        Recent Labs  Lab 07/25/18  2719    ALKPHOS 92   ALT 15   AST 27   ALBUMIN 4.1   PROT 8.0   BILITOT 1.5*   INR 1.1      No results for input(s): POCTGLUCOSE in the last 168 hours.  Recent Labs      07/25/18   1528   TROPONINI  0.019       Scheduled Meds:   aspirin  81 mg Oral Daily    atorvastatin  40 mg Oral Daily    citalopram  10 mg Oral Daily    docusate sodium  100 mg Oral BID    furosemide  20 mg Oral Daily    isosorbide mononitrate  30 mg Oral Daily    levothyroxine  50 mcg Oral Before breakfast    losartan  25 mg Oral Daily    metoprolol succinate  25 mg Oral Daily    pantoprazole  40 mg Oral Daily     Continuous Infusions:   sodium chloride 0.9% 75 mL/hr at 07/26/18 1232     As Needed:  acetaminophen, dextrose 50%, dextrose 50%, glucagon (human recombinant), glucose, glucose, glucose, labetalol, OLANZapine, ondansetron    Active Hospital Problems    Diagnosis  POA    *SDH (subdural hematoma) [I62.00]  Unknown    Post-traumatic subdural hematoma [S06.5X9A]  Yes    Coronary artery disease of native artery of native heart with stable angina pectoris [I25.118]  Yes    (HFpEF) heart failure with preserved ejection fraction [I50.30]  Yes    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     Overview  1F h/o multiple prior falls, hip fx, dementia, CAD, HFpEF, Severe AS (declined intervention), presenting s/p  fall at home found to have sm. R posterior parietal SDH.      Assessment and Plan for Problems addressed today:     R posterior parietal SDH  -s/p unwitnessed fall on asa/plavix with small right posterior parietal SDH., SDH on CT- Stable thin subdural hematoma along the right posterior cerebral convexity on CT 07/26/ , neurologically stable per NSG  -Holding Plavix until seen in neuro  clinic, OK to restart ASA  -Continue C-collar  -Appreciate neurosurg recs  -PRN tylenol for HA      Severe aortic stenosis   repeat echo ordered -Moderate to severe mitral regurgitation.     6 Severe aortic valve stenosis (KALYANI 0.41 cm2, AVAi 0.25 cm2/m2, peak aortic jet velocity 5.7 m/s,MG 84 mmHg, Pulmonary hypertension. The estimated PA systolic pressure is 47 mmHg. . has episodes of dizziness at home per grand daughter. refused prior intervention. telemetry.      HTN  -Hypertensive in ED to 180s/190s systolic  -Continue losartan 25mg daily  -Continue metoprolol succinate 25 daily  -Continue lasix 20mg daily  -Labetolol IVP 10mg PRN SBP >180     CAD  NSTEMI in March of 2017 with no interventions, patient refused angiography  -continue aspirin 81mg daily, plavix 75mg qd to be held until neurosurgery follow up   -Stable  -Continue statin, BB, ARB     HFpEF  -Continue statin, BB, ARB, imdur     Hyperlipidemia  -continue home atorvastatin 40mg daily     Hypothyroidism  -Stable  -Continue synthroid 50mcg daily     GERD  -Stable   -Continue protonix 40mg daily     Dementia (Alzheimers dz) with agitation AAOX1 today  ·  Started on depacone 150mg IV qHS    DVT PPx: SCD     Provider    I personally scribed for Ambrose Diallo MD on 07/27/2018 at 5:40 PM. Electronically signed by ivanna Arevalo III on 07/27/2018 at 5:40 PM

## 2018-07-27 NOTE — NURSING
Pt removed all PIV and lines, becoming even more agitated with restraints. Restraints removed at this time to see if pt will be safe in room with sitter without restraints. Cont to monitor often and will re-eval. MD made aware.

## 2018-07-27 NOTE — PROGRESS NOTES
Chart reviewed.  Patient is currently an inpatient and is ineligible for OPCM.  Will dis-enroll patient at this time.  Case closed.  JACKSON Heller OPCM-RN

## 2018-07-27 NOTE — PHYSICIAN QUERY
PT Name: Janine Awad  MR #: [id number]    Physician Query Form - CardioPulmonary Clarification    CDS: Deandra Sánchez RN, CCDS       Contact information: [email or web address]    This form is a permanent document in the medical record.    Query Date: July 27, 2018    By submitting this query, we are merely seeking further clarification of documentation. Please utilize your independent clinical judgment when addressing the question(s) below.    The Medical record contains the following:   Indicators   Supporting Clinical Findings Location in Medical Record   X Pulmonary Hypertension documented Pulmonary hypertension  7/26-PN   X Acute/Chronic Illness Post-traumatic subdural hematoma, Severe aortic stenosis, Coronary artery disease of native artery of native heart with stable angina pectoris, HFpEF, Essential HTN, CAD, Hx of NSTEMI 7/26-PN   X Echo and/or Heart Cath Findings   Pulmonary hypertension. The estimated PA systolic     pressure is 47 mmHg 7/26-ECHO    BiPAP/Intubation/Supplemental O2      SOB, AARON, Fatigue, Dizziness, LE Edema, Cyanosis, Chest Pain, Respiratory Distress, Hypoxia, etc.     X Treatment         Medication Continue statin, BB, ARB, imdur 7/26-PN    Other     Provider, please specify the type of pulmonary hypertension:  [   ]  Group 1:  Pulmonary Arterial Hypertension - includes Primary, Idiopathic, Inheritable, and Secondary (due to drugs, toxins, congenital heart diease, HIV infection, etc.)    [   x]  Group 2:  Pulmonary Hypertension due to Left Heart Disease, including left heart failure and/or left heart valve disease    [   ]  Group 3:  Pulmonary Hypertension due to Lung Disease    [   ]  Group 4:  Pulmonary Hypertension due to Obstruction of the Pulmonary Vessels caused by Chronic Thromboemboli, Tumor, or Foreign Bodies    [   ]  Group 5:  Pumonary Hypertension due to other, multifactorial, or unclear mechanisms    [   ]  Pulmonary Hypertension, unspecified    [   ]  Other  Cardiopulmonary Condition (please specify):  _____________________________________    [   ]  Clinically Undetermined    Please document in your progress notes daily for the duration of treatment, until resolved, and include in your discharge summary.

## 2018-07-27 NOTE — SUBJECTIVE & OBJECTIVE
"Interval History: n/a    Family History     Problem Relation (Age of Onset)    Cancer Mother    Heart disease Mother, Father    No Known Problems Sister, Brother, Maternal Grandmother, Maternal Grandfather, Paternal Grandmother, Paternal Grandfather, Maternal Aunt, Maternal Uncle, Paternal Aunt, Paternal Uncle    Skin cancer Father        Social History Main Topics    Smoking status: Never Smoker    Smokeless tobacco: Never Used    Alcohol use No      Comment: rare     Drug use: No    Sexual activity: Not on file     Psychotherapeutics     Start     Stop Route Frequency Ordered    07/27/18 0151  OLANZapine injection 2.5 mg      -- IM Every 30 min PRN 07/27/18 0051    07/26/18 0900  citalopram tablet 10 mg      -- Oral Daily 07/25/18 2048           Review of Systems  Objective:     Vital Signs (Most Recent):  Temp: 98.1 °F (36.7 °C) (07/27/18 1203)  Pulse:  (refused) (07/27/18 1100)  Resp: 16 (07/27/18 0430)  BP:  (refused) (07/27/18 1100)  SpO2: (!) 92 % (07/27/18 1203) Vital Signs (24h Range):  Temp:  [97.8 °F (36.6 °C)-98.4 °F (36.9 °C)] 98.1 °F (36.7 °C)  Pulse:  [71-85] 78  Resp:  [16] 16  SpO2:  [91 %-98 %] 92 %  BP: (136-177)/(63-75) 136/63     Height: 5' 5.98" (167.6 cm)  Weight: 59 kg (130 lb 1.1 oz)  Body mass index is 21 kg/m².      Intake/Output Summary (Last 24 hours) at 07/27/18 1345  Last data filed at 07/26/18 1900   Gross per 24 hour   Intake                0 ml   Output              900 ml   Net             -900 ml       Physical Exam    Modified CAM-ICU:  1. Acute change and/or fluctuating course of mental status: Yes  2. Inattention (SAVEAHAART): No  · "Squeeze my hand, only when you hear, the letter 'A'."  3. Altered Level of Consciousness: Yes  4. Disorganized Thinking (Errors >1/6): No  · "Will a stone float on water?"  · "Are there fish in the sea?"  · "Does one pound weigh more than two?"  · "Can you use a hammer to pound a nail?"  · Command(s):  · "Hold up 2 fingers."  · "Now do the " "same thing with the other hand."    Score: 1+2 AND, either 3 or 4 present = Positive CAM-ICU    Mental Status Exam:  Appearance: unremarkable, younger than stated age  Grooming: appropriate to situation  Psychomotor Behavior: reluctant to participate, redirectable  Level of Consciousness: alert, awake  Speech: normal tone, normal rate, normal pitch, normal volume  Language: english, fluid  Orientation: grossly intact, person, month of year, year  Attention Span/Concentration: Impaired to some degree  Memory: Recent and remote intact  Mood: "upset"  Affect: irritable  Thought Process: goal-directed  Associations: tangential  Thought Content: delusions: yes, paranoid  Fund of Knowledge: adequate to education level  Insight: limited  Judgment: limited     Significant Labs:   Last 24 Hours:   Recent Lab Results       07/27/18  0752 07/26/18  1500      Immature Granulocytes 0.3      Immature Grans (Abs) 0.04  Comment:  Mild elevation in immature granulocytes is non specific and   can be seen in a variety of conditions including stress response,   acute inflammation, trauma and pregnancy. Correlation with other   laboratory and clinical findings is essential.        Anion Gap 10      Aortic Valve Stenosis  SEVERE(A)     Baso # 0.11      Basophil% 0.9      BUN, Bld 8(L)      Calcium 9.0      Chloride 110      CO2 23      Creatinine 0.7      Differential Method Automated      EF  60     eGFR if African American >60.0      eGFR if non  >60.0  Comment:  Calculation used to obtain the estimated glomerular filtration  rate (eGFR) is the CKD-EPI equation.         Eos # 0.2      Eosinophil% 1.4      Glucose 109      Gran # (ANC) 8.1(H)      Gran% 68.8      Hematocrit 37.7      Hemoglobin 12.4      Lymph # 2.7      Lymph% 23.0      MCH 31.0      MCHC 32.9      MCV 94      Mono # 0.7      Mono% 5.6      MPV 10.6      Mitral Valve Mobility  MILDLY RESTRICTED     Mitral Valve Regurgitation  MODERATE TO SEVERE(A)     " nRBC 0      Est. PA Systolic Pressure  46.82(A)     Platelets 238      Potassium 3.6      RBC 4.00      RDW 14.8(H)      Sodium 143      WBC 11.77          Significant Imaging: I have reviewed all pertinent imaging results/findings within the past 24 hours.

## 2018-07-28 LAB
ANION GAP SERPL CALC-SCNC: 9 MMOL/L
BASOPHILS # BLD AUTO: 0.07 K/UL
BASOPHILS NFR BLD: 0.7 %
BUN SERPL-MCNC: 12 MG/DL
CALCIUM SERPL-MCNC: 9.2 MG/DL
CHLORIDE SERPL-SCNC: 111 MMOL/L
CO2 SERPL-SCNC: 25 MMOL/L
CREAT SERPL-MCNC: 0.7 MG/DL
DIFFERENTIAL METHOD: ABNORMAL
EOSINOPHIL # BLD AUTO: 0.2 K/UL
EOSINOPHIL NFR BLD: 1.6 %
ERYTHROCYTE [DISTWIDTH] IN BLOOD BY AUTOMATED COUNT: 15.2 %
EST. GFR  (AFRICAN AMERICAN): >60 ML/MIN/1.73 M^2
EST. GFR  (NON AFRICAN AMERICAN): >60 ML/MIN/1.73 M^2
GLUCOSE SERPL-MCNC: 107 MG/DL
HCT VFR BLD AUTO: 37.1 %
HGB BLD-MCNC: 12.1 G/DL
IMM GRANULOCYTES # BLD AUTO: 0.04 K/UL
IMM GRANULOCYTES NFR BLD AUTO: 0.4 %
LYMPHOCYTES # BLD AUTO: 2.2 K/UL
LYMPHOCYTES NFR BLD: 20.7 %
MCH RBC QN AUTO: 30.8 PG
MCHC RBC AUTO-ENTMCNC: 32.6 G/DL
MCV RBC AUTO: 94 FL
MONOCYTES # BLD AUTO: 0.8 K/UL
MONOCYTES NFR BLD: 7.2 %
NEUTROPHILS # BLD AUTO: 7.4 K/UL
NEUTROPHILS NFR BLD: 69.4 %
NRBC BLD-RTO: 0 /100 WBC
PLATELET # BLD AUTO: 217 K/UL
PMV BLD AUTO: 10.4 FL
POTASSIUM SERPL-SCNC: 3.5 MMOL/L
RBC # BLD AUTO: 3.93 M/UL
SODIUM SERPL-SCNC: 145 MMOL/L
WBC # BLD AUTO: 10.64 K/UL

## 2018-07-28 PROCEDURE — 93010 ELECTROCARDIOGRAM REPORT: CPT | Mod: ,,, | Performed by: INTERNAL MEDICINE

## 2018-07-28 PROCEDURE — 36415 COLL VENOUS BLD VENIPUNCTURE: CPT

## 2018-07-28 PROCEDURE — 80048 BASIC METABOLIC PNL TOTAL CA: CPT

## 2018-07-28 PROCEDURE — 99232 SBSQ HOSP IP/OBS MODERATE 35: CPT | Mod: ,,, | Performed by: HOSPITALIST

## 2018-07-28 PROCEDURE — 25000003 PHARM REV CODE 250: Performed by: STUDENT IN AN ORGANIZED HEALTH CARE EDUCATION/TRAINING PROGRAM

## 2018-07-28 PROCEDURE — 93005 ELECTROCARDIOGRAM TRACING: CPT

## 2018-07-28 PROCEDURE — 25000003 PHARM REV CODE 250: Performed by: HOSPITALIST

## 2018-07-28 PROCEDURE — 11000001 HC ACUTE MED/SURG PRIVATE ROOM

## 2018-07-28 PROCEDURE — 85025 COMPLETE CBC W/AUTO DIFF WBC: CPT

## 2018-07-28 RX ORDER — DIVALPROEX SODIUM 125 MG/1
125 CAPSULE, COATED PELLETS ORAL 3 TIMES DAILY
Status: DISCONTINUED | OUTPATIENT
Start: 2018-07-28 | End: 2018-07-29 | Stop reason: HOSPADM

## 2018-07-28 RX ORDER — DIVALPROEX SODIUM 125 MG/1
125 CAPSULE, COATED PELLETS ORAL ONCE
Status: COMPLETED | OUTPATIENT
Start: 2018-07-28 | End: 2018-07-28

## 2018-07-28 RX ADMIN — DOCUSATE SODIUM 100 MG: 50 CAPSULE, LIQUID FILLED ORAL at 09:07

## 2018-07-28 RX ADMIN — ATORVASTATIN CALCIUM 40 MG: 20 TABLET, FILM COATED ORAL at 10:07

## 2018-07-28 RX ADMIN — CITALOPRAM HYDROBROMIDE 10 MG: 10 TABLET ORAL at 10:07

## 2018-07-28 RX ADMIN — DOCUSATE SODIUM 100 MG: 50 CAPSULE, LIQUID FILLED ORAL at 10:07

## 2018-07-28 RX ADMIN — ASPIRIN 81 MG: 81 TABLET, COATED ORAL at 10:07

## 2018-07-28 RX ADMIN — LEVOTHYROXINE SODIUM 50 MCG: 50 TABLET ORAL at 06:07

## 2018-07-28 RX ADMIN — DIVALPROEX SODIUM 125 MG: 125 CAPSULE, COATED PELLETS ORAL at 09:07

## 2018-07-28 RX ADMIN — DIVALPROEX SODIUM 125 MG: 125 CAPSULE, COATED PELLETS ORAL at 10:07

## 2018-07-28 RX ADMIN — PANTOPRAZOLE SODIUM 40 MG: 40 TABLET, DELAYED RELEASE ORAL at 10:07

## 2018-07-28 RX ADMIN — ISOSORBIDE MONONITRATE 30 MG: 30 TABLET, EXTENDED RELEASE ORAL at 10:07

## 2018-07-28 NOTE — ASSESSMENT & PLAN NOTE
Spoke with granddaughter, who feels that once patient, is medically cleared and free of current confusional state, that she would be comfortable having pt return home. Grandsaqibughter states that pt has sitters that care for her around the clock, as she is able to afford such help, however, geoughter has considered before, and is not against, possible NH placement down the road, if pt's condition deteriorates. Geoughter believes that currently, pt would do the best at home, around familiar faces.    1. Scheduled Medication Recommendations:  , received VPA 125mg PO @1030 (7/28)  -Start VPA 125mg PO q8hr    2. PRN Recommendations:  -Zyprexa 2.5mg IM q6hr PRN for non-redirectable agitation    3. Legal Status/Precautions:  Consider PEC in the setting of gravely disability, if patient attempts to leave the hospital AMA    4.  Monitor:  Please obtain daily EKG to monitor QTc (7/25 QTc 498)    5. Capacity:  Pt continues to have waxing and waning of symptoms and continues to refuse treatment at times. Therefore pt currently does NOT have medical decision making capacity. Please contact next of kin for making medical decisions currently.    6. Other:  CAM ICU- positive  [facility]NAG[facility]  PLEASE utilize CHEMICAL restraints with PRN meds first for agitation. Minimize use of PHYSICAL restraints  Keep window shades open and room lit during day and room dim at night in order to promote normal sleep-wake cycles  Encourage family at bedside. [place] patient often to situation, location, date.  Continue to Limit or Discontinue use of Narcotics, Benzos and Anti-cholinergic medications as they may worsen delirium.

## 2018-07-28 NOTE — HOSPITAL COURSE
07/28/2018  Chart reviewed. 3 PRN Zyprexa 2.5mg IM, yesterday. Upon initiation of interview, pt was lying in bed, dressed in hospital gown. This afternoon, she was pleasant and cooperative with interview. Pt remains delirious only oriented to self, place, and month, in addition to CAM-ICU positive, however, does not exhibit the level of paranoia that was seen yesterday, on evaluation. Pt claims to have slept well, and states that her mood is good.

## 2018-07-28 NOTE — MEDICAL/APP STUDENT
Hospital Medicine  Progress note    Team: [facility] B Ambrose Diallo MD  Admit Date: 7/25/2018  OSIEL 7/31/2018  Code status: Full Code    Principal Problem:  SDH (subdural hematoma)    Interval hx:   Per RN, pt combative, delusional, and verbally abusive. No IV access currently. Started on PO valproic in the a.m. Oriented x 1. Not cooperative with exam.    Per psychiatry, granddaughter feels comfortable arranging for pt to return home when possible. She notes multiple sitters are available and NH placement could be considered later with further deterioration of pt's condition.     ROS     Unable to conduct    PEx  Temp:  [97.8 °F (36.6 °C)-98.2 °F (36.8 °C)]   Pulse:  [86-96]   Resp:  [17-18]   BP: ()/(54-60)   SpO2:  [92 %-95 %]     Intake/Output Summary (Last 24 hours) at 07/28/18 0657  Last data filed at 07/28/18 0600   Gross per 24 hour   Intake              860 ml   Output                0 ml   Net              860 ml     Unable to conduct  Psychiatry: oriented x 1      Recent Labs  Lab 07/25/18  1528 07/26/18  0324 07/27/18  0752   WBC 10.86 11.78 11.77   HGB 13.7 13.1 12.4   HCT 41.6 39.2 37.7    219 238       Recent Labs  Lab 07/25/18  1528 07/26/18  0324 07/27/18  0752    138 143   K 4.3 3.9 3.6    107 110   CO2 22* 21* 23   BUN 11 13 8*   CREATININE 0.8 0.7 0.7    116* 109   CALCIUM 9.8 8.6* 9.0   MG 2.4  --   --        Recent Labs  Lab 07/25/18  1528   ALKPHOS 92   ALT 15   AST 27   ALBUMIN 4.1   PROT 8.0   BILITOT 1.5*   INR 1.1      No results for input(s): POCTGLUCOSE in the last 168 hours.  Recent Labs      07/25/18   1528   TROPONINI  0.019       Scheduled Meds:   aspirin  81 mg Oral Daily    atorvastatin  40 mg Oral Daily    citalopram  10 mg Oral Daily    docusate sodium  100 mg Oral BID    furosemide  20 mg Oral Daily    isosorbide mononitrate  30 mg Oral Daily    levothyroxine  50 mcg Oral Before breakfast    losartan  25 mg Oral Daily    metoprolol  succinate  25 mg Oral Daily    pantoprazole  40 mg Oral Daily    valproate sodium (DEPACON) IVPB  150 mg Intravenous QHS     Continuous Infusions:    As Needed:  acetaminophen, dextrose 50%, dextrose 50%, glucagon (human recombinant), glucose, glucose, glucose, labetalol, OLANZapine, ondansetron    Active Hospital Problems    Diagnosis  POA    *SDH (subdural hematoma) [I62.00]  Unknown    Post-traumatic subdural hematoma [S06.5X9A]  Yes    Coronary artery disease of native artery of native heart with stable angina pectoris [I25.118]  Yes    (HFpEF) heart failure with preserved ejection fraction [I50.30]  Yes    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     Overview  1F h/o multiple prior falls, hip fx, dementia, CAD, HFpEF, Severe AS (declined intervention), presenting s/p  fall at home found to have sm. R posterior parietal SDH.      Assessment and Plan for Problems addressed today:     R posterior parietal SDH  -s/p unwitnessed fall on asa/plavix with small right posterior parietal SDH., SDH on CT- Stable thin subdural hematoma along the right posterior cerebral convexity on CT 07/26/ , neurologically stable per NSG  -Holding Plavix until seen in neuro clinic, OK to restart ASA  -Continue C-collar  -Appreciate neurosurg recs  -PRN tylenol for HA    Dementia (Alzheimers dz) with agitation AAOX1 today  · Started on depacone 150mg IV qHS  · For agitation, olanzapine 2.5 mg IM Q6hr      Severe aortic stenosis   repeat echo ordered -Moderate to severe mitral regurgitation.     6 Severe aortic valve stenosis (KALYANI 0.41 cm2, AVAi 0.25 cm2/m2, peak aortic jet velocity 5.7 m/s,MG 84 mmHg, Pulmonary hypertension. The estimated PA systolic pressure is 47 mmHg. . has episodes of  dizziness at home per grand daughter. refused prior intervention. telemetry.      HTN  -Hypertensive in ED to 180s/190s systolic  -Continue losartan 25mg daily  -Continue metoprolol succinate 25 daily  -Continue lasix 20mg daily  -Labetolol IVP 10mg PRN SBP >180     CAD  NSTEMI in March of 2017 with no interventions, patient refused angiography  -continue aspirin 81mg daily, plavix 75mg qd to be held until neurosurgery follow up   -Stable  -Continue statin, BB, ARB     HFpEF  -Continue statin, BB, ARB, imdur     Hyperlipidemia  -continue home atorvastatin 40mg daily     Hypothyroidism  -Stable  -Continue synthroid 50mcg daily     GERD  -Stable   -Continue protonix 40mg daily    DVT PPx: SCD     Provider    I personally scribed for Ambrose Diallo MD on 07/28/2018 at 6:58 AM. Electronically signed by ivanna Johnson on 07/28/2018 at 6:58 AM

## 2018-07-28 NOTE — SUBJECTIVE & OBJECTIVE
"Interval History: see hospital course    Family History     Problem Relation (Age of Onset)    Cancer Mother    Heart disease Mother, Father    No Known Problems Sister, Brother, Maternal Grandmother, Maternal Grandfather, Paternal Grandmother, Paternal Grandfather, Maternal Aunt, Maternal Uncle, Paternal Aunt, Paternal Uncle    Skin cancer Father        Social History Main Topics    Smoking status: Never Smoker    Smokeless tobacco: Never Used    Alcohol use No      Comment: rare     Drug use: No    Sexual activity: Not on file     Psychotherapeutics     Start     Stop Route Frequency Ordered    07/27/18 1844  OLANZapine injection 2.5 mg      -- IM Every 6 hours PRN 07/27/18 1744    07/26/18 0900  citalopram tablet 10 mg      -- Oral Daily 07/25/18 2048           Review of Systems  Objective:     Vital Signs (Most Recent):  Temp: 97.6 °F (36.4 °C) (07/28/18 1321)  Pulse: 90 (07/28/18 1321)  Resp: 17 (07/28/18 1321)  BP: 120/63 (07/28/18 1321)  SpO2: (!) 93 % (07/28/18 1321) Vital Signs (24h Range):  Temp:  [97.6 °F (36.4 °C)-98.2 °F (36.8 °C)] 97.6 °F (36.4 °C)  Pulse:  [86-96] 90  Resp:  [16-18] 17  SpO2:  [92 %-95 %] 93 %  BP: ()/(54-63) 120/63     Height: 5' 5.98" (167.6 cm)  Weight: 59 kg (130 lb 1.1 oz)  Body mass index is 21 kg/m².      Intake/Output Summary (Last 24 hours) at 07/28/18 1627  Last data filed at 07/28/18 1553   Gross per 24 hour   Intake             1540 ml   Output                0 ml   Net             1540 ml       Physical Exam    CAM-ICU:  1. Acute change and/or fluctuating course of mental status: Yes  2. Inattention (SAVEAHAART): Yes  · "Squeeze my hand, only when you hear, the letter 'A'."  3. Altered Level of Consciousness: Yes  4. Disorganized Thinking (Errors >1/6): No  · "Will a stone float on water?"  · "Are there fish in the sea?"  · "Does one pound weigh more than two?"  · "Can you use a hammer to pound a nail?"  · Command(s):  · "Hold up 2 fingers."  · "Now do the same " "thing with the other hand."    Score: 1+2 AND, either 3 or 4 present = Positive CAM-ICU    Mental Status Exam:  Appearance: unremarkable, younger than stated age  Grooming: appropriate to situation  Psychomotor Behavior: normal, cooperative  Level of Consciousness: alert, awake  Speech: normal tone, normal rate, normal pitch, normal volume  Language: english, fluid  Orientation: person, place, month of year  Attention Span/Concentration: Decreased and CAM-ICU positive  Memory: Recent impaired  Mood: "neutral"  Affect: constricted  Thought Process: goal-directed  Associations: normal and logical  Thought Content: normal, no suicidality, no homicidality, delusions, or paranoia  Fund of Knowledge: adequate to education level  Insight: limited  Judgment: limited    Significant Labs:   Last 24 Hours:   Recent Lab Results       07/28/18  0619      Immature Granulocytes 0.4     Immature Grans (Abs) 0.04  Comment:  Mild elevation in immature granulocytes is non specific and   can be seen in a variety of conditions including stress response,   acute inflammation, trauma and pregnancy. Correlation with other   laboratory and clinical findings is essential.       Anion Gap 9     Baso # 0.07     Basophil% 0.7     BUN, Bld 12     Calcium 9.2     Chloride 111(H)     CO2 25     Creatinine 0.7     Differential Method Automated     eGFR if African American >60.0     eGFR if non  >60.0  Comment:  Calculation used to obtain the estimated glomerular filtration  rate (eGFR) is the CKD-EPI equation.        Eos # 0.2     Eosinophil% 1.6     Glucose 107     Gran # (ANC) 7.4     Gran% 69.4     Hematocrit 37.1     Hemoglobin 12.1     Lymph # 2.2     Lymph% 20.7     MCH 30.8     MCHC 32.6     MCV 94     Mono # 0.8     Mono% 7.2     MPV 10.4     nRBC 0     Platelets 217     Potassium 3.5     RBC 3.93(L)     RDW 15.2(H)     Sodium 145     WBC 10.64         Significant Imaging: I have reviewed all pertinent imaging " results/findings within the past 24 hours.

## 2018-07-28 NOTE — PROGRESS NOTES
Hospital Medicine  Progress note     Team: [facility]OS[facility] MED B Ambrose Diallo MD  Admit Date: 7/25/2018  OSIEL 7/31/2018  Code status: Full Code     Principal Problem:  SDH (subdural hematoma)     Interval hx:   Per RN, pt combative, delusional, and verbally abusive. No IV access currently. Started on PO valproic in the a.m. Oriented x 1. Not cooperative with exam.     Per psychiatry, granddaughter feels comfortable arranging for pt to return home when possible. She notes multiple sitters are available and NH placement could be considered later with further deterioration of pt's condition.     ROS      Unable to conduct     PEx  Temp:  [97.8 °F (36.6 °C)-98.2 °F (36.8 °C)]   Pulse:  [86-96]   Resp:  [17-18]   BP: ()/(54-60)   SpO2:  [92 %-95 %]      Intake/Output Summary (Last 24 hours) at 07/28/18 0657  Last data filed at 07/28/18 0600    Gross per 24 hour   Intake              860 ml   Output                0 ml   Net              860 ml      Unable to conduct  Psychiatry: oriented x 1        Recent Labs  Lab 07/25/18  1528 07/26/18  0324 07/27/18  0752   WBC 10.86 11.78 11.77   HGB 13.7 13.1 12.4   HCT 41.6 39.2 37.7    219 238         Recent Labs  Lab 07/25/18  1528 07/26/18  0324 07/27/18  0752    138 143   K 4.3 3.9 3.6    107 110   CO2 22* 21* 23   BUN 11 13 8*   CREATININE 0.8 0.7 0.7    116* 109   CALCIUM 9.8 8.6* 9.0   MG 2.4  --   --          Recent Labs  Lab 07/25/18  1528   ALKPHOS 92   ALT 15   AST 27   ALBUMIN 4.1   PROT 8.0   BILITOT 1.5*   INR 1.1      No results for input(s): POCTGLUCOSE in the last 168 hours.      Recent Labs      07/25/18   1528   TROPONINI  0.019         Scheduled Meds:   aspirin  81 mg Oral Daily    atorvastatin  40 mg Oral Daily    citalopram  10 mg Oral Daily    docusate sodium  100 mg Oral BID    furosemide  20 mg Oral Daily    isosorbide mononitrate  30 mg Oral Daily    levothyroxine  50 mcg Oral Before breakfast    losartan  25 mg Oral  Daily    metoprolol succinate  25 mg Oral Daily    pantoprazole  40 mg Oral Daily    valproate sodium (DEPACON) IVPB  150 mg Intravenous QHS      Continuous Infusions:  As Needed:  acetaminophen, dextrose 50%, dextrose 50%, glucagon (human recombinant), glucose, glucose, glucose, labetalol, OLANZapine, ondansetron           Active Hospital Problems     Diagnosis   POA    *SDH (subdural hematoma) [I62.00]   Unknown    Post-traumatic subdural hematoma [S06.5X9A]   Yes    Coronary artery disease of native artery of native heart with stable angina pectoris [I25.118]   Yes    (HFpEF) heart failure with preserved ejection fraction [I50.30]   Yes    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      Overview  1F h/o multiple prior falls, hip fx, dementia, CAD, HFpEF, Severe AS (declined intervention), presenting s/p  fall at home found to have sm. R posterior parietal SDH.      Assessment and Plan for Problems addressed today:     R posterior parietal SDH  -s/p unwitnessed fall on asa/plavix with small right posterior parietal SDH., SDH on CT- Stable thin subdural hematoma along the right posterior cerebral convexity on CT 07/26/ , neurologically stable per NSG  -Holding Plavix until seen in neuro clinic, OK to restart ASA  -Continue C-collar  -Appreciate neurosurg recs  -PRN tylenol for HA     Dementia (Alzheimers dz) with agitation AAOX1 today  · Started on depakote 125mg TID  · For agitation, olanzapine 2.5 mg IM Q6hr      Severe aortic stenosis   repeat echo ordered -Moderate to severe mitral regurgitation.     Severe aortic valve stenosis (KALYANI 0.41 cm2, AVAi 0.25 cm2/m2, peak aortic jet velocity 5.7 m/s,MG 84 mmHg, Pulmonary hypertension. The estimated PA systolic  pressure is 47 mmHg. . has episodes of dizziness at home per grand daughter. refused prior intervention. telemetry.      HTN  -Hypertensive in ED to 180s/190s systolic  -Continue losartan 25mg daily  -Continue metoprolol succinate 25 daily  -Continue lasix 20mg daily  -Labetolol IVP 10mg PRN SBP >180     CAD  NSTEMI in March of 2017 with no interventions, patient refused angiography  -continue aspirin 81mg daily, plavix 75mg qd to be held until neurosurgery follow up   -Stable  -Continue statin, BB, ARB     HFpEF  -Continue statin, BB, ARB, imdur     Hyperlipidemia  -continue home atorvastatin 40mg daily     Hypothyroidism  -Stable  -Continue synthroid 50mcg daily     GERD  -Stable   -Continue protonix 40mg daily     DVT PPx: SCD     Provider     I personally scribed for Ambrose Diallo MD on 07/28/2018 at 6:58 AM. Electronically signed by ivanna Johnson on 07/28/2018 at 6:58 AM  The documentation recorded by the scribe accurately reflects service I personally performed and the decisions made by me.  Ambrose Diallo MD  Attending Staff Physician  [facility] Medicine  pager- [phone]  [facility] - [phone]

## 2018-07-28 NOTE — PLAN OF CARE
Problem: Patient Care Overview  Goal: Plan of Care Review  Outcome: Ongoing (interventions implemented as appropriate)  Pt is alert to self and place. Delusional but calm this shift. VSS. Although still refusing PIV. Pt took all meds for writer this AM with no difficulties. Sitter at bedside at all times. Cont to monitor.

## 2018-07-28 NOTE — PLAN OF CARE
Problem: Patient Care Overview  Goal: Plan of Care Review  Pt remains oriented to self and place. Pt has visual hallucinations at times. Pt is combative and verbally abusive. Pt refused to take medications and still refuses an IV.  Sitter at bedside. Call light and personal belongings within reach.  Plan of care reviewed with pt. Will continue to monitor.

## 2018-07-28 NOTE — PROGRESS NOTES
"[facility]  Psychiatry  Progress Note    Patient Name: Janine Awad  MRN: [id number]   Code Status: Full Code  Admission Date: 7/25/2018  Hospital Length of Stay: 3 days  Expected Discharge Date: 7/31/2018  Attending Physician: Ambrose Diallo MD  Primary Care Provider: Oleg Enciso DO    Current Legal Status: N/A    Patient information was obtained from patient, relative(s) and ER records.     Subjective:     Principal Problem:SDH (subdural hematoma)    Chief Complaint: Delirium on Dementia    HPI:   History of Present Illness (Per Primary Team):  89F h/o multiple prior falls, hip fx, dementia, CAD, HFpEF, Severe AS (declined intervention), presenting s/p  fall at home, witnessed by sitter, per records patient tried to go from bathroom to bed, did hit head with posterior head hematoma noted, pt currently on plavix. Patient has no recollection of event and no family members reachable or present at time of history. C-collar placed PTA. Neurosurgery evaluated patient as small SDH noted on CT head, no acute intervention planned, recommended admission to medicine for observation and repeat CT head in AM. Pt currently reports headache, denies any other focal complaints.    Per CL MD:   Janine Awad is a 89 y.o. female with past psychiatric history of depression and anxiety, who presented to the [facility] ED due to a fall.  Psychiatry was originally consulted to address the patient's symptoms of non-redirectable agitation.    Chart reviewed. PRN Zyprexa 5mg IM given due to non-redirectable agitation. Upon initiation of interview, pt was dressed in hospital gown, standing next to her bed. She immediately asked this writer, "are you a doctor? Because you see these two (women), they're in on it." Pt then began to perseverate on persecutory delusion of [facility] staff out to kill her and her granddaughter. Pt remained paranoid throughout interview, but was redirectable after derailing in " thought process. She was able to pass SAVEAHAART, however was only oriented to person and place. Pt has been agitated since yesterday, pulling out her PIV lines and refusing to eat. Pt was taken out of soft restraints, for said interview.       Collateral:  Patient contact; granddaughter Natividad [phone].  Spoke to patient.  She is her caregiver    Attempted to contact granddaughter Gudelia ([phone]) who patient said lives with her at home and helps with her care. Left callback number.     Obtained another number for Gudelia from nurse ([phone]) and left callback message.     Contacted Jennifer De La Rosa, her aunt (whom she grew up with) and obtained psychiatric review through her.     SUBJECTIVE:     Medical Review Of Systems:  Pertinent items are noted in HPI.     Psychiatric Review Of Systems - Is patient experiencing or having changes in:  sleep: no           appetite: no  weight: no  Energy/anergy: no  interest/pleasure/anhedonia: unknown  somatic symptoms: unknown  libido: unknown  anxiety/panic: unknown  guilty/hopelessness: unknown  concentration: unknown  S.I.B.s/risky behavior: unknown  any drugs:  unknown  alcohol: no    Past Psychiatric History:  Previous Medication Trials: unknown  Previous Psychiatric Hospitalizations:unknown   Previous Suicide Attempts: unknown  History of Violence:unknown  Outpatient Psychiatrist:unknown     Social History:  Marital Status:   Children: 3   Employment Status/Info: never employed  Education: high school diploma/GED  Special Ed: unknown  Housing Status: owns home and lives with granddamarcos Sands  History of phys/sexual abuse: unknown  Access to gun: unknown     Substance Abuse History:  Recreational Drugs: no  Use of Alcohol: no  Rehab History:unknown   Tobacco Use:no  Use of Caffeine: unkown  Use of OTC: unkown  Legal consequences of chemical use: n/a  Is the patient aware of the biomedical complications associated with substance abuse and mental  illness? n/a     Legal History:  Past Charges/Incarcerations:no  Pending charges:no      Family Psychiatric History:   unkown     Psychosocial Stressors: health.   Functioning Relationships: good relationship with granddaughter and aunt     Psychosocial Factors:  Maladaptive or problem behaviors: unknown  Peer group, social, ethic, cultural, emotional, and health factors: none  Living situation, family constellation, family circumstances/home: stays at home and has caretakers visit  Recovery environment: good  Community resources used by patient: unknown  Treatment acceptance/motivation for change: claimed to trust her doctors but not her at hospital    Hospital Course: 07/28/2018  Chart reviewed. 3 PRN Zyprexa 2.5mg IM, yesterday. Upon initiation of interview, pt was lying in bed, dressed in hospital gown. This afternoon, she was pleasant and cooperative with interview. Pt remains delirious only oriented to self, place, and month, in addition to CAM-ICU positive, however, does not exhibit the level of paranoia that was seen yesterday, on evaluation. Pt claims to have slept well, and states that her mood is good.    Interval History: see hospital course    Family History     Problem Relation (Age of Onset)    Cancer Mother    Heart disease Mother, Father    No Known Problems Sister, Brother, Maternal Grandmother, Maternal Grandfather, Paternal Grandmother, Paternal Grandfather, Maternal Aunt, Maternal Uncle, Paternal Aunt, Paternal Uncle    Skin cancer Father        Social History Main Topics    Smoking status: Never Smoker    Smokeless tobacco: Never Used    Alcohol use No      Comment: rare     Drug use: No    Sexual activity: Not on file     Psychotherapeutics     Start     Stop Route Frequency Ordered    07/27/18 1844  OLANZapine injection 2.5 mg      -- IM Every 6 hours PRN 07/27/18 1744    07/26/18 0900  citalopram tablet 10 mg      -- Oral Daily 07/25/18 2048           Review of Systems  Objective:  "    Vital Signs (Most Recent):  Temp: 97.6 °F (36.4 °C) (07/28/18 1321)  Pulse: 90 (07/28/18 1321)  Resp: 17 (07/28/18 1321)  BP: 120/63 (07/28/18 1321)  SpO2: (!) 93 % (07/28/18 1321) Vital Signs (24h Range):  Temp:  [97.6 °F (36.4 °C)-98.2 °F (36.8 °C)] 97.6 °F (36.4 °C)  Pulse:  [86-96] 90  Resp:  [16-18] 17  SpO2:  [92 %-95 %] 93 %  BP: ()/(54-63) 120/63     Height: 5' 5.98" (167.6 cm)  Weight: 59 kg (130 lb 1.1 oz)  Body mass index is 21 kg/m².      Intake/Output Summary (Last 24 hours) at 07/28/18 1627  Last data filed at 07/28/18 1553   Gross per 24 hour   Intake             1540 ml   Output                0 ml   Net             1540 ml       Physical Exam    CAM-ICU:  1. Acute change and/or fluctuating course of mental status: Yes  2. Inattention (SAVEAHAART): Yes  · "Squeeze my hand, only when you hear, the letter 'A'."  3. Altered Level of Consciousness: Yes  4. Disorganized Thinking (Errors >1/6): No  · "Will a stone float on water?"  · "Are there fish in the sea?"  · "Does one pound weigh more than two?"  · "Can you use a hammer to pound a nail?"  · Command(s):  · "Hold up 2 fingers."  · "Now do the same thing with the other hand."    Score: 1+2 AND, either 3 or 4 present = Positive CAM-ICU    Mental Status Exam:  Appearance: unremarkable, younger than stated age  Grooming: appropriate to situation  Psychomotor Behavior: normal, cooperative  Level of Consciousness: alert, awake  Speech: normal tone, normal rate, normal pitch, normal volume  Language: english, fluid  Orientation: person, place, month of year  Attention Span/Concentration: Decreased and CAM-ICU positive  Memory: Recent impaired  Mood: "neutral"  Affect: constricted  Thought Process: goal-directed  Associations: normal and logical  Thought Content: normal, no suicidality, no homicidality, delusions, or paranoia  Fund of Knowledge: adequate to education level  Insight: limited  Judgment: limited    Significant Labs:   Last 24 Hours: "   Recent Lab Results       07/28/18  0619      Immature Granulocytes 0.4     Immature Grans (Abs) 0.04  Comment:  Mild elevation in immature granulocytes is non specific and   can be seen in a variety of conditions including stress response,   acute inflammation, trauma and pregnancy. Correlation with other   laboratory and clinical findings is essential.       Anion Gap 9     Baso # 0.07     Basophil% 0.7     BUN, Bld 12     Calcium 9.2     Chloride 111(H)     CO2 25     Creatinine 0.7     Differential Method Automated     eGFR if African American >60.0     eGFR if non  >60.0  Comment:  Calculation used to obtain the estimated glomerular filtration  rate (eGFR) is the CKD-EPI equation.        Eos # 0.2     Eosinophil% 1.6     Glucose 107     Gran # (ANC) 7.4     Gran% 69.4     Hematocrit 37.1     Hemoglobin 12.1     Lymph # 2.2     Lymph% 20.7     MCH 30.8     MCHC 32.6     MCV 94     Mono # 0.8     Mono% 7.2     MPV 10.4     nRBC 0     Platelets 217     Potassium 3.5     RBC 3.93(L)     RDW 15.2(H)     Sodium 145     WBC 10.64         Significant Imaging: I have reviewed all pertinent imaging results/findings within the past 24 hours.    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 Granddaughter states that pt has sitters that care for her around the clock, as she is able to afford such help, however, granddaughter has considered before, and is not against, possible NH placement down the road, if pt's condition deteriorates. Granddaughter believes that currently, pt would do the best at home, around familiar faces.    1. Scheduled Medication Recommendations:  , received VPA 125mg PO @1030 (7/28)  -Start VPA 125mg PO q8hr    2. PRN Recommendations:  -Zyprexa 2.5mg IM q6hr PRN for  non-redirectable agitation    3. Legal Status/Precautions:  Consider PEC in the setting of gravely disability, if patient attempts to leave the hospital AMA    4.  Monitor:  Please obtain daily EKG to monitor QTc (7/25 QTc 498)    5. Capacity:  Pt continues to have waxing and waning of symptoms and continues to refuse treatment at times. Therefore pt currently does NOT have medical decision making capacity. Please contact next of kin for making medical decisions currently.    6. Other:  CAM ICU- positive  DELIRIUM BEHAVIOR MANAGEMENT  PLEASE utilize CHEMICAL restraints with PRN meds first for agitation. Minimize use of PHYSICAL restraints  Keep window shades open and room lit during day and room dim at night in order to promote normal sleep-wake cycles  Encourage family at bedside. [place] patient often to situation, location, date.  Continue to Limit or Discontinue use of Narcotics, Benzos and Anti-cholinergic medications as they may worsen delirium.           Need for Continued Hospitalization:   Requires ongoing hospitalization for stabilization of medications.    Anticipated Disposition: Still a Patient     Total time:  35 with greater than 50% of this time spent in counseling and/or coordination of care.     Case discussed with: Dr. Lul Valladares MD, JESSICA  [facility], PGY-2  Pager Number [phone]

## 2018-07-29 VITALS
TEMPERATURE: 99 F | SYSTOLIC BLOOD PRESSURE: 105 MMHG | OXYGEN SATURATION: 93 % | HEART RATE: 73 BPM | DIASTOLIC BLOOD PRESSURE: 49 MMHG | RESPIRATION RATE: 16 BRPM | WEIGHT: 140.19 LBS | HEIGHT: 66 IN | BODY MASS INDEX: 22.53 KG/M2

## 2018-07-29 PROBLEM — S06.5XAA SDH (SUBDURAL HEMATOMA): Status: ACTIVE | Noted: 2018-07-29

## 2018-07-29 LAB
ANION GAP SERPL CALC-SCNC: 8 MMOL/L
BASOPHILS # BLD AUTO: 0.06 K/UL
BASOPHILS NFR BLD: 0.7 %
BUN SERPL-MCNC: 11 MG/DL
CALCIUM SERPL-MCNC: 8.6 MG/DL
CHLORIDE SERPL-SCNC: 110 MMOL/L
CO2 SERPL-SCNC: 24 MMOL/L
CREAT SERPL-MCNC: 0.7 MG/DL
DIFFERENTIAL METHOD: ABNORMAL
EOSINOPHIL # BLD AUTO: 0.3 K/UL
EOSINOPHIL NFR BLD: 2.8 %
ERYTHROCYTE [DISTWIDTH] IN BLOOD BY AUTOMATED COUNT: 15.4 %
EST. GFR  (AFRICAN AMERICAN): >60 ML/MIN/1.73 M^2
EST. GFR  (NON AFRICAN AMERICAN): >60 ML/MIN/1.73 M^2
GLUCOSE SERPL-MCNC: 102 MG/DL
HCT VFR BLD AUTO: 31.4 %
HGB BLD-MCNC: 10.2 G/DL
IMM GRANULOCYTES # BLD AUTO: 0.04 K/UL
IMM GRANULOCYTES NFR BLD AUTO: 0.4 %
LYMPHOCYTES # BLD AUTO: 1.9 K/UL
LYMPHOCYTES NFR BLD: 20.7 %
MCH RBC QN AUTO: 30.6 PG
MCHC RBC AUTO-ENTMCNC: 32.5 G/DL
MCV RBC AUTO: 94 FL
MONOCYTES # BLD AUTO: 0.5 K/UL
MONOCYTES NFR BLD: 5.8 %
NEUTROPHILS # BLD AUTO: 6.3 K/UL
NEUTROPHILS NFR BLD: 69.6 %
NRBC BLD-RTO: 0 /100 WBC
PLATELET # BLD AUTO: 195 K/UL
PMV BLD AUTO: 10.7 FL
POTASSIUM SERPL-SCNC: 3.3 MMOL/L
RBC # BLD AUTO: 3.33 M/UL
SODIUM SERPL-SCNC: 142 MMOL/L
WBC # BLD AUTO: 8.99 K/UL

## 2018-07-29 PROCEDURE — 85025 COMPLETE CBC W/AUTO DIFF WBC: CPT

## 2018-07-29 PROCEDURE — 93010 ELECTROCARDIOGRAM REPORT: CPT | Mod: ,,, | Performed by: INTERNAL MEDICINE

## 2018-07-29 PROCEDURE — 99238 HOSP IP/OBS DSCHRG MGMT 30/<: CPT | Mod: ,,, | Performed by: HOSPITALIST

## 2018-07-29 PROCEDURE — 25000003 PHARM REV CODE 250: Performed by: STUDENT IN AN ORGANIZED HEALTH CARE EDUCATION/TRAINING PROGRAM

## 2018-07-29 PROCEDURE — 36415 COLL VENOUS BLD VENIPUNCTURE: CPT

## 2018-07-29 PROCEDURE — 25000003 PHARM REV CODE 250: Performed by: HOSPITALIST

## 2018-07-29 PROCEDURE — 93005 ELECTROCARDIOGRAM TRACING: CPT

## 2018-07-29 PROCEDURE — 80048 BASIC METABOLIC PNL TOTAL CA: CPT

## 2018-07-29 RX ORDER — POTASSIUM CHLORIDE 20 MEQ/15ML
40 SOLUTION ORAL ONCE
Status: COMPLETED | OUTPATIENT
Start: 2018-07-29 | End: 2018-07-29

## 2018-07-29 RX ORDER — DIVALPROEX SODIUM 125 MG/1
125 CAPSULE, COATED PELLETS ORAL 3 TIMES DAILY
Qty: 90 CAPSULE | Refills: 11 | Status: SHIPPED | OUTPATIENT
Start: 2018-07-29 | End: 2019-07-29

## 2018-07-29 RX ORDER — NAPROXEN SODIUM 220 MG/1
81 TABLET, FILM COATED ORAL DAILY
Refills: 0 | Status: ON HOLD | COMMUNITY
Start: 2018-07-29 | End: 2018-10-21 | Stop reason: SDUPTHER

## 2018-07-29 RX ADMIN — LEVOTHYROXINE SODIUM 50 MCG: 50 TABLET ORAL at 10:07

## 2018-07-29 RX ADMIN — LOSARTAN POTASSIUM 25 MG: 25 TABLET, FILM COATED ORAL at 10:07

## 2018-07-29 RX ADMIN — ASPIRIN 81 MG: 81 TABLET, COATED ORAL at 10:07

## 2018-07-29 RX ADMIN — DOCUSATE SODIUM 100 MG: 50 CAPSULE, LIQUID FILLED ORAL at 10:07

## 2018-07-29 RX ADMIN — PANTOPRAZOLE SODIUM 40 MG: 40 TABLET, DELAYED RELEASE ORAL at 10:07

## 2018-07-29 RX ADMIN — CITALOPRAM HYDROBROMIDE 10 MG: 10 TABLET ORAL at 10:07

## 2018-07-29 RX ADMIN — FUROSEMIDE 20 MG: 20 TABLET ORAL at 10:07

## 2018-07-29 RX ADMIN — ATORVASTATIN CALCIUM 40 MG: 20 TABLET, FILM COATED ORAL at 10:07

## 2018-07-29 RX ADMIN — ISOSORBIDE MONONITRATE 30 MG: 30 TABLET, EXTENDED RELEASE ORAL at 10:07

## 2018-07-29 RX ADMIN — DIVALPROEX SODIUM 125 MG: 125 CAPSULE, COATED PELLETS ORAL at 10:07

## 2018-07-29 RX ADMIN — POTASSIUM CHLORIDE 40 MEQ: 20 SOLUTION ORAL at 10:07

## 2018-07-29 RX ADMIN — METOPROLOL SUCCINATE 25 MG: 25 TABLET, EXTENDED RELEASE ORAL at 10:07

## 2018-07-29 NOTE — MEDICAL/APP STUDENT
Discharge Summary  Hospital Medicine    Attending Provider on Discharge: Ambrose Diallo MD  [facility] Medicine Team: 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        HPI  89F h/o multiple prior falls, hip fx, dementia, CAD, HFpEF, Severe AS (declined intervention), presenting s/p  fall at home, witnessed by sitter, per records patient tried to go from bathroom to bed, did hit head with posterior head hematoma noted, pt currently on plavix. Patient has no recollection of event and no family members reachable or present at time of history. C-collar placed PTA. Neurosurgery evaluated patient as small SDH noted on CT head, no acute intervention planned, recommended admission to medicine for observation and repeat CT head in AM. Pt currently reports headache, denies any other focal complaints.    Hospital Course  -7/24 - Fall at home, posterior head hematoma, EMS places C collar  -7/25 - neurosurgery consult notes CT shows subdural hematoma (right parietal), no focal deficits, some short term memory loss  -7/26 - repeat CT shows hematoma stable, headache treated with tylenol, ASA restarted but plavis held, -7/27 - Echo for hx of aortic stenosis shows severe mitral regurgitation with severe aortic stenosis and elevated PA systolic pressure of 46. Psychiatry starts  depakote for agitation. Increasingly combative, delusional.  - 7/28 - Still combative, not cooperating with exams, psych consults with granddaughter with plans to return pt home  -7/29 - discharge home    Recent Labs  Lab 07/27/18  0752 07/28/18  0619 07/29/18  0458   WBC 11.77 10.64 8.99   HGB 12.4 12.1 10.2*   HCT 37.7 37.1 31.4*    217 195       Recent Labs  Lab 07/25/18  1528  07/27/18  0752 07/28/18  0619 07/29/18  0458     < > 143 145 142   K 4.3  < > 3.6 3.5 3.3*     < > 110 111* 110   CO2 22*  < > 23 25 24   BUN 11  < > 8* 12 11   CREATININE 0.8  < > 0.7 0.7 0.7     < > 109 107 102   CALCIUM 9.8  < > 9.0 9.2 8.6*   MG 2.4  --   --   --   --    < > = values in this interval not displayed.    Recent Labs  Lab 07/25/18  1528   ALKPHOS 92   ALT 15   AST 27   ALBUMIN 4.1   PROT 8.0   BILITOT 1.5*   INR 1.1      No results for input(s): POCTGLUCOSE in the last 168 hours.  No results for input(s): CPK, CPKMB, MB, TROPONINI in the last 72 hours.    No results for input(s): LACTATE in the last 72 hours.     Procedures: none    Consultants:  - neurosurgery (Dr. Juventino Gardner)  - psychiatry (Dr. Hortencia Singleton)    Current Discharge Medication List      START taking these medications    Details   divalproex (DEPAKOTE SPRINKLE) 125 mg capsule Take 1 capsule (125 mg total) by mouth 3 (three) times daily.  Qty: 90 capsule, Refills: 11         CONTINUE these medications which have CHANGED    Details   aspirin 81 MG Chew Take 1 tablet (81 mg total) by mouth once daily.  Refills: 0         CONTINUE these medications which have NOT CHANGED    Details   artificial tears (ISOPTO TEARS) 0.5 % ophthalmic solution Place 1 drop into the left eye 4 (four) times daily.      ascorbic acid, vitamin C, (VITAMIN C) 250 mg Chew Take by mouth.      atorvastatin (LIPITOR) 40 MG tablet Take 1 tablet (40 mg total) by mouth once daily.  Qty: 90 tablet, Refills: 3      busPIRone (BUSPAR) 30 MG Tab Take 1 tablet (30 mg  total) by mouth 2 (two) times daily.  Qty: 180 tablet, Refills: 3      citalopram (CELEXA) 10 MG tablet Take 1 tablet (10 mg total) by mouth once daily.  Qty: 90 tablet, Refills: 1    Associated Diagnoses: Late onset Alzheimer's disease without behavioral disturbance      cyanocobalamin (VITAMIN B-12) 1000 MCG tablet Take 1 tablet (1,000 mcg total) by mouth once daily.  Qty: 90 tablet, Refills: 3      fish oil-omega-3 fatty acids 300-1,000 mg capsule Take 2 g by mouth once daily.      furosemide (LASIX) 20 MG tablet TAKE 1 TABLET ONE TIME DAILY  Qty: 90 tablet, Refills: 1      isosorbide mononitrate (IMDUR) 30 MG 24 hr tablet TAKE 1 TABLET ONE TIME DAILY  Qty: 90 tablet, Refills: 3    Associated Diagnoses: Coronary artery disease of native artery of native heart with stable angina pectoris      levothyroxine (SYNTHROID) 50 MCG tablet TAKE 1 TABLET BEFORE BREAKFAST.  Qty: 90 tablet, Refills: 3      losartan (COZAAR) 25 MG tablet Take by mouth.      metoprolol succinate (TOPROL-XL) 25 MG 24 hr tablet TAKE 1 TABLET ONE TIME DAILY  Qty: 90 tablet, Refills: 3      omeprazole (PRILOSEC) 20 MG capsule Take 20 mg by mouth once daily.       vitamin D 1000 units Tab Take 185 mg by mouth once daily.         STOP taking these medications       clopidogrel (PLAVIX) 75 mg tablet Comments:   Reason for Stopping:         ZINC ACETATE ORAL Comments:   Reason for Stopping:         ZOSTAVAX, PF, 19,400 unit/0.65 mL injection Comments:   Reason for Stopping:               Discharge Diet:regular diet with Normal Fluid intake of 1500 - 2000 mL per day    Activity: activity as tolerated    Discharge Condition: Stable    Disposition: Home or Self Care    Tests pending at the time of discharge: none ??     Time spent  on the discharge of the patient including review of hospital course with the patient. reviewing discharge medications and arranging follow-up care     Discharge examination Patient was seen and examined on the date of  discharge and determined to be suitable for discharge.    Discharge plan and follow up:      Future Appointments  Date Time Provider Department Center   8/16/2018 9:30 AM [facility] OIC-CT1 500 LB LIMIT [facility] IC Imaging Ctr   8/16/2018 10:00 AM Myrna Zhang PA-C [facility] NEUROS7 [facility] [place]       Provider    I personally scribed for Ambrose Diallo MD on 07/29/2018 at 4:34 PM. Electronically signed by ivanna Johnson on 07/29/2018 at 4:34 PM

## 2018-07-29 NOTE — PLAN OF CARE
Problem: Patient Care Overview  Goal: Plan of Care Review  Patient alert and oriented to self, delusional, calm, and cooperative. V/S stable. Sitter at bedside with patient. No significant findings. WCTM.

## 2018-07-29 NOTE — MEDICAL/APP STUDENT
Hospital Medicine  Progress note    Team: [facility] MED B Ambrose Diallo MD  Admit Date: 7/25/2018  OSIEL 7/31/2018  Code status: Full Code    Principal Problem:  SDH (subdural hematoma)    Interval hx:     Started depakote.    Per nursing, AAOx1, delusional, but calm. Sitter at bedside.     ROS     Unable to conduct    PEx  Temp:  [97.6 °F (36.4 °C)-98.3 °F (36.8 °C)]   Pulse:  [75-92]   Resp:  [16-20]   BP: ()/(56-63)   SpO2:  [93 %-96 %]     Intake/Output Summary (Last 24 hours) at 07/29/18 0744  Last data filed at 07/28/18 1553   Gross per 24 hour   Intake              680 ml   Output                0 ml   Net              680 ml     Unable to conduct  Psychiatry: oriented x 1      Recent Labs  Lab 07/27/18  0752 07/28/18  0619 07/29/18  0458   WBC 11.77 10.64 8.99   HGB 12.4 12.1 10.2*   HCT 37.7 37.1 31.4*    217 195       Recent Labs  Lab 07/25/18  1528  07/27/18  0752 07/28/18  0619 07/29/18  0458     < > 143 145 142   K 4.3  < > 3.6 3.5 3.3*     < > 110 111* 110   CO2 22*  < > 23 25 24   BUN 11  < > 8* 12 11   CREATININE 0.8  < > 0.7 0.7 0.7     < > 109 107 102   CALCIUM 9.8  < > 9.0 9.2 8.6*   MG 2.4  --   --   --   --    < > = values in this interval not displayed.    Recent Labs  Lab 07/25/18  1528   ALKPHOS 92   ALT 15   AST 27   ALBUMIN 4.1   PROT 8.0   BILITOT 1.5*   INR 1.1      No results for input(s): POCTGLUCOSE in the last 168 hours.  No results for input(s): CPK, CPKMB, MB, TROPONINI in the last 72 hours.    Scheduled Meds:   aspirin  81 mg Oral Daily    atorvastatin  40 mg Oral Daily    citalopram  10 mg Oral Daily    divalproex  125 mg Oral TID    docusate sodium  100 mg Oral BID    furosemide  20 mg Oral Daily    isosorbide mononitrate  30 mg Oral Daily    levothyroxine  50 mcg Oral Before breakfast    losartan  25 mg Oral Daily    metoprolol succinate  25 mg Oral Daily    pantoprazole  40 mg Oral Daily    potassium chloride 10%  40 mEq Oral Once      Continuous Infusions:    As Needed:  acetaminophen, dextrose 50%, dextrose 50%, glucagon (human recombinant), glucose, glucose, glucose, labetalol, OLANZapine, ondansetron    Active Hospital Problems    Diagnosis  POA    *SDH (subdural hematoma) [I62.00]  Unknown    Post-traumatic subdural hematoma [S06.5X9A]  Yes    Coronary artery disease of native artery of native heart with stable angina pectoris [I25.118]  Yes    (HFpEF) heart failure with preserved ejection fraction [I50.30]  Yes    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     Overview  1F h/o multiple prior falls, hip fx, dementia, CAD, HFpEF, Severe AS (declined intervention), presenting s/p  fall at home found to have sm. R posterior parietal SDH.      Assessment and Plan for Problems addressed today:     R posterior parietal SDH  -s/p unwitnessed fall on asa/plavix with small right posterior parietal SDH., SDH on CT- Stable thin subdural hematoma along the right posterior cerebral convexity on CT 07/26/ , neurologically stable per NSG  -Holding Plavix until seen in neuro clinic, OK to restart ASA  -Continue C-collar  -Appreciate neurosurg recs  -PRN tylenol for HA    Dementia (Alzheimers dz) with agitation AAOX1 today  · depacone 150mg IV qHS (started 7/28)  · For agitation, olanzapine 2.5 mg IM Q6hr      Severe aortic stenosis   repeat echo ordered -Moderate to severe mitral regurgitation.     6 Severe aortic valve stenosis (KALYANI 0.41 cm2, AVAi 0.25 cm2/m2, peak aortic jet velocity 5.7 m/s,MG 84 mmHg, Pulmonary hypertension. The estimated PA systolic pressure is 47 mmHg. . has episodes of dizziness at home per grand daughter. refused prior intervention. telemetry.      HTN  -Hypertensive in ED to  180s/190s systolic  -Continue losartan 25mg daily  -Continue metoprolol succinate 25 daily  -Continue lasix 20mg daily  -Labetolol IVP 10mg PRN SBP >180     CAD  NSTEMI in March of 2017 with no interventions, patient refused angiography  -continue aspirin 81mg daily, plavix 75mg qd to be held until neurosurgery follow up   -Stable  -Continue statin, BB, ARB     HFpEF  -Continue statin, BB, ARB, imdur     Hyperlipidemia  -continue home atorvastatin 40mg daily     Hypothyroidism  -Stable  -Continue synthroid 50mcg daily     GERD  -Stable   -Continue protonix 40mg daily    DVT PPx: SCD     Provider    I personally scribed for Ambrose Diallo MD on 07/29/2018 at 6:58 AM. Electronically signed by ivanna Johnson on 07/29/2018 at 6:58 AM

## 2018-07-29 NOTE — NURSING
Pt sleeping and refusing to get up to discharge. Will wait for second sitter to arrive to help get patient up and transport her home. Pt sleeping in bed at this time with call light in reach and sitter at bedside.

## 2018-07-29 NOTE — NURSING
Spoke with pt granddaughter Christiane, states it is ok for pt to leave with care taker at bedside d/t Christiane not having her car at the moment. VSS. No c/o pain or discomfort.

## 2018-07-29 NOTE — NURSING
"Patient sleeping during morning med pass. Patient responded appropriately to questions. Currently refusing medications stating "I just want to sleep". Holding levothyroxine. Will pass on to day nurse in report.   "

## 2018-07-29 NOTE — DISCHARGE SUMMARY
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         HPI  89F h/o multiple prior falls, hip fx, dementia, CAD, HFpEF, Severe AS (declined intervention), presenting s/p  fall at home, witnessed by sitter, per records patient tried to go from bathroom to bed, did hit head with posterior head hematoma noted, pt currently on plavix. Patient has no recollection of event and no family members reachable or present at time of history. C-collar placed PTA. Neurosurgery evaluated patient as small SDH noted on CT head, no acute intervention planned, recommended admission to medicine for observation and repeat CT head in AM. Pt currently reports headache, denies any other focal complaints.     Hospital Course  -7/24 - Fall at home, posterior head hematoma, EMS places C collar  -7/25 - neurosurgery consult notes CT shows subdural hematoma (right parietal), no focal deficits, some short term memory loss  -7/26 - repeat CT shows hematoma stable, headache treated with tylenol, ASA restarted but plavis held, -7/27 - Echo for hx of aortic stenosis shows severe mitral regurgitation with severe aortic stenosis and elevated PA systolic pressure of 46.  Psychiatry starts depakote for agitation. Increasingly combative, delusional.  - 7/28 - Still combative, not cooperating with exams, psych consults with granddaughter with plans to return pt home  -7/29 - discharge home     Recent Labs  Lab 07/27/18  0752 07/28/18  0619 07/29/18  0458   WBC 11.77 10.64 8.99   HGB 12.4 12.1 10.2*   HCT 37.7 37.1 31.4*    217 195         Recent Labs  Lab 07/25/18  1528   07/27/18  0752 07/28/18  0619 07/29/18  0458     < > 143 145 142   K 4.3  < > 3.6 3.5 3.3*     < > 110 111* 110   CO2 22*  < > 23 25 24   BUN 11  < > 8* 12 11   CREATININE 0.8  < > 0.7 0.7 0.7     < > 109 107 102   CALCIUM 9.8  < > 9.0 9.2 8.6*   MG 2.4  --   --   --   --    < > = values in this interval not displayed.     Recent Labs  Lab 07/25/18  1528   ALKPHOS 92   ALT 15   AST 27   ALBUMIN 4.1   PROT 8.0   BILITOT 1.5*   INR 1.1      No results for input(s): POCTGLUCOSE in the last 168 hours.  No results for input(s): CPK, CPKMB, MB, TROPONINI in the last 72 hours.     No results for input(s): LACTATE in the last 72 hours.      Procedures: none     Consultants:  - neurosurgery (Dr. Juventino Gardner)  - psychiatry (Dr. Hortencia Singleton)           Current Discharge Medication List             START taking these medications     Details   divalproex (DEPAKOTE SPRINKLE) 125 mg capsule Take 1 capsule (125 mg total) by mouth 3 (three) times daily.  Qty: 90 capsule, Refills: 11                 CONTINUE these medications which have CHANGED     Details   aspirin 81 MG Chew Take 1 tablet (81 mg total) by mouth once daily.  Refills: 0                 CONTINUE these medications which have NOT CHANGED     Details   artificial tears (ISOPTO TEARS) 0.5 % ophthalmic solution Place 1 drop into the left eye 4 (four) times daily.       ascorbic acid, vitamin C, (VITAMIN C) 250 mg Chew Take by mouth.       atorvastatin (LIPITOR) 40 MG tablet Take 1 tablet (40 mg total) by mouth once daily.  Qty: 90 tablet, Refills: 3        busPIRone (BUSPAR) 30 MG Tab Take 1 tablet (30 mg total) by mouth 2 (two) times daily.  Qty: 180 tablet, Refills: 3       citalopram (CELEXA) 10 MG tablet Take 1 tablet (10 mg total) by mouth once daily.  Qty: 90 tablet, Refills: 1     Associated Diagnoses: Late onset Alzheimer's disease without behavioral disturbance       cyanocobalamin (VITAMIN B-12) 1000 MCG tablet Take 1 tablet (1,000 mcg total) by mouth once daily.  Qty: 90 tablet, Refills: 3       fish oil-omega-3 fatty acids 300-1,000 mg capsule Take 2 g by mouth once daily.       furosemide (LASIX) 20 MG tablet TAKE 1 TABLET ONE TIME DAILY  Qty: 90 tablet, Refills: 1       isosorbide mononitrate (IMDUR) 30 MG 24 hr tablet TAKE 1 TABLET ONE TIME DAILY  Qty: 90 tablet, Refills: 3     Associated Diagnoses: Coronary artery disease of native artery of native heart with stable angina pectoris       levothyroxine (SYNTHROID) 50 MCG tablet TAKE 1 TABLET BEFORE BREAKFAST.  Qty: 90 tablet, Refills: 3       losartan (COZAAR) 25 MG tablet Take by mouth.       metoprolol succinate (TOPROL-XL) 25 MG 24 hr tablet TAKE 1 TABLET ONE TIME DAILY  Qty: 90 tablet, Refills: 3       omeprazole (PRILOSEC) 20 MG capsule Take 20 mg by mouth once daily.        vitamin D 1000 units Tab Take 185 mg by mouth once daily.                STOP taking these medications         clopidogrel (PLAVIX) 75 mg tablet Comments:   Reason for Stopping:            ZINC ACETATE ORAL Comments:   Reason for Stopping:            ZOSTAVAX, PF, 19,400 unit/0.65 mL injection Comments:   Reason for Stopping:                    Discharge Diet:regular diet with Normal Fluid intake of 1500 - 2000 mL per day     Activity: activity as tolerated     Discharge Condition: Stable     Disposition: Home or Self Care     Tests pending at the time of discharge: none ??     Time spent  on the discharge of the patient including review of hospital course with the patient. reviewing discharge medications and arranging  follow-up care      Discharge examination Patient was seen and examined on the date of discharge and determined to be suitable for discharge.     Discharge plan and follow up:        Future Appointments  Date Time Provider Department Center   8/16/2018 9:30 AM [facility] OIC-CT1 500 LB LIMIT [facility] IC Imaging Ctr   8/16/2018 10:00 AM Myrna Zhang PA-C [facility] NEUROS7 [facility] [place]         Provider     I personally scribed for Ambrose Diallo MD on 07/29/2018 at 4:34 PM. Electronically signed by ivanna Johnson on 07/29/2018 at 4:34 PM   The documentation recorded by the scribe accurately reflects service I personally performed and the decisions made by me.  Ambrose Diallo MD  Attending Staff Physician  [facility] Medicine  pager- [phone]  Spectr[employer]zl - [phone]

## 2018-07-31 ENCOUNTER — PATIENT OUTREACH (OUTPATIENT)
Dept: ADMINISTRATIVE | Facility: CLINIC | Age: 83
End: 2018-07-31

## 2018-07-31 NOTE — PROGRESS NOTES
Left msg on recorder to call us back to set up hosp fol up appt with dr valentin's office  For this week.  We can fit her in if they call back.

## 2018-07-31 NOTE — PROGRESS NOTES
C3 nurse attempted to contact patient. No answer. The following message was left for the patient to return the call:  Good morning I am a nurse calling on behalf of [facility] from the Care Coordination Center.  This is a Transitional Care Call for Janine Awad . When you have a moment please contact us at [phone] or [phone] Monday through Friday, between the hours of 8 am to 4 pm. We look forward to speaking with you. On behalf of [facility] have a nice day.    The patient does not have a scheduled HOSFU appointment within 7 days post hospital discharge date 7\29. Message sent to Physician staff to assist with HOSFU appointment scheduling.

## 2018-08-02 ENCOUNTER — OUTPATIENT CASE MANAGEMENT (OUTPATIENT)
Dept: ADMINISTRATIVE | Facility: OTHER | Age: 83
End: 2018-08-02

## 2018-08-02 NOTE — PROGRESS NOTES
The following patient has been assigned to Patricia Heller RN with Outpatient Complex Care Management for high risk screening.    Reason: High Risk Recently Discharged/7-    Please contact [facility] at [phone] with any questions.    Thank you,    Myrna Garcia    Outpatient Case Management

## 2018-08-02 NOTE — PROGRESS NOTES
First attempt to perform initial assessment for OCPM; left voice message to return call.  JACKSON Heller, OPCM-RN

## 2018-08-03 ENCOUNTER — PES CALL (OUTPATIENT)
Dept: ADMINISTRATIVE | Facility: CLINIC | Age: 83
End: 2018-08-03

## 2018-08-03 ENCOUNTER — OUTPATIENT CASE MANAGEMENT (OUTPATIENT)
Dept: ADMINISTRATIVE | Facility: OTHER | Age: 83
End: 2018-08-03

## 2018-08-03 NOTE — LETTER
August 3, 2018    Janine Awad  [address] [place] [id number]             [facility] [address] [place] [address] Dear Janine,     I work with [facility] Outpatient Case Management Department. I have been unsuccessful at reaching you to follow-up to see how you have been doing. If you require any future assistance or if any new concerns or problems arise, please do not hesitate to call.     The Outpatient Case Management Department can be reached at [phone] from 8:00AM to 4:30 PM on Monday thru Friday. [facility] also has a program where a nurse is available 24/7 to answer questions or provide medical advice, their number is [phone].    Thanks,      Patricia Heller,  RN  Outpatient Case Management

## 2018-08-03 NOTE — PROGRESS NOTES
2 nd attempt to complete initial assessment for OPCM; left message requesting a return call.  As this is my second unsuccessful attempt to complete initial assessment for OPCM, I will mail a letter with my contact information.  JACKSON Heller OPCM-RN

## 2018-08-10 ENCOUNTER — OUTPATIENT CASE MANAGEMENT (OUTPATIENT)
Dept: ADMINISTRATIVE | Facility: OTHER | Age: 83
End: 2018-08-10

## 2018-08-10 NOTE — PROGRESS NOTES
Third attempt to perform initial assessment for OPCM; left voice message to return call.  Will dis-enroll patient at this time.  Case closed.  JACKSON Heller, OPCM-RN

## 2018-08-16 ENCOUNTER — HOSPITAL ENCOUNTER (OUTPATIENT)
Dept: RADIOLOGY | Facility: HOSPITAL | Age: 83
Discharge: HOME | End: 2018-08-16
Attending: INTERNAL MEDICINE
Payer: MEDICARE

## 2018-08-16 ENCOUNTER — TELEPHONE (OUTPATIENT)
Dept: NEUROSURGERY | Facility: CLINIC | Age: 83
End: 2018-08-16

## 2018-08-16 DIAGNOSIS — S06.5XAA SDH (SUBDURAL HEMATOMA): ICD-10-CM

## 2018-08-16 PROCEDURE — 70450 CT HEAD/BRAIN W/O DYE: CPT | Mod: 26,,, | Performed by: RADIOLOGY

## 2018-08-16 PROCEDURE — 70450 CT HEAD/BRAIN W/O DYE: CPT | Mod: TC

## 2018-08-16 NOTE — TELEPHONE ENCOUNTER
----- Message from Becca Harvey sent at 8/16/2018 10:14 AM CDT -----  Contact: Christiane (g-daughter) @ [phone]  Calling to speak with someone in Dr. Zhang's office says the patient is just finishing up with the appt at the imaging center, and they are in the garage looking for a wheelchair, says the patient can't walk, asking if the patient will still be seen today. Please call.

## 2018-08-23 ENCOUNTER — TELEPHONE (OUTPATIENT)
Dept: PHYSICAL MEDICINE AND REHAB | Facility: CLINIC | Age: 83
End: 2018-08-23

## 2018-08-23 NOTE — TELEPHONE ENCOUNTER
Clinic is closed until 09/10/18, no schedule appointment for any patient.  Patient never seen in this clinic.  No answer when calling for referral/ reason for visit to schedule.    ----- Message from Elli Ferguson sent at 8/23/2018  3:15 PM CDT -----  Contact: Gudelia (granddaughter) @ [phone]  Pt is scheduled to f/u at 4:20 today but pts granddaughter is not able to bring her due to a new medication she started that is causing her dizziness.  Would like to reschedule but the 1st available is 9-20-18.  Would like an appt asap.  Pls call.

## 2018-10-20 ENCOUNTER — HOSPITAL ENCOUNTER (OUTPATIENT)
Facility: HOSPITAL | Age: 83
LOS: 1 days | Discharge: HOME | End: 2018-10-21
Attending: EMERGENCY MEDICINE | Admitting: HOSPITALIST
Payer: MEDICARE

## 2018-10-20 DIAGNOSIS — I35.0 SEVERE AORTIC STENOSIS: ICD-10-CM

## 2018-10-20 DIAGNOSIS — I50.30 (HFPEF) HEART FAILURE WITH PRESERVED EJECTION FRACTION: ICD-10-CM

## 2018-10-20 DIAGNOSIS — R41.3 MEMORY LOSS: ICD-10-CM

## 2018-10-20 DIAGNOSIS — E78.2 MIXED HYPERLIPIDEMIA: ICD-10-CM

## 2018-10-20 DIAGNOSIS — I10 ESSENTIAL HYPERTENSION: ICD-10-CM

## 2018-10-20 DIAGNOSIS — F01.518 MIXED ALZHEIMER'S AND VASCULAR DEMENTIA WITH BEHAVIOR DISTURBANCES: ICD-10-CM

## 2018-10-20 DIAGNOSIS — G30.9 MIXED ALZHEIMER'S AND VASCULAR DEMENTIA WITH BEHAVIOR DISTURBANCES: ICD-10-CM

## 2018-10-20 DIAGNOSIS — F02.818 MIXED ALZHEIMER'S AND VASCULAR DEMENTIA WITH BEHAVIOR DISTURBANCES: ICD-10-CM

## 2018-10-20 DIAGNOSIS — I34.0 MODERATE MITRAL REGURGITATION: ICD-10-CM

## 2018-10-20 DIAGNOSIS — R07.9 CHEST PAIN: Primary | ICD-10-CM

## 2018-10-20 DIAGNOSIS — I35.0 AORTIC STENOSIS, SEVERE: ICD-10-CM

## 2018-10-20 LAB
ALBUMIN SERPL BCP-MCNC: 3.1 G/DL
ALP SERPL-CCNC: 89 U/L
ALT SERPL W/O P-5'-P-CCNC: 8 U/L
ANION GAP SERPL CALC-SCNC: 9 MMOL/L
AORTIC VALVE REGURGITATION: ABNORMAL
AORTIC VALVE STENOSIS: ABNORMAL
AST SERPL-CCNC: 17 U/L
BASOPHILS # BLD AUTO: 0.08 K/UL
BASOPHILS NFR BLD: 0.8 %
BILIRUB SERPL-MCNC: 0.9 MG/DL
BILIRUB UR QL STRIP: NEGATIVE
BNP SERPL-MCNC: 1306 PG/ML
BUN SERPL-MCNC: 13 MG/DL
CALCIUM SERPL-MCNC: 9.4 MG/DL
CHLORIDE SERPL-SCNC: 107 MMOL/L
CHOLEST SERPL-MCNC: 237 MG/DL
CHOLEST/HDLC SERPL: 5.6 {RATIO}
CLARITY UR REFRACT.AUTO: CLEAR
CO2 SERPL-SCNC: 23 MMOL/L
COLOR UR AUTO: YELLOW
CREAT SERPL-MCNC: 0.8 MG/DL
DIFFERENTIAL METHOD: ABNORMAL
EOSINOPHIL # BLD AUTO: 0.3 K/UL
EOSINOPHIL NFR BLD: 3.2 %
ERYTHROCYTE [DISTWIDTH] IN BLOOD BY AUTOMATED COUNT: 14 %
EST. GFR  (AFRICAN AMERICAN): >60 ML/MIN/1.73 M^2
EST. GFR  (NON AFRICAN AMERICAN): >60 ML/MIN/1.73 M^2
ESTIMATED PA SYSTOLIC PRESSURE: 26.62
GLUCOSE SERPL-MCNC: 97 MG/DL
GLUCOSE UR QL STRIP: NEGATIVE
HCT VFR BLD AUTO: 36.6 %
HDLC SERPL-MCNC: 42 MG/DL
HDLC SERPL: 17.7 %
HGB BLD-MCNC: 12 G/DL
HGB UR QL STRIP: NEGATIVE
IMM GRANULOCYTES # BLD AUTO: 0.06 K/UL
IMM GRANULOCYTES NFR BLD AUTO: 0.6 %
KETONES UR QL STRIP: NEGATIVE
LDLC SERPL CALC-MCNC: 168 MG/DL
LEUKOCYTE ESTERASE UR QL STRIP: NEGATIVE
LYMPHOCYTES # BLD AUTO: 2 K/UL
LYMPHOCYTES NFR BLD: 21.3 %
MCH RBC QN AUTO: 31.2 PG
MCHC RBC AUTO-ENTMCNC: 32.8 G/DL
MCV RBC AUTO: 95 FL
MITRAL VALVE MOBILITY: NORMAL
MITRAL VALVE REGURGITATION: ABNORMAL
MONOCYTES # BLD AUTO: 0.8 K/UL
MONOCYTES NFR BLD: 7.9 %
NEUTROPHILS # BLD AUTO: 6.3 K/UL
NEUTROPHILS NFR BLD: 66.2 %
NITRITE UR QL STRIP: NEGATIVE
NONHDLC SERPL-MCNC: 195 MG/DL
NRBC BLD-RTO: 0 /100 WBC
PH UR STRIP: 7 [PH] (ref 5–8)
PLATELET # BLD AUTO: 270 K/UL
PMV BLD AUTO: 9.9 FL
POTASSIUM SERPL-SCNC: 4.2 MMOL/L
PROT SERPL-MCNC: 7 G/DL
PROT UR QL STRIP: NEGATIVE
RBC # BLD AUTO: 3.85 M/UL
RETIRED EF AND QEF - SEE NOTES: 55 (ref 55–65)
SODIUM SERPL-SCNC: 139 MMOL/L
SP GR UR STRIP: 1.01 (ref 1–1.03)
T4 FREE SERPL-MCNC: 1.05 NG/DL
TRICUSPID VALVE REGURGITATION: ABNORMAL
TRIGL SERPL-MCNC: 135 MG/DL
TROPONIN I SERPL DL<=0.01 NG/ML-MCNC: 0.02 NG/ML
TROPONIN I SERPL DL<=0.01 NG/ML-MCNC: 0.02 NG/ML
TROPONIN I SERPL DL<=0.01 NG/ML-MCNC: 0.03 NG/ML
TSH SERPL DL<=0.005 MIU/L-ACNC: 0.52 UIU/ML
URN SPEC COLLECT METH UR: NORMAL
UROBILINOGEN UR STRIP-ACNC: 2 EU/DL
WBC # BLD AUTO: 9.59 K/UL

## 2018-10-20 PROCEDURE — 36415 COLL VENOUS BLD VENIPUNCTURE: CPT | Mod: HCNC

## 2018-10-20 PROCEDURE — 81003 URINALYSIS AUTO W/O SCOPE: CPT | Mod: HCNC

## 2018-10-20 PROCEDURE — 84443 ASSAY THYROID STIM HORMONE: CPT | Mod: HCNC

## 2018-10-20 PROCEDURE — 80061 LIPID PANEL: CPT | Mod: HCNC

## 2018-10-20 PROCEDURE — 80053 COMPREHEN METABOLIC PANEL: CPT | Mod: HCNC

## 2018-10-20 PROCEDURE — 99220 PR INITIAL OBSERVATION CARE,LEVL III: CPT | Mod: HCNC,,, | Performed by: NURSE PRACTITIONER

## 2018-10-20 PROCEDURE — 93306 TTE W/DOPPLER COMPLETE: CPT | Mod: HCNC

## 2018-10-20 PROCEDURE — 25000003 PHARM REV CODE 250: Mod: HCNC | Performed by: NURSE PRACTITIONER

## 2018-10-20 PROCEDURE — G0378 HOSPITAL OBSERVATION PER HR: HCPCS | Mod: HCNC

## 2018-10-20 PROCEDURE — 93010 ELECTROCARDIOGRAM REPORT: CPT | Mod: ,,, | Performed by: INTERNAL MEDICINE

## 2018-10-20 PROCEDURE — 93306 TTE W/DOPPLER COMPLETE: CPT | Mod: 26,,, | Performed by: INTERNAL MEDICINE

## 2018-10-20 PROCEDURE — 84484 ASSAY OF TROPONIN QUANT: CPT | Mod: HCNC

## 2018-10-20 PROCEDURE — 85025 COMPLETE CBC W/AUTO DIFF WBC: CPT | Mod: HCNC

## 2018-10-20 PROCEDURE — 84439 ASSAY OF FREE THYROXINE: CPT | Mod: HCNC

## 2018-10-20 PROCEDURE — 25000003 PHARM REV CODE 250: Mod: HCNC | Performed by: EMERGENCY MEDICINE

## 2018-10-20 PROCEDURE — 93010 ELECTROCARDIOGRAM REPORT: CPT | Mod: HCNC,,, | Performed by: INTERNAL MEDICINE

## 2018-10-20 PROCEDURE — 93005 ELECTROCARDIOGRAM TRACING: CPT | Mod: HCNC

## 2018-10-20 PROCEDURE — 84484 ASSAY OF TROPONIN QUANT: CPT | Mod: 91,HCNC

## 2018-10-20 PROCEDURE — 99285 EMERGENCY DEPT VISIT HI MDM: CPT | Mod: HCNC

## 2018-10-20 PROCEDURE — 99285 EMERGENCY DEPT VISIT HI MDM: CPT | Mod: ,,, | Performed by: EMERGENCY MEDICINE

## 2018-10-20 PROCEDURE — 83880 ASSAY OF NATRIURETIC PEPTIDE: CPT | Mod: HCNC

## 2018-10-20 PROCEDURE — 63600175 PHARM REV CODE 636 W HCPCS: Mod: HCNC | Performed by: NURSE PRACTITIONER

## 2018-10-20 RX ORDER — ACETAMINOPHEN 325 MG/1
650 TABLET ORAL EVERY 4 HOURS PRN
Status: DISCONTINUED | OUTPATIENT
Start: 2018-10-20 | End: 2018-10-21 | Stop reason: HOSPADM

## 2018-10-20 RX ORDER — ONDANSETRON 8 MG/1
8 TABLET, ORALLY DISINTEGRATING ORAL EVERY 8 HOURS PRN
Status: DISCONTINUED | OUTPATIENT
Start: 2018-10-20 | End: 2018-10-21 | Stop reason: HOSPADM

## 2018-10-20 RX ORDER — NAPROXEN SODIUM 220 MG/1
81 TABLET, FILM COATED ORAL DAILY
Status: DISCONTINUED | OUTPATIENT
Start: 2018-10-21 | End: 2018-10-21 | Stop reason: HOSPADM

## 2018-10-20 RX ORDER — NITROGLYCERIN 0.4 MG/1
0.4 TABLET SUBLINGUAL EVERY 5 MIN PRN
Status: DISCONTINUED | OUTPATIENT
Start: 2018-10-20 | End: 2018-10-21 | Stop reason: HOSPADM

## 2018-10-20 RX ORDER — CITALOPRAM 10 MG/1
10 TABLET ORAL DAILY
Status: DISCONTINUED | OUTPATIENT
Start: 2018-10-20 | End: 2018-10-20

## 2018-10-20 RX ORDER — IBUPROFEN 200 MG
16 TABLET ORAL
Status: DISCONTINUED | OUTPATIENT
Start: 2018-10-20 | End: 2018-10-21 | Stop reason: HOSPADM

## 2018-10-20 RX ORDER — GLUCAGON 1 MG
1 KIT INJECTION
Status: DISCONTINUED | OUTPATIENT
Start: 2018-10-20 | End: 2018-10-21 | Stop reason: HOSPADM

## 2018-10-20 RX ORDER — ASPIRIN 325 MG
325 TABLET ORAL
Status: COMPLETED | OUTPATIENT
Start: 2018-10-20 | End: 2018-10-20

## 2018-10-20 RX ORDER — LEVOTHYROXINE SODIUM 50 UG/1
50 TABLET ORAL
Status: DISCONTINUED | OUTPATIENT
Start: 2018-10-20 | End: 2018-10-21 | Stop reason: HOSPADM

## 2018-10-20 RX ORDER — IBUPROFEN 200 MG
24 TABLET ORAL
Status: DISCONTINUED | OUTPATIENT
Start: 2018-10-20 | End: 2018-10-21 | Stop reason: HOSPADM

## 2018-10-20 RX ORDER — LOSARTAN POTASSIUM 25 MG/1
25 TABLET ORAL DAILY
Status: DISCONTINUED | OUTPATIENT
Start: 2018-10-20 | End: 2018-10-21 | Stop reason: HOSPADM

## 2018-10-20 RX ORDER — ISOSORBIDE MONONITRATE 30 MG/1
30 TABLET, EXTENDED RELEASE ORAL DAILY
Status: DISCONTINUED | OUTPATIENT
Start: 2018-10-20 | End: 2018-10-21 | Stop reason: HOSPADM

## 2018-10-20 RX ORDER — ACETAMINOPHEN 325 MG/1
650 TABLET ORAL EVERY 8 HOURS PRN
Status: DISCONTINUED | OUTPATIENT
Start: 2018-10-20 | End: 2018-10-21 | Stop reason: HOSPADM

## 2018-10-20 RX ORDER — FUROSEMIDE 10 MG/ML
40 INJECTION INTRAMUSCULAR; INTRAVENOUS 2 TIMES DAILY
Status: DISCONTINUED | OUTPATIENT
Start: 2018-10-20 | End: 2018-10-21

## 2018-10-20 RX ORDER — DIVALPROEX SODIUM 125 MG/1
125 CAPSULE, COATED PELLETS ORAL EVERY 8 HOURS
Status: DISCONTINUED | OUTPATIENT
Start: 2018-10-20 | End: 2018-10-21 | Stop reason: HOSPADM

## 2018-10-20 RX ORDER — CHOLECALCIFEROL (VITAMIN D3) 25 MCG
1000 TABLET ORAL DAILY
Status: DISCONTINUED | OUTPATIENT
Start: 2018-10-20 | End: 2018-10-21 | Stop reason: HOSPADM

## 2018-10-20 RX ORDER — SODIUM CHLORIDE 0.9 % (FLUSH) 0.9 %
5 SYRINGE (ML) INJECTION
Status: DISCONTINUED | OUTPATIENT
Start: 2018-10-20 | End: 2018-10-21 | Stop reason: HOSPADM

## 2018-10-20 RX ORDER — PANTOPRAZOLE SODIUM 40 MG/1
40 TABLET, DELAYED RELEASE ORAL DAILY
Status: DISCONTINUED | OUTPATIENT
Start: 2018-10-20 | End: 2018-10-21 | Stop reason: HOSPADM

## 2018-10-20 RX ORDER — ASCORBIC ACID 250 MG
250 TABLET ORAL DAILY
Status: DISCONTINUED | OUTPATIENT
Start: 2018-10-20 | End: 2018-10-21 | Stop reason: HOSPADM

## 2018-10-20 RX ORDER — ATORVASTATIN CALCIUM 20 MG/1
40 TABLET, FILM COATED ORAL DAILY
Status: DISCONTINUED | OUTPATIENT
Start: 2018-10-20 | End: 2018-10-21

## 2018-10-20 RX ORDER — METOPROLOL SUCCINATE 25 MG/1
25 TABLET, EXTENDED RELEASE ORAL DAILY
Status: DISCONTINUED | OUTPATIENT
Start: 2018-10-20 | End: 2018-10-21 | Stop reason: HOSPADM

## 2018-10-20 RX ORDER — BUSPIRONE HYDROCHLORIDE 10 MG/1
30 TABLET ORAL 2 TIMES DAILY
Status: DISCONTINUED | OUTPATIENT
Start: 2018-10-20 | End: 2018-10-20

## 2018-10-20 RX ORDER — LANOLIN ALCOHOL/MO/W.PET/CERES
1000 CREAM (GRAM) TOPICAL DAILY
Status: DISCONTINUED | OUTPATIENT
Start: 2018-10-20 | End: 2018-10-21 | Stop reason: HOSPADM

## 2018-10-20 RX ADMIN — HYPROMELLOSE 2910 1 DROP: 5 SOLUTION OPHTHALMIC at 11:10

## 2018-10-20 RX ADMIN — FUROSEMIDE 40 MG: 10 INJECTION, SOLUTION INTRAMUSCULAR; INTRAVENOUS at 03:10

## 2018-10-20 RX ADMIN — LOSARTAN POTASSIUM 25 MG: 25 TABLET, FILM COATED ORAL at 12:10

## 2018-10-20 RX ADMIN — DIVALPROEX SODIUM 125 MG: 125 CAPSULE, COATED PELLETS ORAL at 11:10

## 2018-10-20 RX ADMIN — Medication 2 CAPSULE: at 12:10

## 2018-10-20 RX ADMIN — PANTOPRAZOLE SODIUM 40 MG: 40 TABLET, DELAYED RELEASE ORAL at 12:10

## 2018-10-20 RX ADMIN — CYANOCOBALAMIN TAB 1000 MCG 1000 MCG: 1000 TAB at 12:10

## 2018-10-20 RX ADMIN — ATORVASTATIN CALCIUM 40 MG: 20 TABLET, FILM COATED ORAL at 12:10

## 2018-10-20 RX ADMIN — HYPROMELLOSE 2910 1 DROP: 5 SOLUTION OPHTHALMIC at 12:10

## 2018-10-20 RX ADMIN — Medication 250 MG: at 12:10

## 2018-10-20 RX ADMIN — ISOSORBIDE MONONITRATE 30 MG: 30 TABLET, EXTENDED RELEASE ORAL at 12:10

## 2018-10-20 RX ADMIN — LEVOTHYROXINE SODIUM 50 MCG: 50 TABLET ORAL at 12:10

## 2018-10-20 RX ADMIN — Medication 1000 UNITS: at 10:10

## 2018-10-20 RX ADMIN — METOPROLOL SUCCINATE 25 MG: 25 TABLET, EXTENDED RELEASE ORAL at 12:10

## 2018-10-20 RX ADMIN — ASPIRIN 325 MG ORAL TABLET 325 MG: 325 PILL ORAL at 03:10

## 2018-10-20 RX ADMIN — HYPROMELLOSE 2910 1 DROP: 5 SOLUTION OPHTHALMIC at 05:10

## 2018-10-20 RX ADMIN — CITALOPRAM HYDROBROMIDE 10 MG: 10 TABLET ORAL at 03:10

## 2018-10-20 NOTE — ASSESSMENT & PLAN NOTE
- echo pending  - diolrenae gently  - refer to Dr. Duran upon discharge for appt early next week

## 2018-10-20 NOTE — ED PROVIDER NOTES
Encounter Date: 10/20/2018    SCRIBE #1 NOTE: I, Vijaya Monterroso, am scribing for, and in the presence of,  Dr. Zarina Faith. I have scribed the following portions of the note - the EKG reading.       History     Chief Complaint   Patient presents with    Chest Pain     Pt brought to ED via  EMS for Chest pain. PT granddaughter called because pt woke up c/o C/P. Upon EMS arrival pt chest pain free.      Patient is a 89-year-old female with a history of CAD, hyperlipidemia, severe aortic stenosis, hypothyroidism, hypertension who presents to the emergency department chest pain. Patient noted the acute onset of chest pain while going to the bathroom this evening just prior to arrival.  Localized to the center chest.  Associated with some shortness of breath. Granddaughter states that she appeared in pain. Pain lasted approximately 3-4 minutes and subsided by the time EMS arrived.  Nonexertional nature.  Patient denies any nausea or radiation of the chest pain. Patient endorses episode of upper back pain earlier today around 11:00 p.m. given nitro at that time with relief of symptoms. Patient denies any chest pain at this time.          Review of patient's allergies indicates:   Allergen Reactions    Codeine Nausea And Vomiting     chills    Lisinopril Other (See Comments)     cough    Morphine Nausea And Vomiting     Past Medical History:   Diagnosis Date    Acquired hypothyroidism     Closed displaced intertrochanteric fracture of left femur s/p IM nail on 6/5/2016 6/5/2016    Coronary artery disease involving native coronary artery of native heart without angina pectoris     Essential hypertension     GERD (gastroesophageal reflux disease)     Hx of colonic polyps     Hyperlipidemia     Macular degeneration     Mitral valve stenosis     Severe aortic stenosis 9/22/2014    Syncope 1/5/2015     Past Surgical History:   Procedure Laterality Date    DILATION AND CURETTAGE OF UTERUS      HYSTERECTOMY       IM NAILING OF FEMUR LEFT INTERTROCH -- PROFX -- BIG C ARM -- REP CONTACTED Left 6/5/2016    Performed by [facility] at [facility] OR [facility]    INTERTROCHANTERIC HIP FRACTURE SURGERY Left 06/05/2016    IM nail    TONSILLECTOMY       Family History   Problem Relation Age of Onset    Heart disease Mother     Cancer Mother     Heart disease Father     Skin cancer Father     No Known Problems Sister     No Known Problems Brother     No Known Problems Maternal Grandmother     No Known Problems Maternal Grandfather     No Known Problems Paternal Grandmother     No Known Problems Paternal Grandfather     No Known Problems Maternal Aunt     No Known Problems Maternal Uncle     No Known Problems Paternal Aunt     No Known Problems Paternal Uncle     Anemia Neg Hx     Arrhythmia Neg Hx     Asthma Neg Hx     Clotting disorder Neg Hx     Fainting Neg Hx     Heart attack Neg Hx     Heart failure Neg Hx     Hyperlipidemia Neg Hx     Hypertension Neg Hx     Stroke Neg Hx     Atrial Septal Defect Neg Hx      Social History     Tobacco Use    Smoking status: Never Smoker    Smokeless tobacco: Never Used   Substance Use Topics    Alcohol use: No     Alcohol/week: 0.6 oz     Types: 1 Standard drinks or equivalent per week     Comment: rare     Drug use: No     Review of Systems   Constitutional: Negative for chills and fever.   HENT: Negative for congestion.    Eyes: Negative for visual disturbance.   Respiratory: Negative for cough, shortness of breath and wheezing.    Cardiovascular: Positive for chest pain.   Gastrointestinal: Negative for abdominal distention, abdominal pain, blood in stool, constipation, diarrhea, nausea and vomiting.   Genitourinary: Negative for dysuria and hematuria.   Musculoskeletal: Negative for myalgias.   Skin: Negative for rash.   Neurological: Negative for headaches.       Physical Exam     Initial Vitals [10/20/18 0313]   BP Pulse Resp Temp SpO2   (!) 140/82 82 18  98.9 °F (37.2 °C) 95 %      MAP       --         Physical Exam    Nursing note and vitals reviewed.  Constitutional: She appears well-developed and well-nourished. No distress.   Patient is elderly, very comfortable appearing   HENT:   Head: Normocephalic and atraumatic.   Eyes: EOM are normal. Pupils are equal, round, and reactive to light.   Neck: Normal range of motion. Neck supple.   Cardiovascular: Normal rate and regular rhythm. Exam reveals no gallop and no friction rub.    Murmur (Systolic ejection murmur which radiates across the precordium.) heard.  Pulmonary/Chest: Breath sounds normal. No respiratory distress. She has no wheezes. She has no rhonchi. She has no rales.   Abdominal: Soft. She exhibits no distension. There is no tenderness. There is no rebound and no guarding.   Musculoskeletal: She exhibits no edema.   Neurological: She is alert and oriented to person, place, and time. No cranial nerve deficit.   Skin: Skin is warm and dry. No rash noted.   Psychiatric: She has a normal mood and affect. Her behavior is normal. Thought content normal.         ED Course   Procedures  Labs Reviewed - No data to display  EKG Readings: (Independently Interpreted)   Rhythm: Normal Sinus Rhythm. ST Segment Elevation: V1 and AVR. ST Segment Depression: I and AVL.   Unchanged from previous EKG on July 28th of this year.        Imaging Results    None          Medical Decision Making:   History:   Old Medical Records: I decided to obtain old medical records.  Independently Interpreted Test(s):   I have ordered and independently interpreted EKG Reading(s) - see prior notes  Clinical Tests:   Lab Tests: Ordered and Reviewed  Radiological Study: Ordered and Reviewed  Medical Tests: Ordered and Reviewed       APC / Resident Notes:   [facility]  This is an emergent evaluation of 89 y.o. female who presents to the emergency department with chest pain in setting of several cardiovascular risk factors.  Vital signs within  normal limits.  Patient has no chest pain at this time.  Patient feels well. Have ordered cardiac workup.  Will give ASA.  Will continue to evaluate as labs and imaging result.    Nate Belle MD  PGY-4, [facility]  3:57 AM  10/20/2018    HOIV update:  CBC within normal limits.  CMP shows albumin 3.1.  Troponin of 0.021.  BNP at 1306.  Chest x-ray shows some cardiomegaly with mild patchy airspace disease.  Patient admitted internal medicine for high risk chest pain.    Nate Belle MD  PGY-4, [employer]  5:12 AM  10/20/2018       Scribe Attestation:   Scribe #1: I performed the above scribed service and the documentation accurately describes the services I performed. I attest to the accuracy of the note.               Clinical Impression:   The encounter diagnosis was Chest pain.      Disposition:   Disposition: Admitted  Condition: Stable                        Sam Belle MD  Resident  10/20/18 0513       Sam Belle MD  Resident  10/20/18 0513

## 2018-10-20 NOTE — ED NOTES
Pts daughter says that the pt has been complaining of back pain since around 1400, so she gave her nitro, as recommended by pts cardiologist. Pt says that around 0200, pt began to have chest pain that radiated to her shoulder. Pt says that she isnt sure how long the pain lasted, but she did become SOB. Pts daughter reports that when this chest pain started the pt was gasping from SOB and pain. Pt denies N/V.

## 2018-10-20 NOTE — HPI
89 y/o F who presents with c/o SSCP (taken from notes, patient cannot recall, no family at bedside).  She has PMH Alzheimer's/ Dementia with 24 hr care, Irritable/ anxiety, Severe AS KALYANI 0.41/ MG 84, MOD/ Severe MR, PAP 47, EF 60-65% (7/26/18 echo), presumed CAD (supposed to get [facility] last year per Cardiologist Dr. Chapman note, but none in computer), HTN, GERD, HLD, Colonic polyps, macular degeneration, hypothyroidism, syncope 2015.      Per notes: Patient noted the acute onset of chest pain while going to the bathroom this evening just prior to arrival.  Localized to the center chest.  Associated with some shortness of breath. Granddaughter states that she appeared in pain. Pain lasted approximately 3-4 minutes and subsided by the time EMS arrived.  Nonexertional nature.  Patient denies any nausea or radiation of the chest pain. Patient endorses episode of upper back pain earlier today around 11:00 p.m. given nitro at that time with relief of symptoms. Patient denies any chest pain at this time.    Patient ecg no ischemia noted, BNP elevated 1306, troponin 0.021/ 0.027.  RA Sat 95%, CXR: Cardiomegaly and mild patchy airspace disease suggestive of edema.

## 2018-10-20 NOTE — ASSESSMENT & PLAN NOTE
- demand ischemia from volume overload secondary to increased po intake,coupled with severe AS   -troponin's negative / demand ischemia  - not ACS/ NSTEMI  - if pursues TAVR would need [facility] first

## 2018-10-20 NOTE — ASSESSMENT & PLAN NOTE
- Granddaughter states her celexa was changed to depakote in July, she also states she no longer takes Buspar, but her discharge summary from July has it continued.  She gave the only copy of her med list to the ED which is no longer in her blue chart and the Granddaughter didn't take it home. She gets her meds through [facility].     - sounds like she needs to f/u with her neurologist upon discharge to review her meds.

## 2018-10-20 NOTE — HOSPITAL COURSE
Admitted to hospital [facility] for acute sob/ mild pulm edema, chest pain which appears to be related to her worsening valve area. BNP 1306, troponin's c/w demand ischemia with peak at .027, ecg no ischemia, no ACS/NSTEMI. .  Keshawn admitted to having her caretakers give her grandmother more fluids than usual, which probably sent her into heart failure coupled with her severe AS.  s/p diuresis of unknown quantity and unknown weight drop as her I/o's were incomplete (not recorded / admit weights were stated).  She responded well to 40mg IV lasix.  She is no longer having chest pains or sob (though unreliable d/t her dementia).  Echo with severe MR which she's been having borderline Mod/ severe for some time now, unable to increase afterload reduction as b/p's are as low at 90's systolic at times.  Her lipid panel is needs more attention, increased lipitor to 80mg.    Discussed case with Dr. Lyons interventional cardiology and her granddaughter.  She has been successfully diuresed and granddaughter is willing to take her to see Dr. Duran/ colleague for outpatient TAVR appt/ consideration.        Dispo: home with granddaughter today.

## 2018-10-20 NOTE — SUBJECTIVE & OBJECTIVE
Past Medical History:   Diagnosis Date    Acquired hypothyroidism     Closed displaced intertrochanteric fracture of left femur s/p IM nail on 6/5/2016 6/5/2016    Coronary artery disease involving native coronary artery of native heart without angina pectoris     Essential hypertension     GERD (gastroesophageal reflux disease)     Hx of colonic polyps     Hyperlipidemia     Macular degeneration     Mitral valve stenosis     Severe aortic stenosis 9/22/2014    Syncope 1/5/2015       Past Surgical History:   Procedure Laterality Date    DILATION AND CURETTAGE OF UTERUS      HYSTERECTOMY      IM NAILING OF FEMUR LEFT INTERTROCH -- PROFX -- BIG C ARM -- REP CONTACTED Left 6/5/2016    Performed by Sergey [facility] at [facility]    INTERTROCHANTERIC HIP FRACTURE SURGERY Left 06/05/2016    IM nail    TONSILLECTOMY         Review of patient's allergies indicates:   Allergen Reactions    Codeine Nausea And Vomiting     chills    Lisinopril Other (See Comments)     cough    Morphine Nausea And Vomiting       No current facility-administered medications on file prior to encounter.      Current Outpatient Medications on File Prior to Encounter   Medication Sig    atorvastatin (LIPITOR) 40 MG tablet Take 1 tablet (40 mg total) by mouth once daily.    busPIRone (BUSPAR) 30 MG Tab Take 1 tablet (30 mg total) by mouth 2 (two) times daily.    cyanocobalamin (VITAMIN B-12) 1000 MCG tablet Take 1 tablet (1,000 mcg total) by mouth once daily.    divalproex (DEPAKOTE SPRINKLE) 125 mg capsule Take 1 capsule (125 mg total) by mouth 3 (three) times daily.    fish oil-omega-3 fatty acids 300-1,000 mg capsule Take 2 g by mouth once daily.    furosemide (LASIX) 20 MG tablet TAKE 1 TABLET ONE TIME DAILY    isosorbide mononitrate (IMDUR) 30 MG 24 hr tablet TAKE 1 TABLET ONE TIME DAILY    levothyroxine (SYNTHROID) 50 MCG tablet TAKE 1 TABLET BEFORE BREAKFAST.    losartan (COZAAR) 25 MG tablet Take by mouth.     metoprolol succinate (TOPROL-XL) 25 MG 24 hr tablet TAKE 1 TABLET ONE TIME DAILY    multivit-mins no.63/iron/folic (M-VIT ORAL) Take 1 tablet by mouth.    omeprazole (PRILOSEC) 20 MG capsule Take 20 mg by mouth once daily.     vitamin D 1000 units Tab Take 185 mg by mouth once daily.    artificial tears (ISOPTO TEARS) 0.5 % ophthalmic solution Place 1 drop into the left eye 4 (four) times daily.    aspirin 81 MG Chew Take 1 tablet (81 mg total) by mouth once daily. (Patient taking differently: Take 81 mg by mouth once daily. Prn pain)    citalopram (CELEXA) 10 MG tablet Take 1 tablet (10 mg total) by mouth once daily.    [DISCONTINUED] ascorbic acid, vitamin C, (VITAMIN C) 250 mg Chew Take by mouth.     Family History     Problem Relation (Age of Onset)    Cancer Mother    Heart disease Mother, Father    No Known Problems Sister, Brother, Maternal Grandmother, Maternal Grandfather, Paternal Grandmother, Paternal Grandfather, Maternal Aunt, Maternal Uncle, Paternal Aunt, Paternal Uncle    Skin cancer Father        Tobacco Use    Smoking status: Never Smoker    Smokeless tobacco: Never Used   Substance and Sexual Activity    Alcohol use: No     Alcohol/week: 0.6 oz     Types: 1 Standard drinks or equivalent per week     Comment: rare     Drug use: No    Sexual activity: Not on file     Review of Systems   Unable to perform ROS: Dementia   Constitutional: Negative for activity change, appetite change and fatigue.        Patient answers questions, but cannot remember all details and concern that these are not accurate data points.  She cannot recall where she is, other than she knew she was going to the hospital on [facility].  She cannot recall simple math.     HENT: Negative for congestion, rhinorrhea, sinus pressure, sore throat and trouble swallowing.    Eyes: Negative for pain, redness and visual disturbance.   Respiratory: Negative for cough, chest tightness, shortness of breath, wheezing and  stridor.    Cardiovascular: Negative for chest pain (admitted with cp but cannot recall having any ), palpitations and leg swelling.   Gastrointestinal: Negative for abdominal distention, abdominal pain, blood in stool, constipation, diarrhea, nausea and vomiting.   Endocrine: Negative for cold intolerance and heat intolerance.   Genitourinary: Negative for dysuria, frequency, hematuria and urgency.   Musculoskeletal: Negative for arthralgias, back pain, myalgias and neck pain.   Skin: Negative for color change, pallor and rash.   Allergic/Immunologic: Negative for immunocompromised state.   Neurological: Negative for dizziness, tremors, syncope, weakness, light-headedness, numbness and headaches.   Hematological: Does not bruise/bleed easily.   Psychiatric/Behavioral: Positive for confusion. Negative for agitation. The patient is not nervous/anxious.      Objective:     Vital Signs (Most Recent):  Temp: 98.6 °F (37 °C) (10/20/18 1131)  Pulse: 83 (10/20/18 1131)  Resp: 20 (10/20/18 1131)  BP: 130/61 (10/20/18 1131)  SpO2: 97 % (10/20/18 1131) Vital Signs (24h Range):  Temp:  [97.8 °F (36.6 °C)-99.2 °F (37.3 °C)] 98.6 °F (37 °C)  Pulse:  [69-83] 83  Resp:  [17-20] 20  SpO2:  [95 %-97 %] 97 %  BP: ()/(52-82) 130/61     Weight: 63.5 kg (140 lb)  Body mass index is 24.8 kg/m².    Physical Exam   Constitutional: She is oriented to person, place, and time. She appears well-developed and well-nourished. No distress.   HENT:   Head: Normocephalic and atraumatic.   Right Ear: External ear normal.   Left Ear: External ear normal.   Nose: Nose normal.   Mouth/Throat: Oropharynx is clear and moist.   Eyes: Conjunctivae and EOM are normal. No scleral icterus.   Neck: Normal range of motion. Neck supple. No JVD present. No tracheal deviation present. No thyromegaly present.   Cardiovascular: Normal rate, regular rhythm and intact distal pulses. Exam reveals no gallop and no friction rub.   Murmur (high pitched short harsh  blowing aortic murmer c/w AS ) heard.  Pulmonary/Chest: Effort normal. No respiratory distress. She has no wheezes. She has rales (slight bibasilar).   Abdominal: Soft. Bowel sounds are normal. She exhibits no distension and no mass. There is no tenderness. There is no rebound and no guarding.   Musculoskeletal: Normal range of motion. She exhibits no edema or tenderness.   Neurological: She is alert and oriented to person, place, and time. No cranial nerve deficit or sensory deficit. She exhibits normal muscle tone. Coordination normal.   Skin: Skin is dry. No rash noted. No erythema.   Cool hands and feet     Psychiatric: She has a normal mood and affect. Her behavior is normal. Judgment and thought content normal.   Nursing note and vitals reviewed.        CRANIAL NERVES     CN III, IV, VI   Extraocular motions are normal.        Significant Labs:   CBC:   Recent Labs   Lab 10/20/18  0355   WBC 9.59   HGB 12.0   HCT 36.6*        CMP:   Recent Labs   Lab 10/20/18  0355      K 4.2      CO2 23   GLU 97   BUN 13   CREATININE 0.8   CALCIUM 9.4   PROT 7.0   ALBUMIN 3.1*   BILITOT 0.9   ALKPHOS 89   AST 17   ALT 8*   ANIONGAP 9   EGFRNONAA >60.0     Cardiac Markers:   Recent Labs   Lab 10/20/18  0355   BNP 1,306*     Troponin:   Recent Labs   Lab 10/20/18  0355 10/20/18  1022 10/20/18  1259   TROPONINI 0.021 0.027* 0.019     TSH:   Recent Labs   Lab 10/20/18  0355   TSH 0.523       Significant Imaging: CXR: I have reviewed all pertinent results/findings within the past 24 hours and my personal findings are:  pulm edema  Echo: I have reviewed all pertinent results/findings within the past 24 hours and my personal findings are:  pending  EKG: I have reviewed all pertinent results/findings within the past 24 hours and my personal findings are: no ischemia

## 2018-10-20 NOTE — PROGRESS NOTES
[facility] Hospital Medicine  History and Physical    Patient Name: Janine Awad  MRN: [id number]  Patient Class: OP- Observation   Admission Date: 10/20/2018  Length of Stay: 1 days  Attending Physician: John Rob MD  Primary Care Provider: Oleg Enciso DO    [facility] Medicine Team: [facility] MED J Jessica Pan NP    Subjective:     Principal Problem:Chest pain    HPI:  89 y/o F who presents with c/o SSCP (taken from notes, patient cannot recall, no family at bedside).  She has PMH Alzheimer's/ Dementia with 24 hr care, Irritable/ anxiety, Severe AS KALYANI 0.41/ MG 84, MOD/ Severe MR, PAP 47, EF 60-65% (7/26/18 echo), presumed CAD (supposed to get [facility] last year per Cardiologist Dr. Chapman note, but none in computer), HTN, GERD, HLD, Colonic polyps, macular degeneration, hypothyroidism, syncope 2015.      Per notes: Patient noted the acute onset of chest pain while going to the bathroom this evening just prior to arrival.  Localized to the center chest.  Associated with some shortness of breath. Granddaughter states that she appeared in pain. Pain lasted approximately 3-4 minutes and subsided by the time EMS arrived.  Nonexertional nature.  Patient denies any nausea or radiation of the chest pain. Patient endorses episode of upper back pain earlier today around 11:00 p.m. given nitro at that time with relief of symptoms. Patient denies any chest pain at this time.    Patient ecg no ischemia noted, BNP elevated 1306, troponin 0.021/ 0.027.  RA Sat 95%, CXR: Cardiomegaly and mild patchy airspace disease suggestive of edema.    Hospital Course:  Admitted to hospital Med J for acute sob/ mild pulm edema, chest pain which appears to be related to her worsening valve area.  Keshawn admitted to having her caretakers give her GM more fluids than usual, which probably sent her into heart failure coupled with her severe AS.  Gentle Lasix IVP to diurese.  No longer having  chest pain, troponin's, ecg no ischemia, no ACS/NSTEMI.  Will diurese and make arrangments for outpatient work up with TAVR valve team.  Granddaughter is willing to go see Dr. Duran again to see about getting information on TAVR.      Dispo: home in am after diuresis    Past Medical History:   Diagnosis Date    Acquired hypothyroidism     Closed displaced intertrochanteric fracture of left femur s/p IM nail on 6/5/2016 6/5/2016    Coronary artery disease involving native coronary artery of native heart without angina pectoris     Essential hypertension     GERD (gastroesophageal reflux disease)     Hx of colonic polyps     Hyperlipidemia     Macular degeneration     Mitral valve stenosis     Severe aortic stenosis 9/22/2014    Syncope 1/5/2015       Past Surgical History:   Procedure Laterality Date    DILATION AND CURETTAGE OF UTERUS      HYSTERECTOMY      IM NAILING OF FEMUR LEFT INTERTROCH -- PROFX -- BIG C ARM -- REP CONTACTED Left 6/5/2016    Performed [facility] at [facility]ND [facility]    INTERTROCHANTERIC HIP FRACTURE SURGERY Left 06/05/2016    IM nail    TONSILLECTOMY         Review of patient's allergies indicates:   Allergen Reactions    Codeine Nausea And Vomiting     chills    Lisinopril Other (See Comments)     cough    Morphine Nausea And Vomiting       No current facility-administered medications on file prior to encounter.      Current Outpatient Medications on File Prior to Encounter   Medication Sig    atorvastatin (LIPITOR) 40 MG tablet Take 1 tablet (40 mg total) by mouth once daily.    busPIRone (BUSPAR) 30 MG Tab Take 1 tablet (30 mg total) by mouth 2 (two) times daily.    cyanocobalamin (VITAMIN B-12) 1000 MCG tablet Take 1 tablet (1,000 mcg total) by mouth once daily.    divalproex (DEPAKOTE SPRINKLE) 125 mg capsule Take 1 capsule (125 mg total) by mouth 3 (three) times daily.    fish oil-omega-3 fatty acids 300-1,000 mg capsule Take 2 g by mouth once daily.     furosemide (LASIX) 20 MG tablet TAKE 1 TABLET ONE TIME DAILY    isosorbide mononitrate (IMDUR) 30 MG 24 hr tablet TAKE 1 TABLET ONE TIME DAILY    levothyroxine (SYNTHROID) 50 MCG tablet TAKE 1 TABLET BEFORE BREAKFAST.    losartan (COZAAR) 25 MG tablet Take by mouth.    metoprolol succinate (TOPROL-XL) 25 MG 24 hr tablet TAKE 1 TABLET ONE TIME DAILY    multivit-mins no.63/iron/folic (M-VIT ORAL) Take 1 tablet by mouth.    omeprazole (PRILOSEC) 20 MG capsule Take 20 mg by mouth once daily.     vitamin D 1000 units Tab Take 185 mg by mouth once daily.    artificial tears (ISOPTO TEARS) 0.5 % ophthalmic solution Place 1 drop into the left eye 4 (four) times daily.    aspirin 81 MG Chew Take 1 tablet (81 mg total) by mouth once daily. (Patient taking differently: Take 81 mg by mouth once daily. Prn pain)    citalopram (CELEXA) 10 MG tablet Take 1 tablet (10 mg total) by mouth once daily.    [DISCONTINUED] ascorbic acid, vitamin C, (VITAMIN C) 250 mg Chew Take by mouth.     Family History     Problem Relation (Age of Onset)    Cancer Mother    Heart disease Mother, Father    No Known Problems Sister, Brother, Maternal Grandmother, Maternal Grandfather, Paternal Grandmother, Paternal Grandfather, Maternal Aunt, Maternal Uncle, Paternal Aunt, Paternal Uncle    Skin cancer Father        Tobacco Use    Smoking status: Never Smoker    Smokeless tobacco: Never Used   Substance and Sexual Activity    Alcohol use: No     Alcohol/week: 0.6 oz     Types: 1 Standard drinks or equivalent per week     Comment: rare     Drug use: No    Sexual activity: Not on file     Review of Systems   Unable to perform ROS: Dementia   Constitutional: Negative for activity change, appetite change and fatigue.        Patient answers questions, but cannot remember all details and concern that these are not accurate data points.  She cannot recall where she is, other than she knew she was going to the hospital on [facility].  She  cannot recall simple math.     HENT: Negative for congestion, rhinorrhea, sinus pressure, sore throat and trouble swallowing.    Eyes: Negative for pain, redness and visual disturbance.   Respiratory: Negative for cough, chest tightness, shortness of breath, wheezing and stridor.    Cardiovascular: Negative for chest pain (admitted with cp but cannot recall having any ), palpitations and leg swelling.   Gastrointestinal: Negative for abdominal distention, abdominal pain, blood in stool, constipation, diarrhea, nausea and vomiting.   Endocrine: Negative for cold intolerance and heat intolerance.   Genitourinary: Negative for dysuria, frequency, hematuria and urgency.   Musculoskeletal: Negative for arthralgias, back pain, myalgias and neck pain.   Skin: Negative for color change, pallor and rash.   Allergic/Immunologic: Negative for immunocompromised state.   Neurological: Negative for dizziness, tremors, syncope, weakness, light-headedness, numbness and headaches.   Hematological: Does not bruise/bleed easily.   Psychiatric/Behavioral: Positive for confusion. Negative for agitation. The patient is not nervous/anxious.      Objective:     Vital Signs (Most Recent):  Temp: 98.6 °F (37 °C) (10/20/18 1131)  Pulse: 83 (10/20/18 1131)  Resp: 20 (10/20/18 1131)  BP: 130/61 (10/20/18 1131)  SpO2: 97 % (10/20/18 1131) Vital Signs (24h Range):  Temp:  [97.8 °F (36.6 °C)-99.2 °F (37.3 °C)] 98.6 °F (37 °C)  Pulse:  [69-83] 83  Resp:  [17-20] 20  SpO2:  [95 %-97 %] 97 %  BP: ()/(52-82) 130/61     Weight: 63.5 kg (140 lb)  Body mass index is 24.8 kg/m².    Physical Exam   Constitutional: She is oriented to person, place, and time. She appears well-developed and well-nourished. No distress.   HENT:   Head: Normocephalic and atraumatic.   Right Ear: External ear normal.   Left Ear: External ear normal.   Nose: Nose normal.   Mouth/Throat: Oropharynx is clear and moist.   Eyes: Conjunctivae and EOM are normal. No scleral  icterus.   Neck: Normal range of motion. Neck supple. No JVD present. No tracheal deviation present. No thyromegaly present.   Cardiovascular: Normal rate, regular rhythm and intact distal pulses. Exam reveals no gallop and no friction rub.   Murmur (high pitched short harsh blowing aortic murmer c/w AS ) heard.  Pulmonary/Chest: Effort normal. No respiratory distress. She has no wheezes. She has rales (slight bibasilar).   Abdominal: Soft. Bowel sounds are normal. She exhibits no distension and no mass. There is no tenderness. There is no rebound and no guarding.   Musculoskeletal: Normal range of motion. She exhibits no edema or tenderness.   Neurological: She is alert and oriented to person, place, and time. No cranial nerve deficit or sensory deficit. She exhibits normal muscle tone. Coordination normal.   Skin: Skin is dry. No rash noted. No erythema.   Cool hands and feet     Psychiatric: She has a normal mood and affect. Her behavior is normal. Judgment and thought content normal.   Nursing note and vitals reviewed.        CRANIAL NERVES     CN III, IV, VI   Extraocular motions are normal.        Significant Labs:   CBC:   Recent Labs   Lab 10/20/18  0355   WBC 9.59   HGB 12.0   HCT 36.6*        CMP:   Recent Labs   Lab 10/20/18  0355      K 4.2      CO2 23   GLU 97   BUN 13   CREATININE 0.8   CALCIUM 9.4   PROT 7.0   ALBUMIN 3.1*   BILITOT 0.9   ALKPHOS 89   AST 17   ALT 8*   ANIONGAP 9   EGFRNONAA >60.0     Cardiac Markers:   Recent Labs   Lab 10/20/18  0355   BNP 1,306*     Troponin:   Recent Labs   Lab 10/20/18  0355 10/20/18  1022 10/20/18  1259   TROPONINI 0.021 0.027* 0.019     TSH:   Recent Labs   Lab 10/20/18  0355   TSH 0.523       Significant Imaging: CXR: I have reviewed all pertinent results/findings within the past 24 hours and my personal findings are:  pulm edema  Echo: I have reviewed all pertinent results/findings within the past 24 hours and my personal findings are:   pending  EKG: I have reviewed all pertinent results/findings within the past 24 hours and my personal findings are: no ischemia    Assessment/Plan:      * Chest pain    - demand ischemia from volume overload secondary to increased po intake,coupled with severe AS   -troponin's negative / demand ischemia  - not ACS/ NSTEMI  - if pursues TAVR would need [facility] first        Severe aortic stenosis    - echo pending  - jaxon gently  - refer to Dr. 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  She gave the only copy of her med list to the ED which is no longer in her blue chart and the Granddaughter didn't take it home. She gets her meds through [facility] [employer].     - sounds like she needs to f/u with her neurologist upon discharge to review her meds.        Moderate mitral regurgitation    - encourage afterload reduction        Mixed hyperlipidemia    - atorvastatin  - check lipid panel       (HFpEF) heart failure with preserved ejection fraction    - dilorenae, then f/u with TAVR team next week         VTE Risk Mitigation (From admission, onward)        Ordered     Place sequential compression device  Until discontinued      10/20/18 1007     IP VTE HIGH RISK PATIENT  Once      10/20/18 1007     Place BRITTANY hose  Until discontinued      10/20/18 0512              Jessica Pan NP  Department of Hospital Medicine   [facility]  Spectra:  [phone]  Pager: [phone]

## 2018-10-21 VITALS
WEIGHT: 131.75 LBS | HEART RATE: 79 BPM | BODY MASS INDEX: 23.34 KG/M2 | TEMPERATURE: 97 F | SYSTOLIC BLOOD PRESSURE: 99 MMHG | OXYGEN SATURATION: 96 % | RESPIRATION RATE: 18 BRPM | DIASTOLIC BLOOD PRESSURE: 60 MMHG | HEIGHT: 63 IN

## 2018-10-21 PROBLEM — R07.9 CHEST PAIN: Status: RESOLVED | Noted: 2018-10-20 | Resolved: 2018-10-21

## 2018-10-21 LAB
ANION GAP SERPL CALC-SCNC: 10 MMOL/L
BUN SERPL-MCNC: 17 MG/DL
CALCIUM SERPL-MCNC: 9.2 MG/DL
CHLORIDE SERPL-SCNC: 106 MMOL/L
CO2 SERPL-SCNC: 22 MMOL/L
CREAT SERPL-MCNC: 0.8 MG/DL
EST. GFR  (AFRICAN AMERICAN): >60 ML/MIN/1.73 M^2
EST. GFR  (NON AFRICAN AMERICAN): >60 ML/MIN/1.73 M^2
GLUCOSE SERPL-MCNC: 92 MG/DL
MAGNESIUM SERPL-MCNC: 2.2 MG/DL
POTASSIUM SERPL-SCNC: 3.8 MMOL/L
SODIUM SERPL-SCNC: 138 MMOL/L

## 2018-10-21 PROCEDURE — 36415 COLL VENOUS BLD VENIPUNCTURE: CPT | Mod: HCNC

## 2018-10-21 PROCEDURE — 83735 ASSAY OF MAGNESIUM: CPT | Mod: HCNC

## 2018-10-21 PROCEDURE — 80048 BASIC METABOLIC PNL TOTAL CA: CPT | Mod: HCNC

## 2018-10-21 PROCEDURE — 25000003 PHARM REV CODE 250: Mod: HCNC | Performed by: NURSE PRACTITIONER

## 2018-10-21 PROCEDURE — G0378 HOSPITAL OBSERVATION PER HR: HCPCS | Mod: HCNC

## 2018-10-21 PROCEDURE — 99217 PR OBSERVATION CARE DISCHARGE: CPT | Mod: HCNC,,, | Performed by: NURSE PRACTITIONER

## 2018-10-21 RX ORDER — ATORVASTATIN CALCIUM 80 MG/1
80 TABLET, FILM COATED ORAL DAILY
Qty: 90 TABLET | Refills: 3 | Status: SHIPPED | OUTPATIENT
Start: 2018-10-21 | End: 2019-10-21

## 2018-10-21 RX ORDER — ATORVASTATIN CALCIUM 20 MG/1
80 TABLET, FILM COATED ORAL DAILY
Status: DISCONTINUED | OUTPATIENT
Start: 2018-10-21 | End: 2018-10-21 | Stop reason: HOSPADM

## 2018-10-21 RX ORDER — NITROGLYCERIN 0.4 MG/1
0.4 TABLET SUBLINGUAL EVERY 5 MIN PRN
Qty: 25 TABLET | Refills: 0
Start: 2018-10-21 | End: 2019-10-21

## 2018-10-21 RX ORDER — FUROSEMIDE 20 MG/1
20 TABLET ORAL DAILY
Status: DISCONTINUED | OUTPATIENT
Start: 2018-10-21 | End: 2018-10-21 | Stop reason: HOSPADM

## 2018-10-21 RX ORDER — NAPROXEN SODIUM 220 MG/1
81 TABLET, FILM COATED ORAL DAILY
Refills: 0 | COMMUNITY
Start: 2018-10-21 | End: 2019-10-21

## 2018-10-21 RX ORDER — ATORVASTATIN CALCIUM 20 MG/1
80 TABLET, FILM COATED ORAL DAILY
Status: DISCONTINUED | OUTPATIENT
Start: 2018-10-22 | End: 2018-10-21

## 2018-10-21 RX ADMIN — FUROSEMIDE 20 MG: 20 TABLET ORAL at 11:10

## 2018-10-21 RX ADMIN — METOPROLOL SUCCINATE 25 MG: 25 TABLET, EXTENDED RELEASE ORAL at 11:10

## 2018-10-21 RX ADMIN — CYANOCOBALAMIN TAB 1000 MCG 1000 MCG: 1000 TAB at 11:10

## 2018-10-21 RX ADMIN — DIVALPROEX SODIUM 125 MG: 125 CAPSULE, COATED PELLETS ORAL at 05:10

## 2018-10-21 RX ADMIN — ATORVASTATIN CALCIUM 80 MG: 20 TABLET, FILM COATED ORAL at 10:10

## 2018-10-21 RX ADMIN — Medication 1000 UNITS: at 11:10

## 2018-10-21 RX ADMIN — Medication 250 MG: at 11:10

## 2018-10-21 RX ADMIN — ASPIRIN 81 MG CHEWABLE TABLET 81 MG: 81 TABLET CHEWABLE at 11:10

## 2018-10-21 RX ADMIN — PANTOPRAZOLE SODIUM 40 MG: 40 TABLET, DELAYED RELEASE ORAL at 11:10

## 2018-10-21 RX ADMIN — HYPROMELLOSE 2910 1 DROP: 5 SOLUTION OPHTHALMIC at 11:10

## 2018-10-21 RX ADMIN — LOSARTAN POTASSIUM 25 MG: 25 TABLET, FILM COATED ORAL at 11:10

## 2018-10-21 RX ADMIN — ISOSORBIDE MONONITRATE 30 MG: 30 TABLET, EXTENDED RELEASE ORAL at 11:10

## 2018-10-21 RX ADMIN — Medication 2 CAPSULE: at 11:10

## 2018-10-21 RX ADMIN — LEVOTHYROXINE SODIUM 50 MCG: 50 TABLET ORAL at 05:10

## 2018-10-21 NOTE — ASSESSMENT & PLAN NOTE
- diuresed unknown amount   - f/u with TAVR team this week with Dr. Duran/ Celine/ Reynaldo whomever has next available appt

## 2018-10-21 NOTE — ED NOTES
Order to d/c home today. Saline lock removed. VSS. Discharge instructions given to pt and granddaughter. Granddaughter verbalized understanding. Pt discharged from OBS unit via w/c. Pt accompanied by granddaughter. All personal belongings taken home by pt.

## 2018-10-21 NOTE — ASSESSMENT & PLAN NOTE
- atorvastatin increased to 80 mg daily  -   Lab Results   Component Value Date    CHOL 237 (H) 10/20/2018    CHOL 187 12/06/2017    CHOL 212 (H) 04/19/2017     Lab Results   Component Value Date    HDL 42 10/20/2018    HDL 52 12/06/2017    HDL 55 04/19/2017     Lab Results   Component Value Date    LDLCALC 168.0 (H) 10/20/2018    LDLCALC 108.8 12/06/2017    LDLCALC 106.8 04/19/2017     Lab Results   Component Value Date    TRIG 135 10/20/2018    TRIG 131 12/06/2017    TRIG 251 (H) 04/19/2017     Lab Results   Component Value Date    CHOLHDL 17.7 (L) 10/20/2018    CHOLHDL 27.8 12/06/2017    CHOLHDL 25.9 04/19/2017

## 2018-10-21 NOTE — H&P
Please see ELLIE Pan note at 10/20/2018 9:30 AM which is an H&P, however labeled as progress note.

## 2018-10-21 NOTE — ASSESSMENT & PLAN NOTE
- diuresed unknown quantity d/t I/o's not recorded and admit weight was stated, discharge weight is standing at 131 lbs.    - Echo findings;   1 - Severe left atrial enlargement.   2 - No wall motion abnormalities.   3 - Normal left ventricular systolic function (EF 55-60%).   4 - Normal right ventricular systolic function .   5 - The estimated PA systolic pressure is 27 mmHg.   6 - Severe aortic valve stenosis (KALYANI 0.30 cm2, AVAi 0.18 cm2/m2, peak aortic jet velocity 5.2 m/s,MG 81 mmHg, ).   7 - Mild aortic regurgitation.   8 - Severe mitral regurgitation.     - refer to Dr. Duran (or colleagues) upon discharge for appt this week

## 2018-10-21 NOTE — PLAN OF CARE
Problem: Patient Care Overview  Goal: Plan of Care Review  Outcome: Ongoing (interventions implemented as appropriate)  Pt with no falls or injuries this hospital stay. Cardiac monitoring NSR. Pt ambulates in room with standby assist. Pt denies pain this shift.

## 2018-10-21 NOTE — ASSESSMENT & PLAN NOTE
- Granddaughter states her celexa was changed to depakote in July, she also states she no longer takes Buspar, but her discharge summary from July has it continued.  She gave the only copy of her med list to the ED which is no longer in her blue chart and the Granddaughter didn't take it home. She gets her meds through [employer] mail order.     - she needs to f/u with her neurologist upon discharge to review her meds.

## 2018-10-21 NOTE — PLAN OF CARE
Problem: Patient Care Overview  Goal: Plan of Care Review  Outcome: Ongoing (interventions implemented as appropriate)  Will continue to monitor pt safety and assist pt to restroom, will continue to monitor pt's pain, monitor pt's heart rate and rhythm on telemetry monitor.  Will continue to monitor pt for pain.

## 2018-10-21 NOTE — ASSESSMENT & PLAN NOTE
- echo with severe MR,    - on losartan 25, b/p's range from , encourage afterload reduction but b/p's so labile, do not think she will tolerate much lower b/p with increased dose of losartan.  - needs to have C

## 2018-10-22 ENCOUNTER — OFFICE VISIT (OUTPATIENT)
Dept: CARDIOLOGY | Facility: CLINIC | Age: 83
End: 2018-10-22
Payer: MEDICARE

## 2018-10-22 ENCOUNTER — TELEPHONE (OUTPATIENT)
Dept: CARDIOLOGY | Facility: CLINIC | Age: 83
End: 2018-10-22

## 2018-10-22 ENCOUNTER — HOSPITAL ENCOUNTER (OUTPATIENT)
Dept: CARDIOLOGY | Facility: CLINIC | Age: 83
Discharge: HOME | End: 2018-10-22
Payer: MEDICARE

## 2018-10-22 VITALS
HEIGHT: 62 IN | HEART RATE: 67 BPM | SYSTOLIC BLOOD PRESSURE: 100 MMHG | WEIGHT: 134.94 LBS | BODY MASS INDEX: 24.83 KG/M2 | DIASTOLIC BLOOD PRESSURE: 52 MMHG

## 2018-10-22 DIAGNOSIS — R00.2 PALPITATIONS: ICD-10-CM

## 2018-10-22 DIAGNOSIS — F02.80 LATE ONSET ALZHEIMER'S DISEASE WITHOUT BEHAVIORAL DISTURBANCE: ICD-10-CM

## 2018-10-22 DIAGNOSIS — I35.0 SEVERE AORTIC STENOSIS: Primary | ICD-10-CM

## 2018-10-22 DIAGNOSIS — I21.4 NSTEMI (NON-ST ELEVATED MYOCARDIAL INFARCTION): ICD-10-CM

## 2018-10-22 DIAGNOSIS — I25.10 CORONARY ARTERY DISEASE INVOLVING NATIVE CORONARY ARTERY OF NATIVE HEART WITHOUT ANGINA PECTORIS: ICD-10-CM

## 2018-10-22 DIAGNOSIS — R00.2 PALPITATIONS: Primary | ICD-10-CM

## 2018-10-22 DIAGNOSIS — G30.1 LATE ONSET ALZHEIMER'S DISEASE WITHOUT BEHAVIORAL DISTURBANCE: ICD-10-CM

## 2018-10-22 PROCEDURE — 93010 ELECTROCARDIOGRAM REPORT: CPT | Mod: S$PBB,HCNC,, | Performed by: INTERNAL MEDICINE

## 2018-10-22 PROCEDURE — 99999 PR PBB SHADOW E&M-EST. PATIENT-LVL III: CPT | Mod: PBBFAC,HCNC,, | Performed by: INTERNAL MEDICINE

## 2018-10-22 PROCEDURE — 99213 OFFICE O/P EST LOW 20 MIN: CPT | Mod: PBBFAC,HCNC,25 | Performed by: INTERNAL MEDICINE

## 2018-10-22 PROCEDURE — 99499 UNLISTED E&M SERVICE: CPT | Mod: S$GLB,,, | Performed by: INTERNAL MEDICINE

## 2018-10-22 PROCEDURE — 99214 OFFICE O/P EST MOD 30 MIN: CPT | Mod: S$PBB,,, | Performed by: INTERNAL MEDICINE

## 2018-10-22 PROCEDURE — 93005 ELECTROCARDIOGRAM TRACING: CPT | Mod: PBBFAC,HCNC | Performed by: INTERNAL MEDICINE

## 2018-10-22 PROCEDURE — 1100F PTFALLS ASSESS-DOCD GE2>/YR: CPT | Mod: CPTII,,, | Performed by: INTERNAL MEDICINE

## 2018-10-22 PROCEDURE — 3288F FALL RISK ASSESSMENT DOCD: CPT | Mod: CPTII,,, | Performed by: INTERNAL MEDICINE

## 2018-10-22 NOTE — LETTER
October 22, 2018      Tim Duran MD  [address]  [place] [address]           [facility] - Cardiology  [address]  [facility] [address]  Phone: [phone]          Patient: Janine Awad   MR Number: [id number]   YOB: 1927   Date of Visit: 10/22/2018       Dear Dr. Tim Duran:    Thank you for referring Janine Awad to me for evaluation. Attached you will find relevant portions of my assessment and plan of care.    If you have questions, please do not hesitate to call me. I look forward to following Janine Awad along with you.    Sincerely,    Opal Jeffers MD    Enclosure  CC:  No Recipients    If you would like to receive this communication electronically, please contact [email or web address] or [phone] to request more information on [employer] access.    For providers and/or their staff who would like to refer a patient to [facility], please contact us through our one-stop-shop provider referral line, [facility], at [phone].    If you feel you have received this communication in error or would no longer like to receive these types of communications, please e-mail [email or web address]

## 2018-10-22 NOTE — PROGRESS NOTES
Subjective:   Patient ID:  Janine Awad is a 89 y.o. female who presents for follow-up of Chest Pain (ER fu 10/20/18)  88 yo F w hx of severe AS, anxiety, Alzheimer's dimentia, recently admitted and discharged on 2/3 for acute on chronic diastolic CHF w PNA. Pt unable to give a hx 2/2 to memory loss. Her daughter, who lives with her, reported that she was having CP intermittently over the last few nights. Tonight, her mother went to bed then called out to her and stated that the chest pain had returned and was now worse, lasting for more than 1 hour, radiating down her L arm and into her back and shoulder. Her mother described the pain as a pressure. She stated that her mother was not diaphoretic. Nothing made it better or worse.      Ms Awad has known AS and has been evaluated for TAVR in the past which she refused again at a Cardiology apt 10/2016, which was confirmed with her daughter. She again confirmed this tonight in a conversation with Dr. Mayfield. During this conversation, Dr. Mayfield asked Ms Awad's daughter about her wishes regarding possible PCI and she stated that Ms Awad does not want any invasive procedures including catheterization. Dr. Mayfield also discussed the risks associated with heparin infusion as part of conservative management.     ER visit:  Patient is a 89-year-old female with a history of CAD, hyperlipidemia, severe aortic stenosis, hypothyroidism, hypertension who presents to the emergency department chest pain. Patient noted the acute onset of chest pain while going to the bathroom this evening just prior to arrival.  Localized to the center chest.  Associated with some shortness of breath. Granddaughter states that she appeared in pain. Pain lasted approximately 3-4 minutes and subsided by the time EMS arrived.  Nonexertional nature.  Patient denies any nausea or radiation of the chest pain. Patient endorses episode of upper back pain earlier today around 11:00 p.m. given  "nitro at that time with relief of symptoms. Patient denies any chest pain at this time.       HPI:   Patient could not answer any of my questions and could not remember chest pain.  Per the granddaughter she does not want any advance treatment.     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    Chest pain at rest    Severe mitral valve regurgitation    Insomnia    Senile purpura    Aortic atherosclerosis    Anxiety    Physical deconditioning    Oropharyngeal dysphagia    Oral phase dysphagia    (HFpEF) heart failure with preserved ejection fraction    Acute delirium    Coronary artery disease of native artery of native heart with stable angina pectoris    Chronic diastolic heart failure    SDH (subdural hematoma)    Post-traumatic subdural hematoma     BP (!) 100/52 (BP Location: Left arm, Patient Position: Sitting, BP Method: Large (Automatic))   Pulse 67   Ht 5' 2" (1.575 m)   Wt 61.2 kg (134 lb 14.7 oz)   LMP  (LMP Unknown)   BMI 24.68 kg/m²   Body mass index is 24.68 kg/m².  Estimated Creatinine Clearance: 39.4 mL/min (based on SCr of 0.8 mg/dL).    Lab Results   Component Value Date     10/21/2018    K 3.8 10/21/2018     10/21/2018    CO2 22 (L) 10/21/2018    BUN 17 10/21/2018    CREATININE 0.8 10/21/2018    GLU 92 10/21/2018    HGBA1C 5.7 03/24/2016    MG 2.2 10/21/2018    AST 17 10/20/2018    ALT 8 (L) 10/20/2018    ALBUMIN 3.1 (L) 10/20/2018    PROT 7.0 10/20/2018    BILITOT 0.9 10/20/2018    WBC 9.59 10/20/2018    HGB 12.0 10/20/2018    HCT 36.6 (L) 10/20/2018    MCV 95 10/20/2018     10/20/2018    INR 1.1 07/25/2018    TSH 0.523 10/20/2018    CHOL 237 (H) 10/20/2018    " HDL 42 10/20/2018    LDLCALC 168.0 (H) 10/20/2018    TRIG 135 10/20/2018       Current Outpatient Medications   Medication Sig    artificial tears (ISOPTO TEARS) 0.5 % ophthalmic solution Place 1 drop into the left eye 4 (four) times daily.    aspirin 81 MG Chew Take 1 tablet (81 mg total) by mouth once daily.    atorvastatin (LIPITOR) 80 MG tablet Take 1 tablet (80 mg total) by mouth once daily.    cyanocobalamin (VITAMIN B-12) 1000 MCG tablet Take 1 tablet (1,000 mcg total) by mouth once daily.    divalproex (DEPAKOTE SPRINKLE) 125 mg capsule Take 1 capsule (125 mg total) by mouth 3 (three) times daily.    fish oil-omega-3 fatty acids 300-1,000 mg capsule Take 2 g by mouth once daily.    furosemide (LASIX) 20 MG tablet TAKE 1 TABLET ONE TIME DAILY    isosorbide mononitrate (IMDUR) 30 MG 24 hr tablet TAKE 1 TABLET ONE TIME DAILY    levothyroxine (SYNTHROID) 50 MCG tablet TAKE 1 TABLET BEFORE BREAKFAST.    losartan (COZAAR) 25 MG tablet Take 25 mg by mouth once daily.     metoprolol succinate (TOPROL-XL) 25 MG 24 hr tablet TAKE 1 TABLET ONE TIME DAILY    multivit-mins no.63/iron/folic (M-VIT ORAL) Take 1 tablet by mouth once daily.     nitroGLYCERIN (NITROSTAT) 0.4 MG SL tablet Place 1 tablet (0.4 mg total) under the tongue every 5 (five) minutes as needed for Chest pain.    omeprazole (PRILOSEC) 20 MG capsule Take 20 mg by mouth once daily.     vitamin D 1000 units Tab Take 185 mg by mouth once daily.     No current facility-administered medications for this visit.        Review of Systems   Constitution: Negative for chills, decreased appetite, weakness, malaise/fatigue, night sweats, weight gain and weight loss.   Eyes: Negative for blurred vision, double vision, visual disturbance and visual halos.   Cardiovascular: Negative for chest pain, claudication, cyanosis, dyspnea on exertion, irregular heartbeat, leg swelling, near-syncope, orthopnea, palpitations, paroxysmal nocturnal dyspnea and  syncope.   Respiratory: Positive for shortness of breath. Negative for cough, hemoptysis, sleep disturbances due to breathing, snoring, sputum production and wheezing.    Endocrine: Negative for cold intolerance, heat intolerance, polydipsia and polyphagia.   Hematologic/Lymphatic: Negative for adenopathy and bleeding problem. Does not bruise/bleed easily.   Skin: Negative for flushing, itching, poor wound healing and rash.   Musculoskeletal: Negative for arthritis, back pain, falls, gout, joint pain, joint swelling, muscle cramps, muscle weakness, myalgias, neck pain and stiffness.   Gastrointestinal: Negative for bloating, abdominal pain, anorexia, diarrhea, dysphagia, excessive appetite, flatus, hematemesis, jaundice, melena and nausea.   Genitourinary: Negative for hesitancy and incomplete emptying.   Neurological: Negative for aphonia, brief paralysis, difficulty with concentration, disturbances in coordination, excessive daytime sleepiness, dizziness, focal weakness, light-headedness and loss of balance.   Psychiatric/Behavioral: Negative for altered mental status, depression, hallucinations, hypervigilance, memory loss, substance abuse and suicidal ideas. The patient does not have insomnia and is not nervous/anxious.        Objective:   Physical Exam   Constitutional: She is oriented to person, place, and time. She appears well-developed and well-nourished. No distress.   Flat affect   HENT:   Head: Normocephalic and atraumatic.   Nose: Nose normal.   Mouth/Throat: Oropharynx is clear and moist. No oropharyngeal exudate.   Eyes: Conjunctivae and EOM are normal. Pupils are equal, round, and reactive to light. Right eye exhibits no discharge. Left eye exhibits no discharge. No scleral icterus.   Neck: Normal range of motion. Neck supple. No JVD present. No tracheal deviation present. No thyromegaly present.   Cardiovascular: Normal rate, regular rhythm and intact distal pulses. Exam reveals no gallop and no  friction rub.   Murmur (systolic) heard.  Pulmonary/Chest: Effort normal and breath sounds normal. No stridor. No respiratory distress. She has no wheezes. She has no rales. She exhibits no tenderness.   Abdominal: Soft. Bowel sounds are normal. She exhibits no distension and no mass. There is no tenderness. There is no rebound and no guarding.   Musculoskeletal: Normal range of motion. She exhibits no edema or tenderness.   Lymphadenopathy:     She has no cervical adenopathy.   Neurological: She is alert and oriented to person, place, and time. She has normal reflexes. No cranial nerve deficit. She exhibits normal muscle tone. Coordination normal.   Skin: Skin is warm. No rash noted. She is not diaphoretic. No erythema. No pallor.   Psychiatric: She has a normal mood and affect. Her behavior is normal. Judgment and thought content normal.       Assessment:     1. Severe aortic stenosis    2. Late onset Alzheimer's disease without behavioral disturbance    3. NSTEMI (non-ST elevated myocardial infarction)    4. Coronary artery disease involving native coronary artery of native heart without angina pectoris        Plan:   Per the patient's wishes, no intervention we revived the medications, ok to take SL NTG as needed and continue IMDUR  Patient will benefit from home health and family wishes to discuss hospice care.       RTC prn for medical management.

## 2018-10-22 NOTE — TELEPHONE ENCOUNTER
----- Message from Sujata Mantilla sent at 10/22/2018  2:28 PM CDT -----  Please put EKG order in the computer.  Also pt appt is today at 4:00. Thank you.

## 2018-10-25 ENCOUNTER — TELEPHONE (OUTPATIENT)
Dept: INTERNAL MEDICINE | Facility: CLINIC | Age: 83
End: 2018-10-25

## 2018-10-25 DIAGNOSIS — F02.80 ALZHEIMER'S DEMENTIA WITHOUT BEHAVIORAL DISTURBANCE, UNSPECIFIED TIMING OF DEMENTIA ONSET: ICD-10-CM

## 2018-10-25 DIAGNOSIS — I35.0 SEVERE AORTIC STENOSIS: Primary | ICD-10-CM

## 2018-10-25 DIAGNOSIS — G30.9 ALZHEIMER'S DEMENTIA WITHOUT BEHAVIORAL DISTURBANCE, UNSPECIFIED TIMING OF DEMENTIA ONSET: ICD-10-CM

## 2018-10-25 NOTE — TELEPHONE ENCOUNTER
I called in 2 week supply on lipitor, prilosec, cozaar, lasix and ntg .  14 of each.  Put on recorder at [phone].    No need to document to med list.  jonel says she will be out after tonite.    Thanks noel

## 2018-10-25 NOTE — TELEPHONE ENCOUNTER
----- Message from Brigida Lopez sent at 10/25/2018  1:29 PM CDT -----  Contact: Dixie Cell# [phone]  Dixie called in regards needing to f/u on medication request. Dixie stated that there are two important medication that patient need for her heart and she need it today. Please call and advise. Thank you

## 2018-10-25 NOTE — TELEPHONE ENCOUNTER
----- Message from Jacinta Quinonez sent at 10/24/2018  4:10 PM CDT -----  Contact: Daughter Dixie Cell# [phone]  RX request - refill or new RX.  Is this a refill or new RX: Refill   RX name and strength: atorvastatin (LIPITOR) 80 MG tablet  omeprazole (PRILOSEC) 20 MG capsule  losartan (COZAAR) 25 MG tablet  furosemide (LASIX) 20 MG tablet  nitroGLYCERIN (NITROSTAT) 0.4 MG SL tablet  Directions:   Is this a 30 day or 90 day RX:    Local pharmacy or mail order pharmacy:  [facility]/pharmacy #[id number] - [place], [place] - [address]  Pharmacy name and phone # Phone# [phone],Fax# [phone]   Comments:  Patient's daughter would like to get a two weeks supply of her mother's medication because she said it would take [facility] pharmacy about two weeks before her mother will be able to get her medication. She said that her mother is almost out of her medication.

## 2018-10-25 NOTE — TELEPHONE ENCOUNTER
LS: 1-16-18     Patient's daughter would like to get a two weeks supply of her mother's medication because she said it would take [facility] pharmacy about two weeks before her mother will be able to get her medication. She said that her mother is almost out of her medication

## 2019-04-18 ENCOUNTER — OFFICE VISIT (OUTPATIENT)
Dept: NEUROLOGY | Facility: CLINIC | Age: 84
End: 2019-04-18
Payer: MEDICARE

## 2019-04-18 VITALS
WEIGHT: 134 LBS | HEIGHT: 62 IN | DIASTOLIC BLOOD PRESSURE: 78 MMHG | HEART RATE: 72 BPM | SYSTOLIC BLOOD PRESSURE: 108 MMHG | BODY MASS INDEX: 24.66 KG/M2

## 2019-04-18 DIAGNOSIS — E78.2 MIXED HYPERLIPIDEMIA: ICD-10-CM

## 2019-04-18 DIAGNOSIS — I70.0 AORTIC ATHEROSCLEROSIS: ICD-10-CM

## 2019-04-18 DIAGNOSIS — F02.818 MIXED ALZHEIMER'S AND VASCULAR DEMENTIA WITH BEHAVIOR DISTURBANCES: Primary | ICD-10-CM

## 2019-04-18 DIAGNOSIS — I10 ESSENTIAL HYPERTENSION: ICD-10-CM

## 2019-04-18 DIAGNOSIS — F32.A DEPRESSION, UNSPECIFIED DEPRESSION TYPE: ICD-10-CM

## 2019-04-18 DIAGNOSIS — I35.0 SEVERE AORTIC STENOSIS: ICD-10-CM

## 2019-04-18 DIAGNOSIS — F01.518 MIXED ALZHEIMER'S AND VASCULAR DEMENTIA WITH BEHAVIOR DISTURBANCES: Primary | ICD-10-CM

## 2019-04-18 DIAGNOSIS — I25.118 CORONARY ARTERY DISEASE OF NATIVE ARTERY OF NATIVE HEART WITH STABLE ANGINA PECTORIS: ICD-10-CM

## 2019-04-18 DIAGNOSIS — G30.9 MIXED ALZHEIMER'S AND VASCULAR DEMENTIA WITH BEHAVIOR DISTURBANCES: Primary | ICD-10-CM

## 2019-04-18 DIAGNOSIS — I50.32 CHRONIC DIASTOLIC HEART FAILURE: ICD-10-CM

## 2019-04-18 DIAGNOSIS — R41.3 MEMORY LOSS: ICD-10-CM

## 2019-04-18 PROCEDURE — 99499 RISK ADDL DX/OHS AUDIT: ICD-10-PCS | Mod: HCNC,S$GLB,, | Performed by: PSYCHIATRY & NEUROLOGY

## 2019-04-18 PROCEDURE — 99999 PR PBB SHADOW E&M-EST. PATIENT-LVL III: CPT | Mod: PBBFAC,HCNC,, | Performed by: PSYCHIATRY & NEUROLOGY

## 2019-04-18 PROCEDURE — 99214 OFFICE O/P EST MOD 30 MIN: CPT | Mod: GW,HCNC,S$GLB, | Performed by: PSYCHIATRY & NEUROLOGY

## 2019-04-18 PROCEDURE — 99999 PR PBB SHADOW E&M-EST. PATIENT-LVL III: ICD-10-PCS | Mod: PBBFAC,HCNC,, | Performed by: PSYCHIATRY & NEUROLOGY

## 2019-04-18 PROCEDURE — 99499 UNLISTED E&M SERVICE: CPT | Mod: HCNC,S$GLB,, | Performed by: PSYCHIATRY & NEUROLOGY

## 2019-04-18 PROCEDURE — 99214 PR OFFICE/OUTPT VISIT, EST, LEVL IV, 30-39 MIN: ICD-10-PCS | Mod: GW,HCNC,S$GLB, | Performed by: PSYCHIATRY & NEUROLOGY

## 2019-04-18 RX ORDER — LACTULOSE 10 G/15ML
15 SOLUTION ORAL; RECTAL
COMMUNITY

## 2019-04-18 NOTE — PATIENT INSTRUCTIONS
Discussed with patient and granddaughter.  She is on no memory medications and would hesitate to initiate them because of potential side effects.  Fall precautions discussed.  The patient is to be in a supervised environment at home.  Continue citalopram which has helped his behavior.  She is presently significantly cognitively impaired and any decisions regarding care and finances.

## 2019-04-18 NOTE — PROGRESS NOTES
Subjective:       Patient ID: Janine Awad is a 89 y.o. female.    Chief Complaint:  Memory Loss      History of Present Illness  HPI  This is an 89-year-old female who returns in follow-up.  She was last seen by me in March 2018.  She was accompanied by her granddaughter and presently is at home requiring 24-hour supervision.  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  She has had difficulty with retention of recent information and was not able to give an adequate recent history.  Workup done in the past included an EEG that was normal.  A carotid ultrasound showed no significant stenosis.  The patient is not on any memory medications because of potential side effects.  Over the past 6 months her granddaughter reported that she had become somewhat irritable and anxious with occasional restlessness.  She was then started on citalopram 10 mg daily.  This has helped her behavior.  She is sleeping better.    Patient had a fall in July 2018 and subsequently was seen in the ED.  She had a CT scan of the head that showed subdural hematoma and she was evaluated surgery who advised treatment.  A repeat  A CT of the head done in August 2018 showed evolving thin subdural hemorrhage overlying the right parietal convexity posteriorly.  No evidence for new hemorrhage or new abnormal parenchymal attenuation.  No further neurosurgical follow-up was needed.       Review of Systems  Review of Systems   Constitutional: Negative.    HENT: Negative.  Negative for hearing loss.    Eyes: Negative.  Negative for visual disturbance.   Respiratory: Negative.  Negative for shortness of breath.    Cardiovascular: Negative.  Negative for chest pain and palpitations.   Gastrointestinal: Negative.    Endocrine: Negative.    Genitourinary: Negative.    Musculoskeletal: Positive for arthralgias and gait problem. Negative for back pain and neck pain.   Skin: Negative.     Allergic/Immunologic: Negative.    Neurological: Negative for tremors, seizures, syncope, speech difficulty, weakness, numbness and headaches.   Hematological: Negative.    Psychiatric/Behavioral: Positive for decreased concentration. Negative for confusion. The patient is nervous/anxious.        Objective:      Neurologic Exam     Mental Status   Oriented to person.   Disoriented to place. Disoriented to area and number. Oriented to country, city and street.   Disoriented to time. Disoriented to year, month, date, day and season.   Registration: recalls 3 of 3 objects. Recall at 5 minutes: recalls 1 of 3 objects. Follows 3 step commands.   Attention: decreased. Concentration: decreased.   Speech: speech is normal   Level of consciousness: alert  Knowledge: good. Able to perform simple calculations.   Able to name object. Able to read. Able to repeat. Able to write. Normal comprehension.   Patient was alert and responsive but cannot give any adequate recent history.     Cranial Nerves   Cranial nerves II through XII intact.     Motor Exam   Muscle bulk: normal  Overall muscle tone: normal    Strength   Strength 5/5 throughout.     Sensory Exam   Light touch normal.   Proprioception normal.   Pinprick normal.     Gait, Coordination, and Reflexes     Gait  Gait: non-neurologic    Coordination   Romberg: negative  Finger to nose coordination: normal    Tremor   Resting tremor: absent  Intention tremor: absent  Action tremor: absent    Reflexes   Right brachioradialis: 1+  Left brachioradialis: 1+  Right biceps: 1+  Left biceps: 1+  Right triceps: 1+  Left triceps: 1+  Right patellar: 1+  Left patellar: 1+  Right achilles: 1+  Left achilles: 1+  Right plantar: normal  Left plantar: normalPatient is limited to a wheelchair because of recent hip fracture with surgery.       Physical Exam   Constitutional: She appears well-developed and well-nourished.   HENT:   Head: Normocephalic and atraumatic.   Neck: Normal range of  motion. Neck supple. Carotid bruit is not present.   Neurological: She has normal strength. She has a normal Finger-Nose-Finger Test and a normal Romberg Test.   Reflex Scores:       Tricep reflexes are 1+ on the right side and 1+ on the left side.       Bicep reflexes are 1+ on the right side and 1+ on the left side.       Brachioradialis reflexes are 1+ on the right side and 1+ on the left side.       Patellar reflexes are 1+ on the right side and 1+ on the left side.       Achilles reflexes are 1+ on the right side and 1+ on the left side.  Psychiatric: Her speech is normal.   Vitals reviewed.        Assessment:        1. 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 Depression, unspecified depression type    3. Memory loss    4. Aortic atherosclerosis    5. Chronic diastolic heart failure    6. Coronary artery disease of native artery of native heart with stable angina pectoris    7. Essential hypertension    8. Mixed hyperlipidemia    9. Severe aortic stenosis           Plan:       Discussed with patient and granddaughter.  She is on no memory medications and would hesitate to initiate them because of potential side effects.  Fall precautions discussed.  The patient is to be in a supervised environment at home.  Continue citalopram which has helped his behavior.  She is presently significantly cognitively impaired and any decisions regarding care and finances.  Follow-up in one year if stable.

## 2019-05-27 ENCOUNTER — PES CALL (OUTPATIENT)
Dept: ADMINISTRATIVE | Facility: CLINIC | Age: 84
End: 2019-05-27

## 2019-07-03 ENCOUNTER — TELEPHONE (OUTPATIENT)
Dept: NEUROLOGY | Facility: CLINIC | Age: 84
End: 2019-07-03

## 2019-07-03 NOTE — TELEPHONE ENCOUNTER
Granddaughter will contact [facility] for scheduling for second opinion.

## 2019-07-22 ENCOUNTER — TELEPHONE (OUTPATIENT)
Dept: NEUROLOGY | Facility: CLINIC | Age: 84
End: 2019-07-22

## 2019-07-22 NOTE — TELEPHONE ENCOUNTER
I called patient to reschedule her appt with Dr. Weller. She needed a earlier appt and was schedule with Dr. Walker for tomorrow 07/23/2019 at 2:00.

## 2019-07-22 NOTE — TELEPHONE ENCOUNTER
----- Message from Shivani Garcia sent at 7/22/2019  8:27 AM CDT -----  Contact: Pt's granddaughter Gudelia   Per Gudelia, pt is running late for her appt today at 8:20A. Per pt, she would like a call back to still be seen today, if possible. Pt would need some time because she is using [employer] transportation.    Gudelia can be reached at [phone].    Thank you

## 2019-07-23 ENCOUNTER — OFFICE VISIT (OUTPATIENT)
Dept: NEUROLOGY | Facility: CLINIC | Age: 84
End: 2019-07-23
Payer: MEDICARE

## 2019-07-23 VITALS
DIASTOLIC BLOOD PRESSURE: 49 MMHG | BODY MASS INDEX: 24.67 KG/M2 | HEART RATE: 66 BPM | WEIGHT: 134.06 LBS | SYSTOLIC BLOOD PRESSURE: 76 MMHG | HEIGHT: 62 IN

## 2019-07-23 DIAGNOSIS — F02.818 MIXED ALZHEIMER'S AND VASCULAR DEMENTIA WITH BEHAVIOR DISTURBANCES: ICD-10-CM

## 2019-07-23 DIAGNOSIS — F01.518 MIXED ALZHEIMER'S AND VASCULAR DEMENTIA WITH BEHAVIOR DISTURBANCES: ICD-10-CM

## 2019-07-23 DIAGNOSIS — F03.90 MULTIFACTORIAL DEMENTIA: ICD-10-CM

## 2019-07-23 DIAGNOSIS — G30.9 MIXED ALZHEIMER'S AND VASCULAR DEMENTIA WITH BEHAVIOR DISTURBANCES: ICD-10-CM

## 2019-07-23 PROCEDURE — 1101F PT FALLS ASSESS-DOCD LE1/YR: CPT | Mod: HCNC,CPTII,S$GLB, | Performed by: PSYCHIATRY & NEUROLOGY

## 2019-07-23 PROCEDURE — 99999 PR PBB SHADOW E&M-EST. PATIENT-LVL III: CPT | Mod: PBBFAC,HCNC,, | Performed by: PSYCHIATRY & NEUROLOGY

## 2019-07-23 PROCEDURE — 99215 PR OFFICE/OUTPT VISIT, EST, LEVL V, 40-54 MIN: ICD-10-PCS | Mod: GV,HCNC,S$GLB, | Performed by: PSYCHIATRY & NEUROLOGY

## 2019-07-23 PROCEDURE — 99215 OFFICE O/P EST HI 40 MIN: CPT | Mod: GV,HCNC,S$GLB, | Performed by: PSYCHIATRY & NEUROLOGY

## 2019-07-23 PROCEDURE — 1101F PR PT FALLS ASSESS DOC 0-1 FALLS W/OUT INJ PAST YR: ICD-10-PCS | Mod: HCNC,CPTII,S$GLB, | Performed by: PSYCHIATRY & NEUROLOGY

## 2019-07-23 PROCEDURE — 99999 PR PBB SHADOW E&M-EST. PATIENT-LVL III: ICD-10-PCS | Mod: PBBFAC,HCNC,, | Performed by: PSYCHIATRY & NEUROLOGY

## 2019-07-23 NOTE — PROGRESS NOTES
[facility] NEUROLOGY  [facility] [place]    Date: 7/23/19  Patient Name: Janine Awad   MRN: [id number]   PCP: Oleg Enciso  Referring Provider: Self, Aaareferral    Assessment:   Janine Awad is a 89 y.o. female presenting to [facility] care for mixed dementia.  Completed extensive discussions on advanced care planning and safety today.  Patient is currently on Depakote for mood management, may favor taper off in the future pending overall clinical picture however will not adjust today as patient is stable.    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     Hood Walker MD  [facility]   Department of Neurology    Patient note was created using MModal Dictation.  Any errors in syntax or even information may not have been identified and edited on initial review prior to signing this note.  Subjective:   Patient seen in consultation at the request of Self, Aaareferral for the evaluation of dementia. A copy of this note will be sent to the referring physician.      HPI:   Ms. Janine Awad is a 89 y.o. female who presents with a chief complaint of longstanding dementia.  Patient presents today with her granddaughter, the patient raised who access her primary caregiver.  The patient's granddaughter reports that the patient has had significant progression of her dementia over the past year and is now largely passive and requires assistance with all of her activities of daily living.  While she experienced behavioral outbursts in the past, she is now  largely passive.  Her granddaughter states that she does occasionally have benign hallucinations of family members in the room with her but they are not upsetting or disturbing to the patient. She currently receives respite care and sitters via the [facility].    PAST MEDICAL HISTORY:  Past Medical History:   Diagnosis Date    Acquired hypothyroidism     Closed displaced intertrochanteric fracture of left femur s/p IM nail on 6/5/2016 6/5/2016    Coronary artery disease involving native coronary artery of native heart without angina pectoris     Essential hypertension     GERD (gastroesophageal reflux disease)     Hx of colonic polyps     Hyperlipidemia     Macular degeneration     Mitral valve stenosis     Severe aortic stenosis 9/22/2014    Syncope 1/5/2015       PAST SURGICAL HISTORY:  Past Surgical History:   Procedure Laterality Date    DILATION AND CURETTAGE OF UTERUS      HYSTERECTOMY      IM NAILING OF FEMUR LEFT INTERTROCH -- PROFX -- BIG C ARM -- REP CONTACTED Left 6/5/2016    [facility]    INTERTROCHANTERIC HIP FRACTURE SURGERY Left 06/05/2016    IM nail    TONSILLECTOMY         CURRENT MEDS:  Current Outpatient Medications   Medication Sig Dispense Refill    divalproex (DEPAKOTE SPRINKLE) 125 mg capsule Take 1 capsule (125 mg total) by mouth 3 (three) times daily. (Patient taking differently: Take 125 mg by mouth 2 (two) times daily. ) 90 capsule 11    fish oil-omega-3 fatty acids 300-1,000 mg capsule Take 2 g by mouth once daily.      furosemide (LASIX) 20 MG tablet TAKE 1 TABLET ONE TIME DAILY 90 tablet 1    lactulose (CHRONULAC) 10 gram/15 mL solution Take 15 mLs by mouth as needed.       levothyroxine (SYNTHROID) 50 MCG tablet TAKE 1 TABLET BEFORE BREAKFAST. 90 tablet 3    losartan (COZAAR) 25 MG tablet Take 25 mg by mouth once daily.       metoprolol succinate (TOPROL-XL) 25 MG 24 hr tablet TAKE 1 TABLET ONE TIME DAILY 90 tablet  3    multivit-mins no.63/iron/folic (M-VIT ORAL) Take 1 tablet by mouth once daily.       nitroGLYCERIN (NITROSTAT) 0.4 MG SL tablet Place 1 tablet (0.4 mg total) under the tongue every 5 (five) minutes as needed for Chest pain. 25 tablet 0    omeprazole (PRILOSEC) 20 MG capsule Take 20 mg by mouth once daily.       vitamin D 1000 units Tab Take 185 mg by mouth once daily.      artificial tears (ISOPTO TEARS) 0.5 % ophthalmic solution Place 1 drop into the left eye 4 (four) times daily.      aspirin 81 MG Chew Take 1 tablet (81 mg total) by mouth once daily.  0    atorvastatin (LIPITOR) 80 MG tablet Take 1 tablet (80 mg total) by mouth once daily. 90 tablet 3    cyanocobalamin (VITAMIN B-12) 1000 MCG tablet Take 1 tablet (1,000 mcg total) by mouth once daily. 90 tablet 3    isosorbide mononitrate (IMDUR) 30 MG 24 hr tablet TAKE 1 TABLET ONE TIME DAILY 90 tablet 3     No current facility-administered medications for this visit.        ALLERGIES:  Review of patient's allergies indicates:   Allergen Reactions    Codeine Nausea And Vomiting     chills    Lisinopril Other (See Comments)     cough    Morphine Nausea And Vomiting       FAMILY HISTORY:  Family History   Problem Relation Age of Onset    Heart disease Mother     Cancer Mother     Heart disease Father     Skin cancer Father     No Known Problems Sister     No Known Problems Brother     No Known Problems Maternal Grandmother     No Known Problems Maternal Grandfather     No Known Problems Paternal Grandmother     No Known Problems Paternal Grandfather     No Known Problems Maternal Aunt     No Known Problems Maternal Uncle     No Known Problems Paternal Aunt     No Known Problems Paternal Uncle     Anemia Neg Hx     Arrhythmia Neg Hx     Asthma Neg Hx     Clotting disorder Neg Hx     Fainting Neg Hx     Heart attack Neg Hx     Heart failure Neg Hx     Hyperlipidemia Neg Hx     Hypertension Neg Hx     Stroke Neg Hx     Atrial  "Septal Defect Neg Hx        SOCIAL HISTORY:  Social History     Tobacco Use    Smoking status: Never Smoker    Smokeless tobacco: Never Used   Substance Use Topics    Alcohol use: No     Alcohol/week: 0.6 oz     Types: 1 Standard drinks or equivalent per week     Comment: rare     Drug use: No       Review of Systems:  12 system review of systems is negative except for the symptoms mentioned in HPI.      Objective:     Vitals:    07/23/19 1410   BP: (!) 76/49   Pulse: 66   Weight: 60.8 kg (134 lb 0.6 oz)   Height: 5' 2" (1.575 m)     General: NAD, well nourished   Eyes: no tearing, discharge, no erythema   ENT: moist mucous membranes of the oral cavity, nares patent    Neck: Supple, full range of motion  Cardiovascular: Warm and well perfused, pulses equal and symmetrical  Lungs: Normal work of breathing, normal chest wall excursions  Skin: No rash, lesions, or breakdown on exposed skin  Psychiatry: Mood and affect are appropriate   Abdomen: soft, non tender, non distended  Extremeties: No cyanosis, clubbing or edema.    Neurological   MENTAL STATUS: Alert and oriented to person, place, but not time. Attention and concentration within normal limits. Speech without dysarthria, able to name and repeat with significant difficulty. Recent and remote memory por  CRANIAL NERVES: Visual fields intact. PERRL. EOMI. Facial sensation intact. Face symmetrical. Hearing grossly intact. Full shoulder shrug bilaterally. Tongue protrudes midline   SENSORY: Sensation is intact to light touch throughout.  . MOTOR: Normal bulk and tone. 5/5 deltoid, biceps, triceps, interosseous, hand  bilaterally. 5/5 iliopsoas, knee extension/flexion, foot dorsi/plantarflexion bilaterally.    REFLEXES: Symmetric and 1+ throughout.  CEREBELLAR/COORDINATION/GAIT: Patient in wheelchair at baseline. Finger to nose intact. Normal rapid alternating movements.       "

## 2020-01-20 ENCOUNTER — PES CALL (OUTPATIENT)
Dept: ADMINISTRATIVE | Facility: CLINIC | Age: 85
End: 2020-01-20

## 2020-05-03 NOTE — PLAN OF CARE
Left message to complete f/u COVID screen, if patient does not return call a covid screen will be completed on admission to unit on Monday 5/4.    REFERRAL SENT TO     [facility] [phone]    BOB IS F/U.

## 2021-08-09 NOTE — ED NOTES
Pt resting comfortably; no current complaints; pt instructed to call w/ any needs     Moderate Variability

## 2022-12-09 NOTE — PATIENT INSTRUCTIONS
Pulmonary Edema  Your healthcare provider has told you that you have pulmonary edema. Read on to learn more about pulmonary edema and how it can be treated.  What is pulmonary edema?     Pulmonary edema is fluid in the air sacs (alveoli) in the lungs.   Pulmonary edema occurs when the air sacs (alveoli) in your lungs fill with fluid. The fluid buildup makes it hard for the lungs to do their job, including getting oxygen from the air you breathe. This can make it difficult to breathe. The most common cause of pulmonary edema is heart failure. When the heart doesnt work properly, it can cause pressure to rise in the veins (blood vessels) of the lungs. As pressure builds, fluid leaks out of the congested veins. It fills the alveoli. The extra fluid prevents oxygen from moving through the lungs as it should. But heart failure isnt the only cause of pulmonary edema. Damage to the lungs or kidney failure can also cause fluid to fill the lungs. And in some cases, living or exercising at high altitudes can lead to fluid buildup in the lungs.  How is pulmonary edema diagnosed?  Your healthcare provider does an exam and asks about your health history. You may also have one or more of the following:  · Blood tests to take samples of blood.  · Imaging tests to take detailed pictures of inside the body. These may include a chest X-ray and ultrasound.  · Electrocardiography (ECG) to test how well the heart is functioning.  How is pulmonary edema treated?  Treatment usually depends on whats causing the edema. For instance, if its because of heart failure, treating the heart condition will treat the edema. Treatment can also ease symptoms. Therapy often includes the following:  · Oxygen. This may be given through a mask that goes over the nose. It may be given through a small tube that sits under the nose. Or it may be given through a tube thats placed into the windpipe (trachea). A ventilator--often called a breathing  Received order number [id number]. Endorsed to provider for review and signature. machine--may also be used.  · Medicines. These may include water pills (diuretics) to help your body get rid of extra fluid. The fluid passes out of your body as urine. You may also need medicines to treat the heart. These can help your heart work better. This helps reduce fluid buildup in the lungs.  What are the long-term concerns?  If treated right away, pulmonary edema can be improved. It may even be cured. But in some cases, ongoing treatment is needed to help control the problem. This may require having procedures or taking medicines for months or years. In some cases, you may need to use oxygen or breathing equipment for a long time. This can lead to complications such as damage to lung tissue. Your healthcare provider can tell you more if needed.     When to call the healthcare provider  Call your the healthcare provider right away if you have any of the following:  · Chest pain (call 911)  · Severe trouble breathing (call 911)  · Coughing up blood (call 911)  · Skin turns blue (call 911)  · Unusual or irregular heartbeat  · Unable to speak full sentences before running out of breath  · Sweating more than usual   Date Last Reviewed: 8/4/2014  © 9390-8461 [employer]. [address] [place] [address], [place] [address]. All rights reserved. This information is not intended as a substitute for professional medical care. Always follow your healthcare professional's instructions.

## 2023-06-21 NOTE — ED NOTES
The patient was brought to the ER today by EMS after having an episode of shortness of breath when getting dressed to go to a doctors appointment. The patient also complained of left sided chest pain just under the left breast. The patient contacted her PCP and complained of weakness and fever. Patient also reports pain with deep inspiration. Patient has noticed redness to the right eyelid as well. The patient also complains of neck and back pain   Simponi Pregnancy And Lactation Text: The risk during pregnancy and breastfeeding is uncertain with this medication.

## 2024-12-04 NOTE — TELEPHONE ENCOUNTER
----- Message from Blanche Danii sent at 6/20/2017  4:52 PM CDT -----  Contact: Grace, daughter, [phone]  Called in returning your call. Please advise.      
Returned call, spoke with Ms Edwards.  Patient's angiogram was cancelled, patient/daughter does not think this is necessary at this time.  Asked to call back at their convenience if they recant decision for an appointment with Dr Chapman to re-assess patient.  Voiced understanding.  
No